# Patient Record
Sex: FEMALE | Race: WHITE | NOT HISPANIC OR LATINO | Employment: FULL TIME | URBAN - METROPOLITAN AREA
[De-identification: names, ages, dates, MRNs, and addresses within clinical notes are randomized per-mention and may not be internally consistent; named-entity substitution may affect disease eponyms.]

---

## 2017-02-21 DIAGNOSIS — J31.0 CHRONIC RHINITIS: ICD-10-CM

## 2017-02-21 RX ORDER — AZELASTINE 1 MG/ML
1 SPRAY, METERED NASAL 2 TIMES DAILY
Qty: 3 BOTTLE | Refills: 3 | Status: CANCELLED | OUTPATIENT
Start: 2017-02-21

## 2017-02-21 NOTE — TELEPHONE ENCOUNTER
Refills available - called  [place] -   1. Refills: yes    OK to cancel request    Closing encounter - no further actions needed at this time    Krissy Rivero RN

## 2017-02-21 NOTE — TELEPHONE ENCOUNTER
Azelastine 0.1% (137MCG) Nasal-200SP      Last Written Prescription Date: 12/29/16  Last Fill Quantity: 3,  # refills: 3   Last Office Visit with G, [facility] or [facility] prescribing provider: 11/10/16

## 2017-04-12 ENCOUNTER — TELEPHONE (OUTPATIENT)
Dept: FAMILY MEDICINE | Facility: CLINIC | Age: 57
End: 2017-04-12

## 2017-04-12 NOTE — TELEPHONE ENCOUNTER
Spoke with pt, she does still have refills available, explained the refill process and how sometimes refills can look different depending on protocol of clinic, she verbalized understanding  She will call her pharmacy when needs refills  Delmis Fallon RN

## 2017-04-12 NOTE — TELEPHONE ENCOUNTER
Pt is requesting fills of her nasal spray azelastine (ASTELIN) 0.1 % spray to be a fill of three each time rather than one    Pt can be reached @ [phone] ketan

## 2017-05-02 ENCOUNTER — RADIANT APPOINTMENT (OUTPATIENT)
Dept: MAMMOGRAPHY | Facility: CLINIC | Age: 57
End: 2017-05-02
Attending: FAMILY MEDICINE
Payer: COMMERCIAL

## 2017-05-02 DIAGNOSIS — Z12.31 VISIT FOR SCREENING MAMMOGRAM: ICD-10-CM

## 2017-05-02 PROCEDURE — G0202 SCR MAMMO BI INCL CAD: HCPCS

## 2017-05-10 ENCOUNTER — TELEPHONE (OUTPATIENT)
Dept: PODIATRY | Facility: CLINIC | Age: 57
End: 2017-05-10

## 2017-05-10 NOTE — TELEPHONE ENCOUNTER
Reason for Call:  Other call back    Detailed comments: Patient lost her after visit summary and would like to know what the next steps are. She has tried a few things and have seen little change. For example, she's purchased 3 new foot supports and has been exercising. Does she need better support for her foot? Please advise, thank you!    Phone Number Patient can be reached at: Home number on file [phone] (home)    Best Time: anytime    Can we leave a detailed message on this number? YES    Call taken on 5/10/2017 at 12:24 PM by Tiny Bustamante

## 2017-05-10 NOTE — TELEPHONE ENCOUNTER
Called and LVM advising she follow up in clinic since we have not seen her since July 2016. I sent her AVS from her last visit on my chart again and asked that she read it over then call to make a follow up appointment.    Kerry Whyte CMA ([facility])  Podiatry/Foot & Ankle Surgery  [facility]

## 2017-06-01 ENCOUNTER — OFFICE VISIT (OUTPATIENT)
Dept: PODIATRY | Facility: CLINIC | Age: 57
End: 2017-06-01
Payer: COMMERCIAL

## 2017-06-01 VITALS — WEIGHT: 133 LBS | HEART RATE: 68 BPM | BODY MASS INDEX: 20.16 KG/M2 | HEIGHT: 68 IN

## 2017-06-01 DIAGNOSIS — M72.2 PLANTAR FASCIAL FIBROMATOSIS: Primary | ICD-10-CM

## 2017-06-01 DIAGNOSIS — M79.671 PAIN OF BOTH HEELS: ICD-10-CM

## 2017-06-01 DIAGNOSIS — M79.672 PAIN OF BOTH HEELS: ICD-10-CM

## 2017-06-01 DIAGNOSIS — M21.6X9 PES CAVUS, UNSPECIFIED LATERALITY: ICD-10-CM

## 2017-06-01 PROCEDURE — 99213 OFFICE O/P EST LOW 20 MIN: CPT | Performed by: PODIATRIST

## 2017-06-01 NOTE — NURSING NOTE
"Chief Complaint   Patient presents with     RECHECK     BL plantar foot pain, last seen 7/2016 for same thing, says she followed the AVS instructions with no change       Initial Pulse 68  Ht 5' 8\" (1.727 m)  Wt 133 lb (60.3 kg)  LMP 04/06/2010  BMI 20.22 kg/m2 Estimated body mass index is 20.22 kg/(m^2) as calculated from the following:    Height as of this encounter: 5' 8\" (1.727 m).    Weight as of this encounter: 133 lb (60.3 kg).  Medication Reconciliation: complete  "

## 2017-06-01 NOTE — MR AVS SNAPSHOT
After Visit Summary   2017    Beronica Azevedo    MRN: [id number]           Patient Information     Date Of Birth          1960        Visit Information        Provider Department      2017 8:45 AM Gibran Keith DPM [place]        Care Instructions    DR. KEITH'S SCHEDULE:        MONDAY / FRIDAY AM - [place] WEDNES[place] - [place]   [address] [place] [address] [place] [address]   P: [phone] P: [phone]   F: [phone] F:[phone]       TUESDAY - SURGERY THURSDAY - [place]   [phone] [address] [place] [address]   TO SCHEDULE AN APPT CALL: P: [phone] F: [phone]     FYI: Our schedule at [place] on Wednesday is from 7 AM - 2 PM.    [facility] LOCATIONS  [facility]  [address]  [place] [address]  Phone: [phone]  Fax: [phone] [facility]  [address] [place] [address] [address]  [place] [address]  Phone: [phone]   Fax: [phone]   [facility]  [address]  [place] [address]  Phone: [phone]  Fax: [phone] [facility]  [address]  [place] [address]  Phone: [phone]   Fax: [phone]   [facility]   [address]  [place] [address]  Phone: [phone]   Fax: [phone] * Please call any location listed to make an appointment for a casting/fitting. Your referral was sent to their central office and they will all have the order on file.         BODY MASS INDEX (BMI)  Many things can cause foot and ankle problems. Foot structure, activity level, foot mechanics and injuries are common causes of pain.    One very important issue that often goes unmentioned, is body weight.  Extra weight can cause increased stress on muscles, ligaments, bones and tendons. Sometimes just a few extra pounds  is all it takes to put one over her/his threshold. Without reducing that stress, it can be difficult to alleviate pain.      Some people are uncomfortable addressing this issue, but we feel it is important for you to think about it. As Foot & Ankle specialists, our job is addressing the lower extremity problem and possible causes.     Regarding extra body weight, we encourage patients to discuss diet and weight management plans with their primary care doctors. It is this team approach that gives you the best opportunity for pain relief and getting you back on your feet.                Follow-ups after your visit        Your next 10 appointments already scheduled     Jun 06, 2017  1:30 PM CDT   PHYSICAL with CRAIG Abad CNP   [facility] ([facility]    [address] [place] [address] [phone]            Aug 01, 2017  9:15 AM CDT   (Arrive by 9:00 AM)   Return Visit with John Antunez MD   [facility] ([facility] [place])    [address]  3rd [address]  [facility] [address] [phone]              Who to contact     If you have questions or need follow up information about today's clinic visit or your schedule please contact [facility] directly at [phone].  Normal or non-critical lab and imaging results will be communicated to you by [employer], letter or phone within 4 business days after the clinic has received the results. If you do not hear from us within 7 days, please contact the clinic through [employer] or phone. If you have a critical or abnormal lab result, we will notify you by phone as soon as possible.  Submit refill requests through [employer] or call your pharmacy and they will forward the refill request to us. Please allow 3 business days for your refill to be completed.          Additional Information About Your Visit        [employer] Information     [employer] gives you  "secure access to your electronic health record. If you see a primary care provider, you can also send messages to your care team and make appointments. If you have questions, please call your primary care clinic.  If you do not have a primary care provider, please call [phone] and they will assist you.        Care EveryWhere ID     This is your Care EveryWhere ID. This could be used by other organizations to access your [place] medical records  [id number]        Your Vitals Were     Pulse Height Last Period BMI (Body Mass Index)          68 5' 8\" (1.727 m) 04/06/2010 20.22 kg/m2         Blood Pressure from Last 3 Encounters:   11/10/16 104/56   10/30/16 98/60   07/06/16 98/66    Weight from Last 3 Encounters:   06/01/17 133 lb (60.3 kg)   11/10/16 133 lb 6.4 oz (60.5 kg)   10/30/16 130 lb (59 kg)              Today, you had the following     No orders found for display       Primary Care Provider Office Phone # Fax #    Joaquin Sarthak Rowe [phone]       [facility] [address] [address]        Thank you!     Thank you for choosing [facility]  for your care. Our goal is always to provide you with excellent care. Hearing back from our patients is one way we can continue to improve our services. Please take a few minutes to complete the written survey that you may receive in the mail after your visit with us. Thank you!             Your Updated Medication List - Protect others around you: Learn how to safely use, store and throw away your medicines at www.disposemymeds.org.          This list is accurate as of: 6/1/17  9:07 AM.  Always use your most recent med list.                   Brand Name Dispense Instructions for use    ALPRAZolam 0.5 MG tablet    XANAX     Take 0.5 mg by mouth At Bedtime       azelastine 0.1 % spray    ASTELIN    3 Bottle    Spray 1 spray in nostril 2 times daily       BENADRYL ALLERGY PO      prn       benzonatate " 200 MG capsule    TESSALON    21 capsule    Take 1 capsule (200 mg) by mouth 3 times daily as needed for cough       biotin 2.5 mg/mL Susp      Take by mouth daily       BUTALBITAL-APAP-CAFFEINE PO      Take  by mouth as needed.       cholecalciferol 1000 UNIT tablet    vitamin D    100 tablet    Take 2 tablets (2,000 Units) by mouth daily       IRON SUPPLEMENT PO          MULTIVITAMIN & MINERAL PO      Take  by mouth.       NORTRIPTYLINE HCL PO      Take 50 mg by mouth At Bedtime       traZODone 50 MG tablet    DESYREL    15 Tab    1/2 TABLET AT BEDTIME AS NEEDED FOR SLEEP

## 2017-06-01 NOTE — LETTER
"  2017       RE: Beronica Azevedo  [address]  [facility] [address]           Dear Colleague,    Thank you for referring your patient, Beronica Azevedo, to the [facility]. Please see a copy of my visit note below.    ASSESSMENT/PLAN:    Encounter Diagnoses   Name Primary?     Plantar fascial fibromatosis, bilateral Yes     Pain of both heels      Pes cavus, unspecified laterality      Pt educated about the cause and nature of heel pain.  Treatment plan discussed includes icing, calf and plantar fascial stretching, avoidance of barefoot walking, wearing sturdy, supportive athletic-type shoes, and activity modification.  Educational handouts provided.    I think that the areas we can improve on include arch supports and stiffer soled shoes.    Pt is referred to the [facility] for prescription orthoses.      I also recommended PT as a future option.    Given that she describes the pain as \"shooting,\" the medial calcaneal nerve is considered.      Body mass index is 20.22 kg/(m^2).        Gibran Leigh DPM, FACFAS, MS    [place] Department of Podiatry/Foot & Ankle Surgery      ____________________________________________________________________    HPI:         Chief Complaint: ongoing bilateral heel and arch pain.  I treated her for this last year.  Onset of problem: 1 year  Pain/ discomfort is described as:  shooting  Ratin/10   Frequency:  daily    The pain is made worse with standing, walking  Previous treatment: ice, stretching, rolling a ball on bottom  She tried several pair of over the counter inserts. She is not convinced that they helped.      *  Past Medical History:   Diagnosis Date     Anxiety      Cervical high risk HPV (human papillomavirus) test positive 3/4/16    3/18/16 colp - ECC - negative     BEVERLY II (cervical intraepithelial neoplasia II)     LEEP     Depression      Dry eye syndrome      Frequent UTI 2009     Headache(784.0)      IBS " "(irritable bowel syndrome)      Insomnia      Lattice degeneration of retina      Lyme disease      Myopic astigmatism      Nonsenile cataract      Osteopenia 2001,2012    Fosamax     Pneumothorax on right     at 17 y/o     PVD (posterior vitreous detachment), both eyes 7/19/10     Rosacea      Vasomotor rhinitis    *  *  Past Surgical History:   Procedure Laterality Date     CHEST TUBE INSERTION       DILATION AND CURETTAGE, OPERATIVE HYSTEROSCOPY WITH MORCELLATOR, COMBINED  1/2011    endometrial polyp     LEEP TX, CERVICAL  8/2009    BEVERLY II, clear margins   *  *  Current Outpatient Prescriptions   Medication Sig Dispense Refill     azelastine (ASTELIN) 0.1 % spray Spray 1 spray in nostril 2 times daily 3 Bottle 3     benzonatate (TESSALON) 200 MG capsule Take 1 capsule (200 mg) by mouth 3 times daily as needed for cough 21 capsule 0     Ferrous Sulfate (IRON SUPPLEMENT PO)        biotin 2.5 mg/mL Take by mouth daily       cholecalciferol (VITAMIN D) 1000 UNIT tablet Take 2 tablets (2,000 Units) by mouth daily 100 tablet 3     NORTRIPTYLINE HCL PO Take 50 mg by mouth At Bedtime        BUTALBITAL-APAP-CAFFEINE PO Take  by mouth as needed.       alprazolam (XANAX) 0.5 MG tablet Take 0.5 mg by mouth At Bedtime        Multiple Vitamins-Minerals (MULTIVITAMIN & MINERAL PO) Take  by mouth.       BENADRYL ALLERGY PO prn       TRAZODONE HCL 50 MG OR TABS 1/2 TABLET AT BEDTIME AS NEEDED FOR SLEEP 15 Tab 0       EXAM:    Vitals: Pulse 68  Ht 5' 8\" (1.727 m)  Wt 133 lb (60.3 kg)  LMP 04/06/2010  BMI 20.22 kg/m2  BMI: Body mass index is 20.22 kg/(m^2).  Height: 5' 8\"    Constitutional/ general:  Pt is in no apparent distress, appears well-nourished.  Cooperative with history and physical exam.     Vascular:  Pedal pulses are palpable bilaterally for both the DP and PT arteries.  CFT < 3 sec.  No edema.  Pedal hair growth noted.     Neuro:  Alert and oriented x 3. Coordinated gait.  Light touch sensation is intact to the " L4, L5, S1 distributions. No obvious deficits.  No evidence of neurological-based weakness, spasticity, or contracture in the lower extremities.     Derm: Normal texture and turgor.  No erythema, ecchymosis, or cyanosis.  No open lesions.     Musculoskeletal:    Lower extremity muscle strength is normal.  Patient is ambulatory without an assistive device or brace .  No gross deformities.  High medial longitudinal arches.  Pain on palpation to the plantar medial aspect of the bilateral heel.  No pain with side-to-side compression of the heel.        Gibran Leigh DPM, FACFAS, MS    [place] Department of Podiatry/Foot & Ankle Surgery              Again, thank you for allowing me to participate in the care of your patient.        Sincerely,              Gibran Leigh DPM

## 2017-06-01 NOTE — PATIENT INSTRUCTIONS
DR. KEITH'S SCHEDULE:        MONDAY / FRIDAY AM - [place] WEDNESDAY - [place]   [address] [place] [address] [place] [address]   P: [phone] P: [phone]   F: [phone] F:[phone]       TUESDAY - SURGERY THURSDAY - [place]   [phone] [address] [place] [address]   TO SCHEDULE AN APPT CALL: P: [phone] F: [phone]     FYI: Our schedule at [place] on Wednesday is from 7 AM - 2 PM.    [facility] LOCATIONS  [facility]  [address]  [place] [address]  Phone: [phone]  Fax: [phone] [facility]  [address]  [place] [address]  Phone: [phone]   Fax: [phone]   [facility]  [address]  [place] [address]  Phone: [phone]  Fax: [phone] [facility]  [address]  [place] [address]  Phone: [phone]   Fax: [phone]   [facility]   [address]  [place] [address]  Phone: [phone]   Fax: [phone] * Please call any location listed to make an appointment for a casting/fitting. Your referral was sent to their central office and they will all have the order on file.         BODY MASS INDEX (BMI)  Many things can cause foot and ankle problems. Foot structure, activity level, foot mechanics and injuries are common causes of pain.    One very important issue that often goes unmentioned, is body weight.  Extra weight can cause increased stress on muscles, ligaments, bones and tendons. Sometimes just a few extra pounds is all it takes to put one over her/his threshold. Without reducing that stress, it can be difficult to alleviate pain.      Some people are uncomfortable addressing this issue, but we feel it is important for you to think about it. As Foot & Ankle specialists, our job is addressing the lower  extremity problem and possible causes.     Regarding extra body weight, we encourage patients to discuss diet and weight management plans with their primary care doctors. It is this team approach that gives you the best opportunity for pain relief and getting you back on your feet.

## 2017-06-01 NOTE — PROGRESS NOTES
"ASSESSMENT/PLAN:    Encounter Diagnoses   Name Primary?     Plantar fascial fibromatosis, bilateral Yes     Pain of both heels      Pes cavus, unspecified laterality      Pt educated about the cause and nature of heel pain.  Treatment plan discussed includes icing, calf and plantar fascial stretching, avoidance of barefoot walking, wearing sturdy, supportive athletic-type shoes, and activity modification.  Educational handouts provided.    I think that the areas we can improve on include arch supports and stiffer soled shoes.    Pt is referred to the [facility] for prescription orthoses.      I also recommended PT as a future option.    Given that she describes the pain as \"shooting,\" the medial calcaneal nerve is considered.      Body mass index is 20.22 kg/(m^2).        Gibran Leigh DPM, FACFAS, MS    [place] [facility] of Podiatry/Foot & Ankle Surgery      ____________________________________________________________________    HPI:         Chief Complaint: ongoing bilateral heel and arch pain.  I treated her for this last year.  Onset of problem: 1 year  Pain/ discomfort is described as:  shooting  Ratin/10   Frequency:  daily    The pain is made worse with standing, walking  Previous treatment: ice, stretching, rolling a ball on bottom  She tried several pair of over the counter inserts. She is not convinced that they helped.      *  Past Medical History:   Diagnosis Date     Anxiety      Cervical high risk HPV (human papillomavirus) test positive 3/4/16    3/18/16 colp - ECC - negative     BEVERLY II (cervical intraepithelial neoplasia II) 2009    LEEP     Depression      Dry eye syndrome      Frequent UTI 2009     Headache(784.0)      IBS (irritable bowel syndrome)      Insomnia      Lattice degeneration of retina      Lyme disease      Myopic astigmatism      Nonsenile cataract      Osteopenia ,    Fosamax     Pneumothorax on right     at 15 y/o     PVD (posterior " "vitreous detachment), both eyes 7/19/10     Rosacea      Vasomotor rhinitis    *  *  Past Surgical History:   Procedure Laterality Date     CHEST TUBE INSERTION       DILATION AND CURETTAGE, OPERATIVE HYSTEROSCOPY WITH MORCELLATOR, COMBINED  1/2011    endometrial polyp     LEEP TX, CERVICAL  8/2009    BEVERLY II, clear margins   *  *  Current Outpatient Prescriptions   Medication Sig Dispense Refill     azelastine (ASTELIN) 0.1 % spray Spray 1 spray in nostril 2 times daily 3 Bottle 3     benzonatate (TESSALON) 200 MG capsule Take 1 capsule (200 mg) by mouth 3 times daily as needed for cough 21 capsule 0     Ferrous Sulfate (IRON SUPPLEMENT PO)        biotin 2.5 mg/mL Take by mouth daily       cholecalciferol (VITAMIN D) 1000 UNIT tablet Take 2 tablets (2,000 Units) by mouth daily 100 tablet 3     NORTRIPTYLINE HCL PO Take 50 mg by mouth At Bedtime        BUTALBITAL-APAP-CAFFEINE PO Take  by mouth as needed.       alprazolam (XANAX) 0.5 MG tablet Take 0.5 mg by mouth At Bedtime        Multiple Vitamins-Minerals (MULTIVITAMIN & MINERAL PO) Take  by mouth.       BENADRYL ALLERGY PO prn       TRAZODONE HCL 50 MG OR TABS 1/2 TABLET AT BEDTIME AS NEEDED FOR SLEEP 15 Tab 0       EXAM:    Vitals: Pulse 68  Ht 5' 8\" (1.727 m)  Wt 133 lb (60.3 kg)  LMP 04/06/2010  BMI 20.22 kg/m2  BMI: Body mass index is 20.22 kg/(m^2).  Height: 5' 8\"    Constitutional/ general:  Pt is in no apparent distress, appears well-nourished.  Cooperative with history and physical exam.     Vascular:  Pedal pulses are palpable bilaterally for both the DP and PT arteries.  CFT < 3 sec.  No edema.  Pedal hair growth noted.     Neuro:  Alert and oriented x 3. Coordinated gait.  Light touch sensation is intact to the L4, L5, S1 distributions. No obvious deficits.  No evidence of neurological-based weakness, spasticity, or contracture in the lower extremities.     Derm: Normal texture and turgor.  No erythema, ecchymosis, or cyanosis.  No open lesions. "     Musculoskeletal:    Lower extremity muscle strength is normal.  Patient is ambulatory without an assistive device or brace .  No gross deformities.  High medial longitudinal arches.  Pain on palpation to the plantar medial aspect of the bilateral heel.  No pain with side-to-side compression of the heel.        Gibran Leigh DPM, FACFAS, MS    [place] Department of Podiatry/Foot & Ankle Surgery

## 2017-06-03 ENCOUNTER — HEALTH MAINTENANCE LETTER (OUTPATIENT)
Age: 57
End: 2017-06-03

## 2017-06-06 ENCOUNTER — OFFICE VISIT (OUTPATIENT)
Dept: FAMILY MEDICINE | Facility: CLINIC | Age: 57
End: 2017-06-06
Payer: COMMERCIAL

## 2017-06-06 VITALS
SYSTOLIC BLOOD PRESSURE: 100 MMHG | WEIGHT: 135.3 LBS | TEMPERATURE: 98.1 F | BODY MASS INDEX: 20.57 KG/M2 | DIASTOLIC BLOOD PRESSURE: 68 MMHG | OXYGEN SATURATION: 98 % | HEART RATE: 92 BPM

## 2017-06-06 DIAGNOSIS — Z11.59 NEED FOR HEPATITIS C SCREENING TEST: ICD-10-CM

## 2017-06-06 DIAGNOSIS — M25.561 ACUTE PAIN OF RIGHT KNEE: ICD-10-CM

## 2017-06-06 DIAGNOSIS — Z00.01 ENCOUNTER FOR ROUTINE ADULT MEDICAL EXAM WITH ABNORMAL FINDINGS: ICD-10-CM

## 2017-06-06 DIAGNOSIS — Z12.4 SCREENING FOR MALIGNANT NEOPLASM OF CERVIX: ICD-10-CM

## 2017-06-06 DIAGNOSIS — Z00.00 ROUTINE GENERAL MEDICAL EXAMINATION AT A HEALTH CARE FACILITY: ICD-10-CM

## 2017-06-06 DIAGNOSIS — Z13.220 SCREENING CHOLESTEROL LEVEL: ICD-10-CM

## 2017-06-06 DIAGNOSIS — F41.1 GAD (GENERALIZED ANXIETY DISORDER): Primary | ICD-10-CM

## 2017-06-06 PROCEDURE — 87624 HPV HI-RISK TYP POOLED RSLT: CPT | Performed by: NURSE PRACTITIONER

## 2017-06-06 PROCEDURE — 99396 PREV VISIT EST AGE 40-64: CPT | Performed by: NURSE PRACTITIONER

## 2017-06-06 PROCEDURE — G0145 SCR C/V CYTO,THINLAYER,RESCR: HCPCS | Performed by: NURSE PRACTITIONER

## 2017-06-06 NOTE — PROGRESS NOTES
SUBJECTIVE:     CC: Beronica Azevedo is an 57 year old woman who presents for preventive health visit.     Healthy Habits:    Do you get at least three servings of calcium containing foods daily (dairy, green leafy vegetables, etc.)? yes    Amount of exercise or daily activities, outside of work: 7 day(s) per week    Problems taking medications regularly No    Medication side effects: No    Have you had an eye exam in the past two years? yes    Do you see a dentist twice per year? yes    Do you have sleep apnea, excessive snoring or daytime drowsiness?no    Questions/Concerns: right knee pain,- ongoing several months; still able to do normal activities  spasms in upper chest; notices particularly when feeling anxious- normal exercise tolerance, no shortness of breath, no ongoing chest pain or palpitations     Today's PHQ-2 Score:   PHQ-2 ( 1999 Pf[employer]) 2/23/2018 1/30/2018   Q1: Little interest or pleasure in doing things 0 0   Q2: Feeling down, depressed or hopeless 0 1   PHQ-2 Score 0 1       Abuse: Current or Past(Physical, Sexual or Emotional)- No  Do you feel safe in your environment - Yes    Social History   Substance Use Topics     Smoking status: Never Smoker     Smokeless tobacco: Never Used     Alcohol use Yes      Comment: 1 8oz mixed drink or beer     The patient does not drink >3 drinks per day nor >7 drinks per week.    Recent Labs   Lab Test  05/18/15   0737  09/10/12   1600   CHOL  166  205*   HDL  63  78   LDL  91  111   TRIG  60  82   CHOLHDLRATIO  2.6  2.6       Reviewed orders with patient.  Reviewed health maintenance and updated orders accordingly - Yes    Mammo Decision Support:Every 2 years    Pertinent mammograms are reviewed under the imaging tab.  History of abnormal Pap smear: NO - age 30- 65 PAP every 3 years recommended    Reviewed and updated as needed this visit by clinical staff  Tobacco  Allergies  Meds  Med Hx  Surg Hx  Fam Hx  Soc Hx        Reviewed and updated as needed  this visit by Provider            ROS:  CONSTITUTIONAL: NEGATIVE for fever, chills, change in weight  INTEGUMENTARU/SKIN: NEGATIVE for worrisome rashes, moles or lesions  EYES: NEGATIVE for vision changes or irritation  ENT: NEGATIVE for ear, mouth and throat problems  RESP: NEGATIVE for significant cough or SOB  BREAST: NEGATIVE for masses, tenderness or discharge  CV: NEGATIVE for chest pain, palpitations or peripheral edema  GI: NEGATIVE for nausea, abdominal pain, heartburn, or change in bowel habits  : NEGATIVE for unusual urinary or vaginal symptoms. Periods are regular.    NEURO: NEGATIVE for weakness, dizziness or paresthesias  PSYCHIATRIC: NEGATIVE for changes in mood or affect    Labs reviewed in EPIC  OBJECTIVE:     /68 (BP Location: Right arm, Patient Position: Chair, Cuff Size: Adult Regular)  Pulse 92  Temp 98.1  F (36.7  C) (Oral)  Wt 135 lb 4.8 oz (61.4 kg)  LMP 04/06/2010  SpO2 98%  BMI 20.57 kg/m2  EXAM:  GENERAL: healthy, alert and no distress  EYES: Eyes grossly normal to inspection, PERRL and conjunctivae and sclerae normal  HENT: ear canals and TM's normal, nose and mouth without ulcers or lesions  NECK: no adenopathy, no asymmetry, masses, or scars and thyroid normal to palpation  RESP: lungs clear to auscultation - no rales, rhonchi or wheezes  BREAST: normal without masses, tenderness or nipple discharge and no palpable axillary masses or adenopathy  : normal vulva; cervix within normal lmits  CV: regular rate and rhythm, normal S1 S2, no S3 or S4, no murmur, click or rub, no peripheral edema and peripheral pulses strong  ABDOMEN: soft, nontender, no hepatosplenomegaly, no masses and bowel sounds normal  MS: no gross musculoskeletal defects noted, no edema; both knees within normal limits; no crepitus with flexion of jointxity    ASSESSMENT/PLAN:         ICD-10-CM    1. FREDIS (generalized anxiety disorder) F41.1 MENTAL HEALTH REFERRAL   2. Screening for malignant neoplasm of  "cervix Z12.4 Pap imaged thin layer screen with HPV - recommended age 30 - 65 years (select HPV order below)     HPV High Risk Types DNA Cervical   3. Need for hepatitis C screening test Z11.59    4. Routine general medical examination at a health care facility Z00.00    5. Encounter for routine adult medical exam with abnormal findings Z00.01    6. Acute pain of right knee M25.561 PHYSICAL THERAPY REFERRAL   7. Screening cholesterol level Z13.220 Lipid Profile       COUNSELING:   Reviewed preventive health counseling, as reflected in patient instructions         reports that she has never smoked. She has never used smokeless tobacco.    Estimated body mass index is 20.57 kg/(m^2) as calculated from the following:    Height as of 6/1/17: 5' 8\" (1.727 m).    Weight as of this encounter: 135 lb 4.8 oz (61.4 kg).       Counseling Resources:  ATP IV Guidelines  Pooled Cohorts Equation Calculator  Breast Cancer Risk Calculator  FRAX Risk Assessment  ICSI Preventive Guidelines  Dietary Guidelines for Americans, 2010  USDA's MyPlate  ASA Prophylaxis  Lung CA Screening    CRAIG Abad CNP  [facility]  "

## 2017-06-06 NOTE — MR AVS SNAPSHOT
After Visit Summary   6/6/2017    Beronica Azevedo    MRN: [id number]           Patient Information     Date Of Birth          1960        Visit Information        Provider Department      6/6/2017 1:30 PM Ellen Rankin APRN [facility]        Today's Diagnoses     FREDIS (generalized anxiety disorder)    -  1    Screening for malignant neoplasm of cervix        Need for hepatitis C screening test        Routine general medical examination at a health care facility        Encounter for routine adult medical exam with abnormal findings        Acute pain of right knee        Screening cholesterol level          Care Instructions      Preventive Health Recommendations  Female Ages 50 - 64    Yearly exam: See your health care provider every year in order to  o Review health changes.   o Discuss preventive care.    o Review your medicines if your doctor has prescribed any.      Get a Pap test every three years (unless you have an abnormal result and your provider advises testing more often).    If you get Pap tests with HPV test, you only need to test every 5 years, unless you have an abnormal result.     You do not need a Pap test if your uterus was removed (hysterectomy) and you have not had cancer.    You should be tested each year for STDs (sexually transmitted diseases) if you're at risk.     Have a mammogram every 1 to 2 years.    Have a colonoscopy at age 50, or have a yearly FIT test (stool test). These exams screen for colon cancer.      Have a cholesterol test every 5 years, or more often if advised.    Have a diabetes test (fasting glucose) every three years. If you are at risk for diabetes, you should have this test more often.     If you are at risk for osteoporosis (brittle bone disease), think about having a bone density scan (DEXA).    Shots: Get a flu shot each year. Get a tetanus shot every 10 years.    Nutrition:     Eat at least 5 servings of fruits and  vegetables each day.    Eat whole-grain bread, whole-wheat pasta and brown rice instead of white grains and rice.    Talk to your provider about Calcium and Vitamin D.     Lifestyle    Exercise at least 150 minutes a week (30 minutes a day, 5 days a week). This will help you control your weight and prevent disease.    Limit alcohol to one drink per day.    No smoking.     Wear sunscreen to prevent skin cancer.     See your dentist every six months for an exam and cleaning.    See your eye doctor every 1 to 2 years.            Follow-ups after your visit        Additional Services     MENTAL HEALTH REFERRAL       Your provider has referred you to:  Psychiatry scheduling; Behavioral Healthcare Providers Intake Scheduling [phone]  [email or web address]    All scheduling is subject to the client's specific insurance plan & benefits, provider/location availability, and provider clinical specialities.  Please arrive 15 minutes early for your first appointment and bring your completed paperwork.    Please be aware that coverage of these services is subject to the terms and limitations of your health insurance plan.  Call member services at your health plan with any benefit or coverage questions.            PHYSICAL THERAPY REFERRAL       *This therapy referral will be filtered to a centralized scheduling office at [facility] and the patient will receive a call to schedule an appointment at a [place] location most convenient for them. *     [facility] provides Physical Therapy evaluation and treatment and many specialty services across the [place] system.  If requesting a specialty program, please choose from the list below.    If you have not heard from the scheduling office within 2 business days, please call [phone] for all locations, with the exception of [place], please call [phone].  Treatment: Evaluation & Treatment  Special Instructions/Modalities:  "  Special Programs: None    Please be aware that coverage of these services is subject to the terms and limitations of your health insurance plan.  Call member services at your health plan with any benefit or coverage questions.      **Note to Provider:  If you are referring outside of [place] for the therapy appointment, please list the name of the location in the \"special instructions\" above, print the referral and give to the patient to schedule the appointment.                  Your next 10 appointments already scheduled     Aug 01, 2017  9:15 AM CDT   (Arrive by 9:00 AM)   Return Visit with John Antunez MD   [facility] ([facility] [place])    [address] [place] [address]  3rd Floor  [facility] [address] [phone]              Future tests that were ordered for you today     Open Future Orders        Priority Expected Expires Ordered    Lipid Profile Routine  6/6/2018 6/6/2017            Who to contact     If you have questions or need follow up information about today's clinic visit or your schedule please contact [facility] directly at [phone].  Normal or non-critical lab and imaging results will be communicated to you by MyChart, letter or phone within 4 business days after the clinic has received the results. If you do not hear from us within 7 days, please contact the clinic through [employer] or phone. If you have a critical or abnormal lab result, we will notify you by phone as soon as possible.  Submit refill requests through [employer] or call your pharmacy and they will forward the refill request to us. Please allow 3 business days for your refill to be completed.          Additional Information About Your Visit        [employer] Information     [employer] gives you secure access to your electronic health record. If you see a primary care provider, you can also send messages to your care team and make appointments. If you have questions, please call your " primary care clinic.  If you do not have a primary care provider, please call [phone] and they will assist you.        Care EveryWhere ID     This is your Care EveryWhere ID. This could be used by other organizations to access your [place] medical records  [id number]        Your Vitals Were     Pulse Temperature Last Period Pulse Oximetry BMI (Body Mass Index)       92 98.1  F (36.7  C) (Oral) 04/06/2010 98% 20.57 kg/m2        Blood Pressure from Last 3 Encounters:   06/06/17 100/68   11/10/16 104/56   10/30/16 98/60    Weight from Last 3 Encounters:   06/06/17 135 lb 4.8 oz (61.4 kg)   06/01/17 133 lb (60.3 kg)   11/10/16 133 lb 6.4 oz (60.5 kg)              We Performed the Following     DEPRESSION ACTION PLAN (DAP)     HPV High Risk Types DNA Cervical     MENTAL HEALTH REFERRAL     Pap imaged thin layer screen with HPV - recommended age 30 - 65 years (select HPV order below)     PHYSICAL THERAPY REFERRAL        Primary Care Provider Office Phone # Fax #    Joaquin Rowe [phone]       [facility] [address] [place] [address] [address]  [facility] [address]        Thank you!     Thank you for choosing [facility]  for your care. Our goal is always to provide you with excellent care. Hearing back from our patients is one way we can continue to improve our services. Please take a few minutes to complete the written survey that you may receive in the mail after your visit with us. Thank you!             Your Updated Medication List - Protect others around you: Learn how to safely use, store and throw away your medicines at www.disposemymeds.org.          This list is accurate as of: 6/6/17  2:32 PM.  Always use your most recent med list.                   Brand Name Dispense Instructions for use    ALPRAZolam 0.5 MG tablet    XANAX     Take 0.5 mg by mouth At Bedtime       azelastine 0.1 % spray    ASTELIN    3 Bottle    Spray 1 spray in nostril 2 times daily        BENADRYL ALLERGY PO      prn       benzonatate 200 MG capsule    TESSALON    21 capsule    Take 1 capsule (200 mg) by mouth 3 times daily as needed for cough       biotin 2.5 mg/mL Susp      Take by mouth daily       BUTALBITAL-APAP-CAFFEINE PO      Take  by mouth as needed.       cholecalciferol 1000 UNIT tablet    vitamin D    100 tablet    Take 2 tablets (2,000 Units) by mouth daily       IRON SUPPLEMENT PO          MULTIVITAMIN & MINERAL PO      Take  by mouth.       NORTRIPTYLINE HCL PO      Take 50 mg by mouth At Bedtime       traZODone 50 MG tablet    DESYREL    15 Tab    1/2 TABLET AT BEDTIME AS NEEDED FOR SLEEP

## 2017-06-06 NOTE — NURSING NOTE
"Chief Complaint   Patient presents with     Physical       Initial /68 (BP Location: Right arm, Patient Position: Chair, Cuff Size: Adult Regular)  Pulse 92  Temp 98.1  F (36.7  C) (Oral)  Wt 135 lb 4.8 oz (61.4 kg)  LMP 04/06/2010  SpO2 98%  BMI 20.57 kg/m2 Estimated body mass index is 20.57 kg/(m^2) as calculated from the following:    Height as of 6/1/17: 5' 8\" (1.727 m).    Weight as of this encounter: 135 lb 4.8 oz (61.4 kg).  Medication Reconciliation: complete     Nikunj Bustamante MA      "

## 2017-06-07 ASSESSMENT — PATIENT HEALTH QUESTIONNAIRE - PHQ9: SUM OF ALL RESPONSES TO PHQ QUESTIONS 1-9: 3

## 2017-06-09 LAB
COPATH REPORT: NORMAL
PAP: NORMAL

## 2017-06-12 LAB
FINAL DIAGNOSIS: NORMAL
HPV HR 12 DNA CVX QL NAA+PROBE: NEGATIVE
HPV16 DNA SPEC QL NAA+PROBE: NEGATIVE
HPV18 DNA SPEC QL NAA+PROBE: NEGATIVE
SPECIMEN DESCRIPTION: NORMAL

## 2017-06-14 ENCOUNTER — TELEPHONE (OUTPATIENT)
Dept: FAMILY MEDICINE | Facility: CLINIC | Age: 57
End: 2017-06-14

## 2017-06-14 DIAGNOSIS — F41.1 GAD (GENERALIZED ANXIETY DISORDER): Primary | ICD-10-CM

## 2017-06-14 NOTE — TELEPHONE ENCOUNTER
"----- Message from Yasmine Ortiz sent at 6/14/2017 12:58 PM CDT -----  Regarding: MENTAL HEALTH ORDER   Hello, the patient called [facility] today interested in scheduling psychiatry through [place] as her current psychiatrist is out of network for her insurance. I informed her to do this it requires a specific referral to be placed. I let her know this would need to be approved and placed by you before [facility] will schedule.     Our providers practice as a part of our Collaborative Care Psychiatry Service (CCPS).  Based on findings in the initial psychiatric evaluation, your patient will be seen for one or two follow-up medication management visits and returned to your care with recommendations for ongoing medication management.    Due to this collaboration, we do require the specific mental health referral, to ensure the provider understands, and agrees to have the patient return to their ongoing care.    You will be notified when psychiatry directs the patient back to you for ongoing care and all psychiatric medications will need to be prescribed by you, the referring provider. The psychiatric provider will no longer provide new prescriptions or process refills.    If this care model sounds appropriate for this client, please add that order into Epic. When the order is entered, please direct your patient to call our intake office at [phone] to schedule an initial appointment. Please let me know if you have any additional questions.    There have been recent update in Epic Orders.  To place this referral:  -Select Mental Health Referral  -Select the second option \"[facility]: [facility] Psychiat[facility] Services\"      Yasmine Ortiz  , [facility].   "

## 2017-06-14 NOTE — TELEPHONE ENCOUNTER
NO, we do not want collaberative care   SHE needs a new long term pyschiatrist not a couple of visits then return to us  Steve Rankin who put in the order is now out on Maternity leave so I will do a new order BUT again we do not want Collabertive care and for some reason when we send patients to [facility] they are sent there   Please stop doing this and please  call us with questions

## 2017-06-14 NOTE — TELEPHONE ENCOUNTER
Spoke with pt gave her the referral info and number and instructed her it needs to be long term pyschiatrist, she verbalized understanding    Delmis Fallon RN

## 2017-06-21 ENCOUNTER — THERAPY VISIT (OUTPATIENT)
Dept: PHYSICAL THERAPY | Facility: CLINIC | Age: 57
End: 2017-06-21
Payer: COMMERCIAL

## 2017-06-21 DIAGNOSIS — M25.561 ACUTE PAIN OF RIGHT KNEE: Primary | ICD-10-CM

## 2017-06-21 PROCEDURE — 97161 PT EVAL LOW COMPLEX 20 MIN: CPT | Mod: GP | Performed by: PHYSICAL THERAPIST

## 2017-06-21 PROCEDURE — 97110 THERAPEUTIC EXERCISES: CPT | Mod: GP | Performed by: PHYSICAL THERAPIST

## 2017-06-21 ASSESSMENT — ACTIVITIES OF DAILY LIVING (ADL)
HOW_WOULD_YOU_RATE_THE_CURRENT_FUNCTION_OF_YOUR_KNEE_DURING_YOUR_USUAL_DAILY_ACTIVITIES_ON_A_SCALE_FROM_0_TO_100_WITH_100_BEING_YOUR_LEVEL_OF_KNEE_FUNCTION_PRIOR_TO_YOUR_INJURY_AND_0_BEING_THE_INABILITY_TO_PERFORM_ANY_OF_YOUR_USUAL_DAILY_ACTIVITIES?: 60
AS_A_RESULT_OF_YOUR_KNEE_INJURY,_HOW_WOULD_YOU_RATE_YOUR_CURRENT_LEVEL_OF_DAILY_ACTIVITY?: NEARLY NORMAL
LIMPING: I HAVE THE SYMPTOM BUT IT DOES NOT AFFECT MY ACTIVITY
SIT WITH YOUR KNEE BENT: ACTIVITY IS NOT DIFFICULT
WALK: ACTIVITY IS MINIMALLY DIFFICULT
KNEE_ACTIVITY_OF_DAILY_LIVING_SUM: 49
STAND: ACTIVITY IS NOT DIFFICULT
KNEE_ACTIVITY_OF_DAILY_LIVING_SCORE: 70
KNEEL ON THE FRONT OF YOUR KNEE: ACTIVITY IS FAIRLY DIFFICULT
GO UP STAIRS: ACTIVITY IS MINIMALLY DIFFICULT
GO DOWN STAIRS: ACTIVITY IS MINIMALLY DIFFICULT
STIFFNESS: I HAVE THE SYMPTOM BUT IT DOES NOT AFFECT MY ACTIVITY
RAW_SCORE: 49
RISE FROM A CHAIR: ACTIVITY IS NOT DIFFICULT
GIVING WAY, BUCKLING OR SHIFTING OF KNEE: THE SYMPTOM AFFECTS MY ACTIVITY MODERATELY
SWELLING: I DO NOT HAVE THE SYMPTOM
HOW_WOULD_YOU_RATE_THE_OVERALL_FUNCTION_OF_YOUR_KNEE_DURING_YOUR_USUAL_DAILY_ACTIVITIES?: ABNORMAL
PAIN: THE SYMPTOM AFFECTS MY ACTIVITY MODERATELY
SQUAT: ACTIVITY IS FAIRLY DIFFICULT
WEAKNESS: THE SYMPTOM AFFECTS MY ACTIVITY SEVERELY

## 2017-06-21 NOTE — MR AVS SNAPSHOT
After Visit Summary   6/21/2017    Beronica Azevedo    MRN: [id number]           Patient Information     Date Of Birth          1960        Visit Information        Provider Department      6/21/2017 9:20 AM Elise Stewart PT [facility]        Today's Diagnoses     Acute pain of right knee    -  1       Follow-ups after your visit        Your next 10 appointments already scheduled     Aug 01, 2017  9:15 AM CDT   (Arrive by 9:00 AM)   Return Visit with John Antunez MD   [facility] ([facility] [place])    [address] [place] [address] [phone]              Who to contact     If you have questions or need follow up information about today's clinic visit or your schedule please contact [facility] directly at [phone].  Normal or non-critical lab and imaging results will be communicated to you by MyChart, letter or phone within 4 business days after the clinic has received the results. If you do not hear from us within 7 days, please contact the clinic through [employer] or phone. If you have a critical or abnormal lab result, we will notify you by phone as soon as possible.  Submit refill requests through [employer] or call your pharmacy and they will forward the refill request to us. Please allow 3 business days for your refill to be completed.          Additional Information About Your Visit        MyChart Information     [employer] gives you secure access to your electronic health record. If you see a primary care provider, you can also send messages to your care team and make appointments. If you have questions, please call your primary care clinic.  If you do not have a primary care provider, please call [phone] and they will assist you.        Care EveryWhere ID     This is your Care EveryWhere ID. This could be used by other  organizations to access your [place] medical records  [id number]        Your Vitals Were     Last Period                   04/06/2010            Blood Pressure from Last 3 Encounters:   06/06/17 100/68   11/10/16 104/56   10/30/16 98/60    Weight from Last 3 Encounters:   06/06/17 61.4 kg (135 lb 4.8 oz)   06/01/17 60.3 kg (133 lb)   11/10/16 60.5 kg (133 lb 6.4 oz)              We Performed the Following     HC PT EVAL, LOW COMPLEXITY     SHEILA INITIAL EVAL REPORT     THERAPEUTIC EXERCISES        Primary Care Provider Office Phone # Fax #    Joaquin Sarthak Rowe [phone]       [facility] [address] [facility] [address]  [facility] [address]        Equal Access to Services     GREGOR THRASHER : Hadii simon beverly hadasho Soomaali, waaxda luqadaha, qaybta kaalmada adeegyada, aaron mcgovern . So [facility] [phone].    ATENCIÓN: Si habla español, tiene a garrett disposición servicios gratuitos de asistencia lingüística. [facility] [phone].    We comply with applicable federal civil rights laws and [place] laws. We do not discriminate on the basis of race, color, national origin, age, disability sex, sexual orientation or gender identity.            Thank you!     Thank you for choosing [facility] PH[facility]ICAL [facility]ER[facility]  for your care. Our goal is always to provide you with excellent care. Hearing back from our patients is one way we can continue to improve our services. Please take a few minutes to complete the written survey that you may receive in the mail after your visit with us. Thank you!             Your Updated Medication List - Protect others around you: Learn how to safely use, store and throw away your medicines at www.disposemymeds.org.          This list is accurate as of: 6/21/17 11:59 PM.  Always use your most recent med list.                   Brand Name Dispense Instructions for use Diagnosis    ALPRAZolam 0.5 MG tablet    XANAX     Take  0.5 mg by mouth At Bedtime        azelastine 0.1 % spray    ASTELIN    3 Bottle    Spray 1 spray in nostril 2 times daily    Chronic rhinitis       BENADRYL ALLERGY PO      prn        benzonatate 200 MG capsule    TESSALON    21 capsule    Take 1 capsule (200 mg) by mouth 3 times daily as needed for cough    Cough       biotin 2.5 mg/mL Susp      Take by mouth daily        BUTALBITAL-APAP-CAFFEINE PO      Take  by mouth as needed.    Maxillary sinusitis, Headache(784.0)       cholecalciferol 1000 UNIT tablet    vitamin D    100 tablet    Take 2 tablets (2,000 Units) by mouth daily    Osteopenia       IRON SUPPLEMENT PO           MULTIVITAMIN & MINERAL PO      Take  by mouth.        NORTRIPTYLINE HCL PO      Take 50 mg by mouth At Bedtime        traZODone 50 MG tablet    DESYREL    15 Tab    1/2 TABLET AT BEDTIME AS NEEDED FOR SLEEP    Insomnia

## 2017-06-22 PROBLEM — M25.561 ACUTE PAIN OF RIGHT KNEE: Status: ACTIVE | Noted: 2017-06-22

## 2017-06-22 NOTE — PROGRESS NOTES
Subjective:    Patient is a 57 year old female presenting with rehab left ankle/foot hpi.                                      Pertinent medical history includes:  Osteoarthritis, osteoporosis, anemia, migraines and menopausal.  Medical allergies: yes (adhesive).    Current medications:  Anti-depressants and pain medication.  Current occupation is psychologist.    Primary job tasks include:  Prolonged sitting, prolonged standing and other (computer work).                                Objective:    System    Physical Exam    General     ROS    Assessment/Plan:

## 2017-06-22 NOTE — PROGRESS NOTES
Subjective:    Patient is a 57 year old female presenting with rehab right knee hpi.           Pt arrived to clinic 20 mins late for 40 minute initial evaluation appointment. Evaluation continue next as able     Pt reports insidious onset of sharp medial knee pain with WTB flexion and ext. She is walking up to 30 mins doing yoga 1x/week and exercise class once per week.   She has had previous episode of R knee pain that she notes was more anterior and not as sharp. .         and is intermittent and reported as 5/10.  Associated symptoms:  Catching and loss of motion/stiffness. Pain is worse during the day.  Symptoms are exacerbated by activity, kneeling, weight bearing, bending/squatting, walking, ascending stairs, descending stairs and transfers and relieved by rest.  Since onset symptoms are unchanged.    Previous treatment includes physical therapy (for PFP dx).  There was significant improvement following previous treatment.  General health as reported by patient is good.                      Red flags:  Pain at rest/night and chest pain.                        Objective:    Standing Alignment:        Lumbar:  Lordosis decr            Gait:    Gait Type:  Antalgic     Deviations:  Knee:  Knee flexion decr R and knee extension decr RGeneral Deviations:  Stance time decr and stride length decr    Flexibility/Screens:   Positive screens:  Hip (mod loss R hip ER ) and Lumbar (mod loss lumbar ext)                                                          Knee Evaluation:  ROM:  Strength:  Not assessed        Endfeel: springy end feel R knee ext, min loss, R hip ER firm end feel mod loss, R knee flexion min loss PROM with medial sharp pain                     General     ROS    Assessment/Plan:      Patient is a 57 year old female with right side knee complaints.    Patient has the following significant findings with corresponding treatment plan.                Diagnosis 1:  R knee pain   Pain -  hot/cold therapy,  manual therapy and self management  Decreased ROM/flexibility - manual therapy and therapeutic exercise  Decreased joint mobility - manual therapy and therapeutic exercise  Impaired gait - gait training  Impaired muscle performance - neuro re-education  Decreased function - therapeutic activities    Therapy Evaluation Codes:   1) History comprised of:   Personal factors that impact the plan of care:      None.    Comorbidity factors that impact the plan of care are:      Osteoarthritis.     Medications impacting care: None.  2) Examination of Body Systems comprised of:   Body structures and functions that impact the plan of care:      Knee.   Activity limitations that impact the plan of care are:      Squatting/kneeling, Stairs and Standing.  3) Clinical presentation characteristics are:   Stable/Uncomplicated.  4) Decision-Making    Low complexity using standardized patient assessment instrument and/or measureable assessment of functional outcome.  Cumulative Therapy Evaluation is: Low complexity.    Previous and current functional limitations:  (See Goal Flow Sheet for this information)    Short term and Long term goals: (See Goal Flow Sheet for this information)     Communication ability:  Patient appears to be able to clearly communicate and understand verbal and written communication and follow directions correctly.  Treatment Explanation - The following has been discussed with the patient:   RX ordered/plan of care  Anticipated outcomes  Possible risks and side effects  This patient would benefit from PT intervention to resume normal activities.   Rehab potential is excellent.    Frequency:  1 X week, once daily  Duration:  for 6 weeks  Discharge Plan:  Achieve all LTG.  Independent in home treatment program.  Reach maximal therapeutic benefit.    Please refer to the daily flowsheet for treatment today, total treatment time and time spent performing 1:1 timed codes.

## 2017-06-29 ENCOUNTER — OFFICE VISIT (OUTPATIENT)
Dept: FAMILY MEDICINE | Facility: CLINIC | Age: 57
End: 2017-06-29
Payer: COMMERCIAL

## 2017-06-29 VITALS
SYSTOLIC BLOOD PRESSURE: 106 MMHG | TEMPERATURE: 97.7 F | BODY MASS INDEX: 20.75 KG/M2 | DIASTOLIC BLOOD PRESSURE: 70 MMHG | HEART RATE: 100 BPM | WEIGHT: 136.5 LBS | OXYGEN SATURATION: 100 %

## 2017-06-29 DIAGNOSIS — J01.10 ACUTE NON-RECURRENT FRONTAL SINUSITIS: Primary | ICD-10-CM

## 2017-06-29 PROCEDURE — 99213 OFFICE O/P EST LOW 20 MIN: CPT | Performed by: FAMILY MEDICINE

## 2017-06-29 RX ORDER — FLUTICASONE PROPIONATE 50 MCG
1-2 SPRAY, SUSPENSION (ML) NASAL DAILY
Qty: 1 BOTTLE | Refills: 11 | Status: SHIPPED | OUTPATIENT
Start: 2017-06-29 | End: 2017-08-01

## 2017-06-29 RX ORDER — AMOXICILLIN 500 MG/1
500 CAPSULE ORAL 3 TIMES DAILY
Qty: 30 CAPSULE | Refills: 0 | Status: SHIPPED | OUTPATIENT
Start: 2017-06-29 | End: 2017-08-01

## 2017-06-29 NOTE — PROGRESS NOTES
"Chief Complaint   Patient presents with     URI       Initial /70  Pulse 100  Temp 97.7  F (36.5  C) (Oral)  Wt 136 lb 8 oz (61.9 kg)  LMP 04/06/2010  SpO2 100%  BMI 20.75 kg/m2 Estimated body mass index is 20.75 kg/(m^2) as calculated from the following:    Height as of 6/1/17: 5' 8\" (1.727 m).    Weight as of this encounter: 136 lb 8 oz (61.9 kg).  Medication Reconciliation: complete     Ana Paula Post MA      "

## 2017-06-29 NOTE — PROGRESS NOTES
SUBJECTIVE:                                                    Beronica Azevedo is a 57 year old female who presents to clinic today for the following health issues:      Acute Illness   Acute illness concerns: Possible sinus infection   Onset: worse Yesterday morning    Fever: no    Chills/Sweats: Yes Chills    Headache (location?): YES    Sinus Pressure:YES    Conjunctivitis:  no    Ear Pain: YES: both    Rhinorrhea: YES    Congestion: YES    Sore Throat: no      Cough: YES- A little productive at night     Wheeze: no     Decreased Appetite: YES    Nausea: no     Vomiting: no    Diarrhea:  no    Dysuria/Freq.: no    Fatigue/Achiness: YES- Was not able to sleep all night last night.     Sick/Strep Exposure: no      Therapies Tried and outcome: benadryl           Problem list and histories reviewed & adjusted, as indicated.  Additional history: none except some seasonal allergies    Labs reviewed in EPIC    Reviewed and updated as needed this visit by clinical staff       Reviewed and updated as needed this visit by Provider         ROS:  Constitutional, HEENT, cardiovascular, pulmonary, gi and gu systems are negative, except as otherwise noted.    OBJECTIVE:     /70  Pulse 100  Temp 97.7  F (36.5  C) (Oral)  Wt 136 lb 8 oz (61.9 kg)  LMP 04/06/2010  SpO2 100%  BMI 20.75 kg/m2  Body mass index is 20.75 kg/(m^2).  GENERAL: alert, mild distress and fatigued  HENT: normal cephalic/atraumatic, both ears: clear effusion, nose and mouth without ulcers or lesions, nasal mucosa edematous , rhinorrhea yellow and green, oropharynx clear, oral mucous membranes moist and sinuses: maxillary tenderness on bilaterally  NECK: no adenopathy, no asymmetry, masses, or scars and thyroid normal to palpation  RESP: lungs clear to auscultation - no rales, rhonchi or wheezes  CV: regular rate and rhythm, normal S1 S2, no S3 or S4, no murmur, click or rub, no peripheral edema and peripheral pulses strong        ASSESSMENT/PLAN:              1. Acute non-recurrent frontal sinusitis  Hold off on abx for now  Use netip[ot bid, try  - fluticasone (FLONASE) 50 MCG/ACT spray; Spray 1-2 sprays into both nostrils daily  Dispense: 1 Bottle; Refill: 11  - if wors eadd amoxicillin (AMOXIL) 500 MG capsule; Take 1 capsule (500 mg) by mouth 3 times daily  Dispense: 30 capsule; Refill: 0    See Patient Instructions    Joaquin Rowe MD  [facility] [place]

## 2017-06-29 NOTE — MR AVS SNAPSHOT
After Visit Summary   6/29/2017    Beronica Azevedo    MRN: [id number]           Patient Information     Date Of Birth          1960        Visit Information        Provider Department      6/29/2017 11:15 AM Joaquin Rowe MD [facility]        Today's Diagnoses     Acute non-recurrent frontal sinusitis    -  1       Follow-ups after your visit        Your next 10 appointments already scheduled     Aug 01, 2017  9:15 AM CDT   (Arrive by 9:00 AM)   Return Visit with John Antunez MD   [facility] ([facility])    [address] [place] [id number] [phone]              Who to contact     If you have questions or need follow up information about today's clinic visit or your schedule please contact [facility] directly at [phone].  Normal or non-critical lab and imaging results will be communicated to you by MyChart, letter or phone within 4 business days after the clinic has received the results. If you do not hear from us within 7 days, please contact the clinic through MyChart or phone. If you have a critical or abnormal lab result, we will notify you by phone as soon as possible.  Submit refill requests through [employer] or call your pharmacy and they will forward the refill request to us. Please allow 3 business days for your refill to be completed.          Additional Information About Your Visit        MyChart Information     [employer] gives you secure access to your electronic health record. If you see a primary care provider, you can also send messages to your care team and make appointments. If you have questions, please call your primary care clinic.  If you do not have a primary care provider, please call [phone] and they will assist you.        Care EveryWhere ID     This is your Care EveryWhere ID. This could be used by other organizations to access your [facility]  records  [id number]        Your Vitals Were     Pulse Temperature Last Period Pulse Oximetry BMI (Body Mass Index)       100 97.7  F (36.5  C) (Oral) 04/06/2010 100% 20.75 kg/m2        Blood Pressure from Last 3 Encounters:   06/29/17 106/70   06/06/17 100/68   11/10/16 104/56    Weight from Last 3 Encounters:   06/29/17 136 lb 8 oz (61.9 kg)   06/06/17 135 lb 4.8 oz (61.4 kg)   06/01/17 133 lb (60.3 kg)              Today, you had the following     No orders found for display         Today's Medication Changes          These changes are accurate as of: 6/29/17 11:39 AM.  If you have any questions, ask your nurse or doctor.               Start taking these medicines.        Dose/Directions    amoxicillin 500 MG capsule   Commonly known as:  AMOXIL   Used for:  Acute non-recurrent frontal sinusitis   Started by:  Joaquin Rowe MD        Dose:  500 mg   Take 1 capsule (500 mg) by mouth 3 times daily   Quantity:  30 capsule   Refills:  0       fluticasone 50 MCG/ACT spray   Commonly known as:  FLONASE   Used for:  Acute non-recurrent frontal sinusitis   Started by:  Joaquin Rowe MD        Dose:  1-2 spray   Spray 1-2 sprays into both nostrils daily   Quantity:  1 Bottle   Refills:  11            Where to get your medicines      These medications were sent to [facility] [address] [place] [id number]    Hours:  24-hours Phone:  [phone]     fluticasone 50 MCG/ACT spray         Some of these will need a paper prescription and others can be bought over the counter.  Ask your nurse if you have questions.     Bring a paper prescription for each of these medications     amoxicillin 500 MG capsule                Primary Care Provider Office Phone # Fax #    Joaquin Rowe [phone]       [facility] [address] [place] [id number]        Equal  Access to Services     [facility]: Hadii aad ku hadjhonnyjono Lashawnusha, wakaliada luqadaha, qaybta kaalmaguy stevenson, aaron ortiz. So [facility] [phone].    ATENCIÓN: Si edy lock, tiene a garrett disposición servicios gratuitos de asistencia lingüística. Llame al [phone].    We comply with applicable federal civil rights laws and [place] laws. We do not discriminate on the basis of race, color, national origin, age, disability sex, sexual orientation or gender identity.            Thank you!     Thank you for choosing [facility]  for your care. Our goal is always to provide you with excellent care. Hearing back from our patients is one way we can continue to improve our services. Please take a few minutes to complete the written survey that you may receive in the mail after your visit with us. Thank you!             Your Updated Medication List - Protect others around you: Learn how to safely use, store and throw away your medicines at www.disposemymeds.org.          This list is accurate as of: 6/29/17 11:39 AM.  Always use your most recent med list.                   Brand Name Dispense Instructions for use Diagnosis    ALPRAZolam 0.5 MG tablet    XANAX     Take 0.5 mg by mouth At Bedtime        amoxicillin 500 MG capsule    AMOXIL    30 capsule    Take 1 capsule (500 mg) by mouth 3 times daily    Acute non-recurrent frontal sinusitis       azelastine 0.1 % spray    ASTELIN    3 Bottle    Spray 1 spray in nostril 2 times daily    Chronic rhinitis       BENADRYL ALLERGY PO      prn        benzonatate 200 MG capsule    TESSALON    21 capsule    Take 1 capsule (200 mg) by mouth 3 times daily as needed for cough    Cough       biotin 2.5 mg/mL Susp      Take by mouth daily        BUTALBITAL-APAP-CAFFEINE PO      Take  by mouth as needed.    Maxillary sinusitis, Headache(784.0)       cholecalciferol 1000 UNIT tablet    vitamin D    100 tablet    Take 2 tablets (2,000  Units) by mouth daily    Osteopenia       fluticasone 50 MCG/ACT spray    FLONASE    1 Bottle    Spray 1-2 sprays into both nostrils daily    Acute non-recurrent frontal sinusitis       IRON SUPPLEMENT PO           MULTIVITAMIN & MINERAL PO      Take  by mouth.        NORTRIPTYLINE HCL PO      Take 50 mg by mouth At Bedtime        traZODone 50 MG tablet    DESYREL    15 Tab    1/2 TABLET AT BEDTIME AS NEEDED FOR SLEEP    Insomnia

## 2017-07-19 ENCOUNTER — TELEPHONE (OUTPATIENT)
Dept: FAMILY MEDICINE | Facility: CLINIC | Age: 57
End: 2017-07-19

## 2017-07-19 DIAGNOSIS — J32.0 MAXILLARY SINUSITIS: ICD-10-CM

## 2017-07-19 NOTE — TELEPHONE ENCOUNTER
BUTALBITAL-APAP-CAFFEINE PO       Last Written Prescription Date:  ?  Last Fill Quantity: ?,   # refills: ?  Last Office Visit with G, P or [facility] prescribing provider: 6-29-17  Future Office visit:       Routing refill request to provider for review/approval because:  Medication is reported/historical    AWARE SHE MIGHT NEED TO SEE PROVIDER FIRST BEFORE SHE CAN GET THIS FILLED.

## 2017-07-19 NOTE — TELEPHONE ENCOUNTER
Routing refill request to provider for review/approval because:  Drug not on the [facility] refill protocol      review  Last 3 refills 10/7/16 30 tabs, 12/30/16 30 tabs, 3/18/17 24 tabs, these were written by Jayne Moseley DO from [place], filled at [facility] in [place]    Left vm for pt to return call to clinic,   Would like info on the need for the medication    Delmis Fallon RN

## 2017-07-20 NOTE — TELEPHONE ENCOUNTER
Please call patient  This is a medication that is rarely used anymore because in most studies barbiturates tend not to work as well as other medications and have a lot of potential side effects/ dependency issues  I would need to meet with her before I would consider prescribing them

## 2017-07-28 ENCOUNTER — TELEPHONE (OUTPATIENT)
Dept: FAMILY MEDICINE | Facility: CLINIC | Age: 57
End: 2017-07-28

## 2017-07-28 NOTE — TELEPHONE ENCOUNTER
Notes Recorded by Isis Rudd RN on 7/14/2017 at 11:50 AM  3rd request.  Isis Rudd  Pap Tracking RN    ------    Notes Recorded by Isis Rudd RN on 7/5/2017 at 8:10 AM  2nd request.  Isis Rudd  Pap Tracking RN    ------    Notes Recorded by Isis Rudd RN on 6/30/2017 at 11:25 AM  Luis Booneyna,  I'm awaiting you to finish charting her office visit note so I can inform her of the pap results.  Isis Rudd  Pap Tracking RN    ------    Notes Recorded by Isis Rudd RN on 6/15/2017 at 10:56 AM  Awaiting the provider to finish charting the office visit note, but will anticipate a 1 year cotest due to the pt having a hx of a LEEP.  Isis Rudd  Pap Tracking RN

## 2017-07-31 NOTE — PROGRESS NOTES
SUBJECTIVE:                                                    Beronica Azevedo is a 57 year old female who presents to clinic today for the following health issues:    Medication Followup of Butalbital     Taking Medication as prescribed: yes    Side Effects:  Difficulty sleeping if taking it late in the day, lightheadedness     Medication Helping Symptoms:  yes     Other questions/concerns: Laceration on left middle finger about 1 week ago, was infected. Had it looked at by a dermatologist this morning who thought it looked ok- just wanted another opinion.     Depression and Anxiety Follow-Up    Status since last visit: No change    Other associated symptoms:None    Complicating factors:     Significant life event: No     Current substance abuse: None    PHQ-9 SCORE 6/15/2016 6/6/2017 8/1/2017   Total Score - - -   Total Score 4 3 2     FREDIS-7 SCORE 6/15/2016 8/1/2017   Total Score 9 5       PHQ-9  English  PHQ-9   Any Language  GAD7  Migraine Follow-Up    Headaches symptoms:  Stable     Frequency: 3-4 /month     Duration of headaches: hours    Able to do normal daily activities/work with migraines: No - but better wit medication     Rescue/Relief medication:fiorect              Effectiveness: moderate relief    Preventative medication: None    Neurologic complications: No new stroke-like symptoms, loss of vision or speech, numbness or weakness    In the past 4 weeks, how often have you gone to Urgent Care or the emergency room because of your headaches?  0          Problem list and histories reviewed & adjusted, as indicated.  Additional history: as documented    Labs reviewed in EPIC    Reviewed and updated as needed this visit by clinical staff       Reviewed and updated as needed this visit by Provider         ROS:  Constitutional, HEENT, cardiovascular, pulmonary, gi and gu systems are negative, except as otherwise noted.      OBJECTIVE:   /71 (BP Location: Left arm, Patient Position: Chair, Cuff Size:  Adult Regular)  Pulse 100  Temp 96.9  F (36.1  C) (Oral)  Wt 136 lb 9.6 oz (62 kg)  LMP 04/06/2010  SpO2 96%  BMI 20.77 kg/m2  Body mass index is 20.77 kg/(m^2).  GENERAL: healthy, alert and no distress  NECK: no adenopathy, no asymmetry, masses, or scars and thyroid normal to palpation  RESP: lungs clear to auscultation - no rales, rhonchi or wheezes  CV: regular rate and rhythm, normal S1 S2, no S3 or S4, no murmur, click or rub, no peripheral edema and peripheral pulses strong  ABDOMEN: soft, nontender, no hepatosplenomegaly, no masses and bowel sounds normal  SKIN: healing shallow lacertion left distal 3rf finger  NEURO: Normal strength and tone, mentation intact and speech normal  PSYCH: mentation appears normal, affect normal/bright    Diagnostic Test Results:  Results for orders placed or performed in visit on 06/06/17   Pap imaged thin layer screen with HPV - recommended age 30 - 65 years (select HPV order below)   Result Value Ref Range    PAP NIL     Copath Report         Patient Name: BELLA CRAMER  MR#: [id number]  Specimen #: [id number]  Collected: 6/6/2017  Received: 6/7/2017  Reported: 6/9/2017 12:14  Ordering Phy(s): ERICKA COSME    For improved result formatting, select 'View Enhanced Report Format'  under Linked Documents section.    SPECIMEN/STAIN PROCESS:  Pap imaged thin layer prep screening (Surepath, FocalPoint with guided  screening)       Pap-Cyto x 2, HPV ordered x 1    SOURCE: Cervical, endocervical  ----------------------------------------------------------------   Pap imaged thin layer prep screening (Surepath, FocalPoint with guided  screening)  SPECIMEN ADEQUACY:  Satisfactory for evaluation.  -Transitional zone component could not be determined due to atrophy.    CYTOLOGIC INTERPRETATION:    Negative for intraepithelial lesion or malignancy    Electronically signed out by:  TRACY Duarte (ASCP)    Processed and screened at [facility]  [facility]    CLINICAL HISTORY:  LMP: 4/6 /2010  Previous normal pap  Date of Last Pap: 3/4/2016,    Papanicolaou Test Limitations:  Cervical cytology is a screening test  with limited sensitivity; regular screening is critical for cancer  prevention; Pap tests are primarily effective for the  diagnosis/prevention of squamous cell carcinoma, not adenocarcinomas or  other cancers.    TESTING LAB LOCATION:  [address]  [phone]    COLLECTION SITE:  Client:  [facility]  Location: [facility] (B)     HPV High Risk Types DNA Cervical   Result Value Ref Range    HPV 16 DNA Negative NEG    HPV 18 DNA Negative NEG    Other HR HPV Negative NEG    Final Diagnosis       This patient's sample is negative for HPV DNA.  (Note)  METHODOLOGY:  The Roche edwin 4800 system uses automated extraction,  simultaneous amplification of HPV (L1 region) and beta-globin,  followed by  real time detection of fluorescent labeled HPV and beta  globin using specific oligonucleotide probes . The test specifically  identifies types HPV 16 DNA and HPV 18 DNA while concurrently  detecting the rest of the high risk types (31, 33, 35, 39, 45, 51,  52, 56, 58, 59, 66 or 68).    COMMENTS:  This test is not intended for use as a screening device  for women under age 30 with normal cervical cytology.  Results should  be correlated with cytologic and histologic findings. Close clinical  followup is recommended.    This test was developed and its performance characteristics  determined by the [facility], Molecular  Diagnostics Laboratory. It has not been cleared or approved by the  FDA. The laboratory is regulated under CLIA as qualified to perform  high- complexity testing. This test is used for clinical purposes. It  should not be regarded as investigational or for research.        Specimen  Description Cervical Cells   [id number]          ASSESSMENT/PLAN:             1. Migraine with aura and with status migrainosus, not intractable  OK for up to 5-6 tab/ month  - butalbital-acetaminophen-caffeine (FIORICET/ESGIC) -40 MG per tablet; Take 1 tablet by mouth every 4 hours as needed  Dispense: 28 tablet; Refill: 1    2. Mild major depression (H)  Well controlled   - nortriptyline (PAMELOR) 50 MG capsule; Take 1 capsule (50 mg) by mouth At Bedtime  Dispense: 90 capsule; Refill: 3    3. Primary insomnia  Well controlled   - traZODone (DESYREL) 50 MG tablet; 1/2 TABLET AT BEDTIME AS NEEDED FOR SLEEP  Dispense: 45 tablet; Refill: 1  - nortriptyline (PAMELOR) 50 MG capsule; Take 1 capsule (50 mg) by mouth At Bedtime  Dispense: 90 capsule; Refill: 3    4. Chronic rhinitis, unspecified type  Resume   - azelastine (ASTELIN) 0.1 % spray; Spray 1 spray in nostril 2 times daily  Dispense: 3 Bottle; Refill: 3    5. Acute non-recurrent frontal sinusitis  resume  - fluticasone (FLONASE) 50 MCG/ACT spray; Spray 1-2 sprays into both nostrils daily  Dispense: 3 Bottle; Refill: 3    6. FREDIS (generalized anxiety disorder)  Well controlled   - traZODone (DESYREL) 50 MG tablet; 1/2 TABLET AT BEDTIME AS NEEDED FOR SLEEP  Dispense: 45 tablet; Refill: 1    7. Laceration of middle finger, foreign body presence unspecified, nail damage status unspecified, unspecified laterality, subsequent encounter  Healing. discussed cares      Regular exercise  See Patient Instructions    Joaquin Rowe MD  [facility] [place]

## 2017-08-01 ENCOUNTER — MEDICAL CORRESPONDENCE (OUTPATIENT)
Dept: HEALTH INFORMATION MANAGEMENT | Facility: CLINIC | Age: 57
End: 2017-08-01

## 2017-08-01 ENCOUNTER — OFFICE VISIT (OUTPATIENT)
Dept: DERMATOLOGY | Facility: CLINIC | Age: 57
End: 2017-08-01

## 2017-08-01 ENCOUNTER — OFFICE VISIT (OUTPATIENT)
Dept: FAMILY MEDICINE | Facility: CLINIC | Age: 57
End: 2017-08-01
Payer: COMMERCIAL

## 2017-08-01 VITALS
SYSTOLIC BLOOD PRESSURE: 116 MMHG | BODY MASS INDEX: 20.77 KG/M2 | OXYGEN SATURATION: 96 % | WEIGHT: 136.6 LBS | TEMPERATURE: 96.9 F | DIASTOLIC BLOOD PRESSURE: 71 MMHG | HEART RATE: 100 BPM

## 2017-08-01 DIAGNOSIS — L21.9 DERMATITIS, SEBORRHEIC: Primary | ICD-10-CM

## 2017-08-01 DIAGNOSIS — D48.5 NEOPLASM OF UNCERTAIN BEHAVIOR OF SKIN: ICD-10-CM

## 2017-08-01 DIAGNOSIS — L72.0 EPIDERMAL CYST: ICD-10-CM

## 2017-08-01 DIAGNOSIS — F51.01 PRIMARY INSOMNIA: ICD-10-CM

## 2017-08-01 DIAGNOSIS — J01.10 ACUTE NON-RECURRENT FRONTAL SINUSITIS: ICD-10-CM

## 2017-08-01 DIAGNOSIS — S61.218D: ICD-10-CM

## 2017-08-01 DIAGNOSIS — L71.9 ROSACEA: ICD-10-CM

## 2017-08-01 DIAGNOSIS — F32.0 MILD MAJOR DEPRESSION (H): ICD-10-CM

## 2017-08-01 DIAGNOSIS — J31.0 CHRONIC RHINITIS, UNSPECIFIED TYPE: ICD-10-CM

## 2017-08-01 DIAGNOSIS — F41.1 GAD (GENERALIZED ANXIETY DISORDER): ICD-10-CM

## 2017-08-01 DIAGNOSIS — G43.101 MIGRAINE WITH AURA AND WITH STATUS MIGRAINOSUS, NOT INTRACTABLE: Primary | ICD-10-CM

## 2017-08-01 PROCEDURE — 99214 OFFICE O/P EST MOD 30 MIN: CPT | Performed by: FAMILY MEDICINE

## 2017-08-01 RX ORDER — TRAZODONE HYDROCHLORIDE 50 MG/1
TABLET, FILM COATED ORAL
Qty: 45 TABLET | Refills: 1 | Status: SHIPPED | OUTPATIENT
Start: 2017-08-01 | End: 2019-07-22

## 2017-08-01 RX ORDER — AZELASTINE 1 MG/ML
1 SPRAY, METERED NASAL 2 TIMES DAILY
Qty: 3 BOTTLE | Refills: 3 | Status: SHIPPED | OUTPATIENT
Start: 2017-08-01 | End: 2018-04-19

## 2017-08-01 RX ORDER — NORTRIPTYLINE HYDROCHLORIDE 50 MG/1
50 CAPSULE ORAL AT BEDTIME
Qty: 90 CAPSULE | Refills: 3 | Status: SHIPPED | OUTPATIENT
Start: 2017-08-01 | End: 2018-09-25

## 2017-08-01 RX ORDER — LIDOCAINE HYDROCHLORIDE AND EPINEPHRINE 10; 10 MG/ML; UG/ML
3 INJECTION, SOLUTION INFILTRATION; PERINEURAL ONCE
Qty: 3 ML | Refills: 0 | OUTPATIENT
Start: 2017-08-01 | End: 2017-08-01

## 2017-08-01 RX ORDER — FLUTICASONE PROPIONATE 50 MCG
1-2 SPRAY, SUSPENSION (ML) NASAL DAILY
Qty: 3 BOTTLE | Refills: 3 | Status: SHIPPED | OUTPATIENT
Start: 2017-08-01 | End: 2019-01-18

## 2017-08-01 RX ORDER — BUTALBITAL, ACETAMINOPHEN AND CAFFEINE 50; 325; 40 MG/1; MG/1; MG/1
1 TABLET ORAL EVERY 4 HOURS PRN
Qty: 28 TABLET | Refills: 1 | Status: SHIPPED | OUTPATIENT
Start: 2017-08-01 | End: 2018-05-01

## 2017-08-01 RX ORDER — KETOCONAZOLE 20 MG/ML
SHAMPOO TOPICAL DAILY PRN
Qty: 120 ML | Refills: 1 | Status: SHIPPED | OUTPATIENT
Start: 2017-08-01 | End: 2019-02-12

## 2017-08-01 ASSESSMENT — PATIENT HEALTH QUESTIONNAIRE - PHQ9: 5. POOR APPETITE OR OVEREATING: SEVERAL DAYS

## 2017-08-01 ASSESSMENT — PAIN SCALES - GENERAL: PAINLEVEL: NO PAIN (0)

## 2017-08-01 ASSESSMENT — ANXIETY QUESTIONNAIRES
1. FEELING NERVOUS, ANXIOUS, OR ON EDGE: SEVERAL DAYS
6. BECOMING EASILY ANNOYED OR IRRITABLE: SEVERAL DAYS
3. WORRYING TOO MUCH ABOUT DIFFERENT THINGS: SEVERAL DAYS
7. FEELING AFRAID AS IF SOMETHING AWFUL MIGHT HAPPEN: NOT AT ALL
2. NOT BEING ABLE TO STOP OR CONTROL WORRYING: SEVERAL DAYS
IF YOU CHECKED OFF ANY PROBLEMS ON THIS QUESTIONNAIRE, HOW DIFFICULT HAVE THESE PROBLEMS MADE IT FOR YOU TO DO YOUR WORK, TAKE CARE OF THINGS AT HOME, OR GET ALONG WITH OTHER PEOPLE: SOMEWHAT DIFFICULT
GAD7 TOTAL SCORE: 5
5. BEING SO RESTLESS THAT IT IS HARD TO SIT STILL: NOT AT ALL

## 2017-08-01 NOTE — LETTER
8/1/2017       RE: Beronica Azevedo  [address]  [facility] [address]     Dear Colleague,    Thank you for referring your patient, Beronica Azevedo, to the [facility] DERMATOLOGY at [facility]. Please see a copy of my visit note below.    [facility] Dermatology Note      Dermatology Problem List:  -Seborrheic Keratoses on back  - digital mucoid cyst left first toe PIP  -Scattered benign nevi  -eczematous dermatitis  -erythematotelangiectatic rosacea  -onychodystrophy  -milia on left neck  - longitudinal ridging on nails    Encounter Date: Aug 1, 2017    CC:   Chief Complaint   Patient presents with     Derm Problem     Beronica is here today for a skin check, and to discuss her concerns of having a dry scalp. Has a concerning mole on the center of her back.             History of Present Illness:  Ms. Beronica Azevedo is a 57 year old female who presents as a follow-up for skin check. The patient was last seen 12/15/2015 when she had a skin check with no concerning lesions, but an inflamed SK and myxoid cyst treated with cryotherapy.  The patient mentions she has a dry, occasionally itchy scalp, as well as flaky. Occasionally has dry, red patches.  She has used Free and Clear shampoo, Aveda, and now Intelligent nutrients shampoo. She washes her hair every 2 weeks.  No associated hair loss.    She has an irregular shaped mole on the center of her back and a dilated pore on her back.  The patient has rosacea but not uses any rosacea directed therapies currently. Has used Metrogel in the past which made it worse.    She thinks her finer might be infected from a cut from a piece of plastic that she thinks might be infected. She is using triple antibiotic gel.    Past Medical History:   Patient Active Problem List   Diagnosis     Headache     Insomnia     Moderate dysplasia of cervix (BEVERLY II)     CARDIOVASCULAR SCREENING; LDL GOAL LESS THAN 160     Endometrial polyp      Iron deficiency anemia due to chronic blood loss     Rosacea     Onychodystrophy     Dermatitis     Osteopenia     Post-menopausal     Mild major depression (H)     IBS (irritable bowel syndrome)     Myopia     Regular astigmatism     Presbyopia     Posterior subcapsular polar senile cataract     Senile nuclear sclerosis     Macular puckering of retina     Cervicalgia     Pain in thoracic spine     Digital mucous cyst     Seborrheic keratosis, inflamed     Chronic pain of right knee     Acute pain of right knee     Past Medical History:   Diagnosis Date     Anxiety      Cervical high risk HPV (human papillomavirus) test positive 3/4/16    3/18/16 colp - ECC - negative     BEVERLY II (cervical intraepithelial neoplasia II) 2009    LEEP     Depression      Dry eye syndrome      Frequent UTI 2009     Headache(784.0)      IBS (irritable bowel syndrome)      Insomnia      Lattice degeneration of retina      Lyme disease      Myopic astigmatism      Nonsenile cataract      Osteopenia 2001,2012    Fosamax     Pneumothorax on right     at 17 y/o     PVD (posterior vitreous detachment), both eyes 7/19/10     Rosacea      Vasomotor rhinitis      Past Surgical History:   Procedure Laterality Date     CHEST TUBE INSERTION       DILATION AND CURETTAGE, OPERATIVE HYSTEROSCOPY WITH MORCELLATOR, COMBINED  1/2011    endometrial polyp     LEEP TX, CERVICAL  8/2009    BEVERLY II, clear margins     Social History:  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 The patient admits to use of tanning beds.     Family History:  There is a family history of skin cancer in the patient's father, as per HPI.    Medications:  Current Outpatient Prescriptions   Medication Sig Dispense Refill     fluticasone (FLONASE) 50 MCG/ACT spray Spray 1-2 sprays into both nostrils daily 1 Bottle 11     azelastine (ASTELIN) 0.1 % spray Spray 1 spray in nostril 2 times daily 3 Bottle 3     biotin 2.5 mg/mL Take by mouth daily       cholecalciferol  (VITAMIN D) 1000 UNIT tablet Take 2 tablets (2,000 Units) by mouth daily 100 tablet 3     NORTRIPTYLINE HCL PO Take 50 mg by mouth At Bedtime        BUTALBITAL-APAP-CAFFEINE PO Take  by mouth as needed.       alprazolam (XANAX) 0.5 MG tablet Take 0.5 mg by mouth nightly as needed        Multiple Vitamins-Minerals (MULTIVITAMIN & MINERAL PO) Take  by mouth.       BENADRYL ALLERGY PO prn       TRAZODONE HCL 50 MG OR TABS 1/2 TABLET AT BEDTIME AS NEEDED FOR SLEEP 15 Tab 0        Allergies   Allergen Reactions     Tape [Adhesive Tape] Rash     Compazine Other (See Comments)     Hyperactivity       Paxil [Paroxetine] Other (See Comments)     Ropinirole Hcl Unknown     TABS     Depakote [Valproic Acid] Rash     Rash       Food Itching and Rash     mushrooms     Mushroom Itching and Rash         Review of Systems:  -As per HPI  -Constitutional: The patient denies fatigue, fevers, chills, unintended weight loss, and night sweats.  -HEENT: Patient denies nonhealing oral sores.  -Skin: As above in HPI. No additional skin concerns.    Physical exam:  Vitals: LMP 04/06/2010  GEN: This is a well developed, well-nourished female in no acute distress, in a pleasant mood.    SKIN: Total skin excluding the undergarment areas was performed. The exam included the head/face, neck, both arms, chest, back, abdomen, both legs, digits and/or nails.   -There is a bright red dome shaped papule on the abdomen.   There is very mild erythema on cheeks and nose with mild telangiectasias.   There are waxy stuck on tan to brown papules on the back.  The is a cafe-au-lait colored patch on the lower back, on the posterior left upper extremity adjacent to left scapula there is another cafe-au-lait colored patch with fine speckled pinpoint hyperpigmented macules  -Fingernails with linear ridges  -Left 3rd digit with ruptured vesicle but no fluid or pus, rim of faint colored erythema surrounding  -Mild follicular accentuation over scalp with several  flakes, but no clarence-follicular erythema or follicular plugging  -No other lesions of concern on areas examined.     Impression/Plan:  1. Seborrheic dermatitis- Over scalp. Very minimal Recommend to use ketoconazole shampoo.    Prescribed ketoconazole 2% shampoo, recommend to use at least once a week (the patient hesitant to wash hair more frequently)      2. Seborrheic keratosis, non irritated    ABCDs of melanoma were discussed and self skin checks were advised.     Benign nature of skin lesions were discussed      3. Irritated epidermal cyst- Has a dark pore that is irritating. Elected for 3 mm punch biopsy.    Punch biopsy:  After discussion of benefits and risks including but not limited to bleeding/bruising, pain/swelling, infection, scar, incomplete removal, nerve damage/numbness, recurrence, and non-diagnostic biopsy, written consent, verbal consent and photographs were obtained. Time-out was performed. The area was cleaned with isopropyl alcohol. 1.5 mL of 1% lidocaine was injected to obtain adequate anesthesia of the lesion on the upper back. A 3 mm punch biopsy was performed.  4-0 prolene sutures were utilized to approximate the epidermal edges.  White petroleum jelly/VaselineTM and a bandage was applied to the wound.  Explicit verbal and written wound care instructions were provided.  The patient left the Dermatology Clinic in good condition. The patient was counseled to follow up for suture removal in approximately 14 days.    F/u biopsy result    4.   Mild erythematelangiectatic rosacea- Referred to laser clinic as the patient states she does not respond well to topical directed therapies for Rosacea.    4. Finernail changes after menopause- Ridges over fingernails but not dystrophic.    Continue biotin supplement    Recommend      CC Dr. Antunez on close of this encounter.  Follow-up in 1 year, earlier for new or changing lesions.       Dr. Antunez staffed the patient.    Staff  Involved:  Resident(Isaias Gonsalez)/Staff(as above)    I have seen and examined this patient and agree with the assessment and plan as documented in the resident's note, and was present for all procedures.    John Antunez MD  Dermatology Attending          Pictures were placed in Pt's chart today for future reference.      Again, thank you for allowing me to participate in the care of your patient.      Sincerely,    John Antunez MD

## 2017-08-01 NOTE — MR AVS SNAPSHOT
After Visit Summary   8/1/2017    Beronica Azevedo    MRN: [id number]           Patient Information     Date Of Birth          1960        Visit Information        Provider Department      8/1/2017 1:45 PM Joa[place]e [place] [facility] [place]'s Diagnoses     Migraine with aura and with status migrainosus, not intractable    -  1    Mild major depression (H)        Primary insomnia        Chronic rhinitis, unspecified type        Acute non-recurrent frontal sinusitis        FREDIS (generalized anxiety disorder)        Laceration of middle finger, foreign body presence unspecified, nail damage status unspecified, unspecified laterality, subsequent encounter           Follow-ups after your visit        Who to contact     If you have questions or need follow up information about today's clinic visit or your schedule please contact [facility] [place] directly at [phone].  Normal or non-critical lab and imaging results will be communicated to you by MyChart, letter or phone within 4 business days after the clinic has received the results. If you do not hear from us within 7 days, please contact the clinic through MyChart or phone. If you have a critical or abnormal lab result, we will notify you by phone as soon as possible.  Submit refill requests through [employer] or call your pharmacy and they will forward the refill request to us. Please allow 3 business days for your refill to be completed.          Additional Information About Your Visit        MyChart Information     [employer] gives you secure access to your electronic health record. If you see a primary care provider, you can also send messages to your care team and make appointments. If you have questions, please call your primary care clinic.  If you do not have a primary care provider, please call [phone] and they will assist you.        Care EveryWhere ID     This is your Care EveryWhere ID. This could  be used by other organizations to access your [facility] medical records  [id number]        Your Vitals Were     Pulse Temperature Last Period Pulse Oximetry BMI (Body Mass Index)       100 96.9  F (36.1  C) (Oral) 04/06/2010 96% 20.77 kg/m2        Blood Pressure from Last 3 Encounters:   08/01/17 116/71   06/29/17 106/70   06/06/17 100/68    Weight from Last 3 Encounters:   08/01/17 136 lb 9.6 oz (62 kg)   06/29/17 136 lb 8 oz (61.9 kg)   06/06/17 135 lb 4.8 oz (61.4 kg)              Today, you had the following     No orders found for display         Today's Medication Changes          These changes are accurate as of: 8/1/17  2:22 PM.  If you have any questions, ask your nurse or doctor.               Start taking these medicines.        Dose/Directions    ketoconazole 2 % shampoo   Commonly known as:  NIZORAL   Used for:  Dermatitis, seborrheic   Started by:  John Antunez MD        Apply topically daily as needed for itching or irritation   Quantity:  120 mL   Refills:  1       lidocaine 1% with EPINEPHrine 1:100,000 1 %-1:818232 injection   Used for:  Epidermal cyst   Started by:  John Antunez MD        Dose:  3 mL   Inject 3 mLs into the skin once for 1 dose   Quantity:  3 mL   Refills:  0         These medicines have changed or have updated prescriptions.        Dose/Directions    * butalbital-acetaminophen-caffeine -40 MG per tablet   Commonly known as:  FIORICET/ESGIC   This may have changed:  You were already taking a medication with the same name, and this prescription was added. Make sure you understand how and when to take each.   Used for:  Migraine with aura and with status migrainosus, not intractable   Changed by:  Joaquin Rowe MD        Dose:  1 tablet   Take 1 tablet by mouth every 4 hours as needed   Quantity:  28 tablet   Refills:  1       * BUTALBITAL-APAP-CAFFEINE PO   This may have changed:  Another medication with the same name was added. Make sure you understand  how and when to take each.   Used for:  Maxillary sinusitis, Headache(784.0)   Changed by:  Joaquin Rowe MD        Take  by mouth as needed.   Refills:  0       * NORTRIPTYLINE HCL PO   This may have changed:  Another medication with the same name was added. Make sure you understand how and when to take each.   Changed by:  Mila Johnston OD        Dose:  50 mg   Take 50 mg by mouth At Bedtime   Refills:  0       * nortriptyline 50 MG capsule   Commonly known as:  PAMELOR   This may have changed:  You were already taking a medication with the same name, and this prescription was added. Make sure you understand how and when to take each.   Used for:  Mild major depression (H), Primary insomnia   Changed by:  Joaquin Rowe MD        Dose:  50 mg   Take 1 capsule (50 mg) by mouth At Bedtime   Quantity:  90 capsule   Refills:  3       traZODone 50 MG tablet   Commonly known as:  DESYREL   This may have changed:  See the new instructions.   Used for:  Primary insomnia, FREDIS (generalized anxiety disorder)   Changed by:  Joaquin Rowe MD        1/2 TABLET AT BEDTIME AS NEEDED FOR SLEEP   Quantity:  45 tablet   Refills:  1       * Notice:  This list has 4 medication(s) that are the same as other medications prescribed for you. Read the directions carefully, and ask your doctor or other care provider to review them with you.      Stop taking these medicines if you haven't already. Please contact your care team if you have questions.     amoxicillin 500 MG capsule   Commonly known as:  AMOXIL   Stopped by:  John Antunez MD           benzonatate 200 MG capsule   Commonly known as:  TESSALON   Stopped by:  John Antunez MD           IRON SUPPLEMENT PO   Stopped by:  John Antunez MD                Where to get your medicines      [facility] [address] [place] [address] AVE AT [place] [address]  [address], [facility] [address]    Hours:  24-hours Phone:  [phone]     azelastine 0.1 % spray    fluticasone 50 MCG/ACT spray    ketoconazole 2 % shampoo    nortriptyline 50 MG capsule    traZODone 50 MG tablet         Some of these will need a paper prescription and others can be bought over the counter.  Ask your nurse if you have questions.     Bring a paper prescription for each of these medications     butalbital-acetaminophen-caffeine -40 MG per tablet       You don't need a prescription for these medications     lidocaine 1% with EPINEPHrine 1:100,000 1 %-1:592701 injection                Primary Care Provider Office Phone # Fax #    Joaquin Sarthak Rowe [phone]       [facility] [address]  [facility] [address]        Equal Access to Services     GREGOR THRASHER : Hadii aad ku hadasho Sousha, waaxda luqadaha, qaybta kaalmada adeegyada, aaron dominique haytony mcgovern . So [facility] [phone].    ATENCIÓN: Si habla español, tiene a garrett disposición servicios gratuitos de asistencia lingüística. Llame al [phone].    We comply with applicable federal civil rights laws and [place] laws. We do not discriminate on the basis of race, color, national origin, age, disability sex, sexual orientation or gender identity.            Thank you!     Thank you for choosing [facility]  for your care. Our goal is always to provide you with excellent care. Hearing back from our patients is one way we can continue to improve our services. Please take a few minutes to complete the written survey that you may receive in the mail after your visit with us. Thank you!             Your Updated Medication List - Protect others around you: Learn how to safely use, store and throw away your medicines at www.disposemymeds.org.          This list is accurate as of: 8/1/17  2:22 PM.  Always use your most recent med list.                   Brand  Name Dispense Instructions for use Diagnosis    ALPRAZolam 0.5 MG tablet    XANAX     Take 0.5 mg by mouth nightly as needed        azelastine 0.1 % spray    ASTELIN    3 Bottle    Spray 1 spray in nostril 2 times daily    Chronic rhinitis, unspecified type       BENADRYL ALLERGY PO      prn        biotin 2.5 mg/mL Susp      Take by mouth daily        * butalbital-acetaminophen-caffeine -40 MG per tablet    FIORICET/ESGIC    28 tablet    Take 1 tablet by mouth every 4 hours as needed    Migraine with aura and with status migrainosus, not intractable       * BUTALBITAL-APAP-CAFFEINE PO      Take  by mouth as needed.    Maxillary sinusitis, Headache(784.0)       cholecalciferol 1000 UNIT tablet    vitamin D    100 tablet    Take 2 tablets (2,000 Units) by mouth daily    Osteopenia       fluticasone 50 MCG/ACT spray    FLONASE    3 Bottle    Spray 1-2 sprays into both nostrils daily    Acute non-recurrent frontal sinusitis       ketoconazole 2 % shampoo    NIZORAL    120 mL    Apply topically daily as needed for itching or irritation    Dermatitis, seborrheic       lidocaine 1% with EPINEPHrine 1:100,000 1 %-1:829691 injection     3 mL    Inject 3 mLs into the skin once for 1 dose    Epidermal cyst       MULTIVITAMIN & MINERAL PO      Take  by mouth.        * NORTRIPTYLINE HCL PO      Take 50 mg by mouth At Bedtime        * nortriptyline 50 MG capsule    PAMELOR    90 capsule    Take 1 capsule (50 mg) by mouth At Bedtime    Mild major depression (H), Primary insomnia       traZODone 50 MG tablet    DESYREL    45 tablet    1/2 TABLET AT BEDTIME AS NEEDED FOR SLEEP    Primary insomnia, FREDIS (generalized anxiety disorder)       * Notice:  This list has 4 medication(s) that are the same as other medications prescribed for you. Read the directions carefully, and ask your doctor or other care provider to review them with you.

## 2017-08-01 NOTE — NURSING NOTE
"Chief Complaint   Patient presents with     Recheck Medication       Initial /71 (BP Location: Left arm, Patient Position: Chair, Cuff Size: Adult Regular)  Pulse 100  Temp 96.9  F (36.1  C) (Oral)  Wt 136 lb 9.6 oz (62 kg)  LMP 04/06/2010  SpO2 96%  BMI 20.77 kg/m2 Estimated body mass index is 20.77 kg/(m^2) as calculated from the following:    Height as of 6/1/17: 5' 8\" (1.727 m).    Weight as of this encounter: 136 lb 9.6 oz (62 kg).  Medication Reconciliation: complete     Christina Zavala CMA    "

## 2017-08-01 NOTE — PROGRESS NOTES
[facility] Dermatology Note      Dermatology Problem List:  -Seborrheic Keratoses on back  - digital mucoid cyst left first toe PIP  -Scattered benign nevi  -eczematous dermatitis  -erythematotelangiectatic rosacea  -onychodystrophy  -milia on left neck  - longitudinal ridging on nails    Encounter Date: Aug 1, 2017    CC:   Chief Complaint   Patient presents with     Derm Problem     Beronica is here today for a skin check, and to discuss her concerns of having a dry scalp. Has a concerning mole on the center of her back.             History of Present Illness:  Ms. Beronica Azevedo is a 57 year old female who presents as a follow-up for skin check. The patient was last seen 12/15/2015 when she had a skin check with no concerning lesions, but an inflamed SK and myxoid cyst treated with cryotherapy.  The patient mentions she has a dry, occasionally itchy scalp, as well as flaky. Occasionally has dry, red patches.  She has used Free and Clear shampoo, Aveda, and now Intelligent nutrients shampoo. She washes her hair every 2 weeks.  No associated hair loss.    She has an irregular shaped mole on the center of her back and a dilated pore on her back.  The patient has rosacea but not uses any rosacea directed therapies currently. Has used Metrogel in the past which made it worse.    She thinks her finer might be infected from a cut from a piece of plastic that she thinks might be infected. She is using triple antibiotic gel.    Past Medical History:   Patient Active Problem List   Diagnosis     Headache     Insomnia     Moderate dysplasia of cervix (BEVERLY II)     CARDIOVASCULAR SCREENING; LDL GOAL LESS THAN 160     Endometrial polyp     Iron deficiency anemia due to chronic blood loss     Rosacea     Onychodystrophy     Dermatitis     Osteopenia     Post-menopausal     Mild major depression (H)     IBS (irritable bowel syndrome)     Myopia     Regular astigmatism     Presbyopia     Posterior subcapsular  polar senile cataract     Senile nuclear sclerosis     Macular puckering of retina     Cervicalgia     Pain in thoracic spine     Digital mucous cyst     Seborrheic keratosis, inflamed     Chronic pain of right knee     Acute pain of right knee     Past Medical History:   Diagnosis Date     Anxiety      Cervical high risk HPV (human papillomavirus) test positive 3/4/16    3/18/16 colp - [facility] - negative     BEVERLY II (cervical intraepithelial neoplasia II) 2009    LEEP     Depression      Dry eye syndrome      Frequent UTI 2009     Headache(784.0)      IBS (irritable bowel syndrome)      Insomnia      Lattice degeneration of retina      Lyme disease      Myopic astigmatism      Nonsenile cataract      Osteopenia 2001,2012    Fosamax     Pneumothorax on right     at 17 y/o     PVD (posterior vitreous detachment), both eyes 7/19/10     Rosacea      Vasomotor rhinitis      Past Surgical History:   Procedure Laterality Date     CHEST TUBE INSERTION       DILATION AND CURETTAGE, OPERATIVE HYSTEROSCOPY WITH MORCELLATOR, COMBINED  1/2011    endometrial polyp     LEEP TX, CERVICAL  8/2009    BEVERLY II, clear margins     Social History:  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 The patient admits to use of tanning beds.     Family History:  There is a family history of skin cancer in the patient's father, as per HPI.    Medications:  Current Outpatient Prescriptions   Medication Sig Dispense Refill     fluticasone (FLONASE) 50 MCG/ACT spray Spray 1-2 sprays into both nostrils daily 1 Bottle 11     azelastine (ASTELIN) 0.1 % spray Spray 1 spray in nostril 2 times daily 3 Bottle 3     biotin 2.5 mg/mL Take by mouth daily       cholecalciferol (VITAMIN D) 1000 UNIT tablet Take 2 tablets (2,000 Units) by mouth daily 100 tablet 3     NORTRIPTYLINE HCL PO Take 50 mg by mouth At Bedtime        BUTALBITAL-APAP-CAFFEINE PO Take  by mouth as needed.       alprazolam (XANAX) 0.5 MG tablet Take 0.5 mg by mouth nightly as  needed        Multiple Vitamins-Minerals (MULTIVITAMIN & MINERAL PO) Take  by mouth.       BENADRYL ALLERGY PO prn       TRAZODONE HCL 50 MG OR TABS 1/2 TABLET AT BEDTIME AS NEEDED FOR SLEEP 15 Tab 0        Allergies   Allergen Reactions     Tape [Adhesive Tape] Rash     Compazine Other (See Comments)     Hyperactivity       Paxil [Paroxetine] Other (See Comments)     Ropinirole Hcl Unknown     TABS     Depakote [Valproic Acid] Rash     Rash       Food Itching and Rash     mushrooms     Mushroom Itching and Rash         Review of Systems:  -As per HPI  -Constitutional: The patient denies fatigue, fevers, chills, unintended weight loss, and night sweats.  -HEENT: Patient denies nonhealing oral sores.  -Skin: As above in HPI. No additional skin concerns.    Physical exam:  Vitals: LMP 04/06/2010  GEN: This is a well developed, well-nourished female in no acute distress, in a pleasant mood.    SKIN: Total skin excluding the undergarment areas was performed. The exam included the head/face, neck, both arms, chest, back, abdomen, both legs, digits and/or nails.   -There is a bright red dome shaped papule on the abdomen.   There is very mild erythema on cheeks and nose with mild telangiectasias.   There are waxy stuck on tan to brown papules on the back.  The is a cafe-au-lait colored patch on the lower back, on the posterior left upper extremity adjacent to left scapula there is another cafe-au-lait colored patch with fine speckled pinpoint hyperpigmented macules  -Fingernails with linear ridges  -Left 3rd digit with ruptured vesicle but no fluid or pus, rim of faint colored erythema surrounding  -Mild follicular accentuation over scalp with several flakes, but no clarence-follicular erythema or follicular plugging  -No other lesions of concern on areas examined.     Impression/Plan:  1. Seborrheic dermatitis- Over scalp. Very minimal Recommend to use ketoconazole shampoo.    Prescribed ketoconazole 2% shampoo, recommend to  use at least once a week (the patient hesitant to wash hair more frequently)      2. Seborrheic keratosis, non irritated    ABCDs of melanoma were discussed and self skin checks were advised.     Benign nature of skin lesions were discussed      3. Irritated epidermal cyst- Has a dark pore that is irritating. Elected for 3 mm punch biopsy.    Punch biopsy:  After discussion of benefits and risks including but not limited to bleeding/bruising, pain/swelling, infection, scar, incomplete removal, nerve damage/numbness, recurrence, and non-diagnostic biopsy, written consent, verbal consent and photographs were obtained. Time-out was performed. The area was cleaned with isopropyl alcohol. 1.5 mL of 1% lidocaine was injected to obtain adequate anesthesia of the lesion on the upper back. A 3 mm punch biopsy was performed.  4-0 prolene sutures were utilized to approximate the epidermal edges.  White petroleum jelly/VaselineTM and a bandage was applied to the wound.  Explicit verbal and written wound care instructions were provided.  The patient left the Dermatology Clinic in good condition. The patient was counseled to follow up for suture removal in approximately 14 days.    F/u biopsy result    4.   Mild erythematelangiectatic rosacea- Referred to laser clinic as the patient states she does not respond well to topical directed therapies for Rosacea.    4. Finernail changes after menopause- Ridges over fingernails but not dystrophic.    Continue biotin supplement    Recommend      CC Dr. Antunez on close of this encounter.  Follow-up in 1 year, earlier for new or changing lesions.       Dr. Antunez staffed the patient.    Staff Involved:  Resident(Isaias Gonsalez)/Staff(as above)    I have seen and examined this patient and agree with the assessment and plan as documented in the resident's note, and was present for all procedures.    John Antunez MD  Dermatology Attending

## 2017-08-01 NOTE — PATIENT INSTRUCTIONS
Wound Care After a Biopsy    What is a skin biopsy?  A skin biopsy allows the doctor to examine a very small piece of tissue under the microscope to determine the diagnosis and the best treatment for the skin condition. A local anesthetic (numbing medicine)  is injected with a very small needle into the skin area to be tested. A small piece of skin is taken from the area. Sometimes a suture (stitch) is used.     What are the risks of a skin biopsy?  I will experience scar, bleeding, swelling, pain, crusting and redness. I may experience incomplete removal or recurrence. Risks of this procedure are excessive bleeding, bruising, infection, nerve damage, numbness, thick (hypertrophic or keloidal) scar and non-diagnostic biopsy.    How should I care for my wound for the first 24 hours?    Keep the wound dry and covered for 24 hours    If it bleeds, hold direct pressure on the area for 15 minutes. If bleeding does not stop then go to the emergency room    Avoid strenuous exercise the first 1-2 days or as your doctor instructs you    How should I care for the wound after 24 hours?    After 24 hours, remove the bandage    You may bathe or shower as normal    If you had a scalp biopsy, you can shampoo as usual and can use shower water to clean the biopsy site daily    Clean the wound twice a day with gentle soap and water    Do not scrub, be gentle    Apply white petroleum/Vaseline after cleaning the wound with a cotton swab or a clean finger, and keep the site covered with a Bandaid /bandage. Bandages are not necessary with a scalp biopsy    If you are unable to cover the site with a Bandaid /bandage, re-apply ointment 2-3 times a day to keep the site moist. Moisture will help with healing    Avoid strenuous activity for first 1-2 days    Avoid lakes, rivers, pools, and oceans until the stitches are removed or the site is healed    How do I clean my wound?    Wash hands thoroughly with soap or use hand  before all  wound care    Clean the wound with gentle soap and water    Apply white petroleum/Vaseline  to wound after it is clean    Replace the Bandaid /bandage to keep the wound covered for the first few days or as instructed by your doctor    If you had a scalp biopsy, warm shower water to the area on a daily basis should suffice    What should I use to clean my wound?     Cotton-tipped applicators (Qtips )    White petroleum jelly (Vaseline ). Use a clean new container and use Q-tips to apply.    Bandaids   as needed    Gentle soap     How should I care for my wound long term?    Do not get your wound dirty    Keep up with wound care for one week or until the area is healed.    A small scab will form and fall off by itself when the area is completely healed. The area will be red and will become pink in color as it heals. Sun protection is very important for how your scar will turn out. Sunscreen with an SPF 30 or greater is recommended once the area is healed.    If you have stitches, stitches need to be removed in 14 days. You may return to our clinic for this or you may have it done locally at your doctor s office.    You should have some soreness but it should be mild and slowly go away over several days. Talk to your doctor about using tylenol for pain,    When should I call my doctor?  If you have increased:     Pain or swelling    Pus or drainage (clear or slightly yellow drainage is ok)    Temperature over 100F    Spreading redness or warmth around wound    When will I hear about my results?  The biopsy results can take 2-3 weeks to come back. The clinic will call you with the results, send you a [employer] message, or have you schedule a follow-up clinic or phone time to discuss the results. Contact our clinics if you do not hear from us in 3 weeks.     Who should I call with questions?    [facility]: [phone]     [facility]: [phone]    For  urgent needs outside of business hours call the [facility] at [phone] and ask for the dermatology resident on call      The ABCDEs of Melanoma    Skin cancer can develop anywhere on the skin. Ask someone for help when checking your skin, especially in hard to see places. If you notice a mole different from others, or that changes, enlarges, itches, or bleeds (even if it is small), you should see a dermatologist.

## 2017-08-01 NOTE — NURSING NOTE
Dermatology Rooming Note    Beronica Azevedo's goals for this visit include:   Chief Complaint   Patient presents with     Derm Problem     Beronica is here today for a skin check, and to discuss her concerns of having a dry scalp. Has a concerning mole on the center of her back.           Penelope Machado, RANI

## 2017-08-01 NOTE — MR AVS SNAPSHOT
After Visit Summary   8/1/2017    Beronica Azevedo    MRN: [id number]           Patient Information     Date Of Birth          1960        Visit Information        Provider Department      8/1/2017 9:15 AM John Antunez MD [facility] Dermatology        Today's Diagnoses     Dermatitis, seborrheic    -  1    Rosacea        Epidermal cyst          Care Instructions    Wound Care After a Biopsy    What is a skin biopsy?  A skin biopsy allows the doctor to examine a very small piece of tissue under the microscope to determine the diagnosis and the best treatment for the skin condition. A local anesthetic (numbing medicine)  is injected with a very small needle into the skin area to be tested. A small piece of skin is taken from the area. Sometimes a suture (stitch) is used.     What are the risks of a skin biopsy?  I will experience scar, bleeding, swelling, pain, crusting and redness. I may experience incomplete removal or recurrence. Risks of this procedure are excessive bleeding, bruising, infection, nerve damage, numbness, thick (hypertrophic or keloidal) scar and non-diagnostic biopsy.    How should I care for my wound for the first 24 hours?    Keep the wound dry and covered for 24 hours    If it bleeds, hold direct pressure on the area for 15 minutes. If bleeding does not stop then go to the emergency room    Avoid strenuous exercise the first 1-2 days or as your doctor instructs you    How should I care for the wound after 24 hours?    After 24 hours, remove the bandage    You may bathe or shower as normal    If you had a scalp biopsy, you can shampoo as usual and can use shower water to clean the biopsy site daily    Clean the wound twice a day with gentle soap and water    Do not scrub, be gentle    Apply white petroleum/Vaseline after cleaning the wound with a cotton swab or a clean finger, and keep the site covered with a Bandaid /bandage. Bandages are not necessary with a scalp  biopsy    If you are unable to cover the site with a Bandaid /bandage, re-apply ointment 2-3 times a day to keep the site moist. Moisture will help with healing    Avoid strenuous activity for first 1-2 days    Avoid lakes, rivers, pools, and oceans until the stitches are removed or the site is healed    How do I clean my wound?    Wash hands thoroughly with soap or use hand  before all wound care    Clean the wound with gentle soap and water    Apply white petroleum/Vaseline  to wound after it is clean    Replace the Bandaid /bandage to keep the wound covered for the first few days or as instructed by your doctor    If you had a scalp biopsy, warm shower water to the area on a daily basis should suffice    What should I use to clean my wound?     Cotton-tipped applicators (Qtips )    White petroleum jelly (Vaseline ). Use a clean new container and use Q-tips to apply.    Bandaids   as needed    Gentle soap     How should I care for my wound long term?    Do not get your wound dirty    Keep up with wound care for one week or until the area is healed.    A small scab will form and fall off by itself when the area is completely healed. The area will be red and will become pink in color as it heals. Sun protection is very important for how your scar will turn out. Sunscreen with an SPF 30 or greater is recommended once the area is healed.    If you have stitches, stitches need to be removed in 14 days. You may return to our clinic for this or you may have it done locally at your doctor s office.    You should have some soreness but it should be mild and slowly go away over several days. Talk to your doctor about using tylenol for pain,    When should I call my doctor?  If you have increased:     Pain or swelling    Pus or drainage (clear or slightly yellow drainage is ok)    Temperature over 100F    Spreading redness or warmth around wound    When will I hear about my results?  The biopsy results can take 2-3  weeks to come back. The clinic will call you with the results, send you a mychart message, or have you schedule a follow-up clinic or phone time to discuss the results. Contact our clinics if you do not hear from us in 3 weeks.     Who should I call with questions?    [facility]: [phone]     [facility]: [phone]    For urgent needs outside of business hours call the [facility] at [phone] and ask for the dermatology resident on call      The ABCDEs of Melanoma    Skin cancer can develop anywhere on the skin. Ask someone for help when checking your skin, especially in hard to see places. If you notice a mole different from others, or that changes, enlarges, itches, or bleeds (even if it is small), you should see a dermatologist.                  Follow-ups after your visit        Additional Services     Dermatology Referral for [place]       Your provider has referred you to: [facility]: [facility]  [phone]   Dr. Tariq desai for vascular laser for rosacea  Please be aware that coverage of these services is subject to the terms and limitations of your health insurance plan.  Call member services at your health plan with any benefit or coverage questions.      Please bring the following to your appointment:  Any x-rays, CTs or MRIs which have been performed.  Contact the facility where they were done to arrange for  prior to your scheduled appointment.  Any new CT, MRI or other procedures ordered by your specialist must be performed at a [place] facility or coordinated by your clinic's referral office.    List of current medications   This referral request   Any documents/labs given to you for this referral                  Follow-up notes from your care team     Return in 1 year (on 8/1/2018), or for suture removal.      Your next 10 appointments already scheduled     Aug 01, 2017  1:45 PM CDT   Office Visit  with Joaquin Rowe MD   [facility]    [address] [place] [id number] [phone]           Bring a current list of meds and any records pertaining to this visit. For Physicals, please bring immunization records and any forms needing to be filled out. Please arrive 10 minutes early to complete paperwork.              Who to contact     Please call your clinic at [phone] to:    Ask questions about your health    Make or cancel appointments    Discuss your medicines    Learn about your test results    Speak to your doctor   If you have compliments or concerns about an experience at your clinic, or if you wish to file a complaint, please contact [facility] Patient Relations at [phone] or email us at [email or web address]         Additional Information About Your Visit        MyChart Information     [employer] gives you secure access to your electronic health record. If you see a primary care provider, you can also send messages to your care team and make appointments. If you have questions, please call your primary care clinic.  If you do not have a primary care provider, please call [phone] and they will assist you.      [employer] is an electronic gateway that provides easy, online access to your medical records. With [employer], you can request a clinic appointment, read your test results, renew a prescription or communicate with your care team.     To access your existing account, please contact your [facility] or call [phone] for assistance.        Care EveryWhere ID     This is your Care EveryWhere ID. This could be used by other organizations to access your [facility] medical records  [id number]        Your Vitals Were     Last Period                   04/06/2010            Blood Pressure from Last 3 Encounters:   06/29/17 106/70   06/06/17 100/68    11/10/16 104/56    Weight from Last 3 Encounters:   06/29/17 61.9 kg (136 lb 8 oz)   06/06/17 61.4 kg (135 lb 4.8 oz)   06/01/17 60.3 kg (133 lb)              We Performed the Following     BIOPSY SKIN/SUBQ/MUC MEM, SINGLE LESION     Dermatology Referral for [place]     Surgical pathology exam          Today's Medication Changes          These changes are accurate as of: 8/1/17 11:02 AM.  If you have any questions, ask your nurse or doctor.               Start taking these medicines.        Dose/Directions    ketoconazole 2 % shampoo   Commonly known as:  NIZORAL   Used for:  Dermatitis, seborrheic   Started by:  John Antunez MD        Apply topically daily as needed for itching or irritation   Quantity:  120 mL   Refills:  1       lidocaine 1% with EPINEPHrine 1:100,000 1 %-1:405062 injection   Used for:  Epidermal cyst   Started by:  John Antunez MD        Dose:  3 mL   Inject 3 mLs into the skin once for 1 dose   Quantity:  3 mL   Refills:  0         Stop taking these medicines if you haven't already. Please contact your care team if you have questions.     amoxicillin 500 MG capsule   Commonly known as:  AMOXIL   Stopped by:  John Antunez MD           benzonatate 200 MG capsule   Commonly known as:  TESSALON   Stopped by:  John Antunez MD           IRON SUPPLEMENT PO   Stopped by:  John Antunez MD                Where to get your medicines      These medications were sent to [facility] [address] [place] [address] AT [address] [address]    Hours:  24-hours Phone:  [phone]     ketoconazole 2 % shampoo         Some of these will need a paper prescription and others can be bought over the counter.  Ask your nurse if you have questions.     You don't need a prescription for these medications     lidocaine 1% with EPINEPHrine 1:100,000 1 %-1:686209 injection                Primary Care Provider Office  Phone # Fax #    Joaquin Sarthak Rowe [phone]       [facility] [address]  [facility] [address]        Equal Access to Services     GREGOR THRASHER : Hadii aad ku hadjhonnyo Soamenaali, waaxda luqadaha, qaybta kaalmada adeegyada, aaron huntrevangie gomezdejah freed shaniqua ortiz. So [facility] [phone].    ATENCIÓN: Si habla español, tiene a garrett disposición servicios gratuitos de asistencia lingüística. Alyx al [phone].    We comply with applicable federal civil rights laws and [place] laws. We do not discriminate on the basis of race, color, national origin, age, disability sex, sexual orientation or gender identity.            Thank you!     Thank you for choosing [facility] DERMATOLOGY  for your care. Our goal is always to provide you with excellent care. Hearing back from our patients is one way we can continue to improve our services. Please take a few minutes to complete the written survey that you may receive in the mail after your visit with us. Thank you!             Your Updated Medication List - Protect others around you: Learn how to safely use, store and throw away your medicines at www.disposemymeds.org.          This list is accurate as of: 8/1/17 11:02 AM.  Always use your most recent med list.                   Brand Name Dispense Instructions for use Diagnosis    ALPRAZolam 0.5 MG tablet    XANAX     Take 0.5 mg by mouth nightly as needed        azelastine 0.1 % spray    ASTELIN    3 Bottle    Spray 1 spray in nostril 2 times daily    Chronic rhinitis       BENADRYL ALLERGY PO      prn        biotin 2.5 mg/mL Susp      Take by mouth daily        BUTALBITAL-APAP-CAFFEINE PO      Take  by mouth as needed.    Maxillary sinusitis, Headache(784.0)       cholecalciferol 1000 UNIT tablet    vitamin D    100 tablet    Take 2 tablets (2,000 Units) by mouth daily    Osteopenia       fluticasone 50 MCG/ACT spray    FLONASE    1 Bottle    Spray 1-2 sprays into both nostrils  daily    Acute non-recurrent frontal sinusitis       ketoconazole 2 % shampoo    NIZORAL    120 mL    Apply topically daily as needed for itching or irritation    Dermatitis, seborrheic       lidocaine 1% with EPINEPHrine 1:100,000 1 %-1:831067 injection     3 mL    Inject 3 mLs into the skin once for 1 dose    Epidermal cyst       MULTIVITAMIN & MINERAL PO      Take  by mouth.        NORTRIPTYLINE HCL PO      Take 50 mg by mouth At Bedtime        traZODone 50 MG tablet    DESYREL    15 Tab    1/2 TABLET AT BEDTIME AS NEEDED FOR SLEEP    Insomnia

## 2017-08-02 LAB — COPATH REPORT: NORMAL

## 2017-08-02 ASSESSMENT — ANXIETY QUESTIONNAIRES: GAD7 TOTAL SCORE: 5

## 2017-08-02 ASSESSMENT — PATIENT HEALTH QUESTIONNAIRE - PHQ9: SUM OF ALL RESPONSES TO PHQ QUESTIONS 1-9: 2

## 2017-08-10 PROBLEM — F41.1 GAD (GENERALIZED ANXIETY DISORDER): Status: ACTIVE | Noted: 2017-08-10

## 2017-08-16 PROBLEM — L21.9 DERMATITIS, SEBORRHEIC: Status: ACTIVE | Noted: 2017-08-16

## 2017-08-16 PROBLEM — D48.5 NEOPLASM OF UNCERTAIN BEHAVIOR OF SKIN: Status: ACTIVE | Noted: 2017-08-16

## 2018-01-26 ENCOUNTER — OFFICE VISIT (OUTPATIENT)
Dept: URGENT CARE | Facility: URGENT CARE | Age: 58
End: 2018-01-26
Payer: COMMERCIAL

## 2018-01-26 VITALS
BODY MASS INDEX: 21.59 KG/M2 | HEART RATE: 84 BPM | TEMPERATURE: 98.1 F | SYSTOLIC BLOOD PRESSURE: 104 MMHG | DIASTOLIC BLOOD PRESSURE: 66 MMHG | RESPIRATION RATE: 20 BRPM | WEIGHT: 142 LBS

## 2018-01-26 DIAGNOSIS — J34.89 SINUS PRESSURE: Primary | ICD-10-CM

## 2018-01-26 DIAGNOSIS — R51.9 NONINTRACTABLE HEADACHE, UNSPECIFIED CHRONICITY PATTERN, UNSPECIFIED HEADACHE TYPE: ICD-10-CM

## 2018-01-26 PROCEDURE — 99214 OFFICE O/P EST MOD 30 MIN: CPT | Performed by: FAMILY MEDICINE

## 2018-01-26 RX ORDER — CETIRIZINE HYDROCHLORIDE 10 MG/1
10 TABLET ORAL EVERY EVENING
Qty: 30 TABLET | Refills: 0 | COMMUNITY
Start: 2018-01-26 | End: 2018-02-25

## 2018-01-26 RX ORDER — HYDROCODONE BITARTRATE AND ACETAMINOPHEN 5; 325 MG/1; MG/1
1 TABLET ORAL EVERY 6 HOURS PRN
Qty: 8 TABLET | Refills: 0 | Status: SHIPPED | OUTPATIENT
Start: 2018-01-26 | End: 2018-01-28

## 2018-01-26 NOTE — MR AVS SNAPSHOT
After Visit Summary   1/26/2018    Beronica Azevedo    MRN: [id number]           Patient Information     Date Of Birth          1960        Visit Information        Provider Department      1/26/2018 2:50 PM Elie Sparks,  [facility]        Today's Diagnoses     Sinus pressure    -  1    Nonintractable headache, unspecified chronicity pattern, unspecified headache type           Follow-ups after your visit        Your next 10 appointments already scheduled     Jan 30, 2018  3:30 PM CST   Office Visit with Joaquin Rowe MD   [facility]    [address] [place] [id number] [phone]           Bring a current list of meds and any records pertaining to this visit. For Physicals, please bring immunization records and any forms needing to be filled out. Please arrive 10 minutes early to complete paperwork.              Who to contact     If you have questions or need follow up information about today's clinic visit or your schedule please contact [employer] directly at [phone].  Normal or non-critical lab and imaging results will be communicated to you by [employer], letter or phone within 4 business days after the clinic has received the results. If you do not hear from us within 7 days, please contact the clinic through [employer] or phone. If you have a critical or abnormal lab result, we will notify you by phone as soon as possible.  Submit refill requests through [employer] or call your pharmacy and they will forward the refill request to us. Please allow 3 business days for your refill to be completed.          Additional Information About Your Visit        MyChart Information     [employer] gives you secure access to your electronic health record. If you see a primary care provider, you can also send messages to your care team and make appointments. If you  have questions, please call your primary care clinic.  If you do not have a primary care provider, please call [phone] and they will assist you.        Care EveryWhere ID     This is your Care EveryWhere ID. This could be used by other organizations to access your [facility] medical records  [id number]        Your Vitals Were     Pulse Temperature Respirations Last Period BMI (Body Mass Index)       84 98.1  F (36.7  C) (Oral) 20 04/06/2010 21.59 kg/m2        Blood Pressure from Last 3 Encounters:   01/26/18 104/66   08/01/17 116/71   06/29/17 106/70    Weight from Last 3 Encounters:   01/26/18 142 lb (64.4 kg)   08/01/17 136 lb 9.6 oz (62 kg)   06/29/17 136 lb 8 oz (61.9 kg)              Today, you had the following     No orders found for display         Today's Medication Changes          These changes are accurate as of 1/26/18  3:57 PM.  If you have any questions, ask your nurse or doctor.               Start taking these medicines.        Dose/Directions    cetirizine 10 MG tablet   Commonly known as:  zyrTEC   Used for:  Sinus pressure   Started by:  Elie Sparks DO        Dose:  10 mg   Take 1 tablet (10 mg) by mouth every evening   Quantity:  30 tablet   Refills:  0       HYDROcodone-acetaminophen 5-325 MG per tablet   Commonly known as:  NORCO   Used for:  Nonintractable headache, unspecified chronicity pattern, unspecified headache type   Started by:  Elie Sparks DO        Dose:  1 tablet   Take 1 tablet by mouth every 6 hours as needed   Quantity:  8 tablet   Refills:  0            Where to get your medicines      Some of these will need a paper prescription and others can be bought over the counter.  Ask your nurse if you have questions.     Bring a paper prescription for each of these medications     HYDROcodone-acetaminophen 5-325 MG per tablet       You don't need a prescription for these medications     cetirizine 10 MG tablet                Primary Care Provider Office Phone # Fax #     Joaquin Rowe [phone]       [address]  [facility] [address]        Equal Access to Services     GREGOR THRASHER : Hadii simon beverly krystyna Soamenaali, wakaliada luqadaha, qaybta kaalmada aderahat, aaron keyes jasondejah freed laQuincytony ortiz. So [facility] [phone].    ATENCIÓN: Si habla español, tiene a garrett disposición servicios gratuitos de asistencia lingüística. Llame al [phone].    We comply with applicable federal civil rights laws and [place] laws. We do not discriminate on the basis of race, color, national origin, age, disability, sex, sexual orientation, or gender identity.            Thank you!     Thank you for choosing [facility]  for your care. Our goal is always to provide you with excellent care. Hearing back from our patients is one way we can continue to improve our services. Please take a few minutes to complete the written survey that you may receive in the mail after your visit with us. Thank you!             Your Updated Medication List - Protect others around you: Learn how to safely use, store and throw away your medicines at www.disposemymeds.org.          This list is accurate as of 1/26/18  3:57 PM.  Always use your most recent med list.                   Brand Name Dispense Instructions for use Diagnosis    ALPRAZolam 0.5 MG tablet    XANAX     Take 0.5 mg by mouth nightly as needed        azelastine 0.1 % spray    ASTELIN    3 Bottle    Spray 1 spray in nostril 2 times daily    Chronic rhinitis, unspecified type       BENADRYL ALLERGY PO      prn        biotin 2.5 mg/mL Susp      Take by mouth daily        * butalbital-acetaminophen-caffeine -40 MG per tablet    FIORICET/ESGIC    28 tablet    Take 1 tablet by mouth every 4 hours as needed    Migraine with aura and with status migrainosus, not intractable       * BUTALBITAL-APAP-CAFFEINE PO      Take  by mouth as needed.    Maxillary sinusitis, Headache(784.0)        cetirizine 10 MG tablet    zyrTEC    30 tablet    Take 1 tablet (10 mg) by mouth every evening    Sinus pressure       cholecalciferol 1000 UNIT tablet    vitamin D3    100 tablet    Take 2 tablets (2,000 Units) by mouth daily    Osteopenia       fluticasone 50 MCG/ACT spray    FLONASE    3 Bottle    Spray 1-2 sprays into both nostrils daily    Acute non-recurrent frontal sinusitis       HYDROcodone-acetaminophen 5-325 MG per tablet    NORCO    8 tablet    Take 1 tablet by mouth every 6 hours as needed    Nonintractable headache, unspecified chronicity pattern, unspecified headache type       ketoconazole 2 % shampoo    NIZORAL    120 mL    Apply topically daily as needed for itching or irritation    Dermatitis, seborrheic       MULTIVITAMIN & MINERAL PO      Take  by mouth.        * NORTRIPTYLINE HCL PO      Take 50 mg by mouth At Bedtime        * nortriptyline 50 MG capsule    PAMELOR    90 capsule    Take 1 capsule (50 mg) by mouth At Bedtime    Mild major depression (H), Primary insomnia       traZODone 50 MG tablet    DESYREL    45 tablet    1/2 TABLET AT BEDTIME AS NEEDED FOR SLEEP    Primary insomnia, FREDIS (generalized anxiety disorder)       * Notice:  This list has 4 medication(s) that are the same as other medications prescribed for you. Read the directions carefully, and ask your doctor or other care provider to review them with you.

## 2018-01-26 NOTE — NURSING NOTE
"Chief Complaint   Patient presents with     Headache     headache for 4 days       Initial /66 (Cuff Size: Adult Regular)  Pulse 84  Temp 98.1  F (36.7  C) (Oral)  Resp 20  Wt 142 lb (64.4 kg)  LMP 04/06/2010  BMI 21.59 kg/m2 Estimated body mass index is 21.59 kg/(m^2) as calculated from the following:    Height as of 6/1/17: 5' 8\" (1.727 m).    Weight as of this encounter: 142 lb (64.4 kg).  Medication Reconciliation: complete Sara SARAVIA    "

## 2018-01-30 ENCOUNTER — OFFICE VISIT (OUTPATIENT)
Dept: FAMILY MEDICINE | Facility: CLINIC | Age: 58
End: 2018-01-30
Payer: COMMERCIAL

## 2018-01-30 VITALS
OXYGEN SATURATION: 100 % | DIASTOLIC BLOOD PRESSURE: 70 MMHG | HEART RATE: 97 BPM | BODY MASS INDEX: 21.44 KG/M2 | WEIGHT: 141 LBS | SYSTOLIC BLOOD PRESSURE: 100 MMHG | TEMPERATURE: 97.3 F

## 2018-01-30 DIAGNOSIS — R51.9 CHRONIC NONINTRACTABLE HEADACHE, UNSPECIFIED HEADACHE TYPE: ICD-10-CM

## 2018-01-30 DIAGNOSIS — G89.29 CHRONIC NONINTRACTABLE HEADACHE, UNSPECIFIED HEADACHE TYPE: ICD-10-CM

## 2018-01-30 DIAGNOSIS — F32.0 MILD MAJOR DEPRESSION (H): ICD-10-CM

## 2018-01-30 DIAGNOSIS — G44.209 TENSION-TYPE HEADACHE, NOT INTRACTABLE, UNSPECIFIED CHRONICITY PATTERN: Primary | ICD-10-CM

## 2018-01-30 DIAGNOSIS — J01.90 ACUTE SINUSITIS WITH SYMPTOMS GREATER THAN 10 DAYS: ICD-10-CM

## 2018-01-30 DIAGNOSIS — M25.532 PAIN IN BOTH WRISTS: ICD-10-CM

## 2018-01-30 DIAGNOSIS — M25.531 PAIN IN BOTH WRISTS: ICD-10-CM

## 2018-01-30 PROCEDURE — 99214 OFFICE O/P EST MOD 30 MIN: CPT | Performed by: FAMILY MEDICINE

## 2018-01-30 RX ORDER — AMOXICILLIN 500 MG/1
500 CAPSULE ORAL 3 TIMES DAILY
Qty: 30 CAPSULE | Refills: 0 | Status: SHIPPED | OUTPATIENT
Start: 2018-01-30 | End: 2018-03-20

## 2018-01-30 RX ORDER — CYCLOBENZAPRINE HCL 10 MG
10 TABLET ORAL 3 TIMES DAILY PRN
Qty: 20 TABLET | Refills: 0 | Status: SHIPPED | OUTPATIENT
Start: 2018-01-30 | End: 2018-07-25

## 2018-01-30 ASSESSMENT — ANXIETY QUESTIONNAIRES
GAD7 TOTAL SCORE: 4
6. BECOMING EASILY ANNOYED OR IRRITABLE: NOT AT ALL
1. FEELING NERVOUS, ANXIOUS, OR ON EDGE: SEVERAL DAYS
3. WORRYING TOO MUCH ABOUT DIFFERENT THINGS: SEVERAL DAYS
5. BEING SO RESTLESS THAT IT IS HARD TO SIT STILL: NOT AT ALL
2. NOT BEING ABLE TO STOP OR CONTROL WORRYING: SEVERAL DAYS
7. FEELING AFRAID AS IF SOMETHING AWFUL MIGHT HAPPEN: NOT AT ALL

## 2018-01-30 ASSESSMENT — PATIENT HEALTH QUESTIONNAIRE - PHQ9: 5. POOR APPETITE OR OVEREATING: SEVERAL DAYS

## 2018-01-30 NOTE — NURSING NOTE
"Chief Complaint   Patient presents with     Headache       Initial /70 (BP Location: Right arm, Patient Position: Chair, Cuff Size: Adult Regular)  Pulse 97  Temp 97.3  F (36.3  C) (Oral)  Wt 141 lb (64 kg)  LMP 04/06/2010  SpO2 100%  BMI 21.44 kg/m2 Estimated body mass index is 21.44 kg/(m^2) as calculated from the following:    Height as of 6/1/17: 5' 8\" (1.727 m).    Weight as of this encounter: 141 lb (64 kg).  Medication Reconciliation: complete     Christina Zavala CMA    "

## 2018-01-30 NOTE — MR AVS SNAPSHOT
After Visit Summary   1/30/2018    Beronica Azevedo    MRN: [id number]           Patient Information     Date Of Birth          1960        Visit Information        Provider Department      1/30/2018 3:30 PM Joaquin Rowe [place] [facility]        Today's Diagnoses     Tension-type headache, not intractable, unspecified chronicity pattern    -  1    Acute sinusitis with symptoms greater than 10 days        Pain in both wrists        Chronic nonintractable headache, unspecified headache type           Follow-ups after your visit        Additional Services     NEUROLOGY ADULT REFERRAL       Your provider has referred you to: [facility]: [facility]  [place] [phone]   [email or web address]    Please be aware that coverage of these services is subject to the terms and limitations of your health insurance plan.  Call member services at your health plan with any benefit or coverage questions.      Please bring the following to your appointment:    >>   Any x-rays, CTs or MRIs which have been performed.  Contact the facility where they were done to arrange for  prior to your scheduled appointment.  Any new CT, MRI or other procedures ordered by your specialist must be performed at a [facility] facility or coordinated by your clinic's referral office.    >>   List of current medications   >>   This referral request   >>   Any documents/labs given to you for this referral                  Future tests that were ordered for you today     Open Future Orders        Priority Expected Expires Ordered    CRP, inflammation Routine  1/30/2019 1/30/2018            Who to contact     If you have questions or need follow up information about today's clinic visit or your schedule please contact [facility] directly at [phone].  Normal or non-critical lab and imaging results will be communicated to you by MyChart, letter or phone  within 4 business days after the clinic has received the results. If you do not hear from us within 7 days, please contact the clinic through [employer] or phone. If you have a critical or abnormal lab result, we will notify you by phone as soon as possible.  Submit refill requests through [employer] or call your pharmacy and they will forward the refill request to us. Please allow 3 business days for your refill to be completed.          Additional Information About Your Visit        [employer] Information     [employer] gives you secure access to your electronic health record. If you see a primary care provider, you can also send messages to your care team and make appointments. If you have questions, please call your primary care clinic.  If you do not have a primary care provider, please call [phone] and they will assist you.        Care EveryWhere ID     This is your Care EveryWhere ID. This could be used by other organizations to access your [place] medical records  [id number]        Your Vitals Were     Pulse Temperature Last Period Pulse Oximetry BMI (Body Mass Index)       97 97.3  F (36.3  C) (Oral) 04/06/2010 100% 21.44 kg/m2        Blood Pressure from Last 3 Encounters:   01/30/18 100/70   01/26/18 104/66   08/01/17 116/71    Weight from Last 3 Encounters:   01/30/18 141 lb (64 kg)   01/26/18 142 lb (64.4 kg)   08/01/17 136 lb 9.6 oz (62 kg)              We Performed the Following     NEUROLOGY ADULT REFERRAL          Today's Medication Changes          These changes are accurate as of 1/30/18  4:09 PM.  If you have any questions, ask your nurse or doctor.               Start taking these medicines.        Dose/Directions    acetaminophen-codeine 300-30 MG per tablet   Commonly known as:  TYLENOL #3   Used for:  Tension-type headache, not intractable, unspecified chronicity pattern   Started by:  Joaquin Rowe MD        Dose:  1 tablet   Take 1 tablet by mouth every 4 hours as needed for pain  maximum 4 tablet(s) per day   Quantity:  20 tablet   Refills:  0       amoxicillin 500 MG capsule   Commonly known as:  AMOXIL   Used for:  Acute sinusitis with symptoms greater than 10 days   Started by:  Joaquin Rowe MD        Dose:  500 mg   Take 1 capsule (500 mg) by mouth 3 times daily   Quantity:  30 capsule   Refills:  0       cyclobenzaprine 10 MG tablet   Commonly known as:  FLEXERIL   Used for:  Tension-type headache, not intractable, unspecified chronicity pattern   Started by:  Joaquin Rowe MD        Dose:  10 mg   Take 1 tablet (10 mg) by mouth 3 times daily as needed for muscle spasms   Quantity:  20 tablet   Refills:  0            Where to get your medicines      These medications were sent to [facility] [address] [place] [address] AT [address] [address]     Phone:  [phone]     cyclobenzaprine 10 MG tablet         Some of these will need a paper prescription and others can be bought over the counter.  Ask your nurse if you have questions.     Bring a paper prescription for each of these medications     acetaminophen-codeine 300-30 MG per tablet    amoxicillin 500 MG capsule                Primary Care Provider Office Phone # Fax #    Joaquin Rowe [phone]       [address] [facility] [address]  [facility] [address]        Equal Access to Services     GREGOR THRASHER AH: Hadii simon beverly hadasho Soomaali, waaxda luqadaha, qaybta kaalmada adeegyada, aaron ortiz. So [facility] [phone].    ATENCIÓN: Si habla español, tiene a garrett disposición servicios gratuitos de asistencia lingüística. Alyx al [phone].    We comply with applicable federal civil rights laws and [place] laws. We do not discriminate on the basis of race, color, national origin, age, disability, sex, sexual orientation, or gender identity.            Thank you!     Thank you for  choosing [facility]  for your care. Our goal is always to provide you with excellent care. Hearing back from our patients is one way we can continue to improve our services. Please take a few minutes to complete the written survey that you may receive in the mail after your visit with us. Thank you!             Your Updated Medication List - Protect others around you: Learn how to safely use, store and throw away your medicines at www.disposemymeds.org.          This list is accurate as of 1/30/18  4:09 PM.  Always use your most recent med list.                   Brand Name Dispense Instructions for use Diagnosis    acetaminophen-codeine 300-30 MG per tablet    TYLENOL #3    20 tablet    Take 1 tablet by mouth every 4 hours as needed for pain maximum 4 tablet(s) per day    Tension-type headache, not intractable, unspecified chronicity pattern       ALPRAZolam 0.5 MG tablet    XANAX     Take 0.5 mg by mouth nightly as needed        amoxicillin 500 MG capsule    AMOXIL    30 capsule    Take 1 capsule (500 mg) by mouth 3 times daily    Acute sinusitis with symptoms greater than 10 days       azelastine 0.1 % spray    ASTELIN    3 Bottle    Spray 1 spray in nostril 2 times daily    Chronic rhinitis, unspecified type       BENADRYL ALLERGY PO      prn        biotin 2.5 mg/mL Susp      Take by mouth daily        * butalbital-acetaminophen-caffeine -40 MG per tablet    FIORICET/ESGIC    28 tablet    Take 1 tablet by mouth every 4 hours as needed    Migraine with aura and with status migrainosus, not intractable       * BUTALBITAL-APAP-CAFFEINE PO      Take  by mouth as needed.    Maxillary sinusitis, Headache(784.0)       cetirizine 10 MG tablet    zyrTEC    30 tablet    Take 1 tablet (10 mg) by mouth every evening    Sinus pressure       cholecalciferol 1000 UNIT tablet    vitamin D3    100 tablet    Take 2 tablets (2,000 Units) by mouth daily    Osteopenia       cyclobenzaprine 10 MG tablet     FLEXERIL    20 tablet    Take 1 tablet (10 mg) by mouth 3 times daily as needed for muscle spasms    Tension-type headache, not intractable, unspecified chronicity pattern       fluticasone 50 MCG/ACT spray    FLONASE    3 Bottle    Spray 1-2 sprays into both nostrils daily    Acute non-recurrent frontal sinusitis       ketoconazole 2 % shampoo    NIZORAL    120 mL    Apply topically daily as needed for itching or irritation    Dermatitis, seborrheic       MULTIVITAMIN & MINERAL PO      Take  by mouth.        * NORTRIPTYLINE HCL PO      Take 50 mg by mouth At Bedtime        * nortriptyline 50 MG capsule    PAMELOR    90 capsule    Take 1 capsule (50 mg) by mouth At Bedtime    Mild major depression (H), Primary insomnia       traZODone 50 MG tablet    DESYREL    45 tablet    1/2 TABLET AT BEDTIME AS NEEDED FOR SLEEP    Primary insomnia, FREDIS (generalized anxiety disorder)       * Notice:  This list has 4 medication(s) that are the same as other medications prescribed for you. Read the directions carefully, and ask your doctor or other care provider to review them with you.

## 2018-01-30 NOTE — PROGRESS NOTES
SUBJECTIVE:   Beronica Azevedo is a 57 year old female who presents to clinic today for the following health issues:    Headache  Onset: Current headache x7 days     Description:   Location: Forehead/face, back of neck   Character: dull pain, sharp pain  Frequency:  Constant x7 days  Duration:  Constant x7 days     Intensity: moderate    Progression of Symptoms:  improving    Accompanying Signs & Symptoms:  Stiff neck: YES  Neck or upper back pain: YES  Fever: no  Sinus pressure: YES  Nausea or vomiting: YES- nausea last week    Dizziness: no  Numbness: no  Weakness: no  Visual changes: no    History:   Head trauma: no  Family history of migraines: YES  Previous tests for headaches: YES- MRI or CT  Neurologist evaluations: YES  Able to do daily activities: no  Wake with a headaches: YES- sometimes   Do headaches wake you up: YES  Daily pain medication use: no  Work/school stressors/changes: no- changed work location because of [employer]     Precipitating factors:   Does light make it worse: YES- sensitivity to light  Does sound make it worse: YES    Alleviating factors:  Does sleep help: YES    Therapies Tried and outcome: warm compress, neti pot, steam, zyrtec, tylenol #3       Encounter Diagnoses   Name Primary?     Tension-type headache, not intractable, unspecified chronicity pattern Yes     Acute sinusitis with symptoms greater than 10 days      Pain in both wrists for months, slightl swelling, no redness      Chronic nonintractable headache, unspecified headache type  Wants to see neurology for evaluation       Mild major depression (H) Well controlled          Problem list and histories reviewed & adjusted, as indicated.  Additional history: as documented    Labs reviewed in EPIC    Reviewed and updated as needed this visit by clinical staff       Reviewed and updated as needed this visit by Provider         ROS:  Constitutional, HEENT, cardiovascular, pulmonary, gi and gu systems are negative, except as  otherwise noted.    OBJECTIVE:     /70 (BP Location: Right arm, Patient Position: Chair, Cuff Size: Adult Regular)  Pulse 97  Temp 97.3  F (36.3  C) (Oral)  Wt 141 lb (64 kg)  LMP 04/06/2010  SpO2 100%  BMI 21.44 kg/m2  Body mass index is 21.44 kg/(m^2).  GENERAL: alert, mild distress and fatigued  EYES: Eyes grossly normal to inspection, PERRL and conjunctivae and sclerae normal  HENT: normal cephalic/atraumatic, ear canals and TM's normal, nose and mouth without ulcers or lesions, nasal mucosa edematous , rhinorrhea yellow, oropharynx clear, oral mucous membranes moist and sinuses: frontal tenderness on bilaterally  NECK: no adenopathy, no asymmetry, masses, or scars and thyroid normal to palpation  RESP: lungs clear to auscultation - no rales, rhonchi or wheezes  CV: regular rate and rhythm, normal S1 S2, no S3 or S4, no murmur, click or rub, no peripheral edema and peripheral pulses strong  ABDOMEN: soft, nontender, no hepatosplenomegaly, no masses and bowel sounds normal  MS: normal muscle tone and tenderness to palpation both wrist with no effusion  SKIN: no suspicious lesions or rashes  NEURO: Normal strength and tone, mentation intact and speech normal  PSYCH: mentation appears normal, affect normal/bright     wiol do CRP Inflam test once she has improved    ASSESSMENT/PLAN:             1. Tension-type headache, not intractable, unspecified chronicity pattern  Try ice packs, use sparingly  - cyclobenzaprine (FLEXERIL) 10 MG tablet; Take 1 tablet (10 mg) by mouth 3 times daily as needed for muscle spasms  Dispense: 20 tablet; Refill: 0  - acetaminophen-codeine (TYLENOL #3) 300-30 MG per tablet; Take 1 tablet by mouth every 4 hours as needed for pain maximum 4 tablet(s) per day  Dispense: 20 tablet; Refill: 0    2. Acute sinusitis with symptoms greater than 10 days  If not better in 2-3 days  - amoxicillin (AMOXIL) 500 MG capsule; Take 1 capsule (500 mg) by mouth 3 times daily  Dispense: 30  capsule; Refill: 0    3. Pain in both wrists  Likely OA check  - CRP, inflammation; Future    4. Chronic nonintractable headache, unspecified headache type  Follow up with consultant as planned.   - NEUROLOGY ADULT REFERRAL    5. Mild major depression (H)  Well controlled       Regular exercise  See Patient Instructions    Joaquin Rowe MD  [facility] [place]

## 2018-01-31 ASSESSMENT — ANXIETY QUESTIONNAIRES: GAD7 TOTAL SCORE: 4

## 2018-01-31 ASSESSMENT — PATIENT HEALTH QUESTIONNAIRE - PHQ9: SUM OF ALL RESPONSES TO PHQ QUESTIONS 1-9: 4

## 2018-02-06 ENCOUNTER — TELEPHONE (OUTPATIENT)
Dept: FAMILY MEDICINE | Facility: CLINIC | Age: 58
End: 2018-02-06

## 2018-02-06 DIAGNOSIS — G44.209 TENSION HEADACHE: Primary | ICD-10-CM

## 2018-02-06 NOTE — TELEPHONE ENCOUNTER
Spoke with pt, she will call today to set her appointment with neurology    Delmis Fallon RN   [facility]

## 2018-02-06 NOTE — TELEPHONE ENCOUNTER
Its time to see neurology as I am running out of ideas  Orders Placed This Encounter     NEUROLOGY ADULT REFERRAL     Referral Type:   Consultation

## 2018-02-06 NOTE — TELEPHONE ENCOUNTER
Reason for Call:  Other call back    Detailed comments: Was prescribed amoxicillin which she took one dose on Friday and then the regular (3) doses on Saturday and she is not feeling much better and wanting some advice    Phone Number Patient can be reached at: Home number on file [phone] (home)    Best Time: anytime    Can we leave a detailed message on this number? Not Applicable    Call taken on 2/6/2018 at 3:04 PM by Denisa Valdovinos

## 2018-02-06 NOTE — TELEPHONE ENCOUNTER
Dr. Rowe    Continues to have h/as daily,   during the day it goes away when using her T3, but today it returned.   She has been using the T3 daily with good effect  She started the antibiotic Friday evening, instructed to continue the antibiotic     Pain is 7/10 at worse  Flexeril didn't help    Assure adequate fluids, try warmth/cold to head, try aromatherapy, keep caffeine consumption the same daily, add humidity, try adding a plain tylenol as needed, assure not exceeding 24 hour dose limit    Advise    Delmis Fallno RN   [facility]

## 2018-02-10 ENCOUNTER — NURSE TRIAGE (OUTPATIENT)
Dept: NURSING | Facility: CLINIC | Age: 58
End: 2018-02-10

## 2018-02-10 NOTE — TELEPHONE ENCOUNTER
"\"I have bursitis in my knee and it's gotten worse. Should I go to yoga today?\"  C/O pain in the inner aspect of the right knee.   Pain has been more constant over the past few days, \"but really it's more intermittent depending on what I'm doing.\"   C/O knee pain when bending the knee, \"like when I'm going up or down stairs.\"   Rates pain during stair walking, 6-7/10 \"although maybe it's lower than that.\"   Inner aspect of knee is \"slightly blotchy\" and slightly warm.   Denies fever.   Denies any pain when standing or if leg is straight.   Caller states that PCP has told her to use ice when she's had this discomfort before. Advised to do what PCP recommended.   Advised to not to go to yoga today.      Protocol and care advice reviewed.   Caller states understanding of the recommended disposition. Caller wants to wait and see how the knee does this weekend. \"I'm off of work on Tuesday and Wednesday, so if it's still like this then I'll make an appointment.\"   Advised to call back if further questions or concerns.     Reason for Disposition    [1] MODERATE pain (e.g., interferes with normal activities, limping) AND [2] present > 3 days    Additional Information    Negative: Sounds like a life-threatening emergency to the triager    Negative: [1] Swollen joint AND [2] fever    Negative: [1] Red area or streak AND [2] fever    Negative: Patient sounds very sick or weak to the triager    Negative: [1] SEVERE pain (e.g., excruciating, unable to walk) AND [2] not improved after 2 hours of pain medicine    Negative: [1] Can't move swollen joint at all AND [2] no fever    Negative: [1] Thigh or calf pain AND [2] only 1 side AND [3] present > 1 hour    Negative: [1] Thigh, calf, or ankle swelling AND [2] only 1 side    Negative: [1] Looks infected (spreading redness, pus) AND [2] large red area (> 2 in. or 5 cm)    Negative: [1] Very swollen joint AND [2] no fever    Negative: Blistering rash in area of pain  (i.e., dermatomal " "distribution or \"band\" or \"stripe\")    Negative: Looks like a boil, infected sore, or deep ulcer    Negative: [1] Redness of the skin AND [2] no fever    Protocols used: KNEE PAIN-ADULT-AH    "

## 2018-02-15 NOTE — PROGRESS NOTES
SUBJECTIVE: Beronica Azevedo is a 57 year old female here with concerns about sinus infection.  She states onset  of symptoms were greater than 10 days with sinus pressure and drainage.  Course of illness is worsening.    Severity: moderate  Current and Associated symptoms: cold symptoms  Predisposing factors include none.   Recent treatment has included: OTC's  Allergic symptoms include: none    Past Medical History:   Diagnosis Date     Anxiety      Cervical high risk HPV (human papillomavirus) test positive 3/4/16    3/18/16 colp - ECC - negative     BEVERLY II (cervical intraepithelial neoplasia II) 2009    LEEP     Depression      Dry eye syndrome      Frequent UTI 2009     Headache(784.0)      IBS (irritable bowel syndrome)      Insomnia      Lattice degeneration of retina      Lyme disease      Myopic astigmatism      Nonsenile cataract      Osteopenia 2001,2012    Fosamax     Pneumothorax on right     at 15 y/o     PVD (posterior vitreous detachment), both eyes 7/19/10     Rosacea      Vasomotor rhinitis      Allergies   Allergen Reactions     Tape [Adhesive Tape] Rash     Compazine Other (See Comments)     Hyperactivity       Paxil [Paroxetine] Other (See Comments)     Ropinirole Hcl Unknown     TABS     Depakote [Valproic Acid] Rash     Rash       Food Itching and Rash     mushrooms     Mushroom Itching and Rash     Social History   Substance Use Topics     Smoking status: Never Smoker     Smokeless tobacco: Never Used     Alcohol use Yes      Comment: a drink a week       ROS:   no rash  no vomiting    OBJECTIVE:  /66 (Cuff Size: Adult Regular)  Pulse 84  Temp 98.1  F (36.7  C) (Oral)  Resp 20  Wt 142 lb (64.4 kg)  LMP 04/06/2010  BMI 21.59 kg/m2  NAD  EYES: clear, no mattering  EARS: TM's clear, no redness/buldging, canals clear, no swelling/redness  NOSE: discolored discharge sen. Nares  THROAT: clear, no erythema, no exudate  SINUS: maxillary tenderness with palpation  LUNGS: CTAB, no  rhonchi/wheeze/rales      ICD-10-CM    1. Sinus pressure J34.89 cetirizine (ZYRTEC) 10 MG tablet   2. Nonintractable headache, unspecified chronicity pattern, unspecified headache type R51 HYDROcodone-acetaminophen (NORCO) 5-325 MG per tablet       Follow up with primary clinic if not improving

## 2018-02-17 ENCOUNTER — NURSE TRIAGE (OUTPATIENT)
Dept: NURSING | Facility: CLINIC | Age: 58
End: 2018-02-17

## 2018-02-17 NOTE — TELEPHONE ENCOUNTER
"Clinic Action Needed: Please call back Pt to advise Monday 2/19/18 . Pt would like a different antibiotic Rx for her sinus pain  From Dr Rowe.   [facility] Triage Call  Presenting Problem: Around the nose and under   right eyebrow having  Sinus pain/ pressure  since 1/23/18. Seen on 1/26/18 and 1/30/18 for this Sinus pain and pressure  and then Saw Neurologist   2/7/18   and  had MRI of head , and given a  Rx of tramadol  but has not started it yet  and  Next neuro  appt is  2/21/18  . Tried Tylenol #3 and it helps for 3 hours , but Amoxil  started 2/3/18  didn t help .  Currently : no fever , sinus pain is 5/10 after taking  Fioricet at 930am .   Guideline Used: facial pain - adult and then sinus pain or congestion - adult guidelines .  Patient Recommendations/Teaching: follow up with provider in 72 hours .   Routed to:Sent to PCP's nurse pool.   Mila Leung RN, [facility]         Reason for Disposition    [1] Sinus congestion (pressure, fullness) AND [2] present > 10 days    Additional Information    Negative: Shock suspected (e.g., cold/pale/clammy skin, too weak to stand, low BP, rapid pulse)    Negative: [1] Similar pain previously AND [2] it was from \"heart attack\"    Negative: [1] Similar pain previously AND [2] it was from \"angina\" AND [3] not relieved by nitroglycerin    Negative: Sounds like a life-threatening emergency to the triager    Negative: Chest pain    Negative: Headache or pain in upper forehead    Negative: Toothache is main symptom    Negative: Followed a face injury    Sinus pain and congestion    Negative: Severe difficulty breathing (e.g., struggling for each breath, speaks in single words)    Negative: Sounds like a life-threatening emergency to the triager    Negative: [1] Sinus infection AND [2] taking an antibiotic AND [3] symptoms continue    Negative: [1] Difficulty breathing AND [2] not from stuffy nose (e.g., not relieved by cleaning out the nose)    Negative: [1] SEVERE " headache AND [2] fever    Negative: [1] Redness or swelling on the cheek, forehead or around the eye AND [2] fever    Negative: Fever > 104 F (40 C)    Negative: Patient sounds very sick or weak to the triager    Negative: [1] SEVERE pain AND [2] not improved 2 hours after pain medicine    Negative: [1] Redness or swelling on the cheek, forehead or around the eye AND [2] no fever    Negative: [1] Fever > 101 F (38.3 C) AND [2] age > 60    Negative: [1] Fever > 101 F (38.3 C) AND [2] bedridden (e.g., nursing home patient, CVA, chronic illness, recovering from surgery)    Negative: [1] Fever > 100.5 F (38.1 C) AND [2] diabetes mellitus or weak immune system (e.g., HIV positive, cancer chemo, splenectomy, organ transplant, chronic steroids)    Negative: Fever present > 3 days (72 hours)    Negative: [1] Fever returns after gone for over 24 hours AND [2] symptoms worse or not improved    Negative: [1] Sinus pain (not just congestion) AND [2] fever    Negative: Earache    Protocols used: SINUS PAIN OR CONGESTION-ADULT-AH, FACE PAIN-ADULT-AH

## 2018-02-22 ENCOUNTER — NURSE TRIAGE (OUTPATIENT)
Dept: NURSING | Facility: CLINIC | Age: 58
End: 2018-02-22

## 2018-02-22 NOTE — TELEPHONE ENCOUNTER
"  Reason for Disposition    [1] MODERATE pain (e.g., interferes with normal activities, limping) AND [2] present > 3 days    Additional Information    Negative: [1] Swollen joint AND [2] fever    Negative: [1] Red area or streak AND [2] fever    Negative: Patient sounds very sick or weak to the triager    Negative: [1] SEVERE pain (e.g., excruciating, unable to walk) AND [2] not improved after 2 hours of pain medicine    Negative: [1] Can't move swollen joint at all AND [2] no fever    Negative: [1] Thigh or calf pain AND [2] only 1 side AND [3] present > 1 hour    Negative: [1] Thigh, calf, or ankle swelling AND [2] only 1 side    Negative: [1] Looks infected (spreading redness, pus) AND [2] large red area (> 2 in. or 5 cm)    Negative: [1] Very swollen joint AND [2] no fever    Negative: Blistering rash in area of pain  (i.e., dermatomal distribution or \"band\" or \"stripe\")    Negative: Looks like a boil, infected sore, or deep ulcer    Negative: [1] Redness of the skin AND [2] no fever    Protocols used: KNEE PAIN-ADULT-  Connected to schedulers.  Zofia Terrazas RN  [facility]    "

## 2018-02-23 ENCOUNTER — OFFICE VISIT (OUTPATIENT)
Dept: FAMILY MEDICINE | Facility: CLINIC | Age: 58
End: 2018-02-23
Payer: COMMERCIAL

## 2018-02-23 VITALS
DIASTOLIC BLOOD PRESSURE: 49 MMHG | RESPIRATION RATE: 17 BRPM | HEART RATE: 98 BPM | TEMPERATURE: 98.5 F | OXYGEN SATURATION: 100 % | SYSTOLIC BLOOD PRESSURE: 80 MMHG

## 2018-02-23 DIAGNOSIS — M25.561 ACUTE PAIN OF RIGHT KNEE: Primary | ICD-10-CM

## 2018-02-23 DIAGNOSIS — M72.2 PLANTAR FASCIITIS: ICD-10-CM

## 2018-02-23 DIAGNOSIS — Z23 NEED FOR PROPHYLACTIC VACCINATION AND INOCULATION AGAINST INFLUENZA: ICD-10-CM

## 2018-02-23 PROCEDURE — 90686 IIV4 VACC NO PRSV 0.5 ML IM: CPT | Performed by: FAMILY MEDICINE

## 2018-02-23 PROCEDURE — 99213 OFFICE O/P EST LOW 20 MIN: CPT | Mod: 25 | Performed by: FAMILY MEDICINE

## 2018-02-23 PROCEDURE — 90471 IMMUNIZATION ADMIN: CPT | Performed by: FAMILY MEDICINE

## 2018-02-23 NOTE — PATIENT INSTRUCTIONS
ASSESSMENT AND PLAN  1. Acute pain of right knee  Overall exam consistent with plical irritation (joint lining inflammation) and small baker's cyst likely related to this.  Treat with compression icing 3-4 times daily for 15 minutes and ibuprofen 600mg three times daily for 4-5 days .    Next steps would be xray/injection and/or physical therapy if not improving.         2. Plantar fasciitis  Chronic, worse now.  Has been stretching, icing, using inserts.     Discussed use of night splint to try.     Could consider steroid injection as well.      3. Need for prophylactic vaccination and inoculation against influenza          MYCHART SIGNUP FOR E-VISITS AND EASIER COMMUNICATION:  [email or web address]     Zipnosis:  [place].  Sign up for e-visits for common illnesses!     RADIOLOGY:   [facility]:  [phone] to schedule any radiology tests at [facility]: [phone]    Mammograms/colonoscopies:  [phone]    CONSUMER PRICE LINE for estimates of test costs:  [phone]

## 2018-02-23 NOTE — MR AVS SNAPSHOT
After Visit Summary   2/23/2018    Beronica Azevedo    MRN: [id number]           Patient Information     Date Of Birth          1960        Visit Information        Provider Department      2/23/2018 9:40 AM Wegener, Joel Daniel Irwin, MD [facility]        Today's Diagnoses     Acute pain of right knee    -  1    Plantar fasciitis        Need for prophylactic vaccination and inoculation against influenza          Care Instructions    ASSESSMENT AND PLAN  1. Acute pain of right knee  Overall exam consistent with plical irritation (joint lining inflammation) and small baker's cyst likely related to this.  Treat with compression icing 3-4 times daily for 15 minutes and ibuprofen 600mg three times daily for 4-5 days .    Next steps would be xray/injection and/or physical therapy if not improving.         2. Plantar fasciitis  Chronic, worse now.  Has been stretching, icing, using inserts.     Discussed use of night splint to try.     Could consider steroid injection as well.      3. Need for prophylactic vaccination and inoculation against influenza          MYCHART SIGNUP FOR E-VISITS AND EASIER COMMUNICATION:  [email or web address]     Zipnosis:  [email or web address].  Sign up for e-visits for common illnesses!     RADIOLOGY:   [facility]:  [phone] to schedule any radiology tests at [facility]: [phone]    Mammograms/colonoscopies:  [phone]    CONSUMER PRICE LINE for estimates of test costs:  [phone]               Follow-ups after your visit        Who to contact     If you have questions or need follow up information about today's clinic visit or your schedule please contact [facility] directly at [phone].  Normal or non-critical lab and imaging results will be communicated to you by MyChart, letter or phone within 4 business days after the clinic has received the results. If you do not hear from us within 7 days,  please contact the clinic through [employer] or phone. If you have a critical or abnormal lab result, we will notify you by phone as soon as possible.  Submit refill requests through [employer] or call your pharmacy and they will forward the refill request to us. Please allow 3 business days for your refill to be completed.          Additional Information About Your Visit        [id number] Information     [employer] gives you secure access to your electronic health record. If you see a primary care provider, you can also send messages to your care team and make appointments. If you have questions, please call your primary care clinic.  If you do not have a primary care provider, please call [phone] and they will assist you.        Care EveryWhere ID     This is your Care EveryWhere ID. This could be used by other organizations to access your [facility] medical records  [id number]        Your Vitals Were     Pulse Temperature Respirations Last Period Pulse Oximetry       98 98.5  F (36.9  C) (Oral) 17 04/06/2010 100%        Blood Pressure from Last 3 Encounters:   02/23/18 (!) 80/49   01/30/18 100/70   01/26/18 104/66    Weight from Last 3 Encounters:   01/30/18 141 lb (64 kg)   01/26/18 142 lb (64.4 kg)   08/01/17 136 lb 9.6 oz (62 kg)              Today, you had the following     No orders found for display       Primary Care Provider Office Phone # Fax #    Joaquin Sarthak Rowe [phone]       [address]  [facility] [address]        Equal Access to Services     GREGOR THRASHER : Hadii aad ku hadasho Soomaali, waaxda luqadaha, qaybta kaalmada adeegyada, aaron ortiz. So [facility] [phone].    ATENCIÓN: Si habla español, tiene a garrett disposición servicios gratuitos de asistencia lingüística. Llame al [phone].    We comply with applicable federal civil rights laws and [place] laws. We do not discriminate on the basis of race, color, national origin, age,  disability, sex, sexual orientation, or gender identity.            Thank you!     Thank you for choosing [facility]  for your care. Our goal is always to provide you with excellent care. Hearing back from our patients is one way we can continue to improve our services. Please take a few minutes to complete the written survey that you may receive in the mail after your visit with us. Thank you!             Your Updated Medication List - Protect others around you: Learn how to safely use, store and throw away your medicines at www.disposemymeds.org.          This list is accurate as of 2/23/18 10:29 AM.  Always use your most recent med list.                   Brand Name Dispense Instructions for use Diagnosis    acetaminophen-codeine 300-30 MG per tablet    TYLENOL #3    20 tablet    Take 1 tablet by mouth every 4 hours as needed for pain maximum 4 tablet(s) per day    Tension-type headache, not intractable, unspecified chronicity pattern       ALPRAZolam 0.5 MG tablet    XANAX     Take 0.5 mg by mouth nightly as needed        amoxicillin 500 MG capsule    AMOXIL    30 capsule    Take 1 capsule (500 mg) by mouth 3 times daily    Acute sinusitis with symptoms greater than 10 days       azelastine 0.1 % spray    ASTELIN    3 Bottle    Spray 1 spray in nostril 2 times daily    Chronic rhinitis, unspecified type       BENADRYL ALLERGY PO      prn        biotin 2.5 mg/mL Susp      Take by mouth daily        * butalbital-acetaminophen-caffeine -40 MG per tablet    FIORICET/ESGIC    28 tablet    Take 1 tablet by mouth every 4 hours as needed    Migraine with aura and with status migrainosus, not intractable       * BUTALBITAL-APAP-CAFFEINE PO      Take  by mouth as needed.    Maxillary sinusitis, Headache(784.0)       cetirizine 10 MG tablet    zyrTEC    30 tablet    Take 1 tablet (10 mg) by mouth every evening    Sinus pressure       cholecalciferol 1000 UNIT tablet    vitamin D3    100 tablet    Take  2 tablets (2,000 Units) by mouth daily    Osteopenia       CO Q 10 PO           cyclobenzaprine 10 MG tablet    FLEXERIL    20 tablet    Take 1 tablet (10 mg) by mouth 3 times daily as needed for muscle spasms    Tension-type headache, not intractable, unspecified chronicity pattern       fluticasone 50 MCG/ACT spray    FLONASE    3 Bottle    Spray 1-2 sprays into both nostrils daily    Acute non-recurrent frontal sinusitis       ketoconazole 2 % shampoo    NIZORAL    120 mL    Apply topically daily as needed for itching or irritation    Dermatitis, seborrheic       MULTIVITAMIN & MINERAL PO      Take  by mouth.        * NORTRIPTYLINE HCL PO      Take 50 mg by mouth At Bedtime        * nortriptyline 50 MG capsule    PAMELOR    90 capsule    Take 1 capsule (50 mg) by mouth At Bedtime    Mild major depression (H), Primary insomnia       traZODone 50 MG tablet    DESYREL    45 tablet    1/2 TABLET AT BEDTIME AS NEEDED FOR SLEEP    Primary insomnia, FREDIS (generalized anxiety disorder)       VITAMIN B-2 PO      Take 400 mg by mouth        * Notice:  This list has 4 medication(s) that are the same as other medications prescribed for you. Read the directions carefully, and ask your doctor or other care provider to review them with you.

## 2018-02-23 NOTE — PROGRESS NOTES
SUBJECTIVE:   Beronica Azevedo is a 57 year old female who presents to clinic today for the following health issues:      Musculoskeletal problem/pain      Duration: On going for 2 years and getting worst    Description  Location: right knee    Intensity:  moderate    Accompanying signs and symptoms: radiation of pain to upper right thigh, right arm pain, hands and feet are falling asleep more often    History  Previous similar problem: no   Previous evaluation:  none    Precipitating or alleviating factors:  Trauma or overuse: no   Aggravating factors include: walking, overuse and bending    Therapies tried and outcome: ice and Ibuprofen-helps     Other:  Sinus headaches     Additional history/life update:    Patient has had right knee pain for a few years, which waxes and wanes. Over the past couple of days the pain has been gotten worse and she is having pain when flexing her knee and when bending her knee while walking. The endorses pain along the posterior knee as well as long the medial anterior knee. No prior surgery. Has been icing anterior knee with some benefit. Does have moderate worsening plantar fasciitis that she have previously seen podiatry for and is looking for recommendations to treat conservatively. The patient also endorses some right shoulder pain that she attributes to moving a heavy box yesterday.       Acute pain of right knee  Plantar fasciitis  Need for prophylactic vaccination and inoculation against influenza        Problem list, Medication list, Allergies, and Medical/Social/Surgical histories reviewed in [facility] and updated as appropriate.  Labs reviewed in EPIC  BP Readings from Last 3 Encounters:   02/23/18 (!) 80/49   01/30/18 100/70   01/26/18 104/66    Wt Readings from Last 3 Encounters:   01/30/18 141 lb (64 kg)   01/26/18 142 lb (64.4 kg)   08/01/17 136 lb 9.6 oz (62 kg)                  Patient Active Problem List   Diagnosis     Headache     Insomnia     Moderate dysplasia of  cervix (BEVERLY II)     CARDIOVASCULAR SCREENING; LDL GOAL LESS THAN 160     Endometrial polyp     Iron deficiency anemia due to chronic blood loss     Rosacea     Onychodystrophy     Dermatitis     Osteopenia     Post-menopausal     Mild major depression (H)     IBS (irritable bowel syndrome)     Myopia     Regular astigmatism     Presbyopia     Posterior subcapsular polar senile cataract     Senile nuclear sclerosis     Macular puckering of retina     Cervicalgia     Pain in thoracic spine     Digital mucous cyst     Seborrheic keratosis, inflamed     Chronic pain of right knee     Acute pain of right knee     FREDIS (generalized anxiety disorder)     Neoplasm of uncertain behavior of skin     Dermatitis, seborrheic     Past Surgical History:   Procedure Laterality Date     BIOPSY  Moles skin tags     CHEST TUBE INSERTION       COLONOSCOPY  Normal     DILATION AND CURETTAGE, OPERATIVE HYSTEROSCOPY WITH MORCELLATOR, COMBINED  1/2011    endometrial polyp     LEEP TX, CERVICAL  8/2009    BEVERLY II, clear margins     THORACIC SURGERY  1976    Collapsed lung       Social History   Substance Use Topics     Smoking status: Never Smoker     Smokeless tobacco: Never Used     Alcohol use Yes      Comment: 1 8oz mixed drink or beer     Family History   Problem Relation Age of Onset     Depression Sister      Uterine Cancer Sister      Neurologic Disorder Sister      Neurologic Disorder Brother      Neurologic Disorder Father      Lipids Father      high cholesterol     GASTROINTESTINAL DISEASE Father      stomach ulcers     CANCER Father      melanoma     Skin Cancer Father      Hypertension Father      Hyperlipidemia Father      Neurologic Disorder Mother      Allergies Mother      Arthritis Mother      Depression Mother      Eye Disorder Mother      Lipids Mother      high cholesterol     OSTEOPOROSIS Mother      Hypertension Mother      Hyperlipidemia Mother      Thyroid Disease Mother      Neurologic Disorder Sister      epilepsy      Alcohol/Drug Sister      alcohol and drug dependency     Alcohol/Drug Brother      recovery alcohol and drug addit     Asthma Son      excercise and allergy induced     Asthma Daughter      excercise and allergy induced     Lipids Sister      high cholesterol     GASTROINTESTINAL DISEASE Sister      hepatitis ??     OSTEOPOROSIS Sister      Breast Cancer Maternal Aunt      Asthma Daughter      Asthma Son      Melanoma No family hx of          Current Outpatient Prescriptions   Medication Sig Dispense Refill     Riboflavin (VITAMIN B-2 PO) Take 400 mg by mouth       Coenzyme Q10 (CO Q 10 PO)        cyclobenzaprine (FLEXERIL) 10 MG tablet Take 1 tablet (10 mg) by mouth 3 times daily as needed for muscle spasms 20 tablet 0     acetaminophen-codeine (TYLENOL #3) 300-30 MG per tablet Take 1 tablet by mouth every 4 hours as needed for pain maximum 4 tablet(s) per day 20 tablet 0     ketoconazole (NIZORAL) 2 % shampoo Apply topically daily as needed for itching or irritation 120 mL 1     butalbital-acetaminophen-caffeine (FIORICET/ESGIC) -40 MG per tablet Take 1 tablet by mouth every 4 hours as needed 28 tablet 1     traZODone (DESYREL) 50 MG tablet 1/2 TABLET AT BEDTIME AS NEEDED FOR SLEEP 45 tablet 1     nortriptyline (PAMELOR) 50 MG capsule Take 1 capsule (50 mg) by mouth At Bedtime 90 capsule 3     azelastine (ASTELIN) 0.1 % spray Spray 1 spray in nostril 2 times daily 3 Bottle 3     fluticasone (FLONASE) 50 MCG/ACT spray Spray 1-2 sprays into both nostrils daily 3 Bottle 3     biotin 2.5 mg/mL Take by mouth daily       cholecalciferol (VITAMIN D) 1000 UNIT tablet Take 2 tablets (2,000 Units) by mouth daily 100 tablet 3     NORTRIPTYLINE HCL PO Take 50 mg by mouth At Bedtime        alprazolam (XANAX) 0.5 MG tablet Take 0.5 mg by mouth nightly as needed        Multiple Vitamins-Minerals (MULTIVITAMIN & MINERAL PO) Take  by mouth.       BENADRYL ALLERGY PO prn       amoxicillin (AMOXIL) 500 MG capsule Take 1  capsule (500 mg) by mouth 3 times daily (Patient not taking: Reported on 2/23/2018) 30 capsule 0     cetirizine (ZYRTEC) 10 MG tablet Take 1 tablet (10 mg) by mouth every evening (Patient not taking: Reported on 2/23/2018) 30 tablet 0     BUTALBITAL-APAP-CAFFEINE PO Take  by mouth as needed.       Allergies   Allergen Reactions     Tape [Adhesive Tape] Rash     Compazine Other (See Comments)     Hyperactivity       Paxil [Paroxetine] Other (See Comments)     Ropinirole Hcl Unknown     TABS     Depakote [Valproic Acid] Rash     Rash       Food Itching and Rash     mushrooms     Mushroom Itching and Rash     Recent Labs   Lab Test  06/20/16   1703  05/18/15   0737  05/17/15   1827  10/23/14   1054   09/10/12   1600   LDL   --   91   --    --    --   111   HDL   --   63   --    --    --   78   TRIG   --   60   --    --    --   82   ALT  19   --   15  22   < >   --    CR  0.71  0.68  0.67  0.82   < >   --    GFRESTIMATED  85  90  >90  Non  GFR Calc    72   < >   --    GFRESTBLACK  >90   GFR Calc    >90   GFR Calc    >90   GFR Calc    87   < >   --    POTASSIUM  3.8  4.0  3.5  4.5   < >   --    TSH  3.16   --    --   1.70   < >   --     < > = values in this interval not displayed.        ROS:  Constitutional, HEENT, cardiovascular, pulmonary, GI, , musculoskeletal, neuro, skin, endocrine and psych systems are negative, except as otherwise noted.        OBJECTIVE:  BP (!) 80/49 (BP Location: Left arm, Patient Position: Sitting, Cuff Size: Adult Regular)  Pulse 98  Temp 98.5  F (36.9  C) (Oral)  Resp 17  LMP 04/06/2010  SpO2 100%    EXAM:  GENERAL APPEARANCE: healthy, alert and no distress  RESP: breathing comfortably  MSK:  -Right knee: mild swelling over medial tibial plateau compared to the left, mild tenderness to deep palpation over swollen area. Full passive and active ROM. Strength 5/5 flexion and extension. Without tenderness over patella,  medial/lateral tibial prominences. There is a moderately swollen area over the popliteal fossa compared to the left. Negative so's impingement test.   -Left knee: normal appearing knee, no special testing done      ASSESSMENT AND PLAN  Patient Instructions   ASSESSMENT AND PLAN  1. Acute pain of right knee  Overall exam consistent with plical irritation (joint lining inflammation) and small baker's cyst likely related to this.  Treat with compression icing 3-4 times daily for 15 minutes and ibuprofen 600mg three times daily for 4-5 days .    Next steps would be xray/injection and/or physical therapy if not improving.         2. Plantar fasciitis  Chronic, worse now.  Has been stretching, icing, using inserts.     Discussed use of night splint to try.     Could consider steroid injection as well.      3. Need for prophylactic vaccination and inoculation against influenza          MYCHART SIGNUP FOR E-VISITS AND EASIER COMMUNICATION:  [email or web address]     Zipnosis:  [email or web address].  Sign up for e-visits for common illnesses!     RADIOLOGY:   [facility]:  [phone] to schedule any radiology tests at [facility]: [phone]    Mammograms/colonoscopies:  [phone]    CONSUMER PRICE LINE for estimates of test costs:  [phone]              Scribed by Jackson Delgado, Medical Student for Joel Wegener, MD based on the provider s statements to me.        I have reviewed and edited the medical student s documentation acting as scribe and verify that the history, exam and medical decision making reflect the history, exam and decision making performed by myself today.      Joel Wegener,MD        Injectable Influenza Immunization Documentation    1.  Is the person to be vaccinated sick today?   No    2. Does the person to be vaccinated have an allergy to a component   of the vaccine?   No  Egg Allergy Algorithm Link    3. Has the person to be vaccinated ever had a serious  reaction   to influenza vaccine in the past?   No    4. Has the person to be vaccinated ever had Guillain-Barré syndrome?   No    Form completed by patient.    Matilda Ferrera MA

## 2018-02-23 NOTE — NURSING NOTE
Prior to injection verified patient identity using patient's name and date of birth.    Due to injection administration, patient instructed to remain in clinic for 15 minutes  afterwards, and to report any adverse reaction to me immediately.    Matilda Ferrera MA

## 2018-03-20 ENCOUNTER — HOSPITAL ENCOUNTER (OUTPATIENT)
Dept: GENERAL RADIOLOGY | Facility: CLINIC | Age: 58
Discharge: HOME | End: 2018-03-20
Attending: FAMILY MEDICINE | Admitting: FAMILY MEDICINE
Payer: COMMERCIAL

## 2018-03-20 ENCOUNTER — OFFICE VISIT (OUTPATIENT)
Dept: FAMILY MEDICINE | Facility: CLINIC | Age: 58
End: 2018-03-20
Payer: COMMERCIAL

## 2018-03-20 VITALS
DIASTOLIC BLOOD PRESSURE: 65 MMHG | OXYGEN SATURATION: 98 % | BODY MASS INDEX: 21.33 KG/M2 | SYSTOLIC BLOOD PRESSURE: 105 MMHG | WEIGHT: 140.3 LBS | TEMPERATURE: 97.5 F | HEART RATE: 106 BPM

## 2018-03-20 DIAGNOSIS — M25.561 ACUTE BILATERAL KNEE PAIN: Primary | ICD-10-CM

## 2018-03-20 DIAGNOSIS — M72.2 PLANTAR FASCIITIS: ICD-10-CM

## 2018-03-20 DIAGNOSIS — M25.562 ACUTE BILATERAL KNEE PAIN: ICD-10-CM

## 2018-03-20 DIAGNOSIS — M25.562 ACUTE BILATERAL KNEE PAIN: Primary | ICD-10-CM

## 2018-03-20 DIAGNOSIS — M25.561 ACUTE BILATERAL KNEE PAIN: ICD-10-CM

## 2018-03-20 DIAGNOSIS — H61.23 BILATERAL IMPACTED CERUMEN: ICD-10-CM

## 2018-03-20 PROCEDURE — 69209 REMOVE IMPACTED EAR WAX UNI: CPT | Performed by: FAMILY MEDICINE

## 2018-03-20 PROCEDURE — 73560 X-RAY EXAM OF KNEE 1 OR 2: CPT | Mod: 50

## 2018-03-20 PROCEDURE — 99214 OFFICE O/P EST MOD 30 MIN: CPT | Mod: 25 | Performed by: FAMILY MEDICINE

## 2018-03-20 NOTE — NURSING NOTE
"Chief Complaint   Patient presents with     Musculoskeletal Problem     Ear Problem       Initial /65 (BP Location: Left arm, Patient Position: Sitting, Cuff Size: Adult Regular)  Pulse 106  Temp 97.5  F (36.4  C) (Oral)  Wt 140 lb 4.8 oz (63.6 kg)  LMP 04/06/2010  SpO2 98%  BMI 21.33 kg/m2 Estimated body mass index is 21.33 kg/(m^2) as calculated from the following:    Height as of 6/1/17: 5' 8\" (1.727 m).    Weight as of this encounter: 140 lb 4.8 oz (63.6 kg).  Medication Reconciliation: complete     Christina Zavala CMA    "

## 2018-03-20 NOTE — MR AVS SNAPSHOT
After Visit Summary   3/20/2018    Beronica Azevedo    MRN: [id number]           Patient Information     Date Of Birth          1960        Visit Information        Provider Department      3/20/2018 10:30 AM Joaquin Rowe MD [facility]        Today's Diagnoses     Acute bilateral knee pain    -  1    Bilateral impacted cerumen        Plantar fasciitis           Follow-ups after your visit        Additional Services     S[facility] PT, HAND, AND CHIROPRACTIC REFERRAL       **This order will print in the [facility] Scheduling Office**    Physical Therapy, Hand Therapy and Chiropractic Care are available through:    *[facility]  *[facility]  *[facility]    Call one number to schedule at any of the above locations: [phone].    Your provider has referred you to: Physical Therapy at [facility] or [facility]    Indication/Reason for Referral: Foot Pain and Knee Pain  Onset of Illness: 2018  Therapy Orders: Evaluate and Treat  Special Programs: None  Special Request: None    Stephanie Brown      Additional Comments for the Therapist or Chiropractor:     Please be aware that coverage of these services is subject to the terms and limitations of your health insurance plan.  Call member services at your health plan with any benefit or coverage questions.      Please bring the following to your appointment:    *Your personal calendar for scheduling future appointments  *Comfortable clothing                  Future tests that were ordered for you today     Open Future Orders        Priority Expected Expires Ordered    XR Knee Standing Left 2 Views Routine 3/20/2018 3/20/2019 3/20/2018            Who to contact     If you have questions or need follow up information about today's clinic visit or your schedule please contact [facility] directly at [phone].  Normal or non-critical lab and imaging results will be communicated to you by  MyChart, letter or phone within 4 business days after the clinic has received the results. If you do not hear from us within 7 days, please contact the clinic through [employer] or phone. If you have a critical or abnormal lab result, we will notify you by phone as soon as possible.  Submit refill requests through [employer] or call your pharmacy and they will forward the refill request to us. Please allow 3 business days for your refill to be completed.          Additional Information About Your Visit        [employer] Information     [employer] gives you secure access to your electronic health record. If you see a primary care provider, you can also send messages to your care team and make appointments. If you have questions, please call your primary care clinic.  If you do not have a primary care provider, please call [phone] and they will assist you.        Care EveryWhere ID     This is your Care EveryWhere ID. This could be used by other organizations to access your [facility] medical records  [id number]        Your Vitals Were     Pulse Temperature Last Period Pulse Oximetry BMI (Body Mass Index)       106 97.5  F (36.4  C) (Oral) 04/06/2010 98% 21.33 kg/m2        Blood Pressure from Last 3 Encounters:   03/20/18 105/65   02/23/18 (!) 80/49   01/30/18 100/70    Weight from Last 3 Encounters:   03/20/18 140 lb 4.8 oz (63.6 kg)   01/30/18 141 lb (64 kg)   01/26/18 142 lb (64.4 kg)              We Performed the Following     SHE[facility]A PT, HAND, AND CHIROPRACTIC REFERRAL     REMOVE IMPACTED CERUMEN          Today's Medication Changes          These changes are accurate as of 3/20/18 11:02 AM.  If you have any questions, ask your nurse or doctor.               These medicines have changed or have updated prescriptions.        Dose/Directions    butalbital-acetaminophen-caffeine -40 MG per tablet   Commonly known as:  FIORICET/ESGIC   This may have changed:  Another medication with the same name was removed. Continue  taking this medication, and follow the directions you see here.   Used for:  Migraine with aura and with status migrainosus, not intractable   Changed by:  Joaquin Rowe MD        Dose:  1 tablet   Take 1 tablet by mouth every 4 hours as needed   Quantity:  28 tablet   Refills:  1       nortriptyline 50 MG capsule   Commonly known as:  PAMELOR   This may have changed:  Another medication with the same name was removed. Continue taking this medication, and follow the directions you see here.   Used for:  Mild major depression (H), Primary insomnia   Changed by:  Joaquin Rowe MD        Dose:  50 mg   Take 1 capsule (50 mg) by mouth At Bedtime   Quantity:  90 capsule   Refills:  3         Stop taking these medicines if you haven't already. Please contact your care team if you have questions.     amoxicillin 500 MG capsule   Commonly known as:  AMOXIL   Stopped by:  Joaquin Rowe MD           MULTIVITAMIN & MINERAL PO   Stopped by:  Joaquin Rowe MD                    Primary Care Provider Office Phone # Fax #    Joaquin Rowe [phone]       [address] [facility] [address]  [facility] [address]        Equal Access to Services     [facility]: Hadii aad ku hadasho Soomaali, waaxda luqadaha, qaybta kaalmada adeegyada, waxay trip haytony mcgovern . So [facility] [phone].    ATENCIÓN: Si habla español, tiene a garrett disposición servicios gratuitos de asistencia lingüística. [facility] [phone].    We comply with applicable federal civil rights laws and [place] laws. We do not discriminate on the basis of race, color, national origin, age, disability, sex, sexual orientation, or gender identity.            Thank you!     Thank you for choosing [facility]  for your care. Our goal is always to provide you with excellent care. Hearing back from our patients is one way we can continue to improve our services. Please take a few  minutes to complete the written survey that you may receive in the mail after your visit with us. Thank you!             Your Updated Medication List - Protect others around you: Learn how to safely use, store and throw away your medicines at www.disposemymeds.org.          This list is accurate as of 3/20/18 11:02 AM.  Always use your most recent med list.                   Brand Name Dispense Instructions for use Diagnosis    acetaminophen-codeine 300-30 MG per tablet    TYLENOL #3    20 tablet    Take 1 tablet by mouth every 4 hours as needed for pain maximum 4 tablet(s) per day    Tension-type headache, not intractable, unspecified chronicity pattern       ALPRAZolam 0.5 MG tablet    XANAX     Take 0.5 mg by mouth nightly as needed        azelastine 0.1 % spray    ASTELIN    3 Bottle    Spray 1 spray in nostril 2 times daily    Chronic rhinitis, unspecified type       BENADRYL ALLERGY PO      prn        biotin 2.5 mg/mL Susp      Take by mouth daily        butalbital-acetaminophen-caffeine -40 MG per tablet    FIORICET/ESGIC    28 tablet    Take 1 tablet by mouth every 4 hours as needed    Migraine with aura and with status migrainosus, not intractable       cholecalciferol 1000 UNIT tablet    vitamin D3    100 tablet    Take 2 tablets (2,000 Units) by mouth daily    Osteopenia       CO Q 10 PO           cyclobenzaprine 10 MG tablet    FLEXERIL    20 tablet    Take 1 tablet (10 mg) by mouth 3 times daily as needed for muscle spasms    Tension-type headache, not intractable, unspecified chronicity pattern       fluticasone 50 MCG/ACT spray    FLONASE    3 Bottle    Spray 1-2 sprays into both nostrils daily    Acute non-recurrent frontal sinusitis       ketoconazole 2 % shampoo    NIZORAL    120 mL    Apply topically daily as needed for itching or irritation    Dermatitis, seborrheic       magnesium 100 MG Caps      Take 400 mg by mouth        nortriptyline 50 MG capsule    PAMELOR    90 capsule    Take 1  capsule (50 mg) by mouth At Bedtime    Mild major depression (H), Primary insomnia       traZODone 50 MG tablet    DESYREL    45 tablet    1/2 TABLET AT BEDTIME AS NEEDED FOR SLEEP    Primary insomnia, FREDIS (generalized anxiety disorder)       VITAMIN B-2 PO      Take 400 mg by mouth

## 2018-03-20 NOTE — NURSING NOTE
Beronica Azevedo is a 57 year old female who presents in clinic with complaint of impacted ear wax (cerumen).  Per the order of Dr. Rowe, ear wax was removed from both sides by flushing with warm water and peroxide solution. Patient had some dizziness after procedure. Ear wax has been successfully removed.  Christina Zavala

## 2018-03-20 NOTE — PROGRESS NOTES
SUBJECTIVE:   Beronica Azevedo is a 57 year old female who presents to clinic today for the following health issues:    Joint Pain    Onset: on and off x2-3 years     Description:   Location: right knee  Character: Sharp or Dull     Intensity: mild now,  Worst would be 6-7/10    Progression of Symptoms: worse    Accompanying Signs & Symptoms:  Other symptoms: warmth    History:   Previous similar pain: YES      Precipitating factors:   Trauma or overuse: YES- overuse    Therapies Tried and outcome: Ice, good footwear, ibuprofen, stretching- all helpful but never goes away     Other questions/concerns: fluid/wax in right ear- getting better, plantar fascitis right foot         Encounter Diagnoses   Name Primary?     Acute bilateral knee pain Yes     Bilateral impacted cerumen      Plantar fasciitis         Problem list and histories reviewed & adjusted, as indicated.  Additional history: as documented    Labs reviewed in EPIC    Reviewed and updated as needed this visit by clinical staff       Reviewed and updated as needed this visit by Provider         ROS:  Constitutional, HEENT, cardiovascular, pulmonary, gi and gu systems are negative, except as otherwise noted.    OBJECTIVE:     /65 (BP Location: Left arm, Patient Position: Sitting, Cuff Size: Adult Regular)  Pulse 106  Temp 97.5  F (36.4  C) (Oral)  Wt 140 lb 4.8 oz (63.6 kg)  LMP 04/06/2010  SpO2 98%  BMI 21.33 kg/m2  Body mass index is 21.33 kg/(m^2).  GENERAL: healthy, alert and no distress  HENT: normal cephalic/atraumatic, both ears: occluded with wax and Cerumenosis is noted.  Wax is removed by syringing   Instructions for home care to prevent wax buildup are given., nose and mouth without ulcers or lesions, oropharynx clear and oral mucous membranes moist  NECK: no adenopathy, no asymmetry, masses, or scars and thyroid normal to palpation  MS: normal muscle tone and    Knee exam: antalgic gait, soft tissue tenderness over right pop[liteal  region and medailly joint line bilaterally, negative pivot-shift, collateral ligaments intact, ligamentous instability medially, normal ipsilateral foot and ankle exam.  X-ray: no fracture or dislocation noted.      SKIN: no suspicious lesions or rashes  NEURO: Normal strength and tone, mentation intact and speech normal        ASSESSMENT/PLAN:             1. Acute bilateral knee pain  worseRegular exercise  See Patient Instructions- XR Knee Standing Left 2 Views; Future  - [facility] PT, HAND, AND CHIROPRACTIC REFERRAL  ASSESSMENT:  Knee strain and underlying chronic DJD likely     NSAID, ice suggested  referral to physical therapy  see primary care physician in follow up  [facility]  Regular exercise  2. Bilateral impacted cerumen  Reslved, stop q tips  - REMOVE IMPACTED CERUMEN    3. Plantar fasciitis  As above  - XR Knee Standing Left 2 Views; Future  - [facility]A PT, HAND, AND CHIROPRACTIC REFERRAL        Joaquin Rowe MD  [facility]

## 2018-03-21 ENCOUNTER — OFFICE VISIT (OUTPATIENT)
Dept: OBGYN | Facility: CLINIC | Age: 58
End: 2018-03-21
Payer: COMMERCIAL

## 2018-03-21 VITALS — DIASTOLIC BLOOD PRESSURE: 72 MMHG | TEMPERATURE: 97.4 F | HEART RATE: 96 BPM | SYSTOLIC BLOOD PRESSURE: 116 MMHG

## 2018-03-21 DIAGNOSIS — N90.89 VULVAR LESION: Primary | ICD-10-CM

## 2018-03-21 PROCEDURE — 99214 OFFICE O/P EST MOD 30 MIN: CPT | Performed by: OBSTETRICS & GYNECOLOGY

## 2018-03-21 NOTE — NURSING NOTE
"Chief Complaint   Patient presents with     Vaginal Problem     sores outside vaginal area for a week       Initial /72  Pulse 96  Temp 97.4  F (36.3  C) (Oral)  LMP 2010 Estimated body mass index is 21.33 kg/(m^2) as calculated from the following:    Height as of 17: 5' 8\" (1.727 m).    Weight as of 3/20/18: 140 lb 4.8 oz (63.6 kg).  BP completed using cuff size: regular        The following HM Due: NONE      The following patient reported/Care Every where data was sent to:  P ABSTRACT QUALITY INITIATIVES [67196]        patient has appointment for today  Ana Paula Rodriguez                "

## 2018-03-21 NOTE — MR AVS SNAPSHOT
After Visit Summary   3/21/2018    Beronica Azevedo    MRN: [id number]           Patient Information     Date Of Birth          1960        Visit Information        Provider Department      3/21/2018 4:45 PM Shwetha Ibrahim MD [facility]        Today's Diagnoses     Vulvar lesion    -  1       Follow-ups after your visit        Your next 10 appointments already scheduled     Mar 28, 2018 11:40 AM CDT   (Arrive by 11:25 AM)   SHEILA Extremity with Jeronimo Bernardo PT   [facility] ([facility]  )    [address]  [place] [address]   [phone]            Apr 04, 2018 11:40 AM CDT   SHEILA Extremity with Jeronimo Bernardo PT   [facility] ([facility]  )    [address]  [place] [facility] [id number] [phone]              Future tests that were ordered for you today     Open Future Orders        Priority Expected Expires Ordered    XR Knee AP/Lat Standing Bilateral Routine 3/20/2018 3/20/2019 3/20/2018            Who to contact     If you have questions or need follow up information about today's clinic visit or your schedule please contact [facility] directly at [phone].  Normal or non-critical lab and imaging results will be communicated to you by MyChart, letter or phone within 4 business days after the clinic has received the results. If you do not hear from us within 7 days, please contact the clinic through MyChart or phone. If you have a critical or abnormal lab result, we will notify you by phone as soon as possible.  Submit refill requests through [employer] or call your pharmacy and they will forward the refill request to us. Please allow 3 business days for your refill to be completed.          Additional Information About Your Visit        [employer] Information     [employer] gives you secure access to your electronic health record. If you see a  primary care provider, you can also send messages to your care team and make appointments. If you have questions, please call your primary care clinic.  If you do not have a primary care provider, please call [phone] and they will assist you.        Care EveryWhere ID     This is your Care EveryWhere ID. This could be used by other organizations to access your [facility] [facility] medical records  [id number]        Your Vitals Were     Pulse Temperature Last Period             96 97.4  F (36.3  C) (Oral) 04/06/2010          Blood Pressure from Last 3 Encounters:   03/21/18 116/72   03/20/18 105/65   02/23/18 (!) 80/49    Weight from Last 3 Encounters:   03/20/18 140 lb 4.8 oz (63.6 kg)   01/30/18 141 lb (64 kg)   01/26/18 142 lb (64.4 kg)              Today, you had the following     No orders found for display       Primary Care Provider Office Phone # Fax #    Joaquin Sarthak Rowe [phone]       [address] [facility] [address]  [facility] [address]        Equal Access to Services     [facility]: Hadii aad ku hadasho Sousha, waaxda luqadaha, qaybta kaalmada graham, aaron mcgovern . So [facility] [phone].    ATENCIÓN: Si habla español, tiene a garrett disposición servicios gratuitos de asistencia lingüística. [facility] [phone].    We comply with applicable federal civil rights laws and [place] laws. We do not discriminate on the basis of race, color, national origin, age, disability, sex, sexual orientation, or gender identity.            Thank you!     Thank you for choosing [facility]  for your care. Our goal is always to provide you with excellent care. Hearing back from our patients is one way we can continue to improve our services. Please take a few minutes to complete the written survey that you may receive in the mail after your visit with us. Thank you!             Your Updated Medication List - Protect others around you: Learn how to safely use,  store and throw away your medicines at www.disposemymeds.org.          This list is accurate as of 3/21/18  5:11 PM.  Always use your most recent med list.                   Brand Name Dispense Instructions for use Diagnosis    acetaminophen-codeine 300-30 MG per tablet    TYLENOL #3    20 tablet    Take 1 tablet by mouth every 4 hours as needed for pain maximum 4 tablet(s) per day    Tension-type headache, not intractable, unspecified chronicity pattern       ALPRAZolam 0.5 MG tablet    XANAX     Take 0.5 mg by mouth nightly as needed        azelastine 0.1 % spray    ASTELIN    3 Bottle    Spray 1 spray in nostril 2 times daily    Chronic rhinitis, unspecified type       BENADRYL ALLERGY PO      prn        biotin 2.5 mg/mL Susp      Take by mouth daily        butalbital-acetaminophen-caffeine -40 MG per tablet    FIORICET/ESGIC    28 tablet    Take 1 tablet by mouth every 4 hours as needed    Migraine with aura and with status migrainosus, not intractable       cholecalciferol 1000 UNIT tablet    vitamin D3    100 tablet    Take 2 tablets (2,000 Units) by mouth daily    Osteopenia       CO Q 10 PO           cyclobenzaprine 10 MG tablet    FLEXERIL    20 tablet    Take 1 tablet (10 mg) by mouth 3 times daily as needed for muscle spasms    Tension-type headache, not intractable, unspecified chronicity pattern       fluticasone 50 MCG/ACT spray    FLONASE    3 Bottle    Spray 1-2 sprays into both nostrils daily    Acute non-recurrent frontal sinusitis       ketoconazole 2 % shampoo    NIZORAL    120 mL    Apply topically daily as needed for itching or irritation    Dermatitis, seborrheic       magnesium 100 MG Caps      Take 400 mg by mouth        nortriptyline 50 MG capsule    PAMELOR    90 capsule    Take 1 capsule (50 mg) by mouth At Bedtime    Mild major depression (H), Primary insomnia       traZODone 50 MG tablet    DESYREL    45 tablet    1/2 TABLET AT BEDTIME AS NEEDED FOR SLEEP    Primary insomnia, FREDIS  (generalized anxiety disorder)       VITAMIN B-2 PO      Take 400 mg by mouth

## 2018-03-21 NOTE — PROGRESS NOTES
CC: vaginal sores    HPI: Beronica Azevedo is a 57 year old  who reports noticing some sores on the outside of her vagina bout a week ago.  She reports that initially it was a little itchy and when she looked, she noticed 2 small, flat red areas.  One seemed to have a scab on it and when she scratched, the scab came off and the lesion was gone.  The other has slowly improved but is still there.  They were never blisters or pimple type things, only flat.  Never ulcerated.  She denies pain or drainage or bleeding.  She has never had this before, denies any history of HSV.  She has not been sexually active for about 10 years.    She does take baths, and recently started using more bath salts, wondering if that could be the cause?    ROS:  C: NEGATIVE for fever, chills, change in weight  I: NEGATIVE for worrisome rashes, moles or lesions  E: NEGATIVE for vision changes or irritation  E/M: NEGATIVE for ear, mouth and throat problems  R: NEGATIVE for significant cough or SOB  CV: NEGATIVE for chest pain, palpitations or peripheral edema  GI: NEGATIVE for nausea, abdominal pain, heartburn, or change in bowel habits  : NEGATIVE for frequency, dysuria, hematuria, vaginal discharge  M: NEGATIVE for significant arthralgias or myalgia  N: NEGATIVE for weakness, dizziness or paresthesias  E: NEGATIVE for temperature intolerance, skin/hair changes  P: NEGATIVE for changes in mood or affect    Past Medical History:   Diagnosis Date     Anxiety      Arthritis      Cervical high risk HPV (human papillomavirus) test positive 3/4/16    3/18/16 colp - [facility] - negative     BEVERLY II (cervical intraepithelial neoplasia II) 2009    [facility]     Depression      Depressive disorder Adolescence    Managed with medication and therapy     Dry eye syndrome      Frequent UTI 2009     Headache(784.0)      IBS (irritable bowel syndrome)      Insomnia      Lattice degeneration of retina      Lyme disease      Myopic astigmatism      Nonsenile  cataract      Osteopenia ,    Fosamax     Pneumothorax on right     at 17 y/o     PVD (posterior vitreous detachment), both eyes 7/19/10     Rosacea      Vasomotor rhinitis      Past Surgical History:   Procedure Laterality Date     BIOPSY  Moles skin tags     CHEST TUBE INSERTION       COLONOSCOPY  Normal     DILATION AND CURETTAGE, OPERATIVE HYSTEROSCOPY WITH MORCELLATOR, COMBINED  2011    endometrial polyp     LEEP TX, CERVICAL  2009    BEVERLY II, clear margins     THORACIC SURGERY  1976    Collapsed lung     Obstetric History       T0      L2     SAB0   TAB0   Ectopic0   Multiple0   Live Births2       # Outcome Date GA Lbr Deuce/2nd Weight Sex Delivery Anes PTL Lv   2 Para 89        JAVY   1 Para 85        JAVY           Allergies   Allergen Reactions     Tape [Adhesive Tape] Rash     Compazine Other (See Comments)     Hyperactivity       Paxil [Paroxetine] Other (See Comments)     Ropinirole Hcl Unknown     TABS     Depakote [Valproic Acid] Rash     Rash       Food Itching and Rash     mushrooms     Mushroom Itching and Rash       Current Outpatient Prescriptions:      magnesium 100 MG CAPS, Take 400 mg by mouth, Disp: , Rfl:      Riboflavin (VITAMIN B-2 PO), Take 400 mg by mouth, Disp: , Rfl:      Coenzyme Q10 (CO Q 10 PO), , Disp: , Rfl:      cyclobenzaprine (FLEXERIL) 10 MG tablet, Take 1 tablet (10 mg) by mouth 3 times daily as needed for muscle spasms, Disp: 20 tablet, Rfl: 0     acetaminophen-codeine (TYLENOL #3) 300-30 MG per tablet, Take 1 tablet by mouth every 4 hours as needed for pain maximum 4 tablet(s) per day, Disp: 20 tablet, Rfl: 0     ketoconazole (NIZORAL) 2 % shampoo, Apply topically daily as needed for itching or irritation, Disp: 120 mL, Rfl: 1     butalbital-acetaminophen-caffeine (FIORICET/ESGIC) -40 MG per tablet, Take 1 tablet by mouth every 4 hours as needed, Disp: 28 tablet, Rfl: 1     traZODone (DESYREL) 50 MG tablet, 1/2 TABLET AT BEDTIME  AS NEEDED FOR SLEEP, Disp: 45 tablet, Rfl: 1     nortriptyline (PAMELOR) 50 MG capsule, Take 1 capsule (50 mg) by mouth At Bedtime, Disp: 90 capsule, Rfl: 3     azelastine (ASTELIN) 0.1 % spray, Spray 1 spray in nostril 2 times daily, Disp: 3 Bottle, Rfl: 3     fluticasone (FLONASE) 50 MCG/ACT spray, Spray 1-2 sprays into both nostrils daily, Disp: 3 Bottle, Rfl: 3     biotin 2.5 mg/mL, Take by mouth daily, Disp: , Rfl:      cholecalciferol (VITAMIN D) 1000 UNIT tablet, Take 2 tablets (2,000 Units) by mouth daily, Disp: 100 tablet, Rfl: 3     alprazolam (XANAX) 0.5 MG tablet, Take 0.5 mg by mouth nightly as needed , Disp: , Rfl:      BENADRYL ALLERGY PO, prn, Disp: , Rfl:     Social History     Social History     Marital status:      Spouse name: N/A     Number of children: N/A     Years of education: N/A     Occupational History     Psychologist      Social History Main Topics     Smoking status: Never Smoker     Smokeless tobacco: Never Used     Alcohol use Yes      Comment: 1 8oz mixed drink or beer     Drug use: No     Sexual activity: No     Other Topics Concern     Parent/Sibling W/ Cabg, Mi Or Angioplasty Before 65f 55m? No     Social History Narrative     Family History   Problem Relation Age of Onset     Depression Sister      Uterine Cancer Sister      Neurologic Disorder Sister      Neurologic Disorder Brother      Neurologic Disorder Father      Lipids Father      high cholesterol     GASTROINTESTINAL DISEASE Father      stomach ulcers     CANCER Father      melanoma     Skin Cancer Father      Hypertension Father      Hyperlipidemia Father      Neurologic Disorder Mother      Allergies Mother      Arthritis Mother      Depression Mother      Eye Disorder Mother      Lipids Mother      high cholesterol     OSTEOPOROSIS Mother      Hypertension Mother      Hyperlipidemia Mother      Thyroid Disease Mother      Neurologic Disorder Sister      epilepsy     Alcohol/Drug Sister      alcohol and drug  dependency     Alcohol/Drug Brother      recovery alcohol and drug addit     Asthma Son      excercise and allergy induced     Asthma Daughter      excercise and allergy induced     Lipids Sister      high cholesterol     GASTROINTESTINAL DISEASE Sister      hepatitis ??     OSTEOPOROSIS Sister      Breast Cancer Maternal Aunt      Asthma Daughter      Asthma Son      Melanoma No family hx of      Past medical, surgical, social and family history were reviewed and updated in EPIC.    PE: /72  Pulse 96  Temp 97.4  F (36.3  C) (Oral)  LMP 04/06/2010    Psych: normal affect, appropriate eye contact  Resp: no increased work of breathing  CV: no peripheral edema  Abd; SNT, no palpable masses  Lymph: no enlarged inquinal nodes  Pelvic: normal appearing vulva.  Normal hair distribution.  Labia without lesions or erythema.  Single light brown irregular flat lesion, 2 x 3mm on mons. Appears like healing skin vs mole?  Skin: no visible rashes or lesions.    A/P; Lesion on mons   Based on history of improvement and current clinical appearance, this does not look worrisome.  I don;t suspect HSV or any other STD based on current appearance and sexual history.  Possibly was folliculitis or ingrown hair, hard to know.  Neoplasia is possible, although seems less likely since it is improving per her report.  I recommended close monitoring.  If lesion is changing, growing or persisting, she should RTC for biopsy.  If resolves, ok to just monitor for future.  Questions answered.    MELIA AQUINO MD

## 2018-03-28 ENCOUNTER — THERAPY VISIT (OUTPATIENT)
Dept: PHYSICAL THERAPY | Facility: CLINIC | Age: 58
End: 2018-03-28
Payer: COMMERCIAL

## 2018-03-28 DIAGNOSIS — M72.2 PLANTAR FASCIITIS: ICD-10-CM

## 2018-03-28 DIAGNOSIS — M25.561 ACUTE PAIN OF RIGHT KNEE: Primary | ICD-10-CM

## 2018-03-28 PROCEDURE — 97161 PT EVAL LOW COMPLEX 20 MIN: CPT | Mod: GP | Performed by: PHYSICAL THERAPIST

## 2018-03-28 PROCEDURE — 97110 THERAPEUTIC EXERCISES: CPT | Mod: GP | Performed by: PHYSICAL THERAPIST

## 2018-03-28 ASSESSMENT — ACTIVITIES OF DAILY LIVING (ADL)
SQUAT: ACTIVITY IS FAIRLY DIFFICULT
HOW_WOULD_YOU_RATE_THE_CURRENT_FUNCTION_OF_YOUR_KNEE_DURING_YOUR_USUAL_DAILY_ACTIVITIES_ON_A_SCALE_FROM_0_TO_100_WITH_100_BEING_YOUR_LEVEL_OF_KNEE_FUNCTION_PRIOR_TO_YOUR_INJURY_AND_0_BEING_THE_INABILITY_TO_PERFORM_ANY_OF_YOUR_USUAL_DAILY_ACTIVITIES?: 60
STIFFNESS: I DO NOT HAVE THE SYMPTOM
GIVING WAY, BUCKLING OR SHIFTING OF KNEE: I DO NOT HAVE THE SYMPTOM
PAIN: THE SYMPTOM AFFECTS MY ACTIVITY MODERATELY
KNEEL ON THE FRONT OF YOUR KNEE: I AM UNABLE TO DO THE ACTIVITY
LIMPING: I DO NOT HAVE THE SYMPTOM
GO DOWN STAIRS: ACTIVITY IS MINIMALLY DIFFICULT
KNEE_ACTIVITY_OF_DAILY_LIVING_SUM: 48
STAND: ACTIVITY IS MINIMALLY DIFFICULT
RISE FROM A CHAIR: ACTIVITY IS MINIMALLY DIFFICULT
GO UP STAIRS: ACTIVITY IS FAIRLY DIFFICULT
KNEE_ACTIVITY_OF_DAILY_LIVING_SCORE: 68.57
WEAKNESS: THE SYMPTOM AFFECTS MY ACTIVITY SEVERELY
SWELLING: I DO NOT HAVE THE SYMPTOM
SIT WITH YOUR KNEE BENT: ACTIVITY IS MINIMALLY DIFFICULT
HOW_WOULD_YOU_RATE_THE_OVERALL_FUNCTION_OF_YOUR_KNEE_DURING_YOUR_USUAL_DAILY_ACTIVITIES?: ABNORMAL
AS_A_RESULT_OF_YOUR_KNEE_INJURY,_HOW_WOULD_YOU_RATE_YOUR_CURRENT_LEVEL_OF_DAILY_ACTIVITY?: ABNORMAL
RAW_SCORE: 48
WALK: ACTIVITY IS NOT DIFFICULT

## 2018-03-28 NOTE — PROGRESS NOTES
[facility] Initial Evaluation  Subjective:  Beronica Azevedo is a 57 year old female.    Patient s chief complaints:  R knee and B feet.    Condition occurred due to no specific injury.  Date of Onset: about 2 years of pain, recently saw MD and was referred to PT on 3/20/18  Location of symptoms is R knee is posterior/popliteal and medial knee; feet are medial calcaneal tubercle and in mid arch as well.  .  Symptoms other than pain include: knee: weakness  Quality of pain is aching and frequency is intermittent.    Pain dependence on time of day is: worse pm.   Pain rating is: 2-7/10.    Symptoms are exacerbated by: kneeling/squatting, arising from squatting or kneeling, arising stairs, standing prolonged (feet), dress shoes .    Symptoms are relieved by:  Using new balances with custom orthotics, doorway hamstring stretch, ices knee.    Progression of symptoms is that symptoms are:  unchanged  Imaging/Special tests include: Knee x-rays negative.   Previous treatments include: PT a year ago, mild change.   Patient reports that general health is: fair.   Pertinent medical history includes:  Anemia, HA, sleep disorder, menopausal, tension in chest.    Medical allergies includes: adhesive, airborne chemical.   Surgical history includes: collapsed lung.  Current medications include: sleep, pain, anti depressent  Current occupation is: psychologist  Work status is: work  Primary job tasks include: sitting, repetitive computer work  Barriers include: none  Red flags include: none    Patient's expectations for therapy include: strengthen and reduce/eliminate pain.    HPI                    Objective:  KNEE:    PROM:   L  R   Hyperextension 5 5   Extension 0 0   Flexion 150 150     Strength:   L R   HIP     Flex 5 5   Ext 4 4   Abd 5 5   KNEE     Flex 5 5   Ext 5 5       Special tests:   L R   Anterior Drawer  negative   Posterior Drawer  negative   Lachman's  negative   Valgus 0 degrees  negative    Valgus 30 degrees  negative   Varus 0 degrees  negative   Varus 30 degrees  negative   Abby's  negative   Appley's  negative   Lateral Compression     Patellar Compression         Palpation: tender popliteal area of R knee.    Patellar tracking: R knee lateral tilt (but not really having anterior side symptoms)    ANKLE:     PROM L PROM R AROM L AROM R MMT L MMT R   Dorsiflexion 23 20   5 5   Plantarflexion 70 70   5 5   Inversion     5 5   Eversion     5 5   G Toe Ext         G Toe Flex           Edema:    General: none    Palpation: minimal tenderness medial calcaneal tubercle B feet.    System    Physical Exam    General     ROS    Assessment/Plan:    Patient is a 57 year old female with right side knee and B foot complaints.    Patient has the following significant findings with corresponding treatment plan.                Diagnosis 1:  R knee pain, popliteal area (suspect hamstring/popliteus pathology vs possible cyst--though not too prominent with palpation of present, but in common area)    Pain -  hot/cold therapy, US, manual therapy and directional preference exercise  Decreased ROM/flexibility - manual therapy and therapeutic exercise  Decreased strength - therapeutic exercise and therapeutic activities  Decreased proprioception - neuro re-education and therapeutic activities  Decreased function - therapeutic activities  Impaired posture - neuro re-education  Diagnosis 2:  B plantar fascitis     Pain -  hot/cold therapy, US and manual therapy  Decreased ROM/flexibility - manual therapy and therapeutic exercise  Decreased strength - therapeutic exercise and therapeutic activities  Decreased proprioception - neuro re-education and therapeutic activities  Impaired gait - gait training  Decreased function - therapeutic activities    Therapy Evaluation Codes:   1) History comprised of:   Personal factors that impact the plan of care:      None.    Comorbidity factors that impact the plan of care are:       None.     Medications impacting care: None.  2) Examination of Body Systems comprised of:   Body structures and functions that impact the plan of care:      Knee and Foot.   Activity limitations that impact the plan of care are:      Squatting/kneeling, Stairs and Walking.  3) Clinical presentation characteristics are:   Stable/Uncomplicated.  4) Decision-Making    Low complexity using standardized patient assessment instrument and/or measureable assessment of functional outcome.  Cumulative Therapy Evaluation is: Low complexity.    Previous and current functional limitations:  (See Goal Flow Sheet for this information)    Short term and Long term goals: (See Goal Flow Sheet for this information)     Communication ability:  Patient appears to be able to clearly communicate and understand verbal and written communication and follow directions correctly.  Treatment Explanation - The following has been discussed with the patient:   RX ordered/plan of care  Anticipated outcomes  Possible risks and side effects  This patient would benefit from PT intervention to resume normal activities.   Rehab potential is good.    Frequency:  1 X week, once daily  Duration:  for 6 weeks  Discharge Plan:  Achieve all LTG.  Independent in home treatment program.  Reach maximal therapeutic benefit.    Please refer to the daily flowsheet for treatment today, total treatment time and time spent performing 1:1 timed codes.

## 2018-04-04 ENCOUNTER — THERAPY VISIT (OUTPATIENT)
Dept: PHYSICAL THERAPY | Facility: CLINIC | Age: 58
End: 2018-04-04
Payer: COMMERCIAL

## 2018-04-04 DIAGNOSIS — M25.561 ACUTE PAIN OF RIGHT KNEE: ICD-10-CM

## 2018-04-04 DIAGNOSIS — M72.2 PLANTAR FASCIITIS: ICD-10-CM

## 2018-04-04 PROCEDURE — 97110 THERAPEUTIC EXERCISES: CPT | Mod: GP | Performed by: PHYSICAL THERAPIST

## 2018-04-04 PROCEDURE — 97035 APP MDLTY 1+ULTRASOUND EA 15: CPT | Mod: GP | Performed by: PHYSICAL THERAPIST

## 2018-04-04 PROCEDURE — 97112 NEUROMUSCULAR REEDUCATION: CPT | Mod: GP | Performed by: PHYSICAL THERAPIST

## 2018-04-11 ENCOUNTER — THERAPY VISIT (OUTPATIENT)
Dept: PHYSICAL THERAPY | Facility: CLINIC | Age: 58
End: 2018-04-11
Payer: COMMERCIAL

## 2018-04-11 DIAGNOSIS — M25.561 ACUTE PAIN OF RIGHT KNEE: ICD-10-CM

## 2018-04-11 DIAGNOSIS — M72.2 PLANTAR FASCIITIS: ICD-10-CM

## 2018-04-11 PROCEDURE — 97035 APP MDLTY 1+ULTRASOUND EA 15: CPT | Mod: GP | Performed by: PHYSICAL THERAPIST

## 2018-04-11 PROCEDURE — 97110 THERAPEUTIC EXERCISES: CPT | Mod: GP | Performed by: PHYSICAL THERAPIST

## 2018-04-11 PROCEDURE — 97112 NEUROMUSCULAR REEDUCATION: CPT | Mod: GP | Performed by: PHYSICAL THERAPIST

## 2018-04-18 ENCOUNTER — TELEPHONE (OUTPATIENT)
Dept: FAMILY MEDICINE | Facility: CLINIC | Age: 58
End: 2018-04-18

## 2018-04-18 RX ORDER — ALPRAZOLAM 0.5 MG
0.5 TABLET ORAL DAILY PRN
Qty: 90 TABLET | Refills: 0 | Status: CANCELLED | OUTPATIENT
Start: 2018-04-18

## 2018-04-18 NOTE — TELEPHONE ENCOUNTER
Please call patient  I do not recommend that patients take controlled medications on a daily basis unless they are written for and followed by a specialist such as a psychiatrist  So I am not comfortable in witting for this medication on a daily basis   she needs to find a new psychiatrist  We can do a referral if she would like that

## 2018-04-18 NOTE — TELEPHONE ENCOUNTER
Spoke with patient, she is in agreement with this plan. She recalls maybe receiving a referral in the past that didn't work out with her insurance, so she is going to call her insurance company to find a provider that is covered under her plan. She will call us back with the information.    Marisela Melo RN  [facility]

## 2018-04-18 NOTE — TELEPHONE ENCOUNTER
Reason for call:  Other   Patient called regarding (reason for call): call back  Additional comments: The patient has questions on dosing of some of her prescriptions. She would like a call back to discuss this.    Phone number to reach patient:  Home number on file [phone] (home)    Best Time:  Any    Can we leave a detailed message on this number?  YES

## 2018-04-18 NOTE — TELEPHONE ENCOUNTER
Dr. Rowe,     Patient is requesting a prescription written for alprazolam.     Patient states that previous prescription was prescribed by Dr. Tariq Gonzales, and her insurance no longer covers visit to him.     Previous prescription was to take 0.5 mg twice daily as needed.    Patient states that she only takes it once a day currently and would like the dose reduced to 0.5 mg by mouth once daily as needed. Patient also requesting 90 day supply, because then she does not have to pay co-pay.    Medication/pharmacy cued if okay.    Patient is willing to schedule appt if needed.    Marisela Melo RN  [facility]

## 2018-04-19 ENCOUNTER — TELEPHONE (OUTPATIENT)
Dept: FAMILY MEDICINE | Facility: CLINIC | Age: 58
End: 2018-04-19

## 2018-04-19 DIAGNOSIS — F41.1 GAD (GENERALIZED ANXIETY DISORDER): Primary | ICD-10-CM

## 2018-04-19 DIAGNOSIS — J31.0 CHRONIC RHINITIS, UNSPECIFIED TYPE: ICD-10-CM

## 2018-04-19 NOTE — TELEPHONE ENCOUNTER
Reason for call:  Order   Order or referral being requested: long term psych care referral  Reason for request: med management alprazolam, trazodone, norateriptaline  Date needed: as soon as possible  Has the patient been seen by the PCP for this problem? YES    Additional comments: n/a    Phone number to reach patient:  Home number on file [phone] (home)    Best Time:  n/a    Can we leave a detailed message on this number?  YES

## 2018-04-19 NOTE — TELEPHONE ENCOUNTER
"Requested Prescriptions   Pending Prescriptions Disp Refills     azelastine (ASTELIN) 0.1 % spray [Pharmacy Med Name: AZELASTINE 0.1%(137MCG) NASAL-200SP] 90 mL 0    Last Written Prescription Date:  8/1/17  Last Fill Quantity: 3btl,  # refills: 3   Last office visit: 3/20/2018 with prescribing provider:  3/20/18   Future Office Visit:     Sig: INSTILL 1 SPRAY INTO THE NOSTIL(S) TWICE DAILY    Antihistamines Protocol Passed    4/19/2018 11:35 AM       Passed - Patient is 3-64 years of age    Apply weight-based dosing for peds patients age 3 - 12 years of age.    Forward request to provider for patients under the age of 3 or over the age of 64.         Passed - Recent (12 mo) or future (30 days) visit within the authorizing provider's specialty    Patient had office visit in the last 12 months or has a visit in the next 30 days with authorizing provider or within the authorizing provider's specialty.  See \"Patient Info\" tab in inbasket, or \"Choose Columns\" in Meds & Orders section of the refill encounter.              "

## 2018-04-19 NOTE — TELEPHONE ENCOUNTER
Note I do not want her to go to [facility] or [facility] psych clinic'   Sh e needs ongoing psychiatric care so should go to  Orders Placed This Encounter     MENTAL HEALTH REFERRAL  - Adult; Psychiatry and Medication Management; Psychiatry; Other: [facility] [phone]; We will contact you to schedule the appointment or please call with any questions     Referral Type:   Mental Health Outpatient

## 2018-04-19 NOTE — TELEPHONE ENCOUNTER
Patient notified of referral placed and given scheduling number.    Marisela Melo RN  [facility]

## 2018-04-19 NOTE — TELEPHONE ENCOUNTER
Dr. Rowe,     Patient is requesting a referral for psychiatry for medication management and to prescribe her alprazolam medication.    Referral cued if okay.    Please review/sign or advise.    Marisela Melo RN  [facility]

## 2018-04-20 RX ORDER — AZELASTINE 1 MG/ML
SPRAY, METERED NASAL
Qty: 90 ML | Refills: 0 | Status: SHIPPED | OUTPATIENT
Start: 2018-04-20 | End: 2018-10-29

## 2018-04-20 NOTE — TELEPHONE ENCOUNTER
LOV: 3/20/2018    Prescription approved per [facility] according to the [facility] Refill Protocol.  Thanks! Kristyn Peterson RN

## 2018-05-01 DIAGNOSIS — G43.101 MIGRAINE WITH AURA AND WITH STATUS MIGRAINOSUS, NOT INTRACTABLE: ICD-10-CM

## 2018-05-01 RX ORDER — BUTALBITAL, ACETAMINOPHEN AND CAFFEINE 50; 325; 40 MG/1; MG/1; MG/1
TABLET ORAL
Qty: 28 TABLET | Refills: 0 | Status: SHIPPED | OUTPATIENT
Start: 2018-05-01 | End: 2018-10-04

## 2018-05-01 NOTE — TELEPHONE ENCOUNTER
Routing refill request to provider for review/approval because:  Drug not on the [facility] refill protocol      check last fill 12/5/17 for 28    Delmis Fallon RN   [facility]

## 2018-05-01 NOTE — TELEPHONE ENCOUNTER
Controlled Substance Refill Request for BUTALBITAL/ACETAMINOPHEN/CAFF TABS    Problem List Complete:  No     PROVIDER TO CONSIDER COMPLETION OF PROBLEM LIST AND OVERVIEW/CONTROLLED SUBSTANCE AGREEMENT    Last Written Prescription Date:  8/1/17  Last Fill Quantity: 28,   # refills: 1    Last Office Visit with [facility] primary care provider: 3/20/18    Future Office visit:     Controlled substance agreement on file: No.     Processing:  n/a   checked in past 6 months?  No, route to RN

## 2018-05-22 ENCOUNTER — OFFICE VISIT (OUTPATIENT)
Dept: FAMILY MEDICINE | Facility: CLINIC | Age: 58
End: 2018-05-22
Payer: COMMERCIAL

## 2018-05-22 DIAGNOSIS — Z00.00 ROUTINE GENERAL MEDICAL EXAMINATION AT A HEALTH CARE FACILITY: Primary | ICD-10-CM

## 2018-05-22 DIAGNOSIS — Z86.2 HISTORY OF ANEMIA: ICD-10-CM

## 2018-05-22 DIAGNOSIS — M25.531 PAIN IN BOTH WRISTS: ICD-10-CM

## 2018-05-22 DIAGNOSIS — M25.561 CHRONIC PAIN OF RIGHT KNEE: ICD-10-CM

## 2018-05-22 DIAGNOSIS — M25.532 PAIN IN BOTH WRISTS: ICD-10-CM

## 2018-05-22 DIAGNOSIS — F32.0 MILD MAJOR DEPRESSION (H): ICD-10-CM

## 2018-05-22 DIAGNOSIS — G89.29 CHRONIC PAIN OF RIGHT KNEE: ICD-10-CM

## 2018-05-22 DIAGNOSIS — Z13.220 SCREENING CHOLESTEROL LEVEL: ICD-10-CM

## 2018-05-22 DIAGNOSIS — Z11.59 NEED FOR HEPATITIS C SCREENING TEST: ICD-10-CM

## 2018-05-22 LAB
ALBUMIN SERPL-MCNC: 3.7 G/DL (ref 3.4–5)
ALBUMIN UR-MCNC: NEGATIVE MG/DL
ALP SERPL-CCNC: 75 U/L (ref 40–150)
ALT SERPL W P-5'-P-CCNC: 20 U/L (ref 0–50)
ANION GAP SERPL CALCULATED.3IONS-SCNC: 7 MMOL/L (ref 3–14)
APPEARANCE UR: CLEAR
AST SERPL W P-5'-P-CCNC: 15 U/L (ref 0–45)
BILIRUB SERPL-MCNC: 0.3 MG/DL (ref 0.2–1.3)
BILIRUB UR QL STRIP: NEGATIVE
BUN SERPL-MCNC: 8 MG/DL (ref 7–30)
CALCIUM SERPL-MCNC: 8.9 MG/DL (ref 8.5–10.1)
CHLORIDE SERPL-SCNC: 106 MMOL/L (ref 94–109)
CHOLEST SERPL-MCNC: 185 MG/DL
CO2 SERPL-SCNC: 26 MMOL/L (ref 20–32)
COLOR UR AUTO: YELLOW
CREAT SERPL-MCNC: 0.69 MG/DL (ref 0.52–1.04)
CRP SERPL-MCNC: <2.9 MG/L (ref 0–8)
ERYTHROCYTE [DISTWIDTH] IN BLOOD BY AUTOMATED COUNT: 11.9 % (ref 10–15)
GFR SERPL CREATININE-BSD FRML MDRD: 87 ML/MIN/1.7M2
GLUCOSE SERPL-MCNC: 64 MG/DL (ref 70–99)
GLUCOSE UR STRIP-MCNC: NEGATIVE MG/DL
HCT VFR BLD AUTO: 41.2 % (ref 35–47)
HCV AB SERPL QL IA: NONREACTIVE
HDLC SERPL-MCNC: 64 MG/DL
HGB BLD-MCNC: 13.6 G/DL (ref 11.7–15.7)
HGB UR QL STRIP: NEGATIVE
KETONES UR STRIP-MCNC: NEGATIVE MG/DL
LDLC SERPL CALC-MCNC: 106 MG/DL
LEUKOCYTE ESTERASE UR QL STRIP: NEGATIVE
MCH RBC QN AUTO: 30.3 PG (ref 26.5–33)
MCHC RBC AUTO-ENTMCNC: 33 G/DL (ref 31.5–36.5)
MCV RBC AUTO: 92 FL (ref 78–100)
NITRATE UR QL: NEGATIVE
NONHDLC SERPL-MCNC: 121 MG/DL
PH UR STRIP: 7 PH (ref 5–7)
PLATELET # BLD AUTO: 252 10E9/L (ref 150–450)
POTASSIUM SERPL-SCNC: 3.8 MMOL/L (ref 3.4–5.3)
PROT SERPL-MCNC: 7.2 G/DL (ref 6.8–8.8)
RBC # BLD AUTO: 4.49 10E12/L (ref 3.8–5.2)
SODIUM SERPL-SCNC: 139 MMOL/L (ref 133–144)
SOURCE: NORMAL
SP GR UR STRIP: 1.01 (ref 1–1.03)
TRIGL SERPL-MCNC: 75 MG/DL
TSH SERPL DL<=0.005 MIU/L-ACNC: 0.92 MU/L (ref 0.4–4)
UROBILINOGEN UR STRIP-ACNC: 0.2 EU/DL (ref 0.2–1)
WBC # BLD AUTO: 5.5 10E9/L (ref 4–11)

## 2018-05-22 PROCEDURE — 84443 ASSAY THYROID STIM HORMONE: CPT | Performed by: FAMILY MEDICINE

## 2018-05-22 PROCEDURE — 36415 COLL VENOUS BLD VENIPUNCTURE: CPT | Performed by: FAMILY MEDICINE

## 2018-05-22 PROCEDURE — 99396 PREV VISIT EST AGE 40-64: CPT | Performed by: FAMILY MEDICINE

## 2018-05-22 PROCEDURE — 86140 C-REACTIVE PROTEIN: CPT | Performed by: FAMILY MEDICINE

## 2018-05-22 PROCEDURE — 86803 HEPATITIS C AB TEST: CPT | Performed by: FAMILY MEDICINE

## 2018-05-22 PROCEDURE — 85027 COMPLETE CBC AUTOMATED: CPT | Performed by: FAMILY MEDICINE

## 2018-05-22 PROCEDURE — 80053 COMPREHEN METABOLIC PANEL: CPT | Performed by: FAMILY MEDICINE

## 2018-05-22 PROCEDURE — 81003 URINALYSIS AUTO W/O SCOPE: CPT | Performed by: FAMILY MEDICINE

## 2018-05-22 PROCEDURE — 80061 LIPID PANEL: CPT | Performed by: FAMILY MEDICINE
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
## 2018-05-22 NOTE — MR AVS SNAPSHOT
After Visit Summary   5/22/2018    Beronica Azevedo    MRN: [id number]           Patient Information     Date Of Birth          1960        Visit Information        Provider Department      5/22/2018 10:00 AM Joaquin Rowe MD [facility]        Today's Diagnoses     Routine general medical examination at a health care facility    -  1    Mild major depression (H)        Chronic pain of right knee        Pain in both wrists        Screening cholesterol level        History of anemia        Need for hepatitis C screening test          Care Instructions      Preventive Health Recommendations  Female Ages 50 - 64    Yearly exam: See your health care provider every year in order to  o Review health changes.   o Discuss preventive care.    o Review your medicines if your doctor has prescribed any.      Get a Pap test every three years (unless you have an abnormal result and your provider advises testing more often).    If you get Pap tests with HPV test, you only need to test every 5 years, unless you have an abnormal result.     You do not need a Pap test if your uterus was removed (hysterectomy) and you have not had cancer.    You should be tested each year for STDs (sexually transmitted diseases) if you're at risk.     Have a mammogram every 1 to 2 years.    Have a colonoscopy at age 50, or have a yearly FIT test (stool test). These exams screen for colon cancer.      Have a cholesterol test every 5 years, or more often if advised.    Have a diabetes test (fasting glucose) every three years. If you are at risk for diabetes, you should have this test more often.     If you are at risk for osteoporosis (brittle bone disease), think about having a bone density scan (DEXA).    Shots: Get a flu shot each year. Get a tetanus shot every 10 years.    Nutrition:     Eat at least 5 servings of fruits and vegetables each day.    Eat whole-grain bread, whole-wheat pasta and brown rice  instead of white grains and rice.    Talk to your provider about Calcium and Vitamin D.     Lifestyle    Exercise at least 150 minutes a week (30 minutes a day, 5 days a week). This will help you control your weight and prevent disease.    Limit alcohol to one drink per day.    No smoking.     Wear sunscreen to prevent skin cancer.     See your dentist every six months for an exam and cleaning.    See your eye doctor every 1 to 2 years.            Follow-ups after your visit        Who to contact     If you have questions or need follow up information about today's clinic visit or your schedule please contact [facility] directly at [phone].  Normal or non-critical lab and imaging results will be communicated to you by [employer], letter or phone within 4 business days after the clinic has received the results. If you do not hear from us within 7 days, please contact the clinic through [employer] or phone. If you have a critical or abnormal lab result, we will notify you by phone as soon as possible.  Submit refill requests through [employer] or call your pharmacy and they will forward the refill request to us. Please allow 3 business days for your refill to be completed.          Additional Information About Your Visit        [employer] Information     [employer] gives you secure access to your electronic health record. If you see a primary care provider, you can also send messages to your care team and make appointments. If you have questions, please call your primary care clinic.  If you do not have a primary care provider, please call [phone] and they will assist you.        Care EveryWhere ID     This is your Care EveryWhere ID. This could be used by other organizations to access your [facility] medical records  [id number]        Your Vitals Were     Pulse Temperature Last Period Pulse Oximetry BMI (Body Mass Index)       96 98.1  F (36.7  C) (Oral) 04/06/2010 99% 21.04 kg/m2        Blood Pressure  from Last 3 Encounters:   05/22/18 101/69   03/21/18 116/72   03/20/18 105/65    Weight from Last 3 Encounters:   05/22/18 138 lb 6.4 oz (62.8 kg)   03/20/18 140 lb 4.8 oz (63.6 kg)   01/30/18 141 lb (64 kg)              We Performed the Following     *UA reflex to Microscopic     CBC with platelets     Comprehensive metabolic panel     CRP, inflammation     DEPRESSION ACTION PLAN (DAP)     Hepatitis C antibody     Lipid Profile     TSH with free T4 reflex        Primary Care Provider Office Phone # Fax #    Joaquin Sarthak Rowe [phone]       [address] [facility] [address]  [facility] [address]        Equal Access to Services     GREGOR THRASHER : Hadii simon Gutierres, waaxda tyroneadaha, qaybta kaalmada adedejahyaguy, aaron ortiz. So [facility] [phone].    ATENCIÓN: Si habla español, tiene a garrett disposición servicios gratuitos de asistencia lingüística. Llame al [phone].    We comply with applicable federal civil rights laws and [place] laws. We do not discriminate on the basis of race, color, national origin, age, disability, sex, sexual orientation, or gender identity.            Thank you!     Thank you for choosing [facility]  for your care. Our goal is always to provide you with excellent care. Hearing back from our patients is one way we can continue to improve our services. Please take a few minutes to complete the written survey that you may receive in the mail after your visit with us. Thank you!             Your Updated Medication List - Protect others around you: Learn how to safely use, store and throw away your medicines at www.disposemymeds.org.          This list is accurate as of 5/22/18 12:44 PM.  Always use your most recent med list.                   Brand Name Dispense Instructions for use Diagnosis    acetaminophen-codeine 300-30 MG per tablet    TYLENOL #3    20 tablet    Take 1 tablet by mouth every 4 hours as needed for pain  maximum 4 tablet(s) per day    Tension-type headache, not intractable, unspecified chronicity pattern       ALPRAZolam 0.5 MG tablet    XANAX     Take 0.25 mg by mouth nightly as needed        azelastine 0.1 % spray    ASTELIN    90 mL    INSTILL 1 SPRAY INTO THE NOSTIL(S) TWICE DAILY    Chronic rhinitis, unspecified type       BENADRYL ALLERGY PO      prn        biotin 2.5 mg/mL Susp      Take by mouth daily        butalbital-acetaminophen-caffeine -40 MG per tablet    FIORICET/ESGIC    28 tablet    TAKE 1 TABLET BY MOUTH EVERY 4 HOURS AS NEEDED    Migraine with aura and with status migrainosus, not intractable       cholecalciferol 1000 UNIT tablet    vitamin D3    100 tablet    Take 2 tablets (2,000 Units) by mouth daily    Osteopenia       CO Q 10 PO           cyclobenzaprine 10 MG tablet    FLEXERIL    20 tablet    Take 1 tablet (10 mg) by mouth 3 times daily as needed for muscle spasms    Tension-type headache, not intractable, unspecified chronicity pattern       fluticasone 50 MCG/ACT spray    FLONASE    3 Bottle    Spray 1-2 sprays into both nostrils daily    Acute non-recurrent frontal sinusitis       ketoconazole 2 % shampoo    NIZORAL    120 mL    Apply topically daily as needed for itching or irritation    Dermatitis, seborrheic       magnesium 100 MG Caps      Take 400 mg by mouth        nortriptyline 50 MG capsule    PAMELOR    90 capsule    Take 1 capsule (50 mg) by mouth At Bedtime    Mild major depression (H), Primary insomnia       traZODone 50 MG tablet    DESYREL    45 tablet    1/2 TABLET AT BEDTIME AS NEEDED FOR SLEEP    Primary insomnia, FREDIS (generalized anxiety disorder)       VITAMIN B-2 PO      Take 400 mg by mouth

## 2018-05-23 ASSESSMENT — PATIENT HEALTH QUESTIONNAIRE - PHQ9: SUM OF ALL RESPONSES TO PHQ QUESTIONS 1-9: 3

## 2018-05-23 NOTE — PROGRESS NOTES
Beronica,    Hep C was negative.  If you have any questions, please feel free to contact the clinic.    CHANDU Deleon

## 2018-05-24 VITALS
WEIGHT: 138.4 LBS | BODY MASS INDEX: 20.98 KG/M2 | SYSTOLIC BLOOD PRESSURE: 101 MMHG | DIASTOLIC BLOOD PRESSURE: 69 MMHG | OXYGEN SATURATION: 99 % | TEMPERATURE: 98.1 F | HEIGHT: 68 IN | HEART RATE: 96 BPM

## 2018-06-06 ENCOUNTER — RADIANT APPOINTMENT (OUTPATIENT)
Dept: MAMMOGRAPHY | Facility: CLINIC | Age: 58
End: 2018-06-06
Attending: NURSE PRACTITIONER
Payer: COMMERCIAL

## 2018-06-06 DIAGNOSIS — Z12.31 VISIT FOR SCREENING MAMMOGRAM: ICD-10-CM

## 2018-06-06 PROCEDURE — 77067 SCR MAMMO BI INCL CAD: CPT

## 2018-06-13 ENCOUNTER — OFFICE VISIT (OUTPATIENT)
Dept: FAMILY MEDICINE | Facility: CLINIC | Age: 58
End: 2018-06-13
Payer: COMMERCIAL

## 2018-06-13 VITALS
DIASTOLIC BLOOD PRESSURE: 63 MMHG | SYSTOLIC BLOOD PRESSURE: 101 MMHG | TEMPERATURE: 97.7 F | BODY MASS INDEX: 20.88 KG/M2 | OXYGEN SATURATION: 97 % | HEART RATE: 94 BPM | WEIGHT: 137.3 LBS

## 2018-06-13 DIAGNOSIS — Z78.0 MENOPAUSE: ICD-10-CM

## 2018-06-13 DIAGNOSIS — Z12.4 CERVICAL CANCER SCREENING: Primary | ICD-10-CM

## 2018-06-13 PROCEDURE — 99213 OFFICE O/P EST LOW 20 MIN: CPT | Performed by: NURSE PRACTITIONER

## 2018-06-13 PROCEDURE — 87624 HPV HI-RISK TYP POOLED RSLT: CPT | Performed by: NURSE PRACTITIONER

## 2018-06-13 PROCEDURE — G0145 SCR C/V CYTO,THINLAYER,RESCR: HCPCS | Performed by: NURSE PRACTITIONER

## 2018-06-13 PROCEDURE — G0124 SCREEN C/V THIN LAYER BY MD: HCPCS | Performed by: NURSE PRACTITIONER

## 2018-06-13 NOTE — PROGRESS NOTES
SUBJECTIVE:   Beronica Azevedo is a 58 year old female who presents to clinic today for the following health issues:    Cervical Cancer Screening. Experiencing some vaginal dryness and discomfort. Has tried KY Jelly, which has not been helpful. Is not sexually active currently. No discharge or bleeding currently. No lower abdominal pain or swelling.   -------------------------------------    Problem list and histories reviewed & adjusted, as indicated.  Additional history: as documented    Patient Active Problem List   Diagnosis     Headache     Insomnia     Moderate dysplasia of cervix (BEVERLY II)     CARDIOVASCULAR SCREENING; LDL GOAL LESS THAN 160     Endometrial polyp     Iron deficiency anemia due to chronic blood loss     Rosacea     Onychodystrophy     Dermatitis     Osteopenia     Post-menopausal     Mild major depression (H)     IBS (irritable bowel syndrome)     Myopia     Regular astigmatism     Presbyopia     Posterior subcapsular polar senile cataract     Senile nuclear sclerosis     Macular puckering of retina     Cervicalgia     Pain in thoracic spine     Digital mucous cyst     Seborrheic keratosis, inflamed     Chronic pain of right knee     Acute pain of right knee     FREDIS (generalized anxiety disorder)     Neoplasm of uncertain behavior of skin     Dermatitis, seborrheic     Plantar fasciitis     Past Surgical History:   Procedure Laterality Date     BIOPSY  Moles skin tags     CHEST TUBE INSERTION       COLONOSCOPY  Normal     DILATION AND CURETTAGE, OPERATIVE HYSTEROSCOPY WITH MORCELLATOR, COMBINED  1/2011    endometrial polyp     LEEP TX, CERVICAL  8/2009    BEVERLY II, clear margins     THORACIC SURGERY  1976    Collapsed lung       Social History   Substance Use Topics     Smoking status: Never Smoker     Smokeless tobacco: Never Used     Alcohol use Yes      Comment: 1 8oz mixed drink or beer     Family History   Problem Relation Age of Onset     Depression Sister      Uterine Cancer Sister       Neurologic Disorder Sister      Neurologic Disorder Brother      Neurologic Disorder Father      Lipids Father      high cholesterol     GASTROINTESTINAL DISEASE Father      stomach ulcers     CANCER Father      melanoma     Skin Cancer Father      Hypertension Father      Hyperlipidemia Father      Neurologic Disorder Mother      Allergies Mother      Arthritis Mother      Depression Mother      Eye Disorder Mother      Lipids Mother      high cholesterol     OSTEOPOROSIS Mother      Hypertension Mother      Hyperlipidemia Mother      Thyroid Disease Mother      Neurologic Disorder Sister      epilepsy     Alcohol/Drug Sister      alcohol and drug dependency     Alcohol/Drug Brother      recovery alcohol and drug addit     Asthma Son      excercise and allergy induced     Asthma Daughter      excercise and allergy induced     Lipids Sister      high cholesterol     GASTROINTESTINAL DISEASE Sister      hepatitis ??     OSTEOPOROSIS Sister      Breast Cancer Maternal Aunt      Asthma Daughter      Asthma Son      Melanoma No family hx of            Reviewed and updated as needed this visit by clinical staff  Tobacco  Allergies  Meds  Med Hx  Surg Hx  Fam Hx  Soc Hx      Reviewed and updated as needed this visit by Provider         ROS:  Constitutional, HEENT, cardiovascular, pulmonary, gi and gu systems are negative, except as otherwise noted.    OBJECTIVE:     /63 (BP Location: Left arm, Patient Position: Sitting, Cuff Size: Adult Regular)  Pulse 94  Temp 97.7  F (36.5  C) (Oral)  Wt 137 lb 4.8 oz (62.3 kg)  LMP 04/06/2010  SpO2 97%  BMI 20.88 kg/m2  Body mass index is 20.88 kg/(m^2).   GENERAL: healthy, alert and no distress  RESP: lungs clear to auscultation - no rales, rhonchi or wheezes  CV: regular rate and rhythm, normal S1 S2, no S3 or S4, no murmur, click or rub, no peripheral edema and peripheral pulses strong  ABDOMEN: soft, nontender, no hepatosplenomegaly, no masses and bowel sounds  normal   (female): normal female external genitalia, normal urethral meatus, vaginal mucosa, normal cervix/adnexa/uterus without masses or discharge; slightly pale vaginal mucosa    Diagnostic Test Results:  No results found for this or any previous visit (from the past 24 hour(s)).    ASSESSMENT/PLAN:     Problem List Items Addressed This Visit     None      Visit Diagnoses     Cervical cancer screening    -  Primary    Relevant Orders    Pap imaged thin layer screen with HPV - recommended age 30 - 65 years (select HPV order below)    HPV High Risk Types DNA Cervical    Menopause             Beronica plans to check with her insurance to see if an Estrogen cream, such as Estrace, is covered, and she will get back to us.   CRAIG Abad [facility]

## 2018-06-18 LAB
COPATH REPORT: ABNORMAL
PAP: ABNORMAL

## 2018-06-19 LAB
FINAL DIAGNOSIS: ABNORMAL
HPV HR 12 DNA CVX QL NAA+PROBE: POSITIVE
HPV16 DNA SPEC QL NAA+PROBE: NEGATIVE
HPV18 DNA SPEC QL NAA+PROBE: NEGATIVE
SPECIMEN DESCRIPTION: ABNORMAL
SPECIMEN SOURCE CVX/VAG CYTO: ABNORMAL

## 2018-06-20 PROBLEM — R87.619 ENDOMETRIAL CELLS ON CERVICAL PAP SMEAR INCONSISTENT WITH LAST MENSTRUAL PERIOD: Status: ACTIVE | Noted: 2018-06-13

## 2018-07-25 ENCOUNTER — OFFICE VISIT (OUTPATIENT)
Dept: OBGYN | Facility: CLINIC | Age: 58
End: 2018-07-25
Payer: COMMERCIAL

## 2018-07-25 VITALS
SYSTOLIC BLOOD PRESSURE: 105 MMHG | WEIGHT: 137.4 LBS | TEMPERATURE: 97.6 F | HEART RATE: 90 BPM | DIASTOLIC BLOOD PRESSURE: 64 MMHG | BODY MASS INDEX: 20.89 KG/M2

## 2018-07-25 DIAGNOSIS — R87.610 ASCUS WITH POSITIVE HIGH RISK HPV CERVICAL: Primary | ICD-10-CM

## 2018-07-25 DIAGNOSIS — R87.619 ENDOMETRIAL CELLS ON CERVICAL PAP SMEAR INCONSISTENT WITH LAST MENSTRUAL PERIOD: ICD-10-CM

## 2018-07-25 DIAGNOSIS — R87.810 ASCUS WITH POSITIVE HIGH RISK HPV CERVICAL: Primary | ICD-10-CM

## 2018-07-25 PROCEDURE — 57456 ENDOCERV CURETTAGE W/SCOPE: CPT | Performed by: OBSTETRICS & GYNECOLOGY

## 2018-07-25 PROCEDURE — 58110 BX DONE W/COLPOSCOPY ADD-ON: CPT | Performed by: OBSTETRICS & GYNECOLOGY

## 2018-07-25 PROCEDURE — 88342 IMHCHEM/IMCYTCHM 1ST ANTB: CPT | Performed by: OBSTETRICS & GYNECOLOGY

## 2018-07-25 PROCEDURE — 88305 TISSUE EXAM BY PATHOLOGIST: CPT | Performed by: OBSTETRICS & GYNECOLOGY

## 2018-07-25 NOTE — LETTER
[facility]  [address] [place] [address] [address]  [facility] [address]  [phone]          January 11, 2019    Beronica Azevedo                                                                                                                     [address]  [facility] [address]          Luis Loco,     The biopsy we did 12/12/18 was normal.  We will plan your follow-up pap in July as scheduled.     Take care,   MELIA AQUINO MD

## 2018-07-25 NOTE — PROGRESS NOTES
Beronica Azevedo is a 58 year old female  who presents for repeat colposcopy, referred by Ellen Rankin. Pap smear on 18 showed: ASCUS and with high risk HPV present: non 16/18 and endometrial cells present. The prior pap showed normal.       Patient's last menstrual period was 2010.  UPT today is not postmenopausal  Patient does not smoke  Type of contraception: none  Age at first sexual intercourse: 16  Number of sexual partners (lifetime): 6  Past GYN history: Gonorrhea and HPV  Prior cervical/vaginal disease: BEVERLY 2.  Prior cervical treatment: LEEP.      PROCEDURE:      Before the procedure, it was ensured that the patient was educated regarding the nature of her findings to date, the implications, and what was to be done. She has been made aware of the role of HPV, the natural history of infection, ways to minimize her future risk, the effect of HPV on the cervix, and treatment options available should they be indicated. The details of the colposcopic procedure were reviewed. All questions were answered before proceeding, and informed consent was therefore obtained.      Speculum placed in vagina and excellent visualization of cervix acheived, cervix swabbed x 3 with acetic acid solution. A colposcopy was performed.  The cervix was then swabbed with a betadine prep.  Tenaculum was applied to the anterior cervical lip. Endometrial biopsy pipelle was passed through the cervical os and scant tissue obtained.  A second pass was made, again yielding scant tissue. Uterus sounded to 6 cm.  Tenaculum was removed and sites were hemostatic. There were no complications. The patient tolerated the procedure well with a minimal amount of cramping noted.  Specimen was sent to pathology.        FINDINGS:  Cervix: no visible lesions  Please refer to images section for details.  SCJ seen?: no   ECC done?: Yes   Satisfactory examination?: yes      ASSESSMENT: HPV related changes.  PLAN: specimens labelled and sent to  Pathology, will base further treatment on Pathology findings, treatment options discussed with patient, post biopsy instructions given to patient and call to discuss Pathology results.      Shwetha Ibrahim MD

## 2018-07-25 NOTE — MR AVS SNAPSHOT
After Visit Summary   7/25/2018    Beronica Azevedo    MRN: [id number]           Patient Information     Date Of Birth          1960        Visit Information        Provider Department      7/25/2018 2:00 PM Shwetha Ibrahim MD; RD PROC [facility]        Care Instructions    Endometrial Bioy Post-Procedure Patient Instructions    Please call your healthcare provider if you have any of the following symptoms:    Fever--Temperature greater than 100 degrees    Cramping after 48 hours    Bleeding heavier than a normal period in the first 24-48 hours    If you are bleeding and soaking more than one pad an hour    Or any other worrisome problems.    We recommend that:    You use pads, not tampons for the bleeding.    You may resume sexual activity in 2-3 days, or after bleeding stops.    You may use Tylenol or ibuprofen (Motrin or Advil) for any discomfort.      [facility] - OB/GYN : [phone]                    Follow-ups after your visit        Who to contact     If you have questions or need follow up information about today's clinic visit or your schedule please contact [facility] directly at [phone].  Normal or non-critical lab and imaging results will be communicated to you by MyChart, letter or phone within 4 business days after the clinic has received the results. If you do not hear from us within 7 days, please contact the clinic through [employer] or phone. If you have a critical or abnormal lab result, we will notify you by phone as soon as possible.  Submit refill requests through [employer] or call your pharmacy and they will forward the refill request to us. Please allow 3 business days for your refill to be completed.          Additional Information About Your Visit        MyChart Information     [employer] gives you secure access to your electronic health record. If you see a primary care provider, you can also send messages to your care  team and make appointments. If you have questions, please call your primary care clinic.  If you do not have a primary care provider, please call [phone] and they will assist you.        Care EveryWhere ID     This is your Care EveryWhere ID. This could be used by other organizations to access your [place] medical records  [id number]        Your Vitals Were     Pulse Temperature Last Period BMI (Body Mass Index)          90 97.6  F (36.4  C) (Oral) 04/06/2010 20.89 kg/m2         Blood Pressure from Last 3 Encounters:   07/25/18 105/64   06/13/18 101/63   05/22/18 101/69    Weight from Last 3 Encounters:   07/25/18 137 lb 6.4 oz (62.3 kg)   06/13/18 137 lb 4.8 oz (62.3 kg)   05/22/18 138 lb 6.4 oz (62.8 kg)              Today, you had the following     No orders found for display       Primary Care Provider Office Phone # Fax #    Joaquin Sarthak Rowe [phone]       [address] [facility] [address]  [facility] [address]        Equal Access to Services     [facility]: Hadii aad ku hadasho Soomaali, waaxda luqadaha, qaybta kaalmada adeegyada, aaron mcgovern . So [facility] [phone].    ATENCIÓN: Si habla español, tiene a garrett disposición servicios gratuitos de asistencia lingüística. Llame al [phone].    We comply with applicable federal civil rights laws and [place] laws. We do not discriminate on the basis of race, color, national origin, age, disability, sex, sexual orientation, or gender identity.            Thank you!     Thank you for choosing [facility]  for your care. Our goal is always to provide you with excellent care. Hearing back from our patients is one way we can continue to improve our services. Please take a few minutes to complete the written survey that you may receive in the mail after your visit with us. Thank you!             Your Updated Medication List - Protect others around you: Learn how to safely use, store and throw away  your medicines at www.disposemymeds.org.          This list is accurate as of 7/25/18  2:13 PM.  Always use your most recent med list.                   Brand Name Dispense Instructions for use Diagnosis    azelastine 0.1 % spray    ASTELIN    90 mL    INSTILL 1 SPRAY INTO THE NOSTIL(S) TWICE DAILY    Chronic rhinitis, unspecified type       BENADRYL ALLERGY PO      prn        biotin 2.5 mg/mL Susp      Take by mouth daily        butalbital-acetaminophen-caffeine -40 MG per tablet    FIORICET/ESGIC    28 tablet    TAKE 1 TABLET BY MOUTH EVERY 4 HOURS AS NEEDED    Migraine with aura and with status migrainosus, not intractable       cholecalciferol 1000 UNIT tablet    vitamin D3    100 tablet    Take 2 tablets (2,000 Units) by mouth daily    Osteopenia       CO Q 10 PO           fluticasone 50 MCG/ACT spray    FLONASE    3 Bottle    Spray 1-2 sprays into both nostrils daily    Acute non-recurrent frontal sinusitis       ketoconazole 2 % shampoo    NIZORAL    120 mL    Apply topically daily as needed for itching or irritation    Dermatitis, seborrheic       magnesium 100 MG Caps      Take 400 mg by mouth        nortriptyline 50 MG capsule    PAMELOR    90 capsule    Take 1 capsule (50 mg) by mouth At Bedtime    Mild major depression (H), Primary insomnia       traZODone 50 MG tablet    DESYREL    45 tablet    1/2 TABLET AT BEDTIME AS NEEDED FOR SLEEP    Primary insomnia, FREDIS (generalized anxiety disorder)       VITAMIN B-2 PO      Take 400 mg by mouth

## 2018-07-25 NOTE — NURSING NOTE
"Chief Complaint   Patient presents with     Colposcopy     colp and EMB       Initial /64  Pulse 90  Temp 97.6  F (36.4  C) (Oral)  Wt 137 lb 6.4 oz (62.3 kg)  LMP 2010  BMI 20.89 kg/m2 Estimated body mass index is 20.89 kg/(m^2) as calculated from the following:    Height as of 18: 5' 8\" (1.727 m).    Weight as of this encounter: 137 lb 6.4 oz (62.3 kg).  BP completed using cuff size: regular        The following HM Due: NONE      The following patient reported/Care Every where data was sent to:  P ABSTRACT QUALITY INITIATIVES [02474]        patient has appointment for today  Ana Paula Rodriguez                "

## 2018-08-01 LAB — COPATH REPORT: NORMAL

## 2018-09-25 DIAGNOSIS — F51.01 PRIMARY INSOMNIA: ICD-10-CM

## 2018-09-25 DIAGNOSIS — F32.0 MILD MAJOR DEPRESSION (H): ICD-10-CM

## 2018-09-25 NOTE — TELEPHONE ENCOUNTER
"Requested Prescriptions   Pending Prescriptions Disp Refills     nortriptyline (PAMELOR) 50 MG capsule [Pharmacy Med Name: NORTRIPTYLINE 50MG CAPSULES] 90 capsule 0    Last Written Prescription Date:  08/01/2017  Last Fill Quantity: 90,  # refills: 3   Last office visit: 6/13/2018 with prescribing provider:  05/22/2018   Future Office Visit:   Sig: TAKE 1 CAPSULE(50 MG) BY MOUTH AT BEDTIME    Tricyclic Agents ( Annual appt and no PHQ9) Passed    9/25/2018 10:24 AM       Passed - Blood Pressure under 140/90 in past 12 mos    BP Readings from Last 3 Encounters:   07/25/18 105/64   06/13/18 101/63   05/22/18 101/69                Passed - Recent (12 mo) or future (30 days) visit within authorizing provider's specialty    Patient had office visit in the last 12 months or has a visit in the next 30 days with authorizing provider or within the authorizing provider's specialty.  See \"Patient Info\" tab in inbasket, or \"Choose Columns\" in Meds & Orders section of the refill encounter.           Passed - Patient is age 18 or older       Passed - Patient is not pregnant       Passed - No positive pregnancy test on record in past 12 mos        "

## 2018-09-26 RX ORDER — NORTRIPTYLINE HYDROCHLORIDE 50 MG/1
CAPSULE ORAL
Qty: 90 CAPSULE | Refills: 1 | Status: SHIPPED | OUTPATIENT
Start: 2018-09-26

## 2018-09-26 NOTE — TELEPHONE ENCOUNTER
Prescription approved per [facility] Refill Protocol.    Jose Agudelo RN, BSN

## 2018-10-04 DIAGNOSIS — G43.101 MIGRAINE WITH AURA AND WITH STATUS MIGRAINOSUS, NOT INTRACTABLE: ICD-10-CM

## 2018-10-04 NOTE — TELEPHONE ENCOUNTER
butalbital-acetaminophen-caffeine (FIORICET/ESGIC) -40 MG per tablet      Last Written Prescription Date:  05/01/2018  Last Fill Quantity: 28,   # refills: 0  Last Office Visit: 06/13/2018  Future Office visit:       Routing refill request to provider for review/approval because:  Drug not on the [facility], [facility] or [facility] refill protocol or controlled substance

## 2018-10-05 ENCOUNTER — TELEPHONE (OUTPATIENT)
Dept: FAMILY MEDICINE | Facility: CLINIC | Age: 58
End: 2018-10-05

## 2018-10-05 RX ORDER — BUTALBITAL, ACETAMINOPHEN AND CAFFEINE 50; 325; 40 MG/1; MG/1; MG/1
TABLET ORAL
Qty: 28 TABLET | Refills: 0 | Status: SHIPPED | OUTPATIENT
Start: 2018-10-05 | End: 2022-04-15

## 2018-10-05 NOTE — LETTER
[address] [address]  [phone]          2018    Regarding: Beronica Azevedo                                                                                                                   10/12/2018    INSURER: No coverage found.    ATTN: Vazquez  Re: Prior Authorization Request  Patient: Beronica Azevedo  Policy ID#:   [id number]  : 1960      To Whom it May Concern:    I am writing to formally request a prior authorization of coverage for my patient,  Beronica Azevedo, for treatment using, Fioricet/Esgic -40 mg tablet.   Prescribed for migraine.     The therapy involves  Fioricet/Esgic -40 mg tablet. Prescribed for migraine.       The benefits of the therapy include pt uses Fioricet/Esgic -40 mg tablet. Prescribed for migraine. And she gets good relief with the medication      I have treated Beronica Azevedo for many years  and I have determined that it is medically appropriate for  this patient to receive be treated with Fioricet/Esgic -40 mg tablet. Prescribed for migraine.    ]  for the reason(s) stated below:      Migraine, Migraine with aura and with status migrainosus, not intractable [G43.101]              Naratriptan, she states she tried this med on  for 4 doses, it didn't work and             the form does state she has tried the others sumitriptan, rizatripan and                             zolmitriptan on the letter from Vazquez to her neurologist dated 18           She also states she has used naproxen and ibuprofen and they do not give her relief    I firmly believe that this therapy is clinically appropriate and that Beronica Azevedo would benefit from improved if allowed the opportunity to receive this treatment.  Please contact me at Dept: [phone] if you require additional information to ensure the prompt approval for coverage.    Please send your written decision to me at this  address:  [address] [id number] [phone]      Sincerely,          Joaquin Rowe MD

## 2018-10-05 NOTE — TELEPHONE ENCOUNTER
Prior Authorization Retail Medication Request    Medication/Dose: butalbital-acetaminophen-caffeine (FIORICET/ESGIC) -40 MG per tablet  ICD code (if different than what is on RX):    Previously Tried and Failed:    Rationale:  Plan does not cover this medication.    Insurance Name:  [phone]  Insurance ID:  [id number]      Pharmacy Information (if different than what is on RX)  Name:  [facility]  Phone:  [phone]  Fax: [phone]

## 2018-10-05 NOTE — TELEPHONE ENCOUNTER
Routing refill request to provider for review/approval because:  Drug not on the [facility] refill protocol      check last fill 5/1/18 for 28, Dr. Soledad Fallon, RN   [facility]             36 y/o female w/ left ear TM perf- cipro otic drops, ent f/u.

## 2018-10-08 NOTE — TELEPHONE ENCOUNTER
[facility] Prior Authorization Team   Phone: [phone]      PA Initiation    Medication: butalbital-acetaminophen-caffeine (FIORICET/ESGIC) -40 MG per tablet-Initiated  Insurance Company: Other (see comments)Comment:  Vazquez [phone]  Pharmacy Filling the Rx: [facility] [id number] [place] [address] [address] AT [address]  Filling Pharmacy Phone: [phone]  Filling Pharmacy Fax:    Start Date: 10/8/2018

## 2018-10-11 NOTE — TELEPHONE ENCOUNTER
Please call patient  If she has tried theses medications and she can tell us that they have not worked we can document that and try a medical necessity note, if she has not tried them she needs to either try them or pay for medication out of pocket

## 2018-10-11 NOTE — TELEPHONE ENCOUNTER
Dr. Rowe,    PA denied for Fioricet/Esgic -40 mg tablet. Prescribed for migraine.     Denial reason:  Excluded medication.  Covered medications are sumatriptan, naratriptan, rizatriptan, zolmitriptan, naproxen, and ibuprofen. Per chart review, pt has not had previous rx's for these medications.    Please advise- would you like to try one of the covered medications or send letter of medical necessity for butalbital-acetaminophen-caffeine -40 MG tablet?    Marisela Melo RN  [facility]

## 2018-10-11 NOTE — TELEPHONE ENCOUNTER
PRIOR AUTHORIZATION DENIED    Medication: butalbital-acetaminophen-caffeine (FIORICET/ESGIC) -40 MG per tablet-DENIED    Denial Date: 10/10/2018    Denial Rational: Excluded medication.  Covered medications are sumatriptan, naratriptan, rizatriptan, zolmitriptan, naproxen, and ibuprofen.        Appeal Information:

## 2018-10-12 NOTE — TELEPHONE ENCOUNTER
Since this was denied already we would need to appeal the medication. Please have provider write a letter of medical necessity and route this back to [facility] so when know when to start the appeal process.

## 2018-10-12 NOTE — TELEPHONE ENCOUNTER
Spoke with pt, she stated she has tried some of the meds, she had gotten scripts from her neurologist    She will fax the info to us    She is currently paying for the medication    Delmis Fallon RN   [facility]

## 2018-10-12 NOTE — TELEPHONE ENCOUNTER
PA group    Can we redo the PA for pt    Pt stated that she has used naproxen and ibuprofen and they did not give her relief    Pt sent documentation of tries/fails on alternative meds    Naratriptan, she states she tried this med on 8/30/ for 4 doses, it didn't work and the form does state she has tried the others sumitriptan, rizatripan and zolmitriptan on the letter from Vazquez  These were through her neurologist letter dated 8/22/18    Form sent to abstracting    Delmis Fallon RN   [facility]

## 2018-10-12 NOTE — TELEPHONE ENCOUNTER
Letter of med. Brionna was faxed to [facility] 1 [phone]    Delmis Fallon RN   [facility]

## 2018-10-12 NOTE — TELEPHONE ENCOUNTER
Dr. Rowe    See letter of medical necessity     Please review/ revise and sign if you agree    Thank you  Delmis RADHA Fallon   [facility]

## 2018-10-29 DIAGNOSIS — J31.0 CHRONIC RHINITIS: ICD-10-CM

## 2018-10-30 RX ORDER — AZELASTINE 1 MG/ML
SPRAY, METERED NASAL
Qty: 30 ML | Refills: 1 | Status: SHIPPED | OUTPATIENT
Start: 2018-10-30 | End: 2019-03-26

## 2018-10-30 NOTE — TELEPHONE ENCOUNTER
"Requested Prescriptions   Pending Prescriptions Disp Refills     azelastine (ASTELIN) 0.1 % nasal spray [Pharmacy Med Name: AZELASTINE 0.1%(137MCG) NASAL-200SP]    Last Written Prescription Date:  4-20-18  Last Fill Quantity: 90,  # refills: 0   Last office visit: 6/13/2018 with prescribing provider:  Ellen Rankin   Future Office Visit:     90 mL 0     Sig: INSTILL 1 SPRAY INTO THE NOSTIL(S) TWICE DAILY    Antihistamines Protocol Passed    10/29/2018  6:53 PM       Passed - Patient is 3-64 years of age    Apply weight-based dosing for peds patients age 3 - 12 years of age.    Forward request to provider for patients under the age of 3 or over the age of 64.         Passed - Recent (12 mo) or future (30 days) visit within the authorizing provider's specialty    Patient had office visit in the last 12 months or has a visit in the next 30 days with authorizing provider or within the authorizing provider's specialty.  See \"Patient Info\" tab in inbasket, or \"Choose Columns\" in Meds & Orders section of the refill encounter.                "

## 2018-10-30 NOTE — TELEPHONE ENCOUNTER
Prescription approved per [facility] Refill Protocol.    Marisela Melo RN  [facility]

## 2018-10-31 ENCOUNTER — TELEPHONE (OUTPATIENT)
Dept: DERMATOLOGY | Facility: CLINIC | Age: 58
End: 2018-10-31

## 2018-10-31 NOTE — TELEPHONE ENCOUNTER
[facility] Call Center    Phone Message    May a detailed message be left on voicemail: yes    Reason for Call: Medication Refill Request    Has the patient contacted the pharmacy for the refill? Yes   Name of medication being requested: ketoconazole (NIZORAL) 2 % shampoo  Provider who prescribed the medication:Isaias Gonsalez MD  Pharmacy: [facility] [address] [facility]   Date medication is needed: ASAP   Pt requesting a 3 month supply each time she calls in for refills, due to copay and she wont have to call in as much. Please call pt ASAP when rx is filled or sent to Pharmacy .   Action Taken: Message routed to:  [facility]

## 2018-11-01 ENCOUNTER — TELEPHONE (OUTPATIENT)
Dept: DERMATOLOGY | Facility: CLINIC | Age: 58
End: 2018-11-01

## 2018-11-01 ENCOUNTER — TELEPHONE (OUTPATIENT)
Dept: FAMILY MEDICINE | Facility: CLINIC | Age: 58
End: 2018-11-01

## 2018-11-01 NOTE — TELEPHONE ENCOUNTER
Pt last seen 8/1/17, no future appt, note sent to Derm team to contact pt to schedule clinic appt.  Scheduled appt needed prior to refill per protocol

## 2018-11-01 NOTE — TELEPHONE ENCOUNTER
----- Message from Raquel Suresh CMA sent at 11/1/2018 11:30 AM CDT -----  Regarding: FW: appt  Can you please call the patient to schedule an appointment?   Thank you,   Raquel  ----- Message -----     From: Joann Rivera RN     Sent: 11/1/2018  10:52 AM       To: [facility]  Subject: appt                                             Please call to schedule.    Thanks    Due for RTC: pt requesting refill, last seen 8/1/17     I do not need any follow up on the scheduling of this appointment unless the patient will no longer be receiving care in our clinic.

## 2018-11-01 NOTE — TELEPHONE ENCOUNTER
Called patient.     Symptoms:   Urgency to use the bathroom, then can't go.  Has a stomach ache.  Little bit of back pain.  Cramping/pelvic like pain    No fever, burning sensation, no foul smelling urine. Cloudy urine? No. Blood in urine: no.    Scheduled pt for OV tomorrow at 8:00 am with Zoey.    Marisela Melo RN  [facility]

## 2018-11-01 NOTE — TELEPHONE ENCOUNTER
Reason for call:  Symptom   Symptom or request: urinary urgency    Duration (how long have symptoms been present): 1 day  Have you been treated for this before? No    Additional comments: Pt states this is what previous UTIs have felt like    Phone number to reach patient:  Home number on file [phone] (home)    Best Time:  n/a    Can we leave a detailed message on this number?  YES

## 2018-11-01 NOTE — TELEPHONE ENCOUNTER
LVM- please schedule w/ Dr. Watson next available appt. Provided call center number as call back.

## 2018-11-02 ENCOUNTER — OFFICE VISIT (OUTPATIENT)
Dept: FAMILY MEDICINE | Facility: CLINIC | Age: 58
End: 2018-11-02
Payer: COMMERCIAL

## 2018-11-02 VITALS
BODY MASS INDEX: 21.29 KG/M2 | HEART RATE: 110 BPM | OXYGEN SATURATION: 98 % | SYSTOLIC BLOOD PRESSURE: 96 MMHG | DIASTOLIC BLOOD PRESSURE: 65 MMHG | RESPIRATION RATE: 16 BRPM | WEIGHT: 140 LBS

## 2018-11-02 DIAGNOSIS — R81 GLUCOSURIA: ICD-10-CM

## 2018-11-02 DIAGNOSIS — R30.0 DYSURIA: ICD-10-CM

## 2018-11-02 DIAGNOSIS — B37.31 YEAST INFECTION OF THE VAGINA: ICD-10-CM

## 2018-11-02 DIAGNOSIS — R82.90 NONSPECIFIC FINDING ON EXAMINATION OF URINE: ICD-10-CM

## 2018-11-02 DIAGNOSIS — N30.00 ACUTE CYSTITIS WITHOUT HEMATURIA: Primary | ICD-10-CM

## 2018-11-02 LAB
ALBUMIN UR-MCNC: 100 MG/DL
ANION GAP SERPL CALCULATED.3IONS-SCNC: 10 MMOL/L (ref 3–14)
APPEARANCE UR: CLEAR
BACTERIA #/AREA URNS HPF: ABNORMAL /HPF
BILIRUB UR QL STRIP: NEGATIVE
BUN SERPL-MCNC: 12 MG/DL (ref 7–30)
CALCIUM SERPL-MCNC: 9.1 MG/DL (ref 8.5–10.1)
CHLORIDE SERPL-SCNC: 103 MMOL/L (ref 94–109)
CO2 SERPL-SCNC: 24 MMOL/L (ref 20–32)
COLOR UR AUTO: YELLOW
CREAT SERPL-MCNC: 0.84 MG/DL (ref 0.52–1.04)
GFR SERPL CREATININE-BSD FRML MDRD: 70 ML/MIN/1.7M2
GLUCOSE SERPL-MCNC: 55 MG/DL (ref 70–99)
GLUCOSE UR STRIP-MCNC: 100 MG/DL
HGB UR QL STRIP: ABNORMAL
KETONES UR STRIP-MCNC: ABNORMAL MG/DL
LEUKOCYTE ESTERASE UR QL STRIP: ABNORMAL
NITRATE UR QL: NEGATIVE
NON-SQ EPI CELLS #/AREA URNS LPF: ABNORMAL /LPF
PH UR STRIP: 5.5 PH (ref 5–7)
POTASSIUM SERPL-SCNC: 4.5 MMOL/L (ref 3.4–5.3)
RBC #/AREA URNS AUTO: >100 /HPF
SODIUM SERPL-SCNC: 137 MMOL/L (ref 133–144)
SOURCE: ABNORMAL
SP GR UR STRIP: >1.03 (ref 1–1.03)
UROBILINOGEN UR STRIP-ACNC: 0.2 EU/DL (ref 0.2–1)
WBC #/AREA URNS AUTO: ABNORMAL /HPF

## 2018-11-02 PROCEDURE — 99214 OFFICE O/P EST MOD 30 MIN: CPT | Performed by: PHYSICIAN ASSISTANT

## 2018-11-02 PROCEDURE — 80048 BASIC METABOLIC PNL TOTAL CA: CPT | Performed by: PHYSICIAN ASSISTANT

## 2018-11-02 PROCEDURE — 81001 URINALYSIS AUTO W/SCOPE: CPT | Performed by: PHYSICIAN ASSISTANT

## 2018-11-02 PROCEDURE — 87086 URINE CULTURE/COLONY COUNT: CPT | Performed by: PHYSICIAN ASSISTANT

## 2018-11-02 PROCEDURE — 36415 COLL VENOUS BLD VENIPUNCTURE: CPT | Performed by: PHYSICIAN ASSISTANT

## 2018-11-02 RX ORDER — SULFAMETHOXAZOLE/TRIMETHOPRIM 800-160 MG
1 TABLET ORAL 2 TIMES DAILY
Qty: 6 TABLET | Refills: 0 | Status: SHIPPED | OUTPATIENT
Start: 2018-11-02 | End: 2018-11-05

## 2018-11-02 RX ORDER — FLUCONAZOLE 150 MG/1
150 TABLET ORAL ONCE
Qty: 1 TABLET | Refills: 0 | Status: SHIPPED | OUTPATIENT
Start: 2018-11-02 | End: 2018-11-02

## 2018-11-02 ASSESSMENT — ANXIETY QUESTIONNAIRES
6. BECOMING EASILY ANNOYED OR IRRITABLE: SEVERAL DAYS
2. NOT BEING ABLE TO STOP OR CONTROL WORRYING: SEVERAL DAYS
1. FEELING NERVOUS, ANXIOUS, OR ON EDGE: SEVERAL DAYS
7. FEELING AFRAID AS IF SOMETHING AWFUL MIGHT HAPPEN: SEVERAL DAYS
GAD7 TOTAL SCORE: 6
3. WORRYING TOO MUCH ABOUT DIFFERENT THINGS: SEVERAL DAYS
5. BEING SO RESTLESS THAT IT IS HARD TO SIT STILL: NOT AT ALL

## 2018-11-02 ASSESSMENT — PATIENT HEALTH QUESTIONNAIRE - PHQ9
SUM OF ALL RESPONSES TO PHQ QUESTIONS 1-9: 3
5. POOR APPETITE OR OVEREATING: SEVERAL DAYS

## 2018-11-02 NOTE — MR AVS SNAPSHOT
After Visit Summary   11/2/2018    Beronica Azevedo    MRN: [id number]           Patient Information     Date Of Birth          1960        Visit Information        Provider Department      11/2/2018 8:00 AM Nadia Greer PA-C [facility]        Today's Diagnoses     Acute cystitis without hematuria    -  1    Dysuria        Glucosuria        Yeast infection of the vagina        Nonspecific finding on examination of urine           Follow-ups after your visit        Future tests that were ordered for you today     Open Future Orders        Priority Expected Expires Ordered    *UA reflex to Microscopic Routine  11/2/2019 11/2/2018            Who to contact     If you have questions or need follow up information about today's clinic visit or your schedule please contact [facility] directly at [phone].  Normal or non-critical lab and imaging results will be communicated to you by MyChart, letter or phone within 4 business days after the clinic has received the results. If you do not hear from us within 7 days, please contact the clinic through [employer] or phone. If you have a critical or abnormal lab result, we will notify you by phone as soon as possible.  Submit refill requests through [employer] or call your pharmacy and they will forward the refill request to us. Please allow 3 business days for your refill to be completed.          Additional Information About Your Visit        MyChart Information     [employer] gives you secure access to your electronic health record. If you see a primary care provider, you can also send messages to your care team and make appointments. If you have questions, please call your primary care clinic.  If you do not have a primary care provider, please call [phone] and they will assist you.        Care EveryWhere ID     This is your Care EveryWhere ID. This could be used by other organizations to access your [place]  medical records  [id number]        Your Vitals Were     Pulse Respirations Last Period Pulse Oximetry BMI (Body Mass Index)       110 16 04/06/2010 98% 21.29 kg/m2        Blood Pressure from Last 3 Encounters:   11/02/18 96/65   07/25/18 105/64   06/13/18 101/63    Weight from Last 3 Encounters:   11/02/18 140 lb (63.5 kg)   07/25/18 137 lb 6.4 oz (62.3 kg)   06/13/18 137 lb 4.8 oz (62.3 kg)              We Performed the Following     *UA reflex to Microscopic and Culture ([place] and [facility] (except [place] and [place])     Basic metabolic panel  (Ca, Cl, CO2, Creat, Gluc, K, Na, BUN)     Urine Culture Aerobic Bacterial     Urine Microscopic          Today's Medication Changes          These changes are accurate as of 11/2/18  9:16 AM.  If you have any questions, ask your nurse or doctor.               Start taking these medicines.        Dose/Directions    fluconazole 150 MG tablet   Commonly known as:  DIFLUCAN   Used for:  Yeast infection of the vagina   Started by:  Nadia Greer PA-C        Dose:  150 mg   Take 1 tablet (150 mg) by mouth once for 1 dose   Quantity:  1 tablet   Refills:  0       sulfamethoxazole-trimethoprim 800-160 MG per tablet   Commonly known as:  BACTRIM DS/SEPTRA DS   Used for:  Acute cystitis without hematuria   Started by:  Nadia Greer PA-C        Dose:  1 tablet   Take 1 tablet by mouth 2 times daily for 3 days   Quantity:  6 tablet   Refills:  0            Where to get your medicines      These medications were sent to [facility] [address]  [address], [facility] [address]     Phone:  [phone]     fluconazole 150 MG tablet    sulfamethoxazole-trimethoprim 800-160 MG per tablet                Primary Care Provider Office Phone # Fax #    Joaquin Rowe [phone]       [address]  [facility] [address]        Equal Access to Services     GREGOR  GAAR : Hadii aad ku hadnatalya Marleyali, waaxda luqadaha, qaybta kaalmada aderahat, aaron trip dalevangie johnson abdiaziz shaniqua . So [facility] [phone].    ATENCIÓN: Si habla ashvin, tiene a garrett disposición servicios gratuitos de asistencia lingüística. Llame al [phone].    We comply with applicable federal civil rights laws and [place] laws. We do not discriminate on the basis of race, color, national origin, age, disability, sex, sexual orientation, or gender identity.            Thank you!     Thank you for choosing [facility]  for your care. Our goal is always to provide you with excellent care. Hearing back from our patients is one way we can continue to improve our services. Please take a few minutes to complete the written survey that you may receive in the mail after your visit with us. Thank you!             Your Updated Medication List - Protect others around you: Learn how to safely use, store and throw away your medicines at www.disposemymeds.org.          This list is accurate as of 11/2/18  9:16 AM.  Always use your most recent med list.                   Brand Name Dispense Instructions for use Diagnosis    azelastine 0.1 % nasal spray    ASTELIN    30 mL    INSTILL 1 SPRAY INTO THE NOSTIL(S) TWICE DAILY    Chronic rhinitis       BENADRYL ALLERGY PO      prn        biotin 2.5 mg/mL Susp      Take by mouth daily        butalbital-acetaminophen-caffeine -40 MG per tablet    FIORICET/ESGIC    28 tablet    TAKE 1 TABLET BY MOUTH EVERY 4 HOURS AS NEEDED    Migraine with aura and with status migrainosus, not intractable       cholecalciferol 1000 UNIT tablet    vitamin D3    100 tablet    Take 2 tablets (2,000 Units) by mouth daily    Osteopenia       CO Q 10 PO           fluconazole 150 MG tablet    DIFLUCAN    1 tablet    Take 1 tablet (150 mg) by mouth once for 1 dose    Yeast infection of the vagina       fluticasone 50 MCG/ACT spray    FLONASE    3 Bottle    Spray 1-2 sprays  into both nostrils daily    Acute non-recurrent frontal sinusitis       ketoconazole 2 % shampoo    NIZORAL    120 mL    Apply topically daily as needed for itching or irritation    Dermatitis, seborrheic       magnesium 100 MG Caps      Take 400 mg by mouth        nortriptyline 50 MG capsule    PAMELOR    90 capsule    TAKE 1 CAPSULE(50 MG) BY MOUTH AT BEDTIME    Mild major depression (H), Primary insomnia       sulfamethoxazole-trimethoprim 800-160 MG per tablet    BACTRIM DS/SEPTRA DS    6 tablet    Take 1 tablet by mouth 2 times daily for 3 days    Acute cystitis without hematuria       traZODone 50 MG tablet    DESYREL    45 tablet    1/2 TABLET AT BEDTIME AS NEEDED FOR SLEEP    Primary insomnia, FREDIS (generalized anxiety disorder)       VITAMIN B-2 PO      Take 400 mg by mouth

## 2018-11-02 NOTE — PROGRESS NOTES
"  SUBJECTIVE:   Beronica Azevedo is a 58 year old female who presents to clinic today for the following health issues:    URINARY TRACT SYMPTOMS  Onset: x 2 days    Description:   Painful urination (Dysuria): no   Blood in urine (Hematuria): no   Delay in urine (Hesitency): YES    Intensity: mild, moderate    Progression of Symptoms:  worsening    Accompanying Signs & Symptoms: cramping with urination  Fever/chills: no   Flank pain: lower back pain  Nausea and vomiting: no   Any vaginal symptoms: none  Abdominal/Pelvic Pain: YES    History:   History of frequent UTI's: YES  History of kidney stones: no   Sexually Active: no   Possibility of pregnancy: No    Precipitating factors:   \"If I'm focusing on it, it makes it worse\"    Therapies Tried and outcome: drink more water    No burning with urination, but describes it more as cramping. Also with some urgency    Reports she ate a lot of candy last night which she normally doesn't        Problem list and histories reviewed & adjusted, as indicated.  Additional history: as documented    ROS:  CONSTITUTIONAL: NEGATIVE for fever, chills, change in weight  INTEGUMENTARY/SKIN: NEGATIVE for worrisome rashes, moles or lesions  EYES: NEGATIVE for vision changes or irritation  ENT/MOUTH: NEGATIVE for ear, mouth and throat problems  RESP: NEGATIVE for significant cough or SOB  CV: NEGATIVE for chest pain, palpitations or peripheral edema  GI: NEGATIVE for nausea, abdominal pain, heartburn, or change in bowel habits  MUSCULOSKELETAL: NEGATIVE for significant arthralgias or myalgia  NEURO: NEGATIVE for weakness, dizziness or paresthesias  ENDOCRINE: NEGATIVE for temperature intolerance, skin/hair changes  HEME: NEGATIVE for bleeding problems  PSYCHIATRIC: NEGATIVE for changes in mood or affect        Patient Active Problem List   Diagnosis     Headache     Insomnia     Moderate dysplasia of cervix (BEVERLY II)     CARDIOVASCULAR SCREENING; LDL GOAL LESS THAN 160     Endometrial polyp "     Iron deficiency anemia due to chronic blood loss     Rosacea     Onychodystrophy     Dermatitis     Osteopenia     Post-menopausal     Mild major depression (H)     IBS (irritable bowel syndrome)     Myopia     Regular astigmatism     Presbyopia     Posterior subcapsular polar senile cataract     Senile nuclear sclerosis     Macular puckering of retina     Cervicalgia     Pain in thoracic spine     Digital mucous cyst     Seborrheic keratosis, inflamed     Chronic pain of right knee     Acute pain of right knee     FREDIS (generalized anxiety disorder)     Neoplasm of uncertain behavior of skin     Dermatitis, seborrheic     Plantar fasciitis     Endometrial cells on cervical Pap smear inconsistent with last menstrual period     Past Surgical History:   Procedure Laterality Date     BIOPSY  Moles skin tags     CHEST TUBE INSERTION       COLONOSCOPY  Normal     DILATION AND CURETTAGE, OPERATIVE HYSTEROSCOPY WITH MORCELLATOR, COMBINED  1/2011    endometrial polyp     LEEP TX, CERVICAL  8/2009    BEVERLY II, clear margins     THORACIC SURGERY  1976    Collapsed lung       Social History   Substance Use Topics     Smoking status: Never Smoker     Smokeless tobacco: Never Used     Alcohol use Yes      Comment: 1 8oz mixed drink or beer, minimal 1-2 per week     Family History   Problem Relation Age of Onset     Depression Sister      Uterine Cancer Sister      Neurologic Disorder Sister      Neurologic Disorder Brother      Neurologic Disorder Father      Lipids Father      high cholesterol     GASTROINTESTINAL DISEASE Father      stomach ulcers     Cancer Father      melanoma     Skin Cancer Father      Hypertension Father      Hyperlipidemia Father      Neurologic Disorder Mother      Allergies Mother      Arthritis Mother      Depression Mother      Eye Disorder Mother      Lipids Mother      high cholesterol     Osteoporosis Mother      Hypertension Mother      Hyperlipidemia Mother      Thyroid Disease Mother       Neurologic Disorder Sister      epilepsy     Alcohol/Drug Sister      alcohol and drug dependency     Alcohol/Drug Brother      recovery alcohol and drug addit     Asthma Son      excercise and allergy induced     Asthma Daughter      excercise and allergy induced     Lipids Sister      high cholesterol     GASTROINTESTINAL DISEASE Sister      hepatitis ??     Osteoporosis Sister      Breast Cancer Maternal Aunt      Asthma Daughter      Asthma Son      Melanoma No family hx of            Problem list, Medication list, Allergies, and Medical/Social/Surgical histories reviewed in [facility] and updated as appropriate.    OBJECTIVE:                                                    BP 96/65  Pulse 110  Resp 16  Wt 140 lb (63.5 kg)  LMP 04/06/2010  SpO2 98%  BMI 21.29 kg/m2 Body mass index is 21.29 kg/(m^2).   General appearance: alert,no distress  Hydration: well hydrated  CVS exam: S1, S2 normal, no murmur, click, rub or gallop, regular rate and rhythm, chest is clear without rales or wheezing, no pedal edema, no JVD, no hepatosplenomegaly.  LUNGS:  CTA B/L, no wheezing or crackles.  CVA tenderness to percussion: absent  Abdomen: flat, normal bowel sounds.   Tenderness: present: mild suprapubic rebound,guarding absent  Masses: none  Organomegaly: none      Results for orders placed or performed in visit on 11/02/18 (from the past 24 hour(s))   *UA reflex to Microscopic and Culture ([place] and [facility] (except [place] and [place])   Result Value Ref Range    Color Urine Yellow     Appearance Urine Clear     Glucose Urine 100 (A) NEG^Negative mg/dL    Bilirubin Urine Negative NEG^Negative    Ketones Urine Trace (A) NEG^Negative mg/dL    Specific Gravity Urine >1.030 1.003 - 1.035    Blood Urine Large (A) NEG^Negative    pH Urine 5.5 5.0 - 7.0 pH    Protein Albumin Urine 100 (A) NEG^Negative mg/dL    Urobilinogen Urine 0.2 0.2 - 1.0 EU/dL    Nitrite Urine Negative NEG^Negative    Leukocyte Esterase Urine  "Moderate (A) NEG^Negative    Source Midstream Urine    Urine Microscopic   Result Value Ref Range    WBC Urine  (A) OTO5^0 - 5 /HPF    RBC Urine >100 (A) OTO2^O - 2 /HPF    Squamous Epithelial /LPF Urine Few FEW^Few /LPF    Bacteria Urine Moderate (A) NEG^Negative /HPF          ASSESSMENT/PLAN:                                                        ICD-10-CM    1. Acute cystitis without hematuria N30.00 Basic metabolic panel  (Ca, Cl, CO2, Creat, Gluc, K, Na, BUN)     sulfamethoxazole-trimethoprim (BACTRIM DS/SEPTRA DS) 800-160 MG per tablet     *UA reflex to Microscopic   2. Dysuria R30.0 *UA reflex to Microscopic and Culture ([place] and [facility] (except [place] and [place])     Urine Microscopic   3. Glucosuria R81 Basic metabolic panel  (Ca, Cl, CO2, Creat, Gluc, K, Na, BUN)   4. Yeast infection of the vagina B37.3 fluconazole (DIFLUCAN) 150 MG tablet   5. Nonspecific finding on examination of urine R82.90 Urine Culture Aerobic Bacterial         Labs to further assess glucosuria. More likely lab error. Advised to take antibiotics as prescribed and plan repeat UA after completing course of antibiotics. Return to clinic for any new or worsening symptoms or go to ER Urgent care in off hours        Estimated body mass index is 21.29 kg/(m^2) as calculated from the following:    Height as of 5/22/18: 5' 8\" (1.727 m).    Weight as of this encounter: 140 lb (63.5 kg).   Weight management plan: See PCP    Nadia Greer  [facility]      "

## 2018-11-03 LAB
BACTERIA SPEC CULT: NORMAL
SPECIMEN SOURCE: NORMAL

## 2018-11-03 ASSESSMENT — ANXIETY QUESTIONNAIRES: GAD7 TOTAL SCORE: 6

## 2018-11-20 ENCOUNTER — TELEPHONE (OUTPATIENT)
Dept: FAMILY MEDICINE | Facility: CLINIC | Age: 58
End: 2018-11-20

## 2018-11-20 DIAGNOSIS — R82.90 ABNORMAL URINE FINDINGS: Primary | ICD-10-CM

## 2018-11-20 DIAGNOSIS — N30.00 ACUTE CYSTITIS WITHOUT HEMATURIA: ICD-10-CM

## 2018-11-20 LAB
ALBUMIN UR-MCNC: 100 MG/DL
APPEARANCE UR: ABNORMAL
BACTERIA #/AREA URNS HPF: ABNORMAL /HPF
BILIRUB UR QL STRIP: ABNORMAL
COLOR UR AUTO: YELLOW
GLUCOSE UR STRIP-MCNC: NEGATIVE MG/DL
HGB UR QL STRIP: ABNORMAL
KETONES UR STRIP-MCNC: ABNORMAL MG/DL
LEUKOCYTE ESTERASE UR QL STRIP: ABNORMAL
NITRATE UR QL: POSITIVE
NON-SQ EPI CELLS #/AREA URNS LPF: ABNORMAL /LPF
PH UR STRIP: 6.5 PH (ref 5–7)
RBC #/AREA URNS AUTO: >100 /HPF
SOURCE: ABNORMAL
SP GR UR STRIP: >1.03 (ref 1–1.03)
UROBILINOGEN UR STRIP-ACNC: 1 EU/DL (ref 0.2–1)
WBC #/AREA URNS AUTO: ABNORMAL /HPF

## 2018-11-20 PROCEDURE — 87088 URINE BACTERIA CULTURE: CPT | Performed by: PHYSICIAN ASSISTANT

## 2018-11-20 PROCEDURE — 87086 URINE CULTURE/COLONY COUNT: CPT | Performed by: PHYSICIAN ASSISTANT

## 2018-11-20 PROCEDURE — 87186 SC STD MICRODIL/AGAR DIL: CPT | Performed by: PHYSICIAN ASSISTANT

## 2018-11-20 PROCEDURE — 81001 URINALYSIS AUTO W/SCOPE: CPT | Performed by: PHYSICIAN ASSISTANT

## 2018-11-20 RX ORDER — CIPROFLOXACIN 500 MG/1
500 TABLET, FILM COATED ORAL 2 TIMES DAILY
Qty: 6 TABLET | Refills: 0 | Status: SHIPPED | OUTPATIENT
Start: 2018-11-20 | End: 2019-01-18

## 2018-11-20 RX ORDER — CIPROFLOXACIN 500 MG/1
500 TABLET, FILM COATED ORAL 2 TIMES DAILY
Qty: 6 TABLET | Refills: 0 | Status: SHIPPED | OUTPATIENT
Start: 2018-11-20 | End: 2018-11-20

## 2018-11-20 NOTE — TELEPHONE ENCOUNTER
Patient called clinic requesting RX for Cipro be sent to [facility]'s instead of [facility]. Stated that it has to do with her insurance contract, she would have to pay out of pocket at [facility].    Rx sent to [facility]'s per pt request.    Marisela Melo RN  [facility]

## 2018-11-20 NOTE — TELEPHONE ENCOUNTER
Pt states she saw Venita Greer earlier this month for a bladder infection but believes she still has it and would like to see if she can get a prescription for a new medication as the medication she was given for it before did not help. She would like someone to call her to follow up with her    Pt can be reached @ [phone] ketan

## 2018-11-20 NOTE — TELEPHONE ENCOUNTER
Venita,    Called patient. Please see phone message (below).     There is current UA in chart. Patient is coming in (has lab appt for 9:30 am today) to leave a urine sample.    Pt stated that her current symptoms include: urgency, cramping when urinating, pelvic pressure, burning, and nausea. She stated that they symptoms went away for a couple days after taking antibiotic and diflucan prescribed 11/02/18, but then returned.    Pt stated that she did take a pill from her daughter-- pill to take away urgency and made her urine red. Pt stated that she is unsure of the name of this medication, but can check with her daughter if needed.    Marisela Melo RN  [facility]

## 2018-11-24 LAB
BACTERIA SPEC CULT: ABNORMAL
BACTERIA SPEC CULT: ABNORMAL
SPECIMEN SOURCE: ABNORMAL

## 2018-12-12 ENCOUNTER — OFFICE VISIT (OUTPATIENT)
Dept: OBGYN | Facility: CLINIC | Age: 58
End: 2018-12-12
Payer: COMMERCIAL

## 2018-12-12 VITALS
HEART RATE: 85 BPM | SYSTOLIC BLOOD PRESSURE: 104 MMHG | DIASTOLIC BLOOD PRESSURE: 69 MMHG | WEIGHT: 141.7 LBS | BODY MASS INDEX: 21.55 KG/M2

## 2018-12-12 DIAGNOSIS — R87.810 ASCUS WITH POSITIVE HIGH RISK HPV CERVICAL: ICD-10-CM

## 2018-12-12 DIAGNOSIS — R87.610 ASCUS WITH POSITIVE HIGH RISK HPV CERVICAL: ICD-10-CM

## 2018-12-12 DIAGNOSIS — Z23 NEED FOR PROPHYLACTIC VACCINATION AND INOCULATION AGAINST INFLUENZA: Primary | ICD-10-CM

## 2018-12-12 PROCEDURE — 90471 IMMUNIZATION ADMIN: CPT | Performed by: OBSTETRICS & GYNECOLOGY

## 2018-12-12 PROCEDURE — 57505 ENDOCERVICAL CURETTAGE: CPT | Performed by: OBSTETRICS & GYNECOLOGY

## 2018-12-12 PROCEDURE — 90686 IIV4 VACC NO PRSV 0.5 ML IM: CPT | Performed by: OBSTETRICS & GYNECOLOGY

## 2018-12-12 PROCEDURE — 88305 TISSUE EXAM BY PATHOLOGIST: CPT | Performed by: OBSTETRICS & GYNECOLOGY

## 2018-12-12 NOTE — PROGRESS NOTES
"S; Beronica Azevedo is a 58 year old  who is here for ECC.  She has a long history of cervical dysplasia, including a LEEP in  for BEVERLY II.  Her most recent pap in  was ascus with + HPV and endometrial cells.  She had a colp with ecc and EMB . The ECC was \"normal\" but possible dysplastic changes.  Decision was made to repeat ecc in 6 months.  She is otherwise without complaints.    O: /69   Pulse 85   Wt 64.3 kg (141 lb 11.2 oz)   LMP 2010   BMI 21.55 kg/m      Psych: normal affect, appropriate eye contact  Resp: no increased work of breathing  CV: no peripheral edema  Abd; SNT, no palpable masses  Lymph: no enlarged inquinal nodes  Pelvic: atrophic vulva and vagina, small cervix, no lesions visible.  ECC performed  Skin: no visible rashes or lesions.    A/P; possible cervical dysplasia   ECC done.  Will await path report to determine best mgmt.  If normal, plan cotesting in .      MELIA AQUINO MD          Injectable Influenza Immunization Documentation    1.  Is the person to be vaccinated sick today?   No    2. Does the person to be vaccinated have an allergy to a component   of the vaccine?   No  Egg Allergy Algorithm Link    3. Has the person to be vaccinated ever had a serious reaction   to influenza vaccine in the past?   No    4. Has the person to be vaccinated ever had Guillain-Barré syndrome?   No    Form completed by Ana Paula Rodriguez           "

## 2018-12-12 NOTE — NURSING NOTE
"Chief Complaint   Patient presents with     Gyn Exam     ECC and flu shot       Initial /69   Pulse 85   Wt 64.3 kg (141 lb 11.2 oz)   LMP 2010   BMI 21.55 kg/m   Estimated body mass index is 21.55 kg/m  as calculated from the following:    Height as of 18: 1.727 m (5' 8\").    Weight as of this encounter: 64.3 kg (141 lb 11.2 oz).  BP completed using cuff size: regular    Questioned patient about current smoking habits.  Pt. has never smoked.          The following HM Due: NONE      The following patient reported/Care Every where data was sent to:  P ABSTRACT QUALITY INITIATIVES [[id number]]        patient has appointment for today  Ana Paula Rodriguez                "

## 2018-12-14 LAB — COPATH REPORT: NORMAL

## 2019-01-17 ENCOUNTER — TELEPHONE (OUTPATIENT)
Dept: FAMILY MEDICINE | Facility: CLINIC | Age: 59
End: 2019-01-17

## 2019-01-17 ENCOUNTER — NURSE TRIAGE (OUTPATIENT)
Dept: NURSING | Facility: CLINIC | Age: 59
End: 2019-01-17

## 2019-01-17 DIAGNOSIS — R30.0 DYSURIA: Primary | ICD-10-CM

## 2019-01-18 ENCOUNTER — OFFICE VISIT (OUTPATIENT)
Dept: FAMILY MEDICINE | Facility: CLINIC | Age: 59
End: 2019-01-18
Payer: COMMERCIAL

## 2019-01-18 VITALS
TEMPERATURE: 97.8 F | HEART RATE: 100 BPM | DIASTOLIC BLOOD PRESSURE: 66 MMHG | BODY MASS INDEX: 21.55 KG/M2 | OXYGEN SATURATION: 98 % | WEIGHT: 141.7 LBS | SYSTOLIC BLOOD PRESSURE: 102 MMHG

## 2019-01-18 DIAGNOSIS — R30.0 DYSURIA: Primary | ICD-10-CM

## 2019-01-18 DIAGNOSIS — Z23 NEED FOR VACCINATION FOR ZOSTER: ICD-10-CM

## 2019-01-18 LAB
BACTERIA #/AREA URNS HPF: ABNORMAL /HPF
NON-SQ EPI CELLS #/AREA URNS LPF: ABNORMAL /LPF
RBC #/AREA URNS AUTO: ABNORMAL /HPF
URNS CMNT MICRO: ABNORMAL
WBC #/AREA URNS AUTO: ABNORMAL /HPF

## 2019-01-18 PROCEDURE — 99213 OFFICE O/P EST LOW 20 MIN: CPT | Mod: 25 | Performed by: FAMILY MEDICINE

## 2019-01-18 PROCEDURE — 81015 MICROSCOPIC EXAM OF URINE: CPT | Performed by: FAMILY MEDICINE

## 2019-01-18 PROCEDURE — 90750 HZV VACC RECOMBINANT IM: CPT | Performed by: FAMILY MEDICINE

## 2019-01-18 PROCEDURE — 87088 URINE BACTERIA CULTURE: CPT | Performed by: FAMILY MEDICINE

## 2019-01-18 PROCEDURE — 90471 IMMUNIZATION ADMIN: CPT | Performed by: FAMILY MEDICINE

## 2019-01-18 PROCEDURE — 87186 SC STD MICRODIL/AGAR DIL: CPT | Performed by: FAMILY MEDICINE

## 2019-01-18 PROCEDURE — 87086 URINE CULTURE/COLONY COUNT: CPT | Performed by: FAMILY MEDICINE

## 2019-01-18 RX ORDER — TOPIRAMATE 25 MG/1
25 TABLET, FILM COATED ORAL 2 TIMES DAILY
COMMUNITY

## 2019-01-18 RX ORDER — NITROFURANTOIN 25; 75 MG/1; MG/1
100 CAPSULE ORAL 2 TIMES DAILY
Qty: 14 CAPSULE | Refills: 0 | Status: SHIPPED | OUTPATIENT
Start: 2019-01-18 | End: 2019-05-21

## 2019-01-18 NOTE — PROGRESS NOTES
SUBJECTIVE:   Beronica Azevedo is a 58 year old female who presents to clinic today for the following health issues:    URINARY TRACT SYMPTOMS  Onset: Tuesday     Description:   Painful urination (Dysuria): YES  Blood in urine (Hematuria): no   Delay in urine (Hesitency): no     Intensity: mild currently     Progression of Symptoms: worse     Accompanying Signs & Symptoms:  Fever/chills: no   Flank pain no   Nausea and vomiting: no   Any vaginal symptoms: none  Abdominal/Pelvic Pain: YES- cramping     History:   History of frequent UTI's: YES  History of kidney stones: no   Sexually Active: no   Possibility of pregnancy: No    Precipitating factors:   None     Therapies Tried and outcome: Azo- helping     No flank pain or fever  No STI risk      Wants Zoster vaccine  Problem list and histories reviewed & adjusted, as indicated.  Additional history: as documented    Labs reviewed in EPIC    Reviewed and updated as needed this visit by clinical staff       Reviewed and updated as needed this visit by Provider         ROS:  Constitutional, HEENT, cardiovascular, pulmonary, gi and gu systems are negative, except as otherwise noted.    OBJECTIVE:     /66   Pulse 100   Temp 97.8  F (36.6  C) (Oral)   Wt 64.3 kg (141 lb 11.2 oz)   LMP 04/06/2010   SpO2 98%   BMI 21.55 kg/m    Body mass index is 21.55 kg/m .  GENERAL: healthy, alert and no distress  ABDOMEN: soft, nontender, no hepatosplenomegaly, no masses and bowel sounds normal  SKIN: no suspicious lesions or rashes  BACK: no CVA tenderness, no paralumbar tenderness    Diagnostic Test Results:  Results for orders placed or performed in visit on 01/18/19   Urine Microscopic   Result Value Ref Range    WBC Urine 5-10 (A) OTO5^0 - 5 /HPF    RBC Urine O - 2 OTO2^O - 2 /HPF    Squamous Epithelial /LPF Urine Few FEW^Few /LPF    Bacteria Urine Many (A) NEG^Negative /HPF    Comment Urine       UNABLE TO RUN UMAC DUE TO INTERFERING SUBSTANCE ON THE MACROSCOPIC.        ASSESSMENT/PLAN:             1. Dysuria  Likely UTI  - nitroFURantoin macrocrystal-monohydrate (MACROBID) 100 MG capsule; Take 1 capsule (100 mg) by mouth 2 times daily for 7 days  Dispense: 14 capsule; Refill: 0  - Urine Culture Aerobic Bacterial  - Urine Microscopic    2. Need for vaccination for zoster  Done  Follow up in 2 months.   - ADMIN 1st VACCINE    See Patient Instructions    Joaquin Rowe MD  [facility]

## 2019-01-18 NOTE — TELEPHONE ENCOUNTER
Patient reports UTI symptoms since Tuesday and says symptoms worse today when she went got home.  Symptoms are urgency and pain with urination.  Reviewed guideline and care advice with caller.  Caller requests her doctor to send in a prescription without office visit.  Patient says this has been done in the past.  Patient say she could give urine sample if doctor is ok with lab only visit.  FNA advised will send message to provider to decide if patient needs office visit or not. TE routed to .        Reason for Disposition    Urinating more frequently than usual (i.e., frequency)    Additional Information    Negative: Shock suspected (e.g., cold/pale/clammy skin, too weak to stand, low BP, rapid pulse)    Negative: Sounds like a life-threatening emergency to the triager    Negative: Followed a genital area injury    Negative: Followed a genital area injury (penis, scrotum)    Negative: Vaginal discharge    Negative: Pus (white, yellow) or bloody discharge from end of penis    Negative: [1] Taking antibiotic for urinary tract infection (UTI) AND [2] female    Negative: [1] Taking antibiotic for urinary tract infection (UTI) AND [2] male    Negative: [1] Discomfort (pain, burning or stinging) when passing urine AND [2] pregnant    Negative: [1] Discomfort (pain, burning or stinging) when passing urine AND [2] postpartum < 1 month    Negative: [1] Discomfort (pain, burning or stinging) when passing urine AND [2] female    Negative: [1] Discomfort (pain, burning or stinging) when passing urine AND [2] male    Negative: Pain or itching in the vulvar area    Negative: Pain in scrotum is main symptom    Negative: Blood in the urine is main symptom    Negative: Symptoms arising from use of a urinary catheter ([place] or [place])    Negative: [1] Unable to urinate (or only a few drops) > 4 hours AND     [2] bladder feels very full (e.g., palpable bladder or strong urge to urinate)    Negative: [1] Decreased urination and [2]  drinking very little AND [2] dehydration suspected (e.g., dark urine, no urine > 12 hours, very dry mouth, very lightheaded)    Negative: Patient sounds very sick or weak to the triager    Negative: Fever > 100.5 F (38.1 C)    Negative: Side (flank) or lower back pain present    Negative: [1] Can't control passage of urine (i.e., urinary incontinence) AND [2] new onset (< 2 weeks) or worsening    Protocols used: URINARY SYMPTOMS-ADULT-AH

## 2019-01-18 NOTE — TELEPHONE ENCOUNTER
Dr. Rowe    See phone message below. Patient reporting UTI symptoms. Requesting antibiotic be sent with no appointment    Would you like office visit or e-Visit?    Please advise    Thank You!  Maile Ortiz, RADHA  Triage Nurse

## 2019-01-18 NOTE — TELEPHONE ENCOUNTER
Clinic Action Needed: yes, call back  FNA Triage Call  Presenting Problem:  Patient reports UTI symptoms since Tuesday and says symptoms worse today when she went got home.  Symptoms are urgency and pain with urination.      Caller requests her doctor to send in a prescription without office visit.  Patient say she could give urine sample if doctor is ok with lab only visit. Patient says antibiotics have been called for her in the past.     FNA advised will send message to provider to decide if patient needs office visit or not.     Guideline Used: urinary symptoms    Patient Recommendations/Teaching: see provider within 24 hours    Routed to: RN Pool -- patient wants request to be forward to Nadia Greer if Dr. Rowe is not in clinic    Tayler Bustamante RN/[facility]

## 2019-01-18 NOTE — TELEPHONE ENCOUNTER
If at all posible we should do an OV or at least have her drop off a UA/UC before starting medications

## 2019-01-20 LAB
BACTERIA SPEC CULT: ABNORMAL
SPECIMEN SOURCE: ABNORMAL

## 2019-01-22 ENCOUNTER — TELEPHONE (OUTPATIENT)
Dept: DERMATOLOGY | Facility: CLINIC | Age: 59
End: 2019-01-22

## 2019-01-22 NOTE — TELEPHONE ENCOUNTER
I called and left Beronica a VM informing her that she would need a follow up appointment before we could refill her rx.

## 2019-01-22 NOTE — TELEPHONE ENCOUNTER
[facility]    Phone Message    May a detailed message be left on voicemail: yes    Reason for Call: Other: PT states her pharmacy won't refill her RX for ketoconazole (NIZORAL) 2 % shampoo.  Please follow up with the PT at number above.      Action Taken: Message routed to:  [facility] ([facility]): derm

## 2019-01-30 ENCOUNTER — TELEPHONE (OUTPATIENT)
Dept: FAMILY MEDICINE | Facility: CLINIC | Age: 59
End: 2019-01-30

## 2019-01-30 ENCOUNTER — NURSE TRIAGE (OUTPATIENT)
Dept: NURSING | Facility: CLINIC | Age: 59
End: 2019-01-30

## 2019-01-30 DIAGNOSIS — N30.00 ACUTE CYSTITIS WITHOUT HEMATURIA: Primary | ICD-10-CM

## 2019-01-31 RX ORDER — CIPROFLOXACIN 250 MG/1
250 TABLET, FILM COATED ORAL 2 TIMES DAILY
Qty: 14 TABLET | Refills: 0 | Status: SHIPPED | OUTPATIENT
Start: 2019-01-31 | End: 2019-05-21

## 2019-01-31 NOTE — TELEPHONE ENCOUNTER
Spoke with patient, relayed provider message below. Patient verbalized understanding, lab only appointment scheduled 02/01/2019    Maile Ortiz, RN  Triage Nurse

## 2019-01-31 NOTE — TELEPHONE ENCOUNTER
Dr. Rowe,  Please see phone message below. Patient requesting repeat round of Nitrofurantoin as UTI symptoms have returned    Would you like office visit/lab only appointment for repeat UA/UC?    Please advise    Thank You!  Maile Ortiz, RADHA  Triage Nurse

## 2019-01-31 NOTE — TELEPHONE ENCOUNTER
Please call patient  As always it is best to get a UA/UC first but I understand that with the cold weather that may be hard to do    Orders Placed This Encounter     **UA reflex to Microscopic FUTURE anytime     Standing Status:   Future     Standing Expiration Date:   1/31/2020     ciprofloxacin (CIPRO) 250 MG tablet     Sig: Take 1 tablet (250 mg) by mouth 2 times daily for 7 days     Dispense:  14 tablet     Refill:  0

## 2019-01-31 NOTE — TELEPHONE ENCOUNTER
Pt calling as she was treated with Nitrofurantoin Macrocrystal-Monohydrate (Macrobid) 100 mg PO capsule, 1 capsule (100 mg) by mouth 2 times daily for 7 days, for a UTI, 1/18/19-1/25/19. Symptoms did disappear, but now they are back again. Has dysuria, and frequency. She is doing home care as discussed and feels she can wait till tomorrow to speak with her clinic.     Cecily Zhao RN, [facility]      Reason for Disposition    [1] Painful urination AND [2] EITHER frequency or urgency AND [3] has on-call doctor    Additional Information    Negative: Shock suspected (e.g., cold/pale/clammy skin, too weak to stand, low BP, rapid pulse)    Negative: Sounds like a life-threatening emergency to the triager    Negative: Followed a genital area injury    Negative: Taking antibiotic for urinary tract infection (UTI)    Negative: Pregnant    Negative: Postpartum < 1 month    Negative: [1] Unable to urinate (or only a few drops) > 4 hours AND     [2] bladder feels very full (e.g., palpable bladder or strong urge to urinate)    Negative: Patient sounds very sick or weak to the triager    Negative: [1] SEVERE pain with urination  (e.g., excruciating) AND [2] not improved after 2 hours of pain medicine and Sitz bath    Negative: Fever > 100.5 F (38.1 C)    Negative: Side (flank) or lower back pain present    Negative: Diabetes mellitus or weak immune system (e.g., HIV positive, cancer chemotherapy, transplant patient)    Negative: Bedridden (e.g., nursing home patient, CVA, chronic illness, recovering from surgery)    Negative: Artificial heart valve or artificial joint    Negative: Unusual vaginal discharge (e.g., bad smelling, yellow, green, or foamy-white)    Negative: Patient is worried about sexually transmitted disease (STD)    Negative: Possibility of pregnancy    Negative: Blood in urine (red, pink, or tea-colored)    Negative: Age > 50 years    Negative: > 2 UTI's in last year    Negative: All other patients  with painful urination(Exception: [1] EITHER frequency or urgency AND [2] has on-call doctor)    Protocols used: URINATION PAIN - FEMALE-ADULT-AH

## 2019-01-31 NOTE — TELEPHONE ENCOUNTER
Clinic Action Needed:Yes-Please call patient  Reason for Call: Pt calling as she was treated with Nitrofurantoin 100 mg PO, 1 capsule (100 mg) by mouth 2 times daily for 7 days, for a UTI, from 1/18/19-1/25/19. Symptoms did disappear, but now they are back again. Has dysuria, and frequency. She is doing home care as discussed and feels she can wait till tomorrow to speak with her clinic. Would like more antibiotics. I did tell her she may need to be seen in clinic for evaluation.  Discussed common causes of UTI's and prevention.   Routed to: Dr Rowe care team    Cecily Zhao RN, [facility]

## 2019-02-01 DIAGNOSIS — N30.00 ACUTE CYSTITIS WITHOUT HEMATURIA: ICD-10-CM

## 2019-02-01 LAB
ALBUMIN UR-MCNC: NEGATIVE MG/DL
APPEARANCE UR: CLEAR
BILIRUB UR QL STRIP: NEGATIVE
COLOR UR AUTO: YELLOW
GLUCOSE UR STRIP-MCNC: NEGATIVE MG/DL
HGB UR QL STRIP: NEGATIVE
KETONES UR STRIP-MCNC: NEGATIVE MG/DL
LEUKOCYTE ESTERASE UR QL STRIP: NEGATIVE
NITRATE UR QL: NEGATIVE
PH UR STRIP: 6.5 PH (ref 5–7)
SOURCE: NORMAL
SP GR UR STRIP: <=1.005 (ref 1–1.03)
UROBILINOGEN UR STRIP-ACNC: 0.2 EU/DL (ref 0.2–1)

## 2019-02-01 PROCEDURE — 87086 URINE CULTURE/COLONY COUNT: CPT | Performed by: FAMILY MEDICINE

## 2019-02-01 PROCEDURE — 81003 URINALYSIS AUTO W/O SCOPE: CPT | Performed by: FAMILY MEDICINE

## 2019-02-02 LAB
BACTERIA SPEC CULT: NORMAL
SPECIMEN SOURCE: NORMAL

## 2019-02-12 ENCOUNTER — OFFICE VISIT (OUTPATIENT)
Dept: DERMATOLOGY | Facility: CLINIC | Age: 59
End: 2019-02-12
Payer: COMMERCIAL

## 2019-02-12 DIAGNOSIS — L21.9 DERMATITIS, SEBORRHEIC: Primary | ICD-10-CM

## 2019-02-12 DIAGNOSIS — L72.0 EIC (EPIDERMAL INCLUSION CYST): ICD-10-CM

## 2019-02-12 DIAGNOSIS — D23.9 INTRADERMAL NEVUS: ICD-10-CM

## 2019-02-12 DIAGNOSIS — L82.1 SK (SEBORRHEIC KERATOSIS): ICD-10-CM

## 2019-02-12 DIAGNOSIS — L71.9 ROSACEA: ICD-10-CM

## 2019-02-12 DIAGNOSIS — D22.39 FIBROUS PAPULE OF NOSE: ICD-10-CM

## 2019-02-12 RX ORDER — NARATRIPTAN 2.5 MG/1
2.5 TABLET ORAL PRN
Refills: 6 | COMMUNITY
Start: 2018-08-30 | End: 2019-05-21

## 2019-02-12 RX ORDER — KETOCONAZOLE 20 MG/ML
SHAMPOO TOPICAL DAILY PRN
Qty: 120 ML | Refills: 11 | Status: SHIPPED | OUTPATIENT
Start: 2019-02-12 | End: 2020-03-12

## 2019-02-12 RX ORDER — ELETRIPTAN HYDROBROMIDE 40 MG/1
40 TABLET, FILM COATED ORAL PRN
Refills: 10 | COMMUNITY
Start: 2019-01-22

## 2019-02-12 ASSESSMENT — PAIN SCALES - GENERAL: PAINLEVEL: NO PAIN (0)

## 2019-02-12 NOTE — NURSING NOTE
"Dermatology Rooming Note    Beronica Azevedo's goals for this visit include:   Chief Complaint   Patient presents with     Derm Problem     Beronica is here today for a seb derm follow up- Beronica states \"I need my shampoo prescription refilled\".      Skin Check     Nacy would also like a skin check today- Beronica notes some areas of concern.      Ayanna Orellana, RMKARON     "

## 2019-02-12 NOTE — PATIENT INSTRUCTIONS
The bump on your nose is most likely a fibrous papule. This is a benign finding, but please let us known if it grows or changes significantly.    The dark spot in the middle of your back is a seborrheic keratosis. The itchy mole on your left upper back is a mole (intradermal nevus). If this continues to bother you, we could do a shave biopsy to remove the mole. The other bump on your back is most likely an epidermoid cyst. None of these findings are worrisome.    Follow up in 1 year.

## 2019-02-12 NOTE — LETTER
"2/12/2019       RE: Beronica Azevedo  [address]  [facility] [address]     Dear Colleague,    Thank you for referring your patient, Beronica Azevedo, to the [facility] DERMATOLOGY at [facility]. Please see a copy of my visit note below.    [facility] Dermatology Note    Dermatology Problem List:  1. Seborrheic dermatitis, scalp  - Current tx: ketoconazole shampoo  2. Eczematous dermatitis  3. Erythematotelangiectatic rosacea  - Referred to laser clinic but not pursued  4. Epidermal inclusion cyst, upper back  - s/p excisional bx of dilated pore of Nichole 8/1/2017  5. Scattered benign nevi  6. Seborrheic keratoses, non-irritated  7. Onychodystrophy, longitudinal ridging on all nails  - Current tx: biotin supplement    Encounter Date: Feb 12, 2019    CC:  Chief Complaint   Patient presents with     Derm Problem     Beronica is here today for a seb derm follow up- Beronica states \"I need my shampoo prescription refilled\".      Skin Check     Nacmartha would also like a skin check today- Beronica notes some areas of concern.          History of Present Illness:  Ms. Beronica Azevedo is a 58 year old female who presents as a follow-up for seborrheic dermatitis of the scalp. The patient was last seen 08/01/2017 by Dr. Antunez when she was started on ketoconazole 2% shampoo, referred to laser clinic for mld erythematelangiectatic rosacea, reassured about her longitudinal nail ridging, and 3mm punch biopsy of an irritated epidermal cyst on the upper back was performed. Dermatopathology revealed a dilated pore of Nichole.    She presents for a new ketoconazole 2% shampoo prescription. She washes her hair once weekly using ketoconazole shampoo, then Aveda Dry Remedy oil on her scalp and conditioner on hair. She continues to have scalp dryness but denies itching, flaking, redness, or hair loss. Her nails still have vertical ridges, sometimes split vertically, and her toenails feel " thicker. She takes biotin daily and believes this has improved her nail appearance and resulted in thicker hair.    She is also concerned about a bump on her nose that bleeds easily and for a prolonged period when lightly scratched. She has rosacea but is not using any rosacea-directed therapies. She used topical metronidazole in the past but believes this made the redness worse.  She reports a large, dark, irregular spot in the middle of her back that may be growing. She reports an itchy mole on her left shoulder. She also notes a bump on her back where a dilated pore was removed at her last dermatology appointment.    Beronica denies other skin, hair, or nail concerns. Recent health changes include seeing a neurologist for headache management, medications reviewed and updated in chart. She has been healthy recently and denies any other health changes since her last appointment.        Past Medical History:   Patient Active Problem List   Diagnosis     Headache     Insomnia     Moderate dysplasia of cervix (BEVERLY II)     CARDIOVASCULAR SCREENING; LDL GOAL LESS THAN 160     Endometrial polyp     Iron deficiency anemia due to chronic blood loss     Rosacea     Onychodystrophy     Dermatitis     Osteopenia     Post-menopausal     Mild major depression (H)     IBS (irritable bowel syndrome)     Myopia     Regular astigmatism     Presbyopia     Posterior subcapsular polar senile cataract     Senile nuclear sclerosis     Macular puckering of retina     Cervicalgia     Pain in thoracic spine     Digital mucous cyst     Seborrheic keratosis, inflamed     Chronic pain of right knee     Acute pain of right knee     FREDIS (generalized anxiety disorder)     Neoplasm of uncertain behavior of skin     Dermatitis, seborrheic     Plantar fasciitis     Endometrial cells on cervical Pap smear inconsistent with last menstrual period     Past Medical History:   Diagnosis Date     Anxiety      Arthritis 2010     Cervical high risk HPV (human  papillomavirus) test positive 03/04/2016    3/18/16 colp - ECC - negative     BEVERLY II (cervical intraepithelial neoplasia II) 2009    LEEP     Depression      Depressive disorder Adolescence    Managed with medication and therapy     Dry eye syndrome      Frequent UTI 2009     Headache(784.0)      IBS (irritable bowel syndrome)      Insomnia      Lattice degeneration of retina      Lyme disease      Myopic astigmatism      Nonsenile cataract      Osteopenia 2001,2012    Fosamax     Pneumothorax on right     at 17 y/o     PVD (posterior vitreous detachment), both eyes 7/19/10     Rosacea      Vasomotor rhinitis      Past Surgical History:   Procedure Laterality Date     BIOPSY  Moles skin tags     CHEST TUBE INSERTION       COLONOSCOPY  Normal     DILATION AND CURETTAGE, OPERATIVE HYSTEROSCOPY WITH MORCELLATOR, COMBINED  1/2011    endometrial polyp     LEEP TX, CERVICAL  8/2009    BEVERLY II, clear margins     THORACIC SURGERY  1976    Collapsed lung       Social History:  Patient reports that  has never smoked. she has never used smokeless tobacco. She reports that she drinks alcohol. She reports that she does not use drugs.    Family History:  Family History   Problem Relation Age of Onset     Depression Sister      Uterine Cancer Sister      Neurologic Disorder Sister      Neurologic Disorder Brother      Neurologic Disorder Father      Lipids Father         high cholesterol     Gastrointestinal Disease Father         stomach ulcers     Cancer Father         melanoma     Skin Cancer Father      Hypertension Father      Hyperlipidemia Father      Neurologic Disorder Mother      Allergies Mother      Arthritis Mother      Depression Mother      Eye Disorder Mother      Lipids Mother         high cholesterol     Osteoporosis Mother      Hypertension Mother      Hyperlipidemia Mother      Thyroid Disease Mother      Neurologic Disorder Sister         epilepsy     Alcohol/Drug Sister         alcohol and drug dependency      Alcohol/Drug Brother         recovery alcohol and drug addit     Asthma Son         excercise and allergy induced     Asthma Daughter         excercise and allergy induced     Lipids Sister         high cholesterol     Gastrointestinal Disease Sister         hepatitis ??     Osteoporosis Sister      Breast Cancer Maternal Aunt      Asthma Daughter      Asthma Son      Melanoma No family hx of        Medications:  Current Outpatient Medications   Medication Sig Dispense Refill     azelastine (ASTELIN) 0.1 % nasal spray INSTILL 1 SPRAY INTO THE NOSTIL(S) TWICE DAILY 30 mL 1     BENADRYL ALLERGY PO prn       biotin 2.5 mg/mL Take by mouth daily       butalbital-acetaminophen-caffeine (FIORICET/ESGIC) -40 MG per tablet TAKE 1 TABLET BY MOUTH EVERY 4 HOURS AS NEEDED 28 tablet 0     cholecalciferol (VITAMIN D) 1000 UNIT tablet Take 2 tablets (2,000 Units) by mouth daily 100 tablet 3     ketoconazole (NIZORAL) 2 % shampoo Apply topically daily as needed for itching or irritation 120 mL 1     magnesium 100 MG CAPS Take 400 mg by mouth       nortriptyline (PAMELOR) 50 MG capsule TAKE 1 CAPSULE(50 MG) BY MOUTH AT BEDTIME 90 capsule 1     Riboflavin (VITAMIN B-2 PO) Take 400 mg by mouth       topiramate (TOPAMAX) 25 MG tablet Take 25 mg by mouth 2 times daily       traZODone (DESYREL) 50 MG tablet 1/2 TABLET AT BEDTIME AS NEEDED FOR SLEEP 45 tablet 1     UNABLE TO FIND MEDICATION NAME: Relpax. Used for headaches. 40 mg. As needed       Allergies   Allergen Reactions     Perfume Other (See Comments)     Scents/smells     Tape [Adhesive Tape] Rash     Compazine Other (See Comments)     Hyperactivity       Paxil [Paroxetine] Other (See Comments)     Ropinirole Hcl Unknown     TABS     Depakote [Valproic Acid] Rash     Rash       Food Itching and Rash     mushrooms     Mushroom Itching and Rash         Review of Systems:  -Constitutional: Otherwise feeling well today, in usual state of health.  -HEENT: Patient denies  nonhealing oral sores.  -Skin: As above in HPI. The patient denies any new rash, pruritus, or lesions that are symptomatic, changing or bleeding, except as per HPI. No additional skin concerns.     Physical exam:  Vitals: LMP 04/06/2010   GEN: This is a well developed, well-nourished female in no acute distress, in a pleasant mood.    SKIN: Focused examination of the scalp, face, central chest, back, upper extremities, hands, and feet was performed.  -Mild follicular accentuation over scalp with scattered white flakes in hair near scalp, no erythema or perifollicular scale  -Very mildly erythematous, poorly defined patch on cheeks and nose with mild telangiectasias  -Poorly defined 1.5mm flesh-toned to light pink papule on nasal tip  -Waxy, tan to brown, stuck-on papule on the back  -Flesh-colored, symmetric, smooth domed papule on right posterior shoulder  -5mm round, hypopigmented, firm, mobile papule on back  -Cafe-au-lait patch on lower back and left upper shoulder  -Finger and toenails with linear ridges. No discoloration  -No other lesions of concern on areas examined.     Impression/Plan:  1. Seborrheic dermatitis - affecting scalp, minimal.    Continue ketoconazole 2% shampoo at least once weekly    2. Fibrous papule on the nose, not inflamed, not concerning for malignancy    Benign nature was discussed.No further intervention required at this time.     3. Seborrheic keratosis, non irritated, mid-back    Seborrheic keratosis: Discussed benign nature of lesions    4. Mild erythematelangiectatic rosacea    Patient declined Metrogel and has been referred to the laser clinic in the past. She is not currently concerned about her rosacea and is aware that she may discuss treatment options again in the future.     5. Epidermal inclusion cyst, upper back    S/p punch biopsy 8/1/2017 which showed a dilated pore. Likely a mix of scar and residual epidermal inclusion cyst.    Benign nature was discussed. No further  intervention required at this time.    6. Intradermal nevus, right posterior shoulder    Benign nature was discussed. Offered shave biopsy to remove nevus if it continues to be pruritic or irritated.    Follow-up in 1 year, earlier for new or changing lesions.     Staff Involved:  Scribed by Pamella Davalos, MS4, for Dr. See Corrigan.    Staff Physician:  I was present with the medical student who participated in the service and in the documentation of the note. I have verified the history and personally performed the physical exam and medical decision making. I agree with the assessment and plan of care as documented in the note.     Again, thank you for allowing me to participate in the care of your patient.      Sincerely,    See Corrigan MD

## 2019-02-12 NOTE — PROGRESS NOTES
"[facility] Note    Dermatology Problem List:  1. Seborrheic dermatitis, scalp  - Current tx: ketoconazole shampoo  2. Eczematous dermatitis  3. Erythematotelangiectatic rosacea  - Referred to laser clinic but not pursued  4. Epidermal inclusion cyst, upper back  - s/p excisional bx of dilated pore of Nichole 8/1/2017  5. Scattered benign nevi  6. Seborrheic keratoses, non-irritated  7. Onychodystrophy, longitudinal ridging on all nails  - Current tx: biotin supplement    Encounter Date: Feb 12, 2019    CC:  Chief Complaint   Patient presents with     Derm Problem     Beronica is here today for a seb derm follow up- Beronica states \"I need my shampoo prescription refilled\".      Skin Check     Jesse would also like a skin check today- Beronica notes some areas of concern.          History of Present Illness:  Ms. Beronica Azevedo is a 58 year old female who presents as a follow-up for seborrheic dermatitis of the scalp. The patient was last seen 08/01/2017 by Dr. Antunez when she was started on ketoconazole 2% shampoo, referred to laser clinic for mld erythematelangiectatic rosacea, reassured about her longitudinal nail ridging, and 3mm punch biopsy of an irritated epidermal cyst on the upper back was performed. Dermatopathology revealed a dilated pore of Nichole.    She presents for a new ketoconazole 2% shampoo prescription. She washes her hair once weekly using ketoconazole shampoo, then Aveda Dry Remedy oil on her scalp and conditioner on hair. She continues to have scalp dryness but denies itching, flaking, redness, or hair loss. Her nails still have vertical ridges, sometimes split vertically, and her toenails feel thicker. She takes biotin daily and believes this has improved her nail appearance and resulted in thicker hair.    She is also concerned about a bump on her nose that bleeds easily and for a prolonged period when lightly scratched. She has rosacea but is not using any rosacea-directed " therapies. She used topical metronidazole in the past but believes this made the redness worse.  She reports a large, dark, irregular spot in the middle of her back that may be growing. She reports an itchy mole on her left shoulder. She also notes a bump on her back where a dilated pore was removed at her last dermatology appointment.    Beronica denies other skin, hair, or nail concerns. Recent health changes include seeing a neurologist for headache management, medications reviewed and updated in chart. She has been healthy recently and denies any other health changes since her last appointment.        Past Medical History:   Patient Active Problem List   Diagnosis     Headache     Insomnia     Moderate dysplasia of cervix (BEVERLY II)     CARDIOVASCULAR SCREENING; LDL GOAL LESS THAN 160     Endometrial polyp     Iron deficiency anemia due to chronic blood loss     Rosacea     Onychodystrophy     Dermatitis     Osteopenia     Post-menopausal     Mild major depression (H)     IBS (irritable bowel syndrome)     Myopia     Regular astigmatism     Presbyopia     Posterior subcapsular polar senile cataract     Senile nuclear sclerosis     Macular puckering of retina     Cervicalgia     Pain in thoracic spine     Digital mucous cyst     Seborrheic keratosis, inflamed     Chronic pain of right knee     Acute pain of right knee     FREDIS (generalized anxiety disorder)     Neoplasm of uncertain behavior of skin     Dermatitis, seborrheic     Plantar fasciitis     Endometrial cells on cervical Pap smear inconsistent with last menstrual period     Past Medical History:   Diagnosis Date     Anxiety      Arthritis 2010     Cervical high risk HPV (human papillomavirus) test positive 03/04/2016    3/18/16 colp - ECC - negative     BEVERLY II (cervical intraepithelial neoplasia II) 2009    LEEP     Depression      Depressive disorder Adolescence    Managed with medication and therapy     Dry eye syndrome      Frequent UTI 2009      Headache(784.0)      IBS (irritable bowel syndrome)      Insomnia      Lattice degeneration of retina      Lyme disease      Myopic astigmatism      Nonsenile cataract      Osteopenia 2001,2012    Fosamax     Pneumothorax on right     at 17 y/o     PVD (posterior vitreous detachment), both eyes 7/19/10     Rosacea      Vasomotor rhinitis      Past Surgical History:   Procedure Laterality Date     BIOPSY  Moles skin tags     CHEST TUBE INSERTION       COLONOSCOPY  Normal     DILATION AND CURETTAGE, OPERATIVE HYSTEROSCOPY WITH MORCELLATOR, COMBINED  1/2011    endometrial polyp     LEEP TX, CERVICAL  8/2009    BEVERLY II, clear margins     THORACIC SURGERY  1976    Collapsed lung       Social History:  Patient reports that  has never smoked. she has never used smokeless tobacco. She reports that she drinks alcohol. She reports that she does not use drugs.    Family History:  Family History   Problem Relation Age of Onset     Depression Sister      Uterine Cancer Sister      Neurologic Disorder Sister      Neurologic Disorder Brother      Neurologic Disorder Father      Lipids Father         high cholesterol     Gastrointestinal Disease Father         stomach ulcers     Cancer Father         melanoma     Skin Cancer Father      Hypertension Father      Hyperlipidemia Father      Neurologic Disorder Mother      Allergies Mother      Arthritis Mother      Depression Mother      Eye Disorder Mother      Lipids Mother         high cholesterol     Osteoporosis Mother      Hypertension Mother      Hyperlipidemia Mother      Thyroid Disease Mother      Neurologic Disorder Sister         epilepsy     Alcohol/Drug Sister         alcohol and drug dependency     Alcohol/Drug Brother         recovery alcohol and drug addit     Asthma Son         excercise and allergy induced     Asthma Daughter         excercise and allergy induced     Lipids Sister         high cholesterol     Gastrointestinal Disease Sister         hepatitis ??      Osteoporosis Sister      Breast Cancer Maternal Aunt      Asthma Daughter      Asthma Son      Melanoma No family hx of        Medications:  Current Outpatient Medications   Medication Sig Dispense Refill     azelastine (ASTELIN) 0.1 % nasal spray INSTILL 1 SPRAY INTO THE NOSTIL(S) TWICE DAILY 30 mL 1     BENADRYL ALLERGY PO prn       biotin 2.5 mg/mL Take by mouth daily       butalbital-acetaminophen-caffeine (FIORICET/ESGIC) -40 MG per tablet TAKE 1 TABLET BY MOUTH EVERY 4 HOURS AS NEEDED 28 tablet 0     cholecalciferol (VITAMIN D) 1000 UNIT tablet Take 2 tablets (2,000 Units) by mouth daily 100 tablet 3     ketoconazole (NIZORAL) 2 % shampoo Apply topically daily as needed for itching or irritation 120 mL 1     magnesium 100 MG CAPS Take 400 mg by mouth       nortriptyline (PAMELOR) 50 MG capsule TAKE 1 CAPSULE(50 MG) BY MOUTH AT BEDTIME 90 capsule 1     Riboflavin (VITAMIN B-2 PO) Take 400 mg by mouth       topiramate (TOPAMAX) 25 MG tablet Take 25 mg by mouth 2 times daily       traZODone (DESYREL) 50 MG tablet 1/2 TABLET AT BEDTIME AS NEEDED FOR SLEEP 45 tablet 1     UNABLE TO FIND MEDICATION NAME: Relpax. Used for headaches. 40 mg. As needed       Allergies   Allergen Reactions     Perfume Other (See Comments)     Scents/smells     Tape [Adhesive Tape] Rash     Compazine Other (See Comments)     Hyperactivity       Paxil [Paroxetine] Other (See Comments)     Ropinirole Hcl Unknown     TABS     Depakote [Valproic Acid] Rash     Rash       Food Itching and Rash     mushrooms     Mushroom Itching and Rash         Review of Systems:  -Constitutional: Otherwise feeling well today, in usual state of health.  -HEENT: Patient denies nonhealing oral sores.  -Skin: As above in HPI. The patient denies any new rash, pruritus, or lesions that are symptomatic, changing or bleeding, except as per HPI. No additional skin concerns.     Physical exam:  Vitals: LMP 04/06/2010   GEN: This is a well developed,  well-nourished female in no acute distress, in a pleasant mood.    SKIN: Focused examination of the scalp, face, central chest, back, upper extremities, hands, and feet was performed.  -Mild follicular accentuation over scalp with scattered white flakes in hair near scalp, no erythema or perifollicular scale  -Very mildly erythematous, poorly defined patch on cheeks and nose with mild telangiectasias  -Poorly defined 1.5mm flesh-toned to light pink papule on nasal tip  -Waxy, tan to brown, stuck-on papule on the back  -Flesh-colored, symmetric, smooth domed papule on right posterior shoulder  -5mm round, hypopigmented, firm, mobile papule on back  -Cafe-au-lait patch on lower back and left upper shoulder  -Finger and toenails with linear ridges. No discoloration  -No other lesions of concern on areas examined.     Impression/Plan:  1. Seborrheic dermatitis - affecting scalp, minimal.    Continue ketoconazole 2% shampoo at least once weekly    2. Fibrous papule on the nose, not inflamed, not concerning for malignancy    Benign nature was discussed.No further intervention required at this time.     3. Seborrheic keratosis, non irritated, mid-back    Seborrheic keratosis: Discussed benign nature of lesions    4. Mild erythematelangiectatic rosacea    Patient declined Metrogel and has been referred to the laser clinic in the past. She is not currently concerned about her rosacea and is aware that she may discuss treatment options again in the future.     5. Epidermal inclusion cyst, upper back    S/p punch biopsy 8/1/2017 which showed a dilated pore. Likely a mix of scar and residual epidermal inclusion cyst.    Benign nature was discussed. No further intervention required at this time.    6. Intradermal nevus, right posterior shoulder    Benign nature was discussed. Offered shave biopsy to remove nevus if it continues to be pruritic or irritated.    Follow-up in 1 year, earlier for new or changing lesions.     Staff  Involved:  Scribed by Pamella Davalos, MS4, for Dr. See Corrigan.    Staff Physician:  I was present with the medical student who participated in the service and in the documentation of the note. I have verified the history and personally performed the physical exam and medical decision making. I agree with the assessment and plan of care as documented in the note.     See Corrigan MD  Staff Dermatologist and Dermatopathologist  , Department of Dermatology

## 2019-02-28 ENCOUNTER — TELEPHONE (OUTPATIENT)
Dept: FAMILY MEDICINE | Facility: CLINIC | Age: 59
End: 2019-02-28

## 2019-02-28 NOTE — TELEPHONE ENCOUNTER
Called patient and gave message from Dr. Rowe. Pt verbalized understanding.    Marisela Melo RN  [facility]

## 2019-02-28 NOTE — TELEPHONE ENCOUNTER
Dr. Rowe,    Spoke with patient today. She is in [place] right now. Won't be back until Sunday. She is having symptoms of abdominal cramping and urgency. Thinks that she might have a UTI again. She is taking AZO right now. Pt had cipro 250 mg tablet that was prescribed on 01/31/19, she brought this prescription with her. She states that she didn't start this prescription, because she was told that she that she did not have a UTI (UA/UC done 02/01/19).     Pt wondering if she should start cipro. Recommended UC to pt. She states that she has no idea if she is covered in [place], and wondering if she can start antibiotic.    Please advise.    Marisela Melo RN  [facility]

## 2019-02-28 NOTE — TELEPHONE ENCOUNTER
Reason for call:  Other   Patient called regarding (reason for call): call back  Additional comments: patient suspects she has another UTI and is wondering if its ok that she take the Cipro she has left over from her last UTI she had in January?    Phone number to reach patient:  Home number on file [phone] (home)    Best Time:  any    Can we leave a detailed message on this number?  YES

## 2019-03-26 DIAGNOSIS — J31.0 CHRONIC RHINITIS: ICD-10-CM

## 2019-03-26 RX ORDER — AZELASTINE 1 MG/ML
SPRAY, METERED NASAL
Qty: 30 ML | Refills: 0 | Status: SHIPPED | OUTPATIENT
Start: 2019-03-26 | End: 2020-01-29

## 2019-03-26 NOTE — TELEPHONE ENCOUNTER
Prescription approved per [facility].  LOV: 01/18/2019    Maile Ortiz, RN  Triage Nurse

## 2019-05-21 ENCOUNTER — HOSPITAL ENCOUNTER (OUTPATIENT)
Dept: GENERAL RADIOLOGY | Facility: CLINIC | Age: 59
Discharge: HOME | End: 2019-05-21
Attending: FAMILY MEDICINE | Admitting: FAMILY MEDICINE
Payer: COMMERCIAL

## 2019-05-21 ENCOUNTER — OFFICE VISIT (OUTPATIENT)
Dept: FAMILY MEDICINE | Facility: CLINIC | Age: 59
End: 2019-05-21
Payer: COMMERCIAL

## 2019-05-21 VITALS
SYSTOLIC BLOOD PRESSURE: 101 MMHG | BODY MASS INDEX: 21.07 KG/M2 | HEART RATE: 108 BPM | HEIGHT: 68 IN | TEMPERATURE: 97.5 F | WEIGHT: 139 LBS | DIASTOLIC BLOOD PRESSURE: 69 MMHG | OXYGEN SATURATION: 98 %

## 2019-05-21 DIAGNOSIS — G89.29 CHRONIC KNEE PAIN, UNSPECIFIED LATERALITY: Primary | ICD-10-CM

## 2019-05-21 DIAGNOSIS — G89.29 CHRONIC KNEE PAIN, UNSPECIFIED LATERALITY: ICD-10-CM

## 2019-05-21 DIAGNOSIS — M25.569 CHRONIC KNEE PAIN, UNSPECIFIED LATERALITY: ICD-10-CM

## 2019-05-21 DIAGNOSIS — M25.569 CHRONIC KNEE PAIN, UNSPECIFIED LATERALITY: Primary | ICD-10-CM

## 2019-05-21 PROCEDURE — 73560 X-RAY EXAM OF KNEE 1 OR 2: CPT | Mod: 50

## 2019-05-21 PROCEDURE — 99213 OFFICE O/P EST LOW 20 MIN: CPT | Performed by: FAMILY MEDICINE

## 2019-05-21 ASSESSMENT — MIFFLIN-ST. JEOR: SCORE: 1249

## 2019-05-21 NOTE — PROGRESS NOTES
Subjective     Beronica Azevedo is a 59 year old female who presents to clinic today for the following health issues:    HPI   Joint Pain    Onset: years     Description:   Location: left knee and right knee  Character: Sharp    Intensity: moderate    Progression of Symptoms: worse    Accompanying Signs & Symptoms:  Other symptoms: radiation of pain to down and up the legs and weakness of both knees    History:   Previous similar pain: YES      Precipitating factors:   Trauma or overuse: no     Alleviating factors:  Improved by: rest/inactivity and physical therapy    Therapies Tried and outcome:nothing       {  Current Outpatient Medications   Medication Sig Dispense Refill     azelastine (ASTELIN) 0.1 % nasal spray INSTILL 1 SPRAY INTO THE NOSTIL(S) TWICE DAILY 30 mL 0     BENADRYL ALLERGY PO prn       biotin 2.5 mg/mL Take by mouth daily       butalbital-acetaminophen-caffeine (FIORICET/ESGIC) -40 MG per tablet TAKE 1 TABLET BY MOUTH EVERY 4 HOURS AS NEEDED 28 tablet 0     eletriptan (RELPAX) 40 MG tablet Take 40 mg by mouth as needed for headaches  10     ketoconazole (NIZORAL) 2 % external shampoo Apply topically daily as needed for itching or irritation 120 mL 11     magnesium 100 MG CAPS Take 400 mg by mouth       nortriptyline (PAMELOR) 50 MG capsule TAKE 1 CAPSULE(50 MG) BY MOUTH AT BEDTIME 90 capsule 1     Riboflavin (VITAMIN B-2 PO) Take 400 mg by mouth       topiramate (TOPAMAX) 25 MG tablet Take 25 mg by mouth 2 times daily       traZODone (DESYREL) 50 MG tablet 1/2 TABLET AT BEDTIME AS NEEDED FOR SLEEP 45 tablet 1       Reviewed and updated as needed this visit by Provider         Review of Systems   ROS COMP: Constitutional, HEENT, cardiovascular, pulmonary, gi and gu systems are negative, except as otherwise noted.      Objective    LMP 04/06/2010   There is no height or weight on file to calculate BMI.  Physical Exam   GENERAL: healthy, alert and no distress  MS: normal muscle tone and   Knee  exam: antalgic gait, soft tissue tenderness over hamstrings, negative drawer sign, negative pivot-shift, collateral ligaments intact, negative Abby sign, patellar tenderness, tenderness over tibial tubercle.  X-ray: ordered, but results not yet available.  SKIN: no suspicious lesions or rashes  ASSESSMENT:  Knee strain and rule out DJD    PLAN:  NSAID, ice suggested  activity modification  referral to physical therapy  see primary care physician in follow up  See orders in [facility]

## 2019-06-05 ENCOUNTER — THERAPY VISIT (OUTPATIENT)
Dept: PHYSICAL THERAPY | Facility: CLINIC | Age: 59
End: 2019-06-05
Payer: COMMERCIAL

## 2019-06-05 DIAGNOSIS — M25.561 CHRONIC PAIN OF RIGHT KNEE: ICD-10-CM

## 2019-06-05 DIAGNOSIS — G89.29 CHRONIC PAIN OF RIGHT KNEE: ICD-10-CM

## 2019-06-05 DIAGNOSIS — M25.569 CHRONIC KNEE PAIN, UNSPECIFIED LATERALITY: ICD-10-CM

## 2019-06-05 DIAGNOSIS — G89.29 CHRONIC KNEE PAIN, UNSPECIFIED LATERALITY: ICD-10-CM

## 2019-06-05 PROCEDURE — 97110 THERAPEUTIC EXERCISES: CPT | Mod: GP | Performed by: PHYSICAL THERAPIST

## 2019-06-05 PROCEDURE — 97161 PT EVAL LOW COMPLEX 20 MIN: CPT | Mod: GP | Performed by: PHYSICAL THERAPIST

## 2019-06-05 ASSESSMENT — ACTIVITIES OF DAILY LIVING (ADL)
SIT WITH YOUR KNEE BENT: ACTIVITY IS VERY DIFFICULT
HOW_WOULD_YOU_RATE_THE_CURRENT_FUNCTION_OF_YOUR_KNEE_DURING_YOUR_USUAL_DAILY_ACTIVITIES_ON_A_SCALE_FROM_0_TO_100_WITH_100_BEING_YOUR_LEVEL_OF_KNEE_FUNCTION_PRIOR_TO_YOUR_INJURY_AND_0_BEING_THE_INABILITY_TO_PERFORM_ANY_OF_YOUR_USUAL_DAILY_ACTIVITIES?: 85
HOW_WOULD_YOU_RATE_THE_OVERALL_FUNCTION_OF_YOUR_KNEE_DURING_YOUR_USUAL_DAILY_ACTIVITIES?: NEARLY NORMAL
SWELLING: I DO NOT HAVE THE SYMPTOM
WALK: ACTIVITY IS FAIRLY DIFFICULT
GO UP STAIRS: ACTIVITY IS MINIMALLY DIFFICULT
GO DOWN STAIRS: ACTIVITY IS NOT DIFFICULT
SQUAT: ACTIVITY IS FAIRLY DIFFICULT
LIMPING: THE SYMPTOM AFFECTS MY ACTIVITY SLIGHTLY
PAIN: THE SYMPTOM AFFECTS MY ACTIVITY SLIGHTLY
KNEE_ACTIVITY_OF_DAILY_LIVING_SCORE: 57.14
WEAKNESS: THE SYMPTOM AFFECTS MY ACTIVITY MODERATELY
GIVING WAY, BUCKLING OR SHIFTING OF KNEE: I DO NOT HAVE THE SYMPTOM
RISE FROM A CHAIR: ACTIVITY IS FAIRLY DIFFICULT
STAND: ACTIVITY IS VERY DIFFICULT
RAW_SCORE: 40
AS_A_RESULT_OF_YOUR_KNEE_INJURY,_HOW_WOULD_YOU_RATE_YOUR_CURRENT_LEVEL_OF_DAILY_ACTIVITY?: NEARLY NORMAL
STIFFNESS: I HAVE THE SYMPTOM BUT IT DOES NOT AFFECT MY ACTIVITY
KNEEL ON THE FRONT OF YOUR KNEE: ACTIVITY IS VERY DIFFICULT
KNEE_ACTIVITY_OF_DAILY_LIVING_SUM: 40

## 2019-06-05 NOTE — PROGRESS NOTES
[facility] Initial Evaluation  Subjective:  The history is provided by the patient.   Beronica Azevedo is a 59 year old female with a bilateral knees condition.      This is a chronic condition  Pt reports chronic knee pain since 5 years, worse since the past few months, initially pain was over R knee only now reports B knee pain, has had PT in the past which helps, saw MD on 5/21/19 who recommended PT; x rays were done   H/o B plantar fascitis   Pt able to do yoga 6/month with mild knee pain when she is standing on one leg   .    Patient reports pain:  Medial and posterior (R; not sure where it hurt on the L knee - pain is less intense on the L ).  Radiates to:  Lower leg (B ).  Pain is described as sharp and is intermittent and reported as 3/10 and 4/10.  Associated symptoms:  Loss of strength and tingling. Pain is the same all the time.  Symptoms are exacerbated by ascending stairs, sitting, bending/squatting and walking (driving every now and then, walking raúl 20 min)   Since onset symptoms are gradually worsening.  Special tests:  X-ray.  Previous treatment includes physical therapy.  There was significant improvement following previous treatment.  General health as reported by patient is good.  Pertinent medical history includes:  Anemia, depression, menopausal, migraines/headaches, osteoarthritis and osteoporosis.  Medical allergies: adhesive     Current medications:  Anti-depressants.  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                                Objective:  Standing Alignment:                Ankle/foot deviations: mild collapse of medial arch with single leg stance B                                                      Hip Evaluation    Hip Strength:    Flexion:   Left: 4+/5   Pain:  Right: 4/5   Pain:                      Abduction:  Left: 4+/5     Pain:      External Rotation:  Left: 4+/5   Pain:                        Knee Evaluation:  ROM:    AROM    Hyperextension:  Left:  0    Right: 0  Extension:  Left: 0    Right:  0  Flexion: Left: 140    Right: 140  PROM    Hyperextension: Left: 7    Right:  7  Extension: Left: 0    Right:  0  Flexion: Left: 142    Right:  142  Pain: no pain     Strength:     Extension:  Left: 5/5   Pain:      Right: 5/5   Pain:    Quad Set Left: WNL    Pain:   Quad Set Right: WNL    Pain:      Palpation:    Left knee tenderness present at:  Medial Joint Line; Lateral Joint Line and Patellar Inferior (medial inf fat pad )  Left knee tenderness not present at:  Patellar Tendon; Popliteal; Biceps Femoral; Semitendinosus; Semembranosus; Patellar Medial; Patellar Lateral and Patellar Superior  Right knee tenderness present at:  Medial Joint Line; Lateral Joint Line; Popliteal; Biceps Femoral and Semitendinosus  Right knee tenderness not present at:  Patellar Tendon; Patellar Medial; Patellar Lateral and Patellar Superior      Functional Testing:          Quad:    Single Leg Squat:  Left:         Mild ant knee pain, improved post 5 reps  Moderate loss of control and femoral IR  Right:      Ankle pain   Moderate loss of control and femoral IR  Bilateral Leg Squat:  No pain till 90 degrees ROM   Mild loss of control and femoral IR              General     ROS    Assessment/Plan:    Patient is a 59 year old female with both sides knee complaints.    Patient has the following significant findings with corresponding treatment plan.                Diagnosis 1:  B knee pain  Pain -  hot/cold therapy, self management, education, directional preference exercise and home program  Decreased strength - therapeutic exercise, therapeutic activities and home program  Impaired balance - neuro re-education and therapeutic activities  Decreased function - therapeutic activities    Therapy Evaluation Codes:   1) History comprised of:   Personal factors that impact the plan of care:      Time since onset of symptoms.     Comorbidity factors that impact the plan of care are:      Depression, Dizziness, Menopausal, Migraines/headaches and Osteoarthritis.     Medications impacting care: Anti-depressant and Sleep.  2) Examination of Body Systems comprised of:   Body structures and functions that impact the plan of care:      Knee.   Activity limitations that impact the plan of care are:      Squatting/kneeling, Stairs and Walking.  3) Clinical presentation characteristics are:   Stable/Uncomplicated.  4) Decision-Making    Moderate complexity using standardized patient assessment instrument and/or measureable assessment of functional outcome.  Cumulative Therapy Evaluation is: Low complexity.    Previous and current functional limitations:  (See Goal Flow Sheet for this information)    Short term and Long term goals: (See Goal Flow Sheet for this information)     Communication ability:  Patient appears to be able to clearly communicate and understand verbal and written communication and follow directions correctly.  Treatment Explanation - The following has been discussed with the patient:   RX ordered/plan of care  Anticipated outcomes  Possible risks and side effects  This patient would benefit from PT intervention to resume normal activities.   Rehab potential is excellent.    Frequency:  1 X week, once daily  Duration:  for 8 weeks  Discharge Plan:  Achieve all LTG.  Independent in home treatment program.  Return to previous functional level by discharge.  Reach maximal therapeutic benefit.    Please refer to the daily flowsheet for treatment today, total treatment time and time spent performing 1:1 timed codes.

## 2019-06-07 PROBLEM — M25.561 KNEE PAIN, RIGHT: Status: ACTIVE | Noted: 2019-06-07

## 2019-06-11 NOTE — PROGRESS NOTES
[facility]  Subjective:                                       Pertinent medical history includes:  Anemia, concussions/dizziness, depression, menopausal, migraines/headaches, osteoarthritis and osteoporosis.  Medical allergies: yes (Adhesive, chemical).  Other surgeries include:  Other (Collapsed lung).  Current medications:  Anti-depressants and sleep medication.    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                                Objective:  System    Physical Exam    General     ROS    Assessment/Plan:

## 2019-06-12 ENCOUNTER — THERAPY VISIT (OUTPATIENT)
Dept: PHYSICAL THERAPY | Facility: CLINIC | Age: 59
End: 2019-06-12
Payer: COMMERCIAL

## 2019-06-12 DIAGNOSIS — M25.561 CHRONIC PAIN OF RIGHT KNEE: ICD-10-CM

## 2019-06-12 DIAGNOSIS — G89.29 CHRONIC PAIN OF RIGHT KNEE: ICD-10-CM

## 2019-06-12 PROCEDURE — 97110 THERAPEUTIC EXERCISES: CPT | Mod: GP | Performed by: PHYSICAL THERAPIST

## 2019-06-12 PROCEDURE — 97140 MANUAL THERAPY 1/> REGIONS: CPT | Mod: GP | Performed by: PHYSICAL THERAPIST

## 2019-06-20 ENCOUNTER — THERAPY VISIT (OUTPATIENT)
Dept: PHYSICAL THERAPY | Facility: CLINIC | Age: 59
End: 2019-06-20
Payer: COMMERCIAL

## 2019-06-20 DIAGNOSIS — G89.29 CHRONIC PAIN OF RIGHT KNEE: ICD-10-CM

## 2019-06-20 DIAGNOSIS — M25.561 CHRONIC PAIN OF RIGHT KNEE: ICD-10-CM

## 2019-06-20 PROCEDURE — 97110 THERAPEUTIC EXERCISES: CPT | Mod: GP | Performed by: PHYSICAL THERAPIST

## 2019-06-20 PROCEDURE — 97140 MANUAL THERAPY 1/> REGIONS: CPT | Mod: GP | Performed by: PHYSICAL THERAPIST

## 2019-06-20 PROCEDURE — 97112 NEUROMUSCULAR REEDUCATION: CPT | Mod: GP | Performed by: PHYSICAL THERAPIST

## 2019-07-05 ENCOUNTER — THERAPY VISIT (OUTPATIENT)
Dept: PHYSICAL THERAPY | Facility: CLINIC | Age: 59
End: 2019-07-05
Payer: COMMERCIAL

## 2019-07-05 DIAGNOSIS — G89.29 CHRONIC PAIN OF RIGHT KNEE: ICD-10-CM

## 2019-07-05 DIAGNOSIS — M25.561 CHRONIC PAIN OF RIGHT KNEE: ICD-10-CM

## 2019-07-05 PROCEDURE — 97112 NEUROMUSCULAR REEDUCATION: CPT | Mod: GP | Performed by: PHYSICAL THERAPIST

## 2019-07-05 PROCEDURE — 97110 THERAPEUTIC EXERCISES: CPT | Mod: GP | Performed by: PHYSICAL THERAPIST
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
## 2019-07-12 ENCOUNTER — OFFICE VISIT (OUTPATIENT)
Dept: FAMILY MEDICINE | Facility: CLINIC | Age: 59
End: 2019-07-12
Payer: COMMERCIAL

## 2019-07-12 VITALS
HEART RATE: 108 BPM | RESPIRATION RATE: 12 BRPM | DIASTOLIC BLOOD PRESSURE: 66 MMHG | TEMPERATURE: 97.4 F | HEIGHT: 68 IN | OXYGEN SATURATION: 99 % | BODY MASS INDEX: 20.75 KG/M2 | SYSTOLIC BLOOD PRESSURE: 84 MMHG | WEIGHT: 136.9 LBS

## 2019-07-12 DIAGNOSIS — F41.1 GAD (GENERALIZED ANXIETY DISORDER): ICD-10-CM

## 2019-07-12 DIAGNOSIS — H60.393 INFECTIVE OTITIS EXTERNA, BILATERAL: ICD-10-CM

## 2019-07-12 DIAGNOSIS — F32.0 MILD MAJOR DEPRESSION (H): ICD-10-CM

## 2019-07-12 DIAGNOSIS — Z00.00 ROUTINE GENERAL MEDICAL EXAMINATION AT A HEALTH CARE FACILITY: Primary | ICD-10-CM

## 2019-07-12 DIAGNOSIS — Z23 NEED FOR VACCINATION: ICD-10-CM

## 2019-07-12 LAB
ALBUMIN SERPL-MCNC: 3.8 G/DL (ref 3.4–5)
ALP SERPL-CCNC: 90 U/L (ref 40–150)
ALT SERPL W P-5'-P-CCNC: 19 U/L (ref 0–50)
ANION GAP SERPL CALCULATED.3IONS-SCNC: 6 MMOL/L (ref 3–14)
AST SERPL W P-5'-P-CCNC: 14 U/L (ref 0–45)
BILIRUB SERPL-MCNC: 0.3 MG/DL (ref 0.2–1.3)
BUN SERPL-MCNC: 11 MG/DL (ref 7–30)
CALCIUM SERPL-MCNC: 9.2 MG/DL (ref 8.5–10.1)
CHLORIDE SERPL-SCNC: 111 MMOL/L (ref 94–109)
CHOLEST SERPL-MCNC: 206 MG/DL
CO2 SERPL-SCNC: 25 MMOL/L (ref 20–32)
CREAT SERPL-MCNC: 0.89 MG/DL (ref 0.52–1.04)
GFR SERPL CREATININE-BSD FRML MDRD: 71 ML/MIN/{1.73_M2}
GLUCOSE SERPL-MCNC: 68 MG/DL (ref 70–99)
HBA1C MFR BLD: 5.3 % (ref 0–5.6)
HDLC SERPL-MCNC: 67 MG/DL
LDLC SERPL CALC-MCNC: 115 MG/DL
NONHDLC SERPL-MCNC: 139 MG/DL
POTASSIUM SERPL-SCNC: 3.6 MMOL/L (ref 3.4–5.3)
PROT SERPL-MCNC: 7 G/DL (ref 6.8–8.8)
SODIUM SERPL-SCNC: 142 MMOL/L (ref 133–144)
TRIGL SERPL-MCNC: 119 MG/DL

## 2019-07-12 PROCEDURE — 83036 HEMOGLOBIN GLYCOSYLATED A1C: CPT | Performed by: FAMILY MEDICINE

## 2019-07-12 PROCEDURE — 36415 COLL VENOUS BLD VENIPUNCTURE: CPT | Performed by: FAMILY MEDICINE

## 2019-07-12 PROCEDURE — 90471 IMMUNIZATION ADMIN: CPT | Performed by: FAMILY MEDICINE

## 2019-07-12 PROCEDURE — 80053 COMPREHEN METABOLIC PANEL: CPT | Performed by: FAMILY MEDICINE

## 2019-07-12 PROCEDURE — 80061 LIPID PANEL: CPT | Performed by: FAMILY MEDICINE

## 2019-07-12 PROCEDURE — 90750 HZV VACC RECOMBINANT IM: CPT | Performed by: FAMILY MEDICINE

## 2019-07-12 PROCEDURE — 99396 PREV VISIT EST AGE 40-64: CPT | Mod: 25 | Performed by: FAMILY MEDICINE

## 2019-07-12 PROCEDURE — 99213 OFFICE O/P EST LOW 20 MIN: CPT | Mod: 25 | Performed by: FAMILY MEDICINE

## 2019-07-12 RX ORDER — NEOMYCIN SULFATE, POLYMYXIN B SULFATE, HYDROCORTISONE 3.5; 10000; 1 MG/ML; [USP'U]/ML; MG/ML
3 SOLUTION/ DROPS AURICULAR (OTIC) 4 TIMES DAILY
Qty: 3 ML | Refills: 0 | Status: SHIPPED | OUTPATIENT
Start: 2019-07-12 | End: 2019-07-17

## 2019-07-12 ASSESSMENT — ANXIETY QUESTIONNAIRES
7. FEELING AFRAID AS IF SOMETHING AWFUL MIGHT HAPPEN: NOT AT ALL
GAD7 TOTAL SCORE: 3
3. WORRYING TOO MUCH ABOUT DIFFERENT THINGS: SEVERAL DAYS
1. FEELING NERVOUS, ANXIOUS, OR ON EDGE: SEVERAL DAYS
IF YOU CHECKED OFF ANY PROBLEMS ON THIS QUESTIONNAIRE, HOW DIFFICULT HAVE THESE PROBLEMS MADE IT FOR YOU TO DO YOUR WORK, TAKE CARE OF THINGS AT HOME, OR GET ALONG WITH OTHER PEOPLE: SOMEWHAT DIFFICULT
6. BECOMING EASILY ANNOYED OR IRRITABLE: NOT AT ALL
2. NOT BEING ABLE TO STOP OR CONTROL WORRYING: NOT AT ALL
5. BEING SO RESTLESS THAT IT IS HARD TO SIT STILL: NOT AT ALL

## 2019-07-12 ASSESSMENT — PATIENT HEALTH QUESTIONNAIRE - PHQ9
SUM OF ALL RESPONSES TO PHQ QUESTIONS 1-9: 3
5. POOR APPETITE OR OVEREATING: SEVERAL DAYS

## 2019-07-12 ASSESSMENT — MIFFLIN-ST. JEOR: SCORE: 1243.72

## 2019-07-13 ASSESSMENT — ANXIETY QUESTIONNAIRES: GAD7 TOTAL SCORE: 3

## 2019-07-18 ENCOUNTER — THERAPY VISIT (OUTPATIENT)
Dept: PHYSICAL THERAPY | Facility: CLINIC | Age: 59
End: 2019-07-18
Payer: COMMERCIAL

## 2019-07-18 DIAGNOSIS — G89.29 CHRONIC PAIN OF RIGHT KNEE: ICD-10-CM

## 2019-07-18 DIAGNOSIS — M25.561 CHRONIC PAIN OF RIGHT KNEE: ICD-10-CM

## 2019-07-18 PROCEDURE — 97530 THERAPEUTIC ACTIVITIES: CPT | Mod: GP | Performed by: PHYSICAL THERAPIST

## 2019-07-18 PROCEDURE — 97110 THERAPEUTIC EXERCISES: CPT | Mod: GP | Performed by: PHYSICAL THERAPIST

## 2019-07-22 DIAGNOSIS — F41.1 GAD (GENERALIZED ANXIETY DISORDER): ICD-10-CM

## 2019-07-22 DIAGNOSIS — F51.01 PRIMARY INSOMNIA: ICD-10-CM

## 2019-07-22 RX ORDER — TRAZODONE HYDROCHLORIDE 50 MG/1
TABLET, FILM COATED ORAL
Qty: 45 TABLET | Refills: 1 | Status: SHIPPED | OUTPATIENT
Start: 2019-07-22

## 2019-09-06 ENCOUNTER — THERAPY VISIT (OUTPATIENT)
Dept: PHYSICAL THERAPY | Facility: CLINIC | Age: 59
End: 2019-09-06
Payer: COMMERCIAL

## 2019-09-06 DIAGNOSIS — G89.29 CHRONIC PAIN OF RIGHT KNEE: ICD-10-CM

## 2019-09-06 DIAGNOSIS — M25.561 CHRONIC PAIN OF RIGHT KNEE: ICD-10-CM

## 2019-09-06 PROCEDURE — 97530 THERAPEUTIC ACTIVITIES: CPT | Mod: GP | Performed by: PHYSICAL THERAPIST

## 2019-09-06 PROCEDURE — 97110 THERAPEUTIC EXERCISES: CPT | Mod: GP | Performed by: PHYSICAL THERAPIST

## 2019-09-07 NOTE — PROGRESS NOTES
Subjective:  HPI                    Objective:  System    Physical Exam    General     ROS    Assessment/Plan:    PROGRESS  REPORT    Progress reporting period is from 6-7-19 to 9-6-19.       SUBJECTIVE  Subjective changes noted by patient:   Pt. returns to PT today for the first time in almost 2 months. She notes less knee pain overall but still has some intermittent anterior R knee pain with prolonged walking and stairs. Pt. has been diligent with her home exercises but wants to review them today to make sure she is doing them correctly and if there are any progressions she should be making.       Current pain level is  1/10.     Previous pain level was  3/10.   Changes in function:  Yes (See Goal flowsheet attached for changes in current functional level)  Adverse reaction to treatment or activity: None    OBJECTIVE  Changes noted in objective findings:  Strength R quad 4+/5; hams 4+/5; hip flex 4+/5; abd 4/5; ext 4-/5     ASSESSMENT/PLAN  Updated problem list and treatment plan: Diagnosis 1:  R patellofemoral knee pain  Pain -  self management and education  Decreased strength - therapeutic exercise and therapeutic activities  Impaired muscle performance - neuro re-education  Decreased function - therapeutic activities  STG/LTGs have been met or progress has been made towards goals:  Yes (See Goal flow sheet completed today.)  Assessment of Progress: The patient's condition is improving.  Self Management Plans:  Patient has been instructed in a home treatment program.  Patient  has been instructed in self management of symptoms.  I have re-evaluated this patient and find that the nature, scope, duration and intensity of the therapy is appropriate for the medical condition of the patient.  Beronica continues to require the following intervention to meet STG and LTG's:  PT    Recommendations:  This patient would benefit from continued therapy.     Frequency:  1 X a month, once daily  Duration:  for 1  month        Please refer to the daily flowsheet for treatment today, total treatment time and time spent performing 1:1 timed codes.

## 2019-09-12 ENCOUNTER — ANCILLARY PROCEDURE (OUTPATIENT)
Dept: MAMMOGRAPHY | Facility: CLINIC | Age: 59
End: 2019-09-12
Attending: OBSTETRICS & GYNECOLOGY
Payer: COMMERCIAL

## 2019-09-12 ENCOUNTER — OFFICE VISIT (OUTPATIENT)
Dept: OBGYN | Facility: CLINIC | Age: 59
End: 2019-09-12
Payer: COMMERCIAL

## 2019-09-12 VITALS
BODY MASS INDEX: 20.67 KG/M2 | WEIGHT: 137 LBS | DIASTOLIC BLOOD PRESSURE: 65 MMHG | HEART RATE: 88 BPM | SYSTOLIC BLOOD PRESSURE: 95 MMHG | TEMPERATURE: 97.5 F

## 2019-09-12 DIAGNOSIS — N95.2 VAGINAL ATROPHY: ICD-10-CM

## 2019-09-12 DIAGNOSIS — N87.1 MODERATE DYSPLASIA OF CERVIX (CIN II): Primary | ICD-10-CM

## 2019-09-12 DIAGNOSIS — Z12.31 VISIT FOR SCREENING MAMMOGRAM: ICD-10-CM

## 2019-09-12 PROCEDURE — 77067 SCR MAMMO BI INCL CAD: CPT

## 2019-09-12 PROCEDURE — 99213 OFFICE O/P EST LOW 20 MIN: CPT | Performed by: OBSTETRICS & GYNECOLOGY

## 2019-09-12 PROCEDURE — 88175 CYTOPATH C/V AUTO FLUID REDO: CPT | Performed by: OBSTETRICS & GYNECOLOGY

## 2019-09-12 PROCEDURE — 87624 HPV HI-RISK TYP POOLED RSLT: CPT | Performed by: OBSTETRICS & GYNECOLOGY

## 2019-09-12 RX ORDER — ESTRADIOL 10 UG/1
10 INSERT VAGINAL DAILY
Qty: 32 TABLET | Refills: 3 | Status: SHIPPED | OUTPATIENT
Start: 2019-09-12 | End: 2020-07-24

## 2019-09-12 NOTE — NURSING NOTE
"Chief Complaint   Patient presents with     Gyn Exam       Initial BP 95/65 (BP Location: Left arm, Patient Position: Sitting, Cuff Size: Adult Regular)   Pulse 88   Temp 97.5  F (36.4  C) (Oral)   Wt 62.1 kg (137 lb)   LMP 2010   BMI 20.67 kg/m   Estimated body mass index is 20.67 kg/m  as calculated from the following:    Height as of 19: 1.734 m (5' 8.27\").    Weight as of this encounter: 62.1 kg (137 lb).  BP completed using cuff size: regular    Questioned patient about current smoking habits.  Pt. has never smoked.          The following HM Due: pap smear      The following patient reported/Care Every where data was sent to:  P ABSTRACT QUALITY INITIATIVES [[id number]]  JONNY Etienne MA           "

## 2019-09-13 NOTE — PROGRESS NOTES
CC:  Pap smear  HPI:  Beronica Azevedo is a 59 year old female  Patient's last menstrual period was 04/06/2010.  Here for diagnostic pap smear. Wants to review history and HPV so that was done today.     6/09: ASC H,   8/09: Cost - BEVERLY II  8/18/09: LEEP - BEVERLY II, clear margins  NIL paps: 2/10, 7/10, 9/12.  Plan pap in 1 yr.  3/4/16: Pap - NIL, + HR HPV.   3/18/16: Cost - ECC - negative. Plan cotest in 1 year.  06/06/17: NIL pap, Neg HR HPV result. Plan cotest in 1 year.  Pt has had a LEEP, needs 2 consecutive NIL/Neg cotest's then a 3 year cotest.   6/13/18 ASCUS pap/+ HR HPV (not 16 or 18) and endometrial cells.  Plan: colposcopy and endometrial cell follow up by 9/13/18.  07/25/18:Cost ECC Endocervical cells showed possible changes. Endometrial cells normal per provider Plan ECC in 3-6 months. Cotest in 1 year.   12/12/18: ECC Neg for dysplasia. Plan cotest due on 07/25/19.     Also c/o vaginal dryness. Is  and not sexually active right now, but may want to be in the future. Has friends on vaginal estrogen.      Past Medical History:   Diagnosis Date     Anxiety      Arthritis 2010     Cervical high risk HPV (human papillomavirus) test positive 03/04/2016    3/18/16 colp - ECC - negative     BEVERLY II (cervical intraepithelial neoplasia II) 2009    LEEP     Depression      Depressive disorder Adolescence    Managed with medication and therapy     Dry eye syndrome      Frequent UTI 2009     Headache(784.0)      IBS (irritable bowel syndrome)      Insomnia      Lattice degeneration of retina      Lyme disease      Myopic astigmatism      Nonsenile cataract      Osteopenia 2001,2012    Fosamax     Pneumothorax on right     at 15 y/o     PVD (posterior vitreous detachment), both eyes 7/19/10     Rosacea      Vasomotor rhinitis        Past Surgical History:   Procedure Laterality Date     BIOPSY  Moles skin tags     CHEST TUBE INSERTION       COLONOSCOPY  Normal     DILATION AND CURETTAGE, OPERATIVE HYSTEROSCOPY  WITH MORCELLATOR, COMBINED  1/2011    endometrial polyp     LEEP TX, CERVICAL  8/2009    BEVERLY II, clear margins     THORACIC SURGERY  1976    Collapsed lung       Family History   Problem Relation Age of Onset     Depression Sister      Uterine Cancer Sister      Neurologic Disorder Sister      Neurologic Disorder Brother      Neurologic Disorder Father      Lipids Father         high cholesterol     Gastrointestinal Disease Father         stomach ulcers     Cancer Father         melanoma     Skin Cancer Father      Hypertension Father      Hyperlipidemia Father      Neurologic Disorder Mother      Allergies Mother      Arthritis Mother      Depression Mother      Eye Disorder Mother      Lipids Mother         high cholesterol     Osteoporosis Mother      Hypertension Mother      Hyperlipidemia Mother      Thyroid Disease Mother      Neurologic Disorder Sister         epilepsy     Alcohol/Drug Sister         alcohol and drug dependency     Alcohol/Drug Brother         recovery alcohol and drug addit     Asthma Son         excercise and allergy induced     Asthma Daughter         excercise and allergy induced     Lipids Sister         high cholesterol     Gastrointestinal Disease Sister         hepatitis ??     Osteoporosis Sister      Breast Cancer Maternal Aunt      Asthma Daughter      Asthma Son      Melanoma No family hx of        Current Outpatient Medications   Medication Sig Dispense Refill     azelastine (ASTELIN) 0.1 % nasal spray INSTILL 1 SPRAY INTO THE NOSTIL(S) TWICE DAILY 30 mL 0     BENADRYL ALLERGY PO prn       biotin 2.5 mg/mL Take by mouth daily       butalbital-acetaminophen-caffeine (FIORICET/ESGIC) -40 MG per tablet TAKE 1 TABLET BY MOUTH EVERY 4 HOURS AS NEEDED 28 tablet 0     eletriptan (RELPAX) 40 MG tablet Take 40 mg by mouth as needed for headaches  10     estradiol (VAGIFEM) 10 MCG TABS vaginal tablet Place 1 tablet (10 mcg) vaginally daily For 2 weeks, then twice a week 32 tablet 3      ketoconazole (NIZORAL) 2 % external shampoo Apply topically daily as needed for itching or irritation 120 mL 11     magnesium 100 MG CAPS Take 400 mg by mouth       nortriptyline (PAMELOR) 50 MG capsule TAKE 1 CAPSULE(50 MG) BY MOUTH AT BEDTIME 90 capsule 1     Riboflavin (VITAMIN B-2 PO) Take 400 mg by mouth       topiramate (TOPAMAX) 25 MG tablet Take 25 mg by mouth 2 times daily       traZODone (DESYREL) 50 MG tablet 1/2 TABLET AT BEDTIME AS NEEDED FOR SLEEP 45 tablet 1       Allergies: Perfume; Tape [adhesive tape]; Compazine; Paxil [paroxetine]; Ropinirole hcl; Depakote [valproic acid]; Food; and Mushroom    ROS:  C: NEGATIVE for fever, chills, change in weight  R: NEGATIVE for significant cough or SOB  CV: NEGATIVE for chest pain, palpitations or peripheral edema  GI: NEGATIVE for nausea, abdominal pain, heartburn, or change in bowel habits  : NEGATIVE for frequency, dysuria, hematuria, vaginal discharge  P: NEGATIVE for changes in mood or affect    EXAM:  Blood pressure 95/65, pulse 88, temperature 97.5  F (36.4  C), temperature source Oral, weight 62.1 kg (137 lb), last menstrual period 04/06/2010, not currently breastfeeding.   BMI= Body mass index is 20.67 kg/m .  General - pleasant female in no acute distress.  Abdomen - soft, nontender, nondistended, no hepatosplenomegaly.  Pelvic - EG: adult female, BUS: within normal limits, Vagina: very atrophic with thin skin at introitus, no discharge, Cervix: no lesions or CMT **virginal spec used  Rectovaginal - deferred.  Musculoskeletal - no gross deformities.  Neurological - normal strength, sensation, and mental status.    ASSESSMENT/PLAN:  (N87.1) Moderate dysplasia of cervix (BEVERLY II)  (primary encounter diagnosis)  Comment: s/p LEEP 2009  Plan: HPV High Risk Types DNA Cervical, Pap imaged         thin layer diagnostic with HPV (select HPV         order below)        Discussed last pap was ASCUS with other hr hpv and if this pap is abnormal or has HPV  will need a repeat colposcopy. If normal and neg, would plan repeat pap smear one year.    (N95.2) Vaginal atrophy  Comment: very atrophic  Plan: estradiol (VAGIFEM) 10 MCG TABS vaginal tablet        Discussed topical estrogen cream vs vaginal tablets vs estrogen ring to try and help with vaginal atrophy.  At this time she would like to try tablets.  She will call if not having some relief after one month of treatment.      RAMSEY QUINTANA MD

## 2019-09-16 LAB
COPATH REPORT: NORMAL
PAP: NORMAL

## 2019-09-20 ENCOUNTER — ANCILLARY PROCEDURE (OUTPATIENT)
Dept: MAMMOGRAPHY | Facility: CLINIC | Age: 59
End: 2019-09-20
Attending: OBSTETRICS & GYNECOLOGY
Payer: COMMERCIAL

## 2019-09-20 DIAGNOSIS — R92.8 ABNORMAL MAMMOGRAM OF LEFT BREAST: ICD-10-CM

## 2019-09-29 ENCOUNTER — HEALTH MAINTENANCE LETTER (OUTPATIENT)
Age: 59
End: 2019-09-29

## 2019-10-10 ENCOUNTER — OFFICE VISIT (OUTPATIENT)
Dept: OBGYN | Facility: CLINIC | Age: 59
End: 2019-10-10
Payer: COMMERCIAL

## 2019-10-10 VITALS
HEART RATE: 97 BPM | OXYGEN SATURATION: 100 % | WEIGHT: 139 LBS | BODY MASS INDEX: 20.97 KG/M2 | SYSTOLIC BLOOD PRESSURE: 112 MMHG | DIASTOLIC BLOOD PRESSURE: 66 MMHG

## 2019-10-10 DIAGNOSIS — Z23 NEED FOR PROPHYLACTIC VACCINATION AND INOCULATION AGAINST INFLUENZA: ICD-10-CM

## 2019-10-10 DIAGNOSIS — R87.810 CERVICAL HIGH RISK HPV (HUMAN PAPILLOMAVIRUS) TEST POSITIVE: Primary | ICD-10-CM

## 2019-10-10 PROCEDURE — 88305 TISSUE EXAM BY PATHOLOGIST: CPT | Performed by: OBSTETRICS & GYNECOLOGY

## 2019-10-10 PROCEDURE — 90471 IMMUNIZATION ADMIN: CPT | Performed by: OBSTETRICS & GYNECOLOGY

## 2019-10-10 PROCEDURE — 90682 RIV4 VACC RECOMBINANT DNA IM: CPT | Performed by: OBSTETRICS & GYNECOLOGY

## 2019-10-10 PROCEDURE — 57454 BX/CURETT OF CERVIX W/SCOPE: CPT | Performed by: OBSTETRICS & GYNECOLOGY

## 2019-10-10 NOTE — PATIENT INSTRUCTIONS

## 2019-10-10 NOTE — PROGRESS NOTES
Beronica Azevedo is a 59 year old female  who presents for repeat colposcopy, referred by Dr. Bridges . Pap smear on 2019 showed: normal and with high risk HPV present: other. The prior pap showed ASCUS and with high risk HPV present: other.       Patient's last menstrual period was 2010.  UPT today is not done  Patient does not smoke  Type of contraception: none  Age at first sexual intercourse:   Number of sexual partners (lifetime): about 6  Past GYN history: Gonorrhea and HPV  Prior cervical/vaginal disease: BEVERLY 2.  Prior cervical treatment: LEEP.      PROCEDURE:      Before the procedure, it was ensured that the patient was educated regarding the nature of her findings to date, the implications, and what was to be done. She has been made aware of the role of HPV, the natural history of infection, ways to minimize her future risk, the effect of HPV on the cervix, and treatment options available should they be indicated. The details of the colposcopic procedure were reviewed. All questions were answered before proceeding, and informed consent was therefore obtained.      Speculum placed in vagina and excellent visualization of cervix acheived, cervix swabbed x 3 with acetic acid solution.      FINDINGS:  Cervix: slight acetowhitening noted 10:00, biopsy taken  Please refer to images section for details.  SCJ seen?: no   ECC done?: Yes   Satisfactory examination?: yes      ASSESSMENT: HPV related changes.  PLAN: specimens labelled and sent to Pathology, will base further treatment on Pathology findings, treatment options discussed with patient and post biopsy instructions given to patient      Shwetha Ibrahim MD

## 2019-10-13 LAB — COPATH REPORT: NORMAL

## 2019-10-18 ENCOUNTER — TRANSFERRED RECORDS (OUTPATIENT)
Dept: HEALTH INFORMATION MANAGEMENT | Facility: CLINIC | Age: 59
End: 2019-10-18

## 2019-10-25 ENCOUNTER — OFFICE VISIT (OUTPATIENT)
Dept: URGENT CARE | Facility: URGENT CARE | Age: 59
End: 2019-10-25
Payer: COMMERCIAL

## 2019-10-25 VITALS
HEART RATE: 90 BPM | HEIGHT: 68 IN | DIASTOLIC BLOOD PRESSURE: 64 MMHG | BODY MASS INDEX: 21.07 KG/M2 | SYSTOLIC BLOOD PRESSURE: 100 MMHG | RESPIRATION RATE: 12 BRPM | WEIGHT: 139 LBS | TEMPERATURE: 97.4 F

## 2019-10-25 DIAGNOSIS — R82.90 NONSPECIFIC FINDING ON EXAMINATION OF URINE: ICD-10-CM

## 2019-10-25 DIAGNOSIS — N30.90 BLADDER INFECTION: Primary | ICD-10-CM

## 2019-10-25 LAB
ALBUMIN UR-MCNC: 100 MG/DL
APPEARANCE UR: ABNORMAL
BACTERIA #/AREA URNS HPF: ABNORMAL /HPF
BILIRUB UR QL STRIP: NEGATIVE
COLOR UR AUTO: YELLOW
GLUCOSE UR STRIP-MCNC: NEGATIVE MG/DL
HGB UR QL STRIP: ABNORMAL
KETONES UR STRIP-MCNC: NEGATIVE MG/DL
LEUKOCYTE ESTERASE UR QL STRIP: ABNORMAL
NITRATE UR QL: POSITIVE
PH UR STRIP: 5.5 PH (ref 5–7)
RBC #/AREA URNS AUTO: >100 /HPF
SOURCE: ABNORMAL
SP GR UR STRIP: 1.02 (ref 1–1.03)
UROBILINOGEN UR STRIP-ACNC: 0.2 EU/DL (ref 0.2–1)
WBC #/AREA URNS AUTO: >100 /HPF

## 2019-10-25 PROCEDURE — 87086 URINE CULTURE/COLONY COUNT: CPT | Performed by: FAMILY MEDICINE

## 2019-10-25 PROCEDURE — 99213 OFFICE O/P EST LOW 20 MIN: CPT | Performed by: FAMILY MEDICINE

## 2019-10-25 PROCEDURE — 81001 URINALYSIS AUTO W/SCOPE: CPT | Performed by: FAMILY MEDICINE

## 2019-10-25 RX ORDER — NITROFURANTOIN 25; 75 MG/1; MG/1
100 CAPSULE ORAL 2 TIMES DAILY
Qty: 10 CAPSULE | Refills: 0 | Status: SHIPPED | OUTPATIENT
Start: 2019-10-25 | End: 2019-11-21

## 2019-10-25 RX ORDER — FLUCONAZOLE 150 MG/1
150 TABLET ORAL ONCE
Qty: 1 TABLET | Refills: 0 | Status: SHIPPED | OUTPATIENT
Start: 2019-10-25 | End: 2020-03-27

## 2019-10-25 ASSESSMENT — MIFFLIN-ST. JEOR: SCORE: 1252.97

## 2019-10-25 NOTE — PROGRESS NOTES
Subjective: Patient has had bladder infections before, came on yesterday with frequency and burning, no odor, feels achy but no fevers.  These seem to come out of the blue.  She did have a colposcopy 2 weeks ago.    Objective: No CVAT.  Abdomen is benign.  Urine is grossly positive culture sent    Assessment and plan: Simple UTI, often gets yeast infection so I gave her a prescription for Diflucan but Septra is the one that usually causes it so we will try nitrofurantoin this time

## 2019-10-26 LAB
BACTERIA SPEC CULT: NORMAL
SPECIMEN SOURCE: NORMAL

## 2019-10-27 NOTE — RESULT ENCOUNTER NOTE
We can't identify which of the bacteria caused the bladder infection as there are multiple, so hopefully you will get better with the one you have

## 2019-11-11 PROBLEM — M25.561 KNEE PAIN, RIGHT: Status: RESOLVED | Noted: 2019-06-07 | Resolved: 2019-11-11

## 2019-11-11 NOTE — PROGRESS NOTES
Patient did not return for further treatment and no additional progress was noted.  Please refer to the progress note and goal flowsheet completed on 09/06/19 for discharge information.

## 2019-11-21 ENCOUNTER — OFFICE VISIT (OUTPATIENT)
Dept: FAMILY MEDICINE | Facility: CLINIC | Age: 59
End: 2019-11-21
Payer: COMMERCIAL

## 2019-11-21 VITALS
WEIGHT: 138.7 LBS | OXYGEN SATURATION: 98 % | BODY MASS INDEX: 21.02 KG/M2 | DIASTOLIC BLOOD PRESSURE: 73 MMHG | SYSTOLIC BLOOD PRESSURE: 108 MMHG | HEART RATE: 111 BPM | HEIGHT: 68 IN | TEMPERATURE: 97.3 F

## 2019-11-21 DIAGNOSIS — H69.93 DYSFUNCTION OF BOTH EUSTACHIAN TUBES: Primary | ICD-10-CM

## 2019-11-21 PROCEDURE — 99213 OFFICE O/P EST LOW 20 MIN: CPT | Performed by: PHYSICIAN ASSISTANT

## 2019-11-21 RX ORDER — GLUCOSA SU 2KCL/CHONDROITIN SU 500-400 MG
100 CAPSULE ORAL 2 TIMES DAILY
COMMUNITY
End: 2024-01-25

## 2019-11-21 ASSESSMENT — MIFFLIN-ST. JEOR: SCORE: 1247.64

## 2019-11-21 NOTE — PROGRESS NOTES
Beronica Azevedo is a 59 year old female who presents to clinic today for the following health issues:    Concern - Ear pain  Onset: around July 12     Description:   Infective otitis media (bilateral)    Intensity: moderate, 6/10    Progression of Symptoms:  worsening, constant and intermittent    Accompanying Signs & Symptoms:  none    Previous history of similar problem:   no    Precipitating factors:   Worsened by: none noticed     Alleviating factors:  Improved by: nothing     Therapies Tried and outcome: Tried     -Patient states that she was seen for ear pain on July 12, was given ear drops that she was supposed to use 4x per day, which is tough for her due to long work shifts  -She has used q tips in the past, has not been using them  -Pain is intermittent, but has not been going away, describes pain as a sharp, sudden pain, occurs bilaterally  -Does not occur daily  -Reports having rhinorrhea yesterday, but has relieved today  -Patient takes Astelin, does not take decongestants, has tried Flonase in the past but it wasn't effective  -Notes that she is sensitive to smells, certain smells will sometimes trigger a migraine    Problem list and histories reviewed & adjusted, as indicated.  Additional history: as documented    ROS:  CONSTITUTIONAL: NEGATIVE for fever, chills, change in weight  INTEGUMENTARY/SKIN: NEGATIVE for worrisome rashes, moles or lesions  EYES: NEGATIVE for vision changes or irritation  ENT/MOUTH: NEGATIVE for mouth and throat problems, POSITIVE ear pain  RESP: NEGATIVE for significant cough or SOB  BREAST: NEGATIVE for masses, tenderness or discharge  CV: NEGATIVE for chest pain, palpitations or peripheral edema  GI: NEGATIVE for nausea, abdominal pain, heartburn, or change in bowel habits  : NEGATIVE for frequency, dysuria, or hematuria  MUSCULOSKELETAL: NEGATIVE for significant arthralgias or myalgia  NEURO: NEGATIVE for weakness, dizziness or paresthesias  ENDOCRINE: NEGATIVE for  temperature intolerance, skin/hair changes  HEME: NEGATIVE for bleeding problems  PSYCHIATRIC: NEGATIVE for changes in mood or affect    This document serves as a record of the services and decisions personally performed and made by Nadia Greer PA-C. It was created on her behalf by Jas Brewer, trained medical scribe. The creation of this document is based on the provider's statements to the medical scribe.  Jas Brewer 3:32 PM November 21, 2019    Patient Active Problem List   Diagnosis     Headache     Insomnia     Moderate dysplasia of cervix (BEVERLY II)     CARDIOVASCULAR SCREENING; LDL GOAL LESS THAN 160     Endometrial polyp     Iron deficiency anemia due to chronic blood loss     Rosacea     Onychodystrophy     Dermatitis     Osteopenia     Post-menopausal     Mild major depression (H)     IBS (irritable bowel syndrome)     Myopia     Regular astigmatism     Presbyopia     Posterior subcapsular polar senile cataract     Senile nuclear sclerosis     Macular puckering of retina     Cervicalgia     Pain in thoracic spine     Digital mucous cyst     Seborrheic keratosis, inflamed     Chronic pain of right knee     Acute pain of right knee     FREDIS (generalized anxiety disorder)     Neoplasm of uncertain behavior of skin     Dermatitis, seborrheic     Plantar fasciitis     Endometrial cells on cervical Pap smear inconsistent with last menstrual period     Past Surgical History:   Procedure Laterality Date     BIOPSY  Moles skin tags     CHEST TUBE INSERTION       COLONOSCOPY  Normal     DILATION AND CURETTAGE, OPERATIVE HYSTEROSCOPY WITH MORCELLATOR, COMBINED  1/2011    endometrial polyp     LEEP TX, CERVICAL  8/2009    BEVERLY II, clear margins     THORACIC SURGERY  1976    Collapsed lung       Social History     Tobacco Use     Smoking status: Never Smoker     Smokeless tobacco: Never Used   Substance Use Topics     Alcohol use: Yes     Comment: 1 8oz mixed drink or beer, minimal 1-2 per week     Family  History   Problem Relation Age of Onset     Depression Sister      Uterine Cancer Sister      Neurologic Disorder Sister      Neurologic Disorder Brother      Neurologic Disorder Father      Lipids Father         high cholesterol     Gastrointestinal Disease Father         stomach ulcers     Cancer Father         melanoma     Skin Cancer Father      Hypertension Father      Hyperlipidemia Father      Neurologic Disorder Mother      Allergies Mother      Arthritis Mother      Depression Mother      Eye Disorder Mother      Lipids Mother         high cholesterol     Osteoporosis Mother      Hypertension Mother      Hyperlipidemia Mother      Thyroid Disease Mother      Neurologic Disorder Sister         epilepsy     Alcohol/Drug Sister         alcohol and drug dependency     Alcohol/Drug Brother         recovery alcohol and drug addit     Asthma Son         excercise and allergy induced     Asthma Daughter         excercise and allergy induced     Lipids Sister         high cholesterol     Gastrointestinal Disease Sister         hepatitis ??     Osteoporosis Sister      Breast Cancer Maternal Aunt      Asthma Daughter      Asthma Son      Melanoma No family hx of            Labs reviewed in EPIC  Patient Active Problem List   Diagnosis     Headache     Insomnia     Moderate dysplasia of cervix (BEVERLY II)     CARDIOVASCULAR SCREENING; LDL GOAL LESS THAN 160     Endometrial polyp     Iron deficiency anemia due to chronic blood loss     Rosacea     Onychodystrophy     Dermatitis     Osteopenia     Post-menopausal     Mild major depression (H)     IBS (irritable bowel syndrome)     Myopia     Regular astigmatism     Presbyopia     Posterior subcapsular polar senile cataract     Senile nuclear sclerosis     Macular puckering of retina     Cervicalgia     Pain in thoracic spine     Digital mucous cyst     Seborrheic keratosis, inflamed     Chronic pain of right knee     Acute pain of right knee     FREDIS (generalized anxiety  disorder)     Neoplasm of uncertain behavior of skin     Dermatitis, seborrheic     Plantar fasciitis     Endometrial cells on cervical Pap smear inconsistent with last menstrual period     Past Surgical History:   Procedure Laterality Date     BIOPSY  Moles skin tags     CHEST TUBE INSERTION       COLONOSCOPY  Normal     DILATION AND CURETTAGE, OPERATIVE HYSTEROSCOPY WITH MORCELLATOR, COMBINED  1/2011    endometrial polyp     LEEP TX, CERVICAL  8/2009    BEVERLY II, clear margins     THORACIC SURGERY  1976    Collapsed lung       Social History     Tobacco Use     Smoking status: Never Smoker     Smokeless tobacco: Never Used   Substance Use Topics     Alcohol use: Yes     Comment: 1 8oz mixed drink or beer, minimal 1-2 per week     Family History   Problem Relation Age of Onset     Depression Sister      Uterine Cancer Sister      Neurologic Disorder Sister      Neurologic Disorder Brother      Neurologic Disorder Father      Lipids Father         high cholesterol     Gastrointestinal Disease Father         stomach ulcers     Cancer Father         melanoma     Skin Cancer Father      Hypertension Father      Hyperlipidemia Father      Neurologic Disorder Mother      Allergies Mother      Arthritis Mother      Depression Mother      Eye Disorder Mother      Lipids Mother         high cholesterol     Osteoporosis Mother      Hypertension Mother      Hyperlipidemia Mother      Thyroid Disease Mother      Neurologic Disorder Sister         epilepsy     Alcohol/Drug Sister         alcohol and drug dependency     Alcohol/Drug Brother         recovery alcohol and drug addit     Asthma Son         excercise and allergy induced     Asthma Daughter         excercise and allergy induced     Lipids Sister         high cholesterol     Gastrointestinal Disease Sister         hepatitis ??     Osteoporosis Sister      Breast Cancer Maternal Aunt      Asthma Daughter      Asthma Son      Melanoma No family hx of          Current  "Outpatient Medications   Medication Sig Dispense Refill     azelastine (ASTELIN) 0.1 % nasal spray INSTILL 1 SPRAY INTO THE NOSTIL(S) TWICE DAILY 30 mL 0     BENADRYL ALLERGY PO prn       biotin 2.5 mg/mL Take by mouth daily       butalbital-acetaminophen-caffeine (FIORICET/ESGIC) -40 MG per tablet TAKE 1 TABLET BY MOUTH EVERY 4 HOURS AS NEEDED 28 tablet 0     Coenzyme Q10 (CO Q10) 100 MG CAPS Take 100 mg by mouth 2 times daily       eletriptan (RELPAX) 40 MG tablet Take 40 mg by mouth as needed for headaches  10     ketoconazole (NIZORAL) 2 % external shampoo Apply topically daily as needed for itching or irritation 120 mL 11     magnesium 100 MG CAPS Take 400 mg by mouth       estradiol (VAGIFEM) 10 MCG TABS vaginal tablet Place 1 tablet (10 mcg) vaginally daily For 2 weeks, then twice a week (Patient not taking: Reported on 11/21/2019) 32 tablet 3     nortriptyline (PAMELOR) 50 MG capsule TAKE 1 CAPSULE(50 MG) BY MOUTH AT BEDTIME 90 capsule 1     Riboflavin (VITAMIN B-2 PO) Take 400 mg by mouth       topiramate (TOPAMAX) 25 MG tablet Take 25 mg by mouth 2 times daily       traZODone (DESYREL) 50 MG tablet 1/2 TABLET AT BEDTIME AS NEEDED FOR SLEEP 45 tablet 1       OBJECTIVE:                                                    /73   Pulse 111   Temp 97.3  F (36.3  C) (Oral)   Ht 1.727 m (5' 8\")   Wt 62.9 kg (138 lb 11.2 oz)   LMP 04/06/2010   SpO2 98%   BMI 21.09 kg/m   Body mass index is 21.09 kg/m .   GENERAL:: healthy, alert and no distress  HENT: mild irritation of the left ear canal; Nose- normal; Mouth- no ulcers, no lesions  NECK: no tenderness, no adenopathy, no asymmetry, no masses, no stiffness; thyroid- normal to palpation  RESP: lungs clear to auscultation - no rales, no rhonchi, no wheezes  CV: regular rates and rhythm, normal S1 S2, no S3 or S4 and no murmur, no click or rub -  MS: extremities- no gross deformities noted, no edema  SKIN: no suspicious lesions, no rashes  NEURO: " strength and tone- normal, sensory exam- grossly normal, mentation- intact, speech- normal, reflexes- symmetric  PSYCH: Alert and oriented times 3; speech- coherent , normal rate and volume; able to articulate logical thoughts, able to abstract reason, no tangential thoughts, no hallucinations or delusions, affect- normal          ASSESSMENT/PLAN:                                                        ICD-10-CM    1. Dysfunction of both eustachian tubes H69.83        Patient Instructions   Start zyrtec daily   Sudafed during the day if needed  If no improvement, follow up with ENT  Return to clinic for any new or worsening symptoms or go to ER Urgent care in off hours       Patient Education     Earache, No Infection (Adult)  Earaches can happen without an infection. This occurs when air and fluid build up behind the eardrum causing a feeling of fullness and discomfort and reduced hearing. This is called otitis media with effusion (OME) or serous otitis media. It means there is fluid in the middle ear. It is not the same as acute otitis media, which is typically from infection.  OME can happen when you have a cold if congestion blocks the passage that drains the middle ear. This passage is called the eustachian tube. OME may also occur with nasal allergies or after a bacterial middle ear infection.    The pain or discomfort may come and go. You may hear clicking or popping sounds when you chew or swallow. You may feel that your balance is off. Or you may hear ringing in the ear.  It often takes from several weeks up to 3 months for the fluid to clear on its own. Oral pain relievers and ear drops help if there is pain. Decongestants and antihistamines sometimes help. Antibiotics don't help since there is no infection. Your doctor may prescribe a nasal spray to help reduce swelling in the nose and eustachian tube. This can allow the ear to drain.  If your OME doesn't improve after 3 months, surgery may be used to  drain the fluid and insert a small tube in the eardrum to allow continued drainage.  Because the middle ear fluid can become infected, it is important to watch for signs of an ear infection which may develop later. These signs include increased ear pain, fever, or drainage from the ear.  Home care  The following guidelines will help you care for yourself at home:    You may use over-the-counter medicine as directed to control pain, unless another medicine was prescribed. If you have chronic liver or kidney disease or ever had a stomach ulcer or GI bleeding, talk with your doctor before using these medicines. Aspirin should never be used in anyone under 18 years of age who is ill with a fever. It may cause severe liver damage.    You may use over-the-counter decongestants such as phenylephrine or pseudoephedrine. But they are not always helpful. Don't use nasal spray decongestants more than 3 days. Longer use can make congestion worse. Prescription nasal sprays from your doctor don't typically have those restrictions.    Antihistamines may help if you are also having allergy symptoms.    You may use medicines such as guaifenesin to thin mucus and promote drainage.  Follow-up care  Follow up with your healthcare provider or as advised if you are not feeling better after 3 days.  When to seek medical advice  Call your healthcare provider right away if any of the following occur:    Your ear pain gets worse or does not start to improve     Fever of 100.4 F (38 C) or higher, or as directed by your healthcare provider    Fluid or blood draining from the ear    Headache or sinus pain    Stiff neck    Unusual drowsiness or confusion  Date Last Reviewed: 10/1/2016    7926-6462 [employer]. [address] [place] [address], [place] [address]. All rights reserved. This information is not intended as a substitute for professional medical care. Always follow your healthcare professional's instructions.        "        Estimated body mass index is 21.09 kg/m  as calculated from the following:    Height as of this encounter: 1.727 m (5' 8\").    Weight as of this encounter: 62.9 kg (138 lb 11.2 oz).     The information in this document, created by the medical scribe for me, accurately reflects the services I personally performed and the decisions made by me. I have reviewed and approved this document for accuracy prior to leaving the patient care area.  November 21, 2019 3:33 PM    Nadia Greer  [facility]        "

## 2019-11-21 NOTE — PATIENT INSTRUCTIONS
Start zyrtec daily   Sudafed during the day if needed  If no improvement, follow up with ENT  Return to clinic for any new or worsening symptoms or go to ER Urgent care in off hours       Patient Education     Earache, No Infection (Adult)  Earaches can happen without an infection. This occurs when air and fluid build up behind the eardrum causing a feeling of fullness and discomfort and reduced hearing. This is called otitis media with effusion (OME) or serous otitis media. It means there is fluid in the middle ear. It is not the same as acute otitis media, which is typically from infection.  OME can happen when you have a cold if congestion blocks the passage that drains the middle ear. This passage is called the eustachian tube. OME may also occur with nasal allergies or after a bacterial middle ear infection.    The pain or discomfort may come and go. You may hear clicking or popping sounds when you chew or swallow. You may feel that your balance is off. Or you may hear ringing in the ear.  It often takes from several weeks up to 3 months for the fluid to clear on its own. Oral pain relievers and ear drops help if there is pain. Decongestants and antihistamines sometimes help. Antibiotics don't help since there is no infection. Your doctor may prescribe a nasal spray to help reduce swelling in the nose and eustachian tube. This can allow the ear to drain.  If your OME doesn't improve after 3 months, surgery may be used to drain the fluid and insert a small tube in the eardrum to allow continued drainage.  Because the middle ear fluid can become infected, it is important to watch for signs of an ear infection which may develop later. These signs include increased ear pain, fever, or drainage from the ear.  Home care  The following guidelines will help you care for yourself at home:    You may use over-the-counter medicine as directed to control pain, unless another medicine was prescribed. If you have chronic  liver or kidney disease or ever had a stomach ulcer or GI bleeding, talk with your doctor before using these medicines. Aspirin should never be used in anyone under 18 years of age who is ill with a fever. It may cause severe liver damage.    You may use over-the-counter decongestants such as phenylephrine or pseudoephedrine. But they are not always helpful. Don't use nasal spray decongestants more than 3 days. Longer use can make congestion worse. Prescription nasal sprays from your doctor don't typically have those restrictions.    Antihistamines may help if you are also having allergy symptoms.    You may use medicines such as guaifenesin to thin mucus and promote drainage.  Follow-up care  Follow up with your healthcare provider or as advised if you are not feeling better after 3 days.  When to seek medical advice  Call your healthcare provider right away if any of the following occur:    Your ear pain gets worse or does not start to improve     Fever of 100.4 F (38 C) or higher, or as directed by your healthcare provider    Fluid or blood draining from the ear    Headache or sinus pain    Stiff neck    Unusual drowsiness or confusion  Date Last Reviewed: 10/1/2016    4101-9225 [employer]. [address] [place] [address], [place] [address]. All rights reserved. This information is not intended as a substitute for professional medical care. Always follow your healthcare professional's instructions.

## 2020-01-28 DIAGNOSIS — J31.0 CHRONIC RHINITIS: ICD-10-CM

## 2020-01-29 RX ORDER — AZELASTINE 1 MG/ML
SPRAY, METERED NASAL
Qty: 30 ML | Refills: 1 | Status: SHIPPED | OUTPATIENT
Start: 2020-01-29 | End: 2022-01-10

## 2020-01-29 NOTE — TELEPHONE ENCOUNTER
"Requested Prescriptions   Pending Prescriptions Disp Refills     azelastine (ASTELIN) 0.1 % nasal spray [Pharmacy Med Name: AZELASTINE 0.1%(137MCG) NASAL-200SP] 30 mL 0     Sig: INSTILL 1 SPRAY INTO THE NOSTRIL TWICE DAILY  Last Written Prescription Date:  03/26/209  Last Fill Quantity: 30,  # refills: 0   Last office visit: 11/21/2019 with prescribing provider:  11/21/2019   Future Office Visit:         Antihistamines Protocol Passed - 1/28/2020  6:19 PM        Passed - Patient is 3-64 years of age     Apply weight-based dosing for peds patients age 3 - 12 years of age.    Forward request to provider for patients under the age of 3 or over the age of 64.          Passed - Recent (12 mo) or future (30 days) visit within the authorizing provider's specialty     Patient has had an office visit with the authorizing provider or a provider within the authorizing providers department within the previous 12 mos or has a future within next 30 days. See \"Patient Info\" tab in inbasket, or \"Choose Columns\" in Meds & Orders section of the refill encounter.              Passed - Medication is active on med list        "

## 2020-01-29 NOTE — TELEPHONE ENCOUNTER
Prescription approved per [facility] [facility].    Delmis Fallon RN   [facility]

## 2020-03-12 ENCOUNTER — OFFICE VISIT (OUTPATIENT)
Dept: DERMATOLOGY | Facility: CLINIC | Age: 60
End: 2020-03-12
Payer: COMMERCIAL

## 2020-03-12 DIAGNOSIS — L21.9 DERMATITIS, SEBORRHEIC: ICD-10-CM

## 2020-03-12 DIAGNOSIS — L60.3 NAIL DYSTROPHY: Primary | ICD-10-CM

## 2020-03-12 RX ORDER — UREA 200 MG/G
CREAM TOPICAL PRN
Qty: 113.4 G | Refills: 11 | Status: SHIPPED | OUTPATIENT
Start: 2020-03-12 | End: 2020-07-24

## 2020-03-12 RX ORDER — KETOCONAZOLE 20 MG/ML
SHAMPOO TOPICAL DAILY PRN
Qty: 120 ML | Refills: 11 | Status: SHIPPED | OUTPATIENT
Start: 2020-03-12 | End: 2021-07-19

## 2020-03-12 ASSESSMENT — PAIN SCALES - GENERAL: PAINLEVEL: NO PAIN (0)

## 2020-03-12 NOTE — LETTER
3/12/2020       RE: Beronica Azevedo  [address]  [facility] [id number]     Dear Colleague,    Thank you for referring your patient, Beronica Azevedo, to the [facility]ER[facility]OLOG[facility] at [facility]. Please see a copy of my visit note below.    [facility] Dermatology Note      Dermatology Problem List:    Last UBSE: 3/12/2020    [facility] patient of Dr. Sean Barreto  1. Seborrheic dermatitis, scalp  - Current tx: ketoconazole shampoo three times per week  2. Eczematous dermatitis  3. Erythematotelangiectatic rosacea  - Referred to laser clinic but not pursued  4. Epidermal inclusion cyst, upper back  - s/p excisional bx of dilated pore of Nichole 8/1/2017  5. Scattered benign nevi  6. Seborrheic keratoses, non-irritated  7. 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 Beronica Azevedo is a 59 year old female who presents as a follow-up for a few spots of the trunk. The patient was last seen on 2/12/2019, when she was told to continue to use ketoconazole 2% shampoo at least once weekly for seborrheic dermatitis. She was also noted to have an epidermoid cyst of the upper back that had been biopsied before in 2017. The patient says that this is asymptomatic and does not want it removed with surgery.    The patient presents to the dermatology clinic for a skin cancer screening/spot check. The patient says they have seen a dermatologist before for a skin cancer screening. The patient is not particularly concerned about any spots today, as well as denies any other spots of concern, including any that are burning, bleeding, tingling or pruritic.    The patient does have a history of frequent blistering  sunburns as a child. There is no personal history of skin cancer. There is possible family history of skin cancer of her father. The patient denies being on immunosuppressive medications that could increase the risk of skin cancer. The patient does try to use sunscreen regularly when outside and does attempt to stay out of the sun whenever possible.    The patient denies any constitutional symptoms, lymphadenopathy, unintentional weight loss or decreased appetite. No other skin complaints today.    Past Medical History:   Patient Active Problem List   Diagnosis     Headache     Insomnia     Moderate dysplasia of cervix (BEVERLY II)     CARDIOVASCULAR SCREENING; LDL GOAL LESS THAN 160     Endometrial polyp     Iron deficiency anemia due to chronic blood loss     Rosacea     Onychodystrophy     Dermatitis     Osteopenia     Post-menopausal     Mild major depression (H)     IBS (irritable bowel syndrome)     Myopia     Regular astigmatism     Presbyopia     Posterior subcapsular polar senile cataract     Senile nuclear sclerosis     Macular puckering of retina     Cervicalgia     Pain in thoracic spine     Digital mucous cyst     Seborrheic keratosis, inflamed     Chronic pain of right knee     Acute pain of right knee     FREDIS (generalized anxiety disorder)     Neoplasm of uncertain behavior of skin     Dermatitis, seborrheic     Plantar fasciitis     Endometrial cells on cervical Pap smear inconsistent with last menstrual period     Past Medical History:   Diagnosis Date     Anxiety      Arthritis 2010     Cervical high risk HPV (human papillomavirus) test positive 03/04/2016, 09/12/19    See problem list.      BEVERLY II (cervical intraepithelial neoplasia II) 2009    LEEP     Depression      Depressive disorder Adolescence    Managed with medication and therapy     Dry eye syndrome      Frequent UTI 2009     Headache(784.0)      IBS (irritable bowel syndrome)      Insomnia      Lattice degeneration of retina      Lyme  disease      Myopic astigmatism      Nonsenile cataract      Osteopenia 2001,2012    Fosamax     Pneumothorax on right     at 17 y/o     PVD (posterior vitreous detachment), both eyes 7/19/10     Rosacea      Vasomotor rhinitis      Past Surgical History:   Procedure Laterality Date     BIOPSY  Moles skin tags     CHEST TUBE INSERTION       COLONOSCOPY  Normal     DILATION AND CURETTAGE, OPERATIVE HYSTEROSCOPY WITH MORCELLATOR, COMBINED  1/2011    endometrial polyp     LEEP TX, CERVICAL  8/2009    BEVERLY II, clear margins     THORACIC SURGERY  1976    Collapsed lung       Social History:  Patient reports that she has never smoked. She has never used smokeless tobacco. She reports current alcohol use. She reports that she does not use drugs.    Family History:  Family History   Problem Relation Age of Onset     Depression Sister      Uterine Cancer Sister      Neurologic Disorder Sister      Neurologic Disorder Brother      Neurologic Disorder Father      Lipids Father         high cholesterol     Gastrointestinal Disease Father         stomach ulcers     Cancer Father         melanoma     Skin Cancer Father      Hypertension Father      Hyperlipidemia Father      Neurologic Disorder Mother      Allergies Mother      Arthritis Mother      Depression Mother      Eye Disorder Mother      Lipids Mother         high cholesterol     Osteoporosis Mother      Hypertension Mother      Hyperlipidemia Mother      Thyroid Disease Mother      Neurologic Disorder Sister         epilepsy     Alcohol/Drug Sister         alcohol and drug dependency     Alcohol/Drug Brother         recovery alcohol and drug addit     Asthma Son         excercise and allergy induced     Asthma Daughter         excercise and allergy induced     Lipids Sister         high cholesterol     Gastrointestinal Disease Sister         hepatitis ??     Osteoporosis Sister      Breast Cancer Maternal Aunt      Asthma Daughter      Asthma Son      Melanoma No family  hx of        Medications:  Current Outpatient Medications   Medication Sig Dispense Refill     ketoconazole (NIZORAL) 2 % external shampoo Apply topically daily as needed for itching or irritation 120 mL 11     urea (GORMEL) 20 % external cream Apply topically as needed To the nails 113.4 g 11     azelastine (ASTELIN) 0.1 % nasal spray INSTILL 1 SPRAY INTO THE NOSTRIL TWICE DAILY 30 mL 1     BENADRYL ALLERGY PO prn       biotin 2.5 mg/mL Take by mouth daily       butalbital-acetaminophen-caffeine (FIORICET/ESGIC) -40 MG per tablet TAKE 1 TABLET BY MOUTH EVERY 4 HOURS AS NEEDED 28 tablet 0     Coenzyme Q10 (CO Q10) 100 MG CAPS Take 100 mg by mouth 2 times daily       eletriptan (RELPAX) 40 MG tablet Take 40 mg by mouth as needed for headaches  10     estradiol (VAGIFEM) 10 MCG TABS vaginal tablet Place 1 tablet (10 mcg) vaginally daily For 2 weeks, then twice a week (Patient not taking: Reported on 11/21/2019) 32 tablet 3     magnesium 100 MG CAPS Take 400 mg by mouth       nortriptyline (PAMELOR) 50 MG capsule TAKE 1 CAPSULE(50 MG) BY MOUTH AT BEDTIME 90 capsule 1     Riboflavin (VITAMIN B-2 PO) Take 400 mg by mouth       topiramate (TOPAMAX) 25 MG tablet Take 25 mg by mouth 2 times daily       traZODone (DESYREL) 50 MG tablet 1/2 TABLET AT BEDTIME AS NEEDED FOR SLEEP 45 tablet 1        Allergies   Allergen Reactions     Perfume Other (See Comments)     Scents/smells     Tape [Adhesive Tape] Rash     Compazine Other (See Comments)     Hyperactivity       Paxil [Paroxetine] Other (See Comments)     Ropinirole Hcl Unknown     TABS     Depakote [Valproic Acid] Rash     Rash       Food Itching and Rash     mushrooms     Mushroom Itching and Rash         Review of Systems:  -Skin Establ Pt: The patient denies any new rash, pruritus, or lesions that are symptomatic, changing or bleeding, except as per HPI.  -Constitutional: Otherwise feeling well today, in usual state of health.  -HEENT: Patient denies nonhealing  oral sores.  -Skin: As above in HPI. No additional skin concerns.    Physical exam:  Vitals: LMP 04/06/2010   GEN: This is a well developed, well-nourished female in no acute distress, in a pleasant mood.    SKIN: Waist-up skin, which includes the head/face, neck, both arms, chest, back, abdomen, digits and/or nails was examined.  -Delgado skin type: II  -There are dome shaped bright red papules on the trunk.   Multiple regular brown pigmented macules and papules are identified on the trunk and upper extremities.   There are fine lines and dyspigmentation on sun exposed areas of the face and chest.  There is macular erythema of the scalp with mild flaky white scale.  Scattered brown macules on sun exposed areas.  There are waxy stuck on tan to brown papules on the trunk, including the upper central back.  - At the upper central back, there is a soft, somewhat mobile, roughly 8 mm nodule with a central punctum that appears non-irritated and is non-tender to palpation.  -No other lesions of concern on areas examined.     Impression/Plan:  1. Longitudinall ridging of the fingernails  - This is a normal physiologic development with age. It is reasonable to treat this with urea 20% cream daily as needed, an exfoliating agent.    2. Seborrheic dermatitis  - Use ketoconazole 2% shampoo three times per week    3. Seborrheic keratosis, non irritated, of the upper central back  - ABCDs of melanoma were discussed and self skin checks were advised. No further intervention required. Patient to report changes. Seborrheic keratosis: Discussed benign nature of lesions.    CC Referred MD Dipesh  No address on file on close of this encounter.  Follow-up prn for new or changing lesions. Okay for her PCP to refill the above medications as needed given their relatively low risk adverse effect profile.    Dr. Quiroz staffed the patient.    Staff Involved:  Resident(Dr. Sean Barreto)/Staff    Patient was seen and examined with the  dermatology resident. I agree with the history, review of systems, physical examination, assessments and plan.    Lluvia Quiroz MD  Professor and  Chair  Department of Dermatology  [facility]

## 2020-03-12 NOTE — NURSING NOTE
Dermatology Rooming Note    Beronica Azevedo's goals for this visit include:   Chief Complaint   Patient presents with     Derm Problem     Seb derm f/u - Beronica is concerned about spots on the back and chest      Marivel Cottrell, EMT

## 2020-03-14 NOTE — PROGRESS NOTES
[facility] Dermatology Note      Dermatology Problem List:    Last UBSE: 3/12/2020    [facility] of Dr. Sean Barreto  1. Seborrheic dermatitis, scalp  - Current tx: ketoconazole shampoo three times per week  2. Eczematous dermatitis  3. Erythematotelangiectatic rosacea  - Referred to laser clinic but not pursued  4. Epidermal inclusion cyst, upper back  - s/p excisional bx of dilated pore of Nichole 8/1/2017  5. Scattered benign nevi  6. Seborrheic keratoses, non-irritated  7. 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 Beronica Azevedo is a 59 year old female who presents as a follow-up for a few spots of the trunk. The patient was last seen on 2/12/2019, when she was told to continue to use ketoconazole 2% shampoo at least once weekly for seborrheic dermatitis. She was also noted to have an epidermoid cyst of the upper back that had been biopsied before in 2017. The patient says that this is asymptomatic and does not want it removed with surgery.    The patient presents to the dermatology clinic for a skin cancer screening/spot check. The patient says they have seen a dermatologist before for a skin cancer screening. The patient is not particularly concerned about any spots today, as well as denies any other spots of concern, including any that are burning, bleeding, tingling or pruritic.    The patient does have a history of frequent blistering sunburns as a child. There is no personal history of skin cancer. There is possible family history of skin cancer of her father. The patient denies being on immunosuppressive medications that could increase the risk of skin cancer. The patient does try to use sunscreen regularly  when outside and does attempt to stay out of the sun whenever possible.    The patient denies any constitutional symptoms, lymphadenopathy, unintentional weight loss or decreased appetite. No other skin complaints today.    Past Medical History:   Patient Active Problem List   Diagnosis     Headache     Insomnia     Moderate dysplasia of cervix (BEVERLY II)     CARDIOVASCULAR SCREENING; LDL GOAL LESS THAN 160     Endometrial polyp     Iron deficiency anemia due to chronic blood loss     Rosacea     Onychodystrophy     Dermatitis     Osteopenia     Post-menopausal     Mild major depression (H)     IBS (irritable bowel syndrome)     Myopia     Regular astigmatism     Presbyopia     Posterior subcapsular polar senile cataract     Senile nuclear sclerosis     Macular puckering of retina     Cervicalgia     Pain in thoracic spine     Digital mucous cyst     Seborrheic keratosis, inflamed     Chronic pain of right knee     Acute pain of right knee     FREDIS (generalized anxiety disorder)     Neoplasm of uncertain behavior of skin     Dermatitis, seborrheic     Plantar fasciitis     Endometrial cells on cervical Pap smear inconsistent with last menstrual period     Past Medical History:   Diagnosis Date     Anxiety      Arthritis 2010     Cervical high risk HPV (human papillomavirus) test positive 03/04/2016, 09/12/19    See problem list.      BEVERLY II (cervical intraepithelial neoplasia II) 2009    LEEP     Depression      Depressive disorder Adolescence    Managed with medication and therapy     Dry eye syndrome      Frequent UTI 2009     Headache(784.0)      IBS (irritable bowel syndrome)      Insomnia      Lattice degeneration of retina      Lyme disease      Myopic astigmatism      Nonsenile cataract      Osteopenia 2001,2012    Fosamax     Pneumothorax on right     at 15 y/o     PVD (posterior vitreous detachment), both eyes 7/19/10     Rosacea      Vasomotor rhinitis      Past Surgical History:   Procedure Laterality Date      BIOPSY  Moles skin tags     CHEST TUBE INSERTION       COLONOSCOPY  Normal     DILATION AND CURETTAGE, OPERATIVE HYSTEROSCOPY WITH MORCELLATOR, COMBINED  1/2011    endometrial polyp     LEEP TX, CERVICAL  8/2009    BEVERLY II, clear margins     THORACIC SURGERY  1976    Collapsed lung       Social History:  Patient reports that she has never smoked. She has never used smokeless tobacco. She reports current alcohol use. She reports that she does not use drugs.    Family History:  Family History   Problem Relation Age of Onset     Depression Sister      Uterine Cancer Sister      Neurologic Disorder Sister      Neurologic Disorder Brother      Neurologic Disorder Father      Lipids Father         high cholesterol     Gastrointestinal Disease Father         stomach ulcers     Cancer Father         melanoma     Skin Cancer Father      Hypertension Father      Hyperlipidemia Father      Neurologic Disorder Mother      Allergies Mother      Arthritis Mother      Depression Mother      Eye Disorder Mother      Lipids Mother         high cholesterol     Osteoporosis Mother      Hypertension Mother      Hyperlipidemia Mother      Thyroid Disease Mother      Neurologic Disorder Sister         epilepsy     Alcohol/Drug Sister         alcohol and drug dependency     Alcohol/Drug Brother         recovery alcohol and drug addit     Asthma Son         excercise and allergy induced     Asthma Daughter         excercise and allergy induced     Lipids Sister         high cholesterol     Gastrointestinal Disease Sister         hepatitis ??     Osteoporosis Sister      Breast Cancer Maternal Aunt      Asthma Daughter      Asthma Son      Melanoma No family hx of        Medications:  Current Outpatient Medications   Medication Sig Dispense Refill     ketoconazole (NIZORAL) 2 % external shampoo Apply topically daily as needed for itching or irritation 120 mL 11     urea (GORMEL) 20 % external cream Apply topically as needed To the nails  113.4 g 11     azelastine (ASTELIN) 0.1 % nasal spray INSTILL 1 SPRAY INTO THE NOSTRIL TWICE DAILY 30 mL 1     BENADRYL ALLERGY PO prn       biotin 2.5 mg/mL Take by mouth daily       butalbital-acetaminophen-caffeine (FIORICET/ESGIC) -40 MG per tablet TAKE 1 TABLET BY MOUTH EVERY 4 HOURS AS NEEDED 28 tablet 0     Coenzyme Q10 (CO Q10) 100 MG CAPS Take 100 mg by mouth 2 times daily       eletriptan (RELPAX) 40 MG tablet Take 40 mg by mouth as needed for headaches  10     estradiol (VAGIFEM) 10 MCG TABS vaginal tablet Place 1 tablet (10 mcg) vaginally daily For 2 weeks, then twice a week (Patient not taking: Reported on 11/21/2019) 32 tablet 3     magnesium 100 MG CAPS Take 400 mg by mouth       nortriptyline (PAMELOR) 50 MG capsule TAKE 1 CAPSULE(50 MG) BY MOUTH AT BEDTIME 90 capsule 1     Riboflavin (VITAMIN B-2 PO) Take 400 mg by mouth       topiramate (TOPAMAX) 25 MG tablet Take 25 mg by mouth 2 times daily       traZODone (DESYREL) 50 MG tablet 1/2 TABLET AT BEDTIME AS NEEDED FOR SLEEP 45 tablet 1        Allergies   Allergen Reactions     Perfume Other (See Comments)     Scents/smells     Tape [Adhesive Tape] Rash     Compazine Other (See Comments)     Hyperactivity       Paxil [Paroxetine] Other (See Comments)     Ropinirole Hcl Unknown     TABS     Depakote [Valproic Acid] Rash     Rash       Food Itching and Rash     mushrooms     Mushroom Itching and Rash         Review of Systems:  -Skin Establ Pt: The patient denies any new rash, pruritus, or lesions that are symptomatic, changing or bleeding, except as per HPI.  -Constitutional: Otherwise feeling well today, in usual state of health.  -HEENT: Patient denies nonhealing oral sores.  -Skin: As above in HPI. No additional skin concerns.    Physical exam:  Vitals: LMP 04/06/2010   GEN: This is a well developed, well-nourished female in no acute distress, in a pleasant mood.    SKIN: Waist-up skin, which includes the head/face, neck, both arms, chest,  back, abdomen, digits and/or nails was examined.  -Delgado skin type: II  -There are dome shaped bright red papules on the trunk.   Multiple regular brown pigmented macules and papules are identified on the trunk and upper extremities.   There are fine lines and dyspigmentation on sun exposed areas of the face and chest.  There is macular erythema of the scalp with mild flaky white scale.  Scattered brown macules on sun exposed areas.  There are waxy stuck on tan to brown papules on the trunk, including the upper central back.  - At the upper central back, there is a soft, somewhat mobile, roughly 8 mm nodule with a central punctum that appears non-irritated and is non-tender to palpation.  -No other lesions of concern on areas examined.     Impression/Plan:  1. Longitudinall ridging of the fingernails  - This is a normal physiologic development with age. It is reasonable to treat this with urea 20% cream daily as needed, an exfoliating agent.    2. Seborrheic dermatitis  - Use ketoconazole 2% shampoo three times per week    3. Seborrheic keratosis, non irritated, of the upper central back  - ABCDs of melanoma were discussed and self skin checks were advised. No further intervention required. Patient to report changes. Seborrheic keratosis: Discussed benign nature of lesions.    CC Referred MD Dipesh  No address on file on close of this encounter.  Follow-up prn for new or changing lesions. Okay for her PCP to refill the above medications as needed given their relatively low risk adverse effect profile.    Dr. Quiroz staffed the patient.    Staff Involved:  Resident(Dr. Sean Barreto)/Staff    Patient was seen and examined with the dermatology resident. I agree with the history, review of systems, physical examination, assessments and plan.    Lluvia Quiroz MD  Professor and  Chair  Department of Dermatology  [facility]

## 2020-03-27 ENCOUNTER — VIRTUAL VISIT (OUTPATIENT)
Dept: FAMILY MEDICINE | Facility: CLINIC | Age: 60
End: 2020-03-27
Payer: COMMERCIAL

## 2020-03-27 DIAGNOSIS — M25.561 CHRONIC PAIN OF RIGHT KNEE: Primary | ICD-10-CM

## 2020-03-27 DIAGNOSIS — G89.29 CHRONIC PAIN OF RIGHT KNEE: Primary | ICD-10-CM

## 2020-03-27 PROCEDURE — 99213 OFFICE O/P EST LOW 20 MIN: CPT | Mod: TEL | Performed by: FAMILY MEDICINE

## 2020-03-27 NOTE — PROGRESS NOTES
"Beronica Azevedo is a 59 year old female who is being evaluated via a billable telephone visit.      The patient has been notified of following:     \"This telephone visit will be conducted via a call between you and your physician/provider. We have found that certain health care needs can be provided without the need for a physical exam.  This service lets us provide the care you need with a short phone conversation.  If a prescription is necessary we can send it directly to your pharmacy.  If lab work is needed we can place an order for that and you can then stop by our lab to have the test done at a later time.    If during the course of the call the physician/provider feels a telephone visit is not appropriate, you will not be charged for this service.\"     Muna Asencio MA on 3/27/2020 at 3:42 PM      Beronica Azevedo complains of   Chief Complaint   Patient presents with     Knee Problems     ongoing for a couple of yrs.     Trouble sleeping       I have reviewed and updated the patient's Past Medical History, Social History, Family History and Medication List.    ALLERGIES  Perfume; Tape [adhesive tape]; Compazine; Paxil [paroxetine]; Ropinirole hcl; Depakote [valproic acid]; Food; and Mushroom      SUBJECTIVE:  Beronica Azevedo is a 59 year old female who developed  left knee injury over a year  ago. Mechanism of injury: no one episode. Immediate symptoms: delayed pain, no deformity was noted by the patient. Symptoms have been waxing and waning since that time. Prior history of related problems: no prior problems with this area in the past. Has done PHYSICAL THERAPY without relief   no swelling, pain worse at night( hard to sleep at times)  No NSAID use   xray last year showed Mild degenerative changes in the bilateral knees,  otherwise no acute bone abnormality noted      Assessment/Plan:  1. Chronic pain of right knee  Not improved with PHYSICAL THERAPY   see  - [facility] Sp[facility]  Referral; " Future   try lidocaine patch     NSAID, ice suggested  continue previously taught exercises  activity modification  See orders in EpicCare    2  Phone call duration:  12 minutes   Joaquin Rowe MD

## 2020-04-30 NOTE — TELEPHONE ENCOUNTER
RECORDS RECEIVED FROM:   Right Knee no surgery, imaging Yes XR Knee Bilateral 1/2 Views 5/21/20 [facility], [place],  Radiology [facility] [phone] per patient   DATE RECEIVED: May 15, 2020   NOTES STATUS DETAILS   OFFICE NOTE from referring provider Internal  Joaquin Rowe MD   OFFICE NOTE from other specialist N/A    DISCHARGE SUMMARY from hospital N/A    DISCHARGE REPORT from the ER N/A    OPERATIVE REPORT N/A    MEDICATION LIST Internal    IMPLANT RECORD/STICKER N/A    LABS     CBC/DIFF N/A    CULTURES N/A    INJECTIONS DONE IN RADIOLOGY N/A    MRI N/A    CT SCAN N/A    XRAYS (IMAGES & REPORTS) Internal    TUMOR     PATHOLOGY  Slides & report N/A      04/30/20   9:37 AM   Pre-visit complete  Nica Spann CMA

## 2020-05-15 ENCOUNTER — PRE VISIT (OUTPATIENT)
Dept: ORTHOPEDICS | Facility: CLINIC | Age: 60
End: 2020-05-15

## 2020-05-15 ENCOUNTER — VIRTUAL VISIT (OUTPATIENT)
Dept: ORTHOPEDICS | Facility: CLINIC | Age: 60
End: 2020-05-15
Attending: FAMILY MEDICINE
Payer: COMMERCIAL

## 2020-05-15 DIAGNOSIS — M25.561 CHRONIC PAIN OF RIGHT KNEE: ICD-10-CM

## 2020-05-15 DIAGNOSIS — G89.29 CHRONIC PAIN OF RIGHT KNEE: ICD-10-CM

## 2020-05-15 DIAGNOSIS — M54.16 LUMBAR RADICULAR PAIN: Primary | ICD-10-CM

## 2020-05-15 RX ORDER — DICLOFENAC SODIUM 75 MG/1
75 TABLET, DELAYED RELEASE ORAL 2 TIMES DAILY
Qty: 60 TABLET | Refills: 0 | Status: SHIPPED | OUTPATIENT
Start: 2020-05-15 | End: 2020-07-24

## 2020-05-15 NOTE — PROGRESS NOTES
"Beronica Azevedo is a 60 year old female who is being evaluated via a billable telephone visit.      The patient has been notified of following:   After review of patient's medical issues this visit was conducted over the phone, as opposed to in person, in effort to reduce risk of COVID-19 exposure.    \"This telephone visit will be conducted via a call between you and your physician/provider. We have found that certain health care needs can be provided without the need for a physical exam.  This service lets us provide the care you need with a short phone conversation.  If a prescription is necessary we can send it directly to your pharmacy.  If lab work is needed we can place an order for that and you can then stop by our lab to have the test done at a later time.    Telephone visits are billed at different rates depending on your insurance coverage. During this emergency period, for some insurers they may be billed the same as an in-person visit.  Please reach out to your insurance provider with any questions.    If during the course of the call the physician/provider feels a telephone visit is not appropriate, you will not be charged for this service.\"    Patient has given verbal consent for Telephone visit?  Yes    What phone number would you like to be contacted at? [phone]    How would you like to obtain your AVS? MyChart   HISTORY OF PRESENT ILLNESS  Ms. Azevedo is a pleasant 60 year old year old female who presents with chronic right knee pain  Beronica explains that she had xrays of her knee and has not had any treatments for it  Has not had a great deal of back pain but pain is more at night when she tries to sleep and feels pain around her right knee and down her right leg  Location: right leg/knee  Quality:  achy pain    Severity:7/10 at worst    Duration: months  Timing: occurs intermittently  Context: occurs while exercising and lifting  Modifying factors:  resting and non-use makes it better, movement " and use makes it worse  Associated signs & symptoms: radiation of pain down right leg    MEDICAL HISTORY  Patient Active Problem List   Diagnosis     Headache     Insomnia     Moderate dysplasia of cervix (BEVERLY II)     CARDIOVASCULAR SCREENING; LDL GOAL LESS THAN 160     Endometrial polyp     Iron deficiency anemia due to chronic blood loss     Rosacea     Onychodystrophy     Dermatitis     Osteopenia     Post-menopausal     Mild major depression (H)     IBS (irritable bowel syndrome)     Myopia     Regular astigmatism     Presbyopia     Posterior subcapsular polar senile cataract     Senile nuclear sclerosis     Macular puckering of retina     Cervicalgia     Pain in thoracic spine     Digital mucous cyst     Seborrheic keratosis, inflamed     Chronic pain of right knee     Acute pain of right knee     FREDIS (generalized anxiety disorder)     Neoplasm of uncertain behavior of skin     Dermatitis, seborrheic     Plantar fasciitis     Endometrial cells on cervical Pap smear inconsistent with last menstrual period       Current Outpatient Medications   Medication Sig Dispense Refill     diclofenac (VOLTAREN) 75 MG EC tablet Take 1 tablet (75 mg) by mouth 2 times daily 60 tablet 0     azelastine (ASTELIN) 0.1 % nasal spray INSTILL 1 SPRAY INTO THE NOSTRIL TWICE DAILY 30 mL 1     BENADRYL ALLERGY PO prn       biotin 2.5 mg/mL Take by mouth daily       butalbital-acetaminophen-caffeine (FIORICET/ESGIC) -40 MG per tablet TAKE 1 TABLET BY MOUTH EVERY 4 HOURS AS NEEDED 28 tablet 0     Coenzyme Q10 (CO Q10) 100 MG CAPS Take 100 mg by mouth 2 times daily       eletriptan (RELPAX) 40 MG tablet Take 40 mg by mouth as needed for headaches  10     estradiol (VAGIFEM) 10 MCG TABS vaginal tablet Place 1 tablet (10 mcg) vaginally daily For 2 weeks, then twice a week (Patient not taking: Reported on 11/21/2019) 32 tablet 3     ketoconazole (NIZORAL) 2 % external shampoo Apply topically daily as needed for itching or irritation 120  mL 11     magnesium 100 MG CAPS Take 400 mg by mouth       nortriptyline (PAMELOR) 50 MG capsule TAKE 1 CAPSULE(50 MG) BY MOUTH AT BEDTIME 90 capsule 1     Riboflavin (VITAMIN B-2 PO) Take 400 mg by mouth       topiramate (TOPAMAX) 25 MG tablet Take 25 mg by mouth 2 times daily       traZODone (DESYREL) 50 MG tablet 1/2 TABLET AT BEDTIME AS NEEDED FOR SLEEP 45 tablet 1       Allergies   Allergen Reactions     Perfume Other (See Comments)     Scents/smells     Tape [Adhesive Tape] Rash     Compazine Other (See Comments)     Hyperactivity       Paxil [Paroxetine] Other (See Comments)     Ropinirole Hcl Unknown     TABS     Depakote [Valproic Acid] Rash     Rash       Food Itching and Rash     mushrooms     Mushroom Itching and Rash       Family History   Problem Relation Age of Onset     Depression Sister      Uterine Cancer Sister      Neurologic Disorder Sister      Neurologic Disorder Brother      Neurologic Disorder Father      Lipids Father         high cholesterol     Gastrointestinal Disease Father         stomach ulcers     Cancer Father         melanoma     Skin Cancer Father      Hypertension Father      Hyperlipidemia Father      Neurologic Disorder Mother      Allergies Mother      Arthritis Mother      Depression Mother      Eye Disorder Mother      Lipids Mother         high cholesterol     Osteoporosis Mother      Hypertension Mother      Hyperlipidemia Mother      Thyroid Disease Mother      Neurologic Disorder Sister         epilepsy     Alcohol/Drug Sister         alcohol and drug dependency     Alcohol/Drug Brother         recovery alcohol and drug addit     Asthma Son         excercise and allergy induced     Asthma Daughter         excercise and allergy induced     Lipids Sister         high cholesterol     Gastrointestinal Disease Sister         hepatitis ??     Osteoporosis Sister      Breast Cancer Maternal Aunt      Asthma Daughter      Asthma Son      Melanoma No family hx of      Social  History     Socioeconomic History     Marital status:      Spouse name: None     Number of children: None     Years of education: None     Highest education level: None   Occupational History     Occupation: Psychologist   Social Needs     Financial resource strain: None     Food insecurity     Worry: None     Inability: None     Transportation needs     Medical: None     Non-medical: None   Tobacco Use     Smoking status: Never Smoker     Smokeless tobacco: Never Used   Substance and Sexual Activity     Alcohol use: Yes     Comment: 1 8oz mixed drink or beer, minimal 1-2 per week     Drug use: No     Sexual activity: Never     Partners: Male     Birth control/protection: Abstinence   Lifestyle     Physical activity     Days per week: None     Minutes per session: None     Stress: None   Relationships     Social connections     Talks on phone: None     Gets together: None     Attends Anabaptism service: None     Active member of club or organization: None     Attends meetings of clubs or organizations: None     Relationship status: None     Intimate partner violence     Fear of current or ex partner: None     Emotionally abused: None     Physically abused: None     Forced sexual activity: None   Other Topics Concern     Parent/sibling w/ CABG, MI or angioplasty before 65F 55M? No   Social History Narrative     None       Additional medical/Social/Surgical histories reviewed in [facility] and updated as appropriate.     REVIEW OF SYSTEMS (5/15/2020)  10 point ROS of systems including Constitutional, Eyes, Respiratory, Cardiovascular, Gastroenterology, Genitourinary, Integumentary, Musculoskeletal, Psychiatric, Allergic/Immunologic were all negative except for pertinent positives noted in my HPI.       ASSESSMENT & PLAN  59 yo female with lumbar radicular pain and right knee arthritis  Reviewed her knee xrays: shows moderate knee arthritis  Need to consider lumbar xray and consider lumbar MRI  Her pain is more  related to lumbar radiculopathy  Will start her on voltaren and f/u with her next week in person for xrays      Marcelo Ochoa MD, CAQSM      Phone call duration: 14 minutes  Phone call start: 12:55pm  Phone call end: 1:09pm  Marcelo Ochoa MD

## 2020-05-21 ENCOUNTER — OFFICE VISIT (OUTPATIENT)
Dept: ORTHOPEDICS | Facility: CLINIC | Age: 60
End: 2020-05-21
Payer: COMMERCIAL

## 2020-05-21 ENCOUNTER — ANCILLARY PROCEDURE (OUTPATIENT)
Dept: GENERAL RADIOLOGY | Facility: CLINIC | Age: 60
End: 2020-05-21
Attending: PREVENTIVE MEDICINE
Payer: COMMERCIAL

## 2020-05-21 VITALS — HEIGHT: 68 IN | RESPIRATION RATE: 16 BRPM | WEIGHT: 137 LBS | BODY MASS INDEX: 20.76 KG/M2

## 2020-05-21 DIAGNOSIS — M54.16 LUMBAR RADICULAR PAIN: ICD-10-CM

## 2020-05-21 DIAGNOSIS — M51.369 DDD (DEGENERATIVE DISC DISEASE), LUMBAR: ICD-10-CM

## 2020-05-21 DIAGNOSIS — M17.11 ARTHRITIS OF RIGHT KNEE: Primary | ICD-10-CM

## 2020-05-21 RX ORDER — GABAPENTIN 100 MG/1
100-200 CAPSULE ORAL AT BEDTIME
Qty: 60 CAPSULE | Refills: 1 | Status: SHIPPED | OUTPATIENT
Start: 2020-05-21 | End: 2021-07-15

## 2020-05-21 ASSESSMENT — MIFFLIN-ST. JEOR: SCORE: 1234.93

## 2020-05-21 NOTE — PROGRESS NOTES
HISTORY OF PRESENT ILLNESS  Ms. Azevedo is a pleasant 60 year old year old female who presents to clinic today with chronic right knee pain and lumbar radicular pain into right leg  Beronica explains that this has gotten worse over the past few months  Location: lumbar radicular pain and right knee  Quality:  achy pain    Severity: 7/10 at worst    Duration: months worse  Timing: occurs intermittently  Context: occurs while lying flat at night in bed  Modifying factors:  resting and non-use makes it better, movement and use makes it worse  Associated signs & symptoms: radiation down right leg pain  Additional history: as documented    MEDICAL HISTORY  Patient Active Problem List   Diagnosis     Headache     Insomnia     Moderate dysplasia of cervix (BEVERLY II)     CARDIOVASCULAR SCREENING; LDL GOAL LESS THAN 160     Endometrial polyp     Iron deficiency anemia due to chronic blood loss     Rosacea     Onychodystrophy     Dermatitis     Osteopenia     Post-menopausal     Mild major depression (H)     IBS (irritable bowel syndrome)     Myopia     Regular astigmatism     Presbyopia     Posterior subcapsular polar senile cataract     Senile nuclear sclerosis     Macular puckering of retina     Cervicalgia     Pain in thoracic spine     Digital mucous cyst     Seborrheic keratosis, inflamed     Chronic pain of right knee     Acute pain of right knee     FREDIS (generalized anxiety disorder)     Neoplasm of uncertain behavior of skin     Dermatitis, seborrheic     Plantar fasciitis     Endometrial cells on cervical Pap smear inconsistent with last menstrual period       Current Outpatient Medications   Medication Sig Dispense Refill     azelastine (ASTELIN) 0.1 % nasal spray INSTILL 1 SPRAY INTO THE NOSTRIL TWICE DAILY 30 mL 1     BENADRYL ALLERGY PO prn       biotin 2.5 mg/mL Take by mouth daily       butalbital-acetaminophen-caffeine (FIORICET/ESGIC) -40 MG per tablet TAKE 1 TABLET BY MOUTH EVERY 4 HOURS AS NEEDED 28 tablet  0     Coenzyme Q10 (CO Q10) 100 MG CAPS Take 100 mg by mouth 2 times daily       diclofenac (VOLTAREN) 75 MG EC tablet Take 1 tablet (75 mg) by mouth 2 times daily 60 tablet 0     eletriptan (RELPAX) 40 MG tablet Take 40 mg by mouth as needed for headaches  10     ketoconazole (NIZORAL) 2 % external shampoo Apply topically daily as needed for itching or irritation 120 mL 11     magnesium 100 MG CAPS Take 400 mg by mouth       nortriptyline (PAMELOR) 50 MG capsule TAKE 1 CAPSULE(50 MG) BY MOUTH AT BEDTIME 90 capsule 1     Riboflavin (VITAMIN B-2 PO) Take 400 mg by mouth       topiramate (TOPAMAX) 25 MG tablet Take 25 mg by mouth 2 times daily       traZODone (DESYREL) 50 MG tablet 1/2 TABLET AT BEDTIME AS NEEDED FOR SLEEP 45 tablet 1     estradiol (VAGIFEM) 10 MCG TABS vaginal tablet Place 1 tablet (10 mcg) vaginally daily For 2 weeks, then twice a week (Patient not taking: Reported on 11/21/2019) 32 tablet 3       Allergies   Allergen Reactions     Perfume Other (See Comments)     Scents/smells     Tape [Adhesive Tape] Rash     Compazine Other (See Comments)     Hyperactivity       Paxil [Paroxetine] Other (See Comments)     Ropinirole Hcl Unknown     TABS     Depakote [Valproic Acid] Rash     Rash       Food Itching and Rash     mushrooms     Mushroom Itching and Rash       Family History   Problem Relation Age of Onset     Depression Sister      Uterine Cancer Sister      Neurologic Disorder Sister      Neurologic Disorder Brother      Neurologic Disorder Father      Lipids Father         high cholesterol     Gastrointestinal Disease Father         stomach ulcers     Cancer Father         melanoma     Skin Cancer Father      Hypertension Father      Hyperlipidemia Father      Neurologic Disorder Mother      Allergies Mother      Arthritis Mother      Depression Mother      Eye Disorder Mother      Lipids Mother         high cholesterol     Osteoporosis Mother      Hypertension Mother      Hyperlipidemia Mother       Thyroid Disease Mother      Neurologic Disorder Sister         epilepsy     Alcohol/Drug Sister         alcohol and drug dependency     Alcohol/Drug Brother         recovery alcohol and drug addit     Asthma Son         excercise and allergy induced     Asthma Daughter         excercise and allergy induced     Lipids Sister         high cholesterol     Gastrointestinal Disease Sister         hepatitis ??     Osteoporosis Sister      Breast Cancer Maternal Aunt      Asthma Daughter      Asthma Son      Melanoma No family hx of      Social History     Socioeconomic History     Marital status:      Spouse name: Not on file     Number of children: Not on file     Years of education: Not on file     Highest education level: Not on file   Occupational History     Occupation: Psychologist   Social Needs     Financial resource strain: Not on file     Food insecurity     Worry: Not on file     Inability: Not on file     Transportation needs     Medical: Not on file     Non-medical: Not on file   Tobacco Use     Smoking status: Never Smoker     Smokeless tobacco: Never Used   Substance and Sexual Activity     Alcohol use: Yes     Comment: 1 8oz mixed drink or beer, minimal 1-2 per week     Drug use: No     Sexual activity: Never     Partners: Male     Birth control/protection: Abstinence   Lifestyle     Physical activity     Days per week: Not on file     Minutes per session: Not on file     Stress: Not on file   Relationships     Social connections     Talks on phone: Not on file     Gets together: Not on file     Attends Jew service: Not on file     Active member of club or organization: Not on file     Attends meetings of clubs or organizations: Not on file     Relationship status: Not on file     Intimate partner violence     Fear of current or ex partner: Not on file     Emotionally abused: Not on file     Physically abused: Not on file     Forced sexual activity: Not on file   Other Topics Concern      "Parent/sibling w/ CABG, MI or angioplasty before 65F 55M? No   Social History Narrative     Not on file       Additional medical/Social/Surgical histories reviewed in [facility] and updated as appropriate.     REVIEW OF SYSTEMS (5/21/2020)  10 point ROS of systems including Constitutional, Eyes, Respiratory, Cardiovascular, Gastroenterology, Genitourinary, Integumentary, Musculoskeletal, Psychiatric, Allergic/Immunologic were all negative except for pertinent positives noted in my HPI.     PHYSICAL EXAM  Vitals:    05/21/20 1347   Resp: 16   Weight: 62.1 kg (137 lb)   Height: 1.727 m (5' 8\")     Vital Signs: Resp 16   Ht 1.727 m (5' 8\")   Wt 62.1 kg (137 lb)   LMP 04/06/2010   BMI 20.83 kg/m   Patient declined being weighed. Body mass index is 20.83 kg/m .    General  - normal appearance, in no obvious distress  HEENT  - conjunctivae not injected, moist mucous membranes, normocephalic/atraumatic head, ears normal appearance, no lesions, mouth normal appearance, no scars, normal dentition and teeth present  CV  - normal peripheral perfusion  Pulm  - normal respiratory pattern, non-labored  Musculoskeletal - lumbar spine  - stance: normal gait without limp, no obvious leg length discrepancy, normal heel and toe walk  - inspection: normal bone and joint alignment, no obvious scoliosis  - palpation: no paravertebral or bony tenderness  - ROM: flexion slightly exacerbates pain, normal extension, sidebending, rotation  - strength: lower extremities 5/5 in all planes  - special tests:  (+) straight leg raise- right  (+) slump test  Neuro  - patellar and Achilles DTRs 2+ bilaterally, some right lower extremity sensory deficit throughout L5 distribution, grossly normal coordination, normal muscle tone  Skin  - no ecchymosis, erythema, warmth, or induration, no obvious rash  Psych  - interactive, appropriate, normal mood and affect  Right knee: no pain with joint line palpation and no pain with flexion, no pain with patellar " compression pain  ASSESSMENT & PLAN  59 yo female with chronic right knee pain due to arthritis, and lumbar radicular pain  Reviewed previous knee xrays: shows some OA- moderate  Reviewed lumbar xrays: shows ddd  Ordered lumbar MRI  Consider PRESTON  Given HEP  Cont. voltaren PRN and start gabapentin nightly   F/u after MRI by phone to discuss possible PRESTON  Start PT after that        Marcelo Ochoa MD, CAQSM

## 2020-05-21 NOTE — PROGRESS NOTES
SPORTS & ORTHOPEDIC WALK-IN FOLLOW-UP VISIT 5/21/2020    Interval History:     Follow up reason: lumbar pain    Date of injury: NA    Date last seen: 5/15/20    Following Therapeutic Plan: Yes     Pain: Improving    Function: Unchanged    Interval History:      Medical History:    Any recent changes to your medical history? No    Any new medication prescribed since last visit? No    Review of Systems:    Do you have fever, chills, weight loss? No    Do you have any vision problems? Yes, blurry vision since Monday    Do you have any chest pain or edema? No    Do you have any shortness of breath or wheezing?  No    Do you have stomach problems? No    Do you have any numbness or focal weakness? No    Do you have diabetes? No    Do you have problems with bleeding or clotting? No    Do you have an rashes or other skin lesions? No

## 2020-05-21 NOTE — LETTER
5/21/2020         RE: Beronica Azevedo  [address]  [facility] [address]        Dear Colleague,    Thank you for referring your patient, Beronica Azevedo, to the [facility] SP[facility]TS AND [facility]THOPAEDIC WALK IN CLINIC. Please see a copy of my visit note below.          SPORTS & ORTHOPEDIC WALK-IN FOLLOW-UP VISIT 5/21/2020    Interval History:     Follow up reason: lumbar pain    Date of injury: NA    Date last seen: 5/15/20    Following Therapeutic Plan: Yes     Pain: Improving    Function: Unchanged    Interval History:      Medical History:    Any recent changes to your medical history? No    Any new medication prescribed since last visit? No    Review of Systems:    Do you have fever, chills, weight loss? No    Do you have any vision problems? Yes, blurry vision since Monday    Do you have any chest pain or edema? No    Do you have any shortness of breath or wheezing?  No    Do you have stomach problems? No    Do you have any numbness or focal weakness? No    Do you have diabetes? No    Do you have problems with bleeding or clotting? No    Do you have an rashes or other skin lesions? No               HISTORY OF PRESENT ILLNESS  Ms. Azevedo is a pleasant 60 year old year old female who presents to clinic today with chronic right knee pain and lumbar radicular pain into right leg  Beronica explains that this has gotten worse over the past few months  Location: lumbar radicular pain and right knee  Quality:  achy pain    Severity: 7/10 at worst    Duration: months worse  Timing: occurs intermittently  Context: occurs while lying flat at night in bed  Modifying factors:  resting and non-use makes it better, movement and use makes it worse  Associated signs & symptoms: radiation down right leg pain  Additional history: as documented    MEDICAL HISTORY  Patient Active Problem List   Diagnosis     Headache     Insomnia     Moderate dysplasia of cervix (BEVERLY II)     CARDIOVASCULAR SCREENING; LDL GOAL LESS THAN 160      Endometrial polyp     Iron deficiency anemia due to chronic blood loss     Rosacea     Onychodystrophy     Dermatitis     Osteopenia     Post-menopausal     Mild major depression (H)     IBS (irritable bowel syndrome)     Myopia     Regular astigmatism     Presbyopia     Posterior subcapsular polar senile cataract     Senile nuclear sclerosis     Macular puckering of retina     Cervicalgia     Pain in thoracic spine     Digital mucous cyst     Seborrheic keratosis, inflamed     Chronic pain of right knee     Acute pain of right knee     FREDIS (generalized anxiety disorder)     Neoplasm of uncertain behavior of skin     Dermatitis, seborrheic     Plantar fasciitis     Endometrial cells on cervical Pap smear inconsistent with last menstrual period       Current Outpatient Medications   Medication Sig Dispense Refill     azelastine (ASTELIN) 0.1 % nasal spray INSTILL 1 SPRAY INTO THE NOSTRIL TWICE DAILY 30 mL 1     BENADRYL ALLERGY PO prn       biotin 2.5 mg/mL Take by mouth daily       butalbital-acetaminophen-caffeine (FIORICET/ESGIC) -40 MG per tablet TAKE 1 TABLET BY MOUTH EVERY 4 HOURS AS NEEDED 28 tablet 0     Coenzyme Q10 (CO Q10) 100 MG CAPS Take 100 mg by mouth 2 times daily       diclofenac (VOLTAREN) 75 MG EC tablet Take 1 tablet (75 mg) by mouth 2 times daily 60 tablet 0     eletriptan (RELPAX) 40 MG tablet Take 40 mg by mouth as needed for headaches  10     ketoconazole (NIZORAL) 2 % external shampoo Apply topically daily as needed for itching or irritation 120 mL 11     magnesium 100 MG CAPS Take 400 mg by mouth       nortriptyline (PAMELOR) 50 MG capsule TAKE 1 CAPSULE(50 MG) BY MOUTH AT BEDTIME 90 capsule 1     Riboflavin (VITAMIN B-2 PO) Take 400 mg by mouth       topiramate (TOPAMAX) 25 MG tablet Take 25 mg by mouth 2 times daily       traZODone (DESYREL) 50 MG tablet 1/2 TABLET AT BEDTIME AS NEEDED FOR SLEEP 45 tablet 1     estradiol (VAGIFEM) 10 MCG TABS vaginal tablet Place 1 tablet (10 mcg)  vaginally daily For 2 weeks, then twice a week (Patient not taking: Reported on 11/21/2019) 32 tablet 3       Allergies   Allergen Reactions     Perfume Other (See Comments)     Scents/smells     Tape [Adhesive Tape] Rash     Compazine Other (See Comments)     Hyperactivity       Paxil [Paroxetine] Other (See Comments)     Ropinirole Hcl Unknown     TABS     Depakote [Valproic Acid] Rash     Rash       Food Itching and Rash     mushrooms     Mushroom Itching and Rash       Family History   Problem Relation Age of Onset     Depression Sister      Uterine Cancer Sister      Neurologic Disorder Sister      Neurologic Disorder Brother      Neurologic Disorder Father      Lipids Father         high cholesterol     Gastrointestinal Disease Father         stomach ulcers     Cancer Father         melanoma     Skin Cancer Father      Hypertension Father      Hyperlipidemia Father      Neurologic Disorder Mother      Allergies Mother      Arthritis Mother      Depression Mother      Eye Disorder Mother      Lipids Mother         high cholesterol     Osteoporosis Mother      Hypertension Mother      Hyperlipidemia Mother      Thyroid Disease Mother      Neurologic Disorder Sister         epilepsy     Alcohol/Drug Sister         alcohol and drug dependency     Alcohol/Drug Brother         recovery alcohol and drug addit     Asthma Son         excercise and allergy induced     Asthma Daughter         excercise and allergy induced     Lipids Sister         high cholesterol     Gastrointestinal Disease Sister         hepatitis ??     Osteoporosis Sister      Breast Cancer Maternal Aunt      Asthma Daughter      Asthma Son      Melanoma No family hx of      Social History     Socioeconomic History     Marital status:      Spouse name: Not on file     Number of children: Not on file     Years of education: Not on file     Highest education level: Not on file   Occupational History     Occupation: Psychologist   Social Needs  "    Financial resource strain: Not on file     Food insecurity     Worry: Not on file     Inability: Not on file     Transportation needs     Medical: Not on file     Non-medical: Not on file   Tobacco Use     Smoking status: Never Smoker     Smokeless tobacco: Never Used   Substance and Sexual Activity     Alcohol use: Yes     Comment: 1 8oz mixed drink or beer, minimal 1-2 per week     Drug use: No     Sexual activity: Never     Partners: Male     Birth control/protection: Abstinence   Lifestyle     Physical activity     Days per week: Not on file     Minutes per session: Not on file     Stress: Not on file   Relationships     Social connections     Talks on phone: Not on file     Gets together: Not on file     Attends Hinduism service: Not on file     Active member of club or organization: Not on file     Attends meetings of clubs or organizations: Not on file     Relationship status: Not on file     Intimate partner violence     Fear of current or ex partner: Not on file     Emotionally abused: Not on file     Physically abused: Not on file     Forced sexual activity: Not on file   Other Topics Concern     Parent/sibling w/ CABG, MI or angioplasty before 65F 55M? No   Social History Narrative     Not on file       Additional medical/Social/Surgical histories reviewed in [facility] and updated as appropriate.     REVIEW OF SYSTEMS (5/21/2020)  10 point ROS of systems including Constitutional, Eyes, Respiratory, Cardiovascular, Gastroenterology, Genitourinary, Integumentary, Musculoskeletal, Psychiatric, Allergic/Immunologic were all negative except for pertinent positives noted in my HPI.     PHYSICAL EXAM  Vitals:    05/21/20 1347   Resp: 16   Weight: 62.1 kg (137 lb)   Height: 1.727 m (5' 8\")     Vital Signs: Resp 16   Ht 1.727 m (5' 8\")   Wt 62.1 kg (137 lb)   LMP 04/06/2010   BMI 20.83 kg/m   Patient declined being weighed. Body mass index is 20.83 kg/m .    General  - normal appearance, in no obvious " distress  HEENT  - conjunctivae not injected, moist mucous membranes, normocephalic/atraumatic head, ears normal appearance, no lesions, mouth normal appearance, no scars, normal dentition and teeth present  CV  - normal peripheral perfusion  Pulm  - normal respiratory pattern, non-labored  Musculoskeletal - lumbar spine  - stance: normal gait without limp, no obvious leg length discrepancy, normal heel and toe walk  - inspection: normal bone and joint alignment, no obvious scoliosis  - palpation: no paravertebral or bony tenderness  - ROM: flexion slightly exacerbates pain, normal extension, sidebending, rotation  - strength: lower extremities 5/5 in all planes  - special tests:  (+) straight leg raise- right  (+) slump test  Neuro  - patellar and Achilles DTRs 2+ bilaterally, some right lower extremity sensory deficit throughout L5 distribution, grossly normal coordination, normal muscle tone  Skin  - no ecchymosis, erythema, warmth, or induration, no obvious rash  Psych  - interactive, appropriate, normal mood and affect  Right knee: no pain with joint line palpation and no pain with flexion, no pain with patellar compression pain  ASSESSMENT & PLAN  61 yo female with chronic right knee pain due to arthritis, and lumbar radicular pain  Reviewed previous knee xrays: shows some OA- moderate  Reviewed lumbar xrays: shows ddd  Ordered lumbar MRI  Consider PRESTON  Given HEP  Cont. voltaren PRN and start gabapentin nightly   F/u after MRI by phone to discuss possible PRESTON  Start PT after that        Marcelo Ochoa MD, CAQSM      Again, thank you for allowing me to participate in the care of your patient.        Sincerely,        Marcelo Ochoa MD

## 2020-05-28 ENCOUNTER — ANCILLARY PROCEDURE (OUTPATIENT)
Dept: MRI IMAGING | Facility: CLINIC | Age: 60
End: 2020-05-28
Attending: PREVENTIVE MEDICINE
Payer: COMMERCIAL

## 2020-05-28 DIAGNOSIS — M51.369 DDD (DEGENERATIVE DISC DISEASE), LUMBAR: ICD-10-CM

## 2020-05-28 DIAGNOSIS — M54.16 LUMBAR RADICULAR PAIN: ICD-10-CM

## 2020-06-12 ENCOUNTER — VIRTUAL VISIT (OUTPATIENT)
Dept: FAMILY MEDICINE | Facility: CLINIC | Age: 60
End: 2020-06-12
Payer: COMMERCIAL

## 2020-06-12 DIAGNOSIS — J06.9 URI WITH COUGH AND CONGESTION: Primary | ICD-10-CM

## 2020-06-12 PROCEDURE — 99213 OFFICE O/P EST LOW 20 MIN: CPT | Mod: TEL | Performed by: FAMILY MEDICINE

## 2020-06-12 NOTE — PROGRESS NOTES
"Beronica Azevedo is a 60 year old female who is being evaluated via a billable telephone visit.      The patient has been notified of following:     \"This telephone visit will be conducted via a call between you and your physician/provider. We have found that certain health care needs can be provided without the need for a physical exam.  This service lets us provide the care you need with a short phone conversation.  If a prescription is necessary we can send it directly to your pharmacy.  If lab work is needed we can place an order for that and you can then stop by our lab to have the test done at a later time.    Telephone visits are billed at different rates depending on your insurance coverage. During this emergency period, for some insurers they may be billed the same as an in-person visit.  Please reach out to your insurance provider with any questions.    If during the course of the call the physician/provider feels a telephone visit is not appropriate, you will not be charged for this service.\"    Patient has given verbal consent for Telephone visit?  Yes    What phone number would you like to be contacted at? [phone]    How would you like to obtain your AVS? Mail a copy    Hiren Estrada MA      Subjective     Beronica Azevedo is a 60 year old female who presents via phone visit today for the following health issues:    HPI  RESPIRATORY SYMPTOMS      Duration: a couple weeks    Description  nasal congestion, cough and fatigue/malaise    Severity: moderate    Accompanying signs and symptoms: better    History (predisposing factors):  none    Precipitating or alleviating factors: None    Therapies tried and outcome:  rest and fluids             Current Outpatient Medications   Medication Sig Dispense Refill     azelastine (ASTELIN) 0.1 % nasal spray INSTILL 1 SPRAY INTO THE NOSTRIL TWICE DAILY 30 mL 1     BENADRYL ALLERGY PO prn       biotin 2.5 mg/mL Take by mouth daily       " butalbital-acetaminophen-caffeine (FIORICET/ESGIC) -40 MG per tablet TAKE 1 TABLET BY MOUTH EVERY 4 HOURS AS NEEDED 28 tablet 0     Coenzyme Q10 (CO Q10) 100 MG CAPS Take 100 mg by mouth 2 times daily       diclofenac (VOLTAREN) 75 MG EC tablet Take 1 tablet (75 mg) by mouth 2 times daily 60 tablet 0     eletriptan (RELPAX) 40 MG tablet Take 40 mg by mouth as needed for headaches  10     estradiol (VAGIFEM) 10 MCG TABS vaginal tablet Place 1 tablet (10 mcg) vaginally daily For 2 weeks, then twice a week (Patient not taking: Reported on 11/21/2019) 32 tablet 3     gabapentin (NEURONTIN) 100 MG capsule Take 1-2 capsules (100-200 mg) by mouth At Bedtime 60 capsule 1     ketoconazole (NIZORAL) 2 % external shampoo Apply topically daily as needed for itching or irritation 120 mL 11     magnesium 100 MG CAPS Take 400 mg by mouth       nortriptyline (PAMELOR) 50 MG capsule TAKE 1 CAPSULE(50 MG) BY MOUTH AT BEDTIME 90 capsule 1     Riboflavin (VITAMIN B-2 PO) Take 400 mg by mouth       topiramate (TOPAMAX) 25 MG tablet Take 25 mg by mouth 2 times daily       traZODone (DESYREL) 50 MG tablet 1/2 TABLET AT BEDTIME AS NEEDED FOR SLEEP 45 tablet 1       Reviewed and updated as needed this visit by Provider         Review of Systems   Constitutional, HEENT, cardiovascular, pulmonary, gi and gu systems are negative, except as otherwise noted.       Objective   Reported vitals:  [facility] 04/06/2010    alert and mild distress  PSYCH: Alert and oriented times 3; coherent speech, normal   rate and volume, able to articulate logical thoughts, able   to abstract reason, no tangential thoughts, no hallucinations   or delusions  Her affect is normal  RESP: No cough, no audible wheezing, able to talk in full sentences  Remainder of exam unable to be completed due to telephone visits    Diagnostic Test Results:  Labs reviewed in Epic        Assessment/Plan:  1. URI with cough and congestion  Needs testing  - Symptomatic COVID-19 Virus  (Coronavirus) by PCR; Future  Will self quarantine for now  No follow-ups on file.      Phone call duration:  12 minutes    Joaquin Rowe MD

## 2020-06-15 DIAGNOSIS — J06.9 URI WITH COUGH AND CONGESTION: ICD-10-CM

## 2020-06-15 PROCEDURE — 99000 SPECIMEN HANDLING OFFICE-LAB: CPT | Performed by: FAMILY MEDICINE

## 2020-06-15 PROCEDURE — U0003 INFECTIOUS AGENT DETECTION BY NUCLEIC ACID (DNA OR RNA); SEVERE ACUTE RESPIRATORY SYNDROME CORONAVIRUS 2 (SARS-COV-2) (CORONAVIRUS DISEASE [COVID-19]), AMPLIFIED PROBE TECHNIQUE, MAKING USE OF HIGH THROUGHPUT TECHNOLOGIES AS DESCRIBED BY CMS-2020-01-R: HCPCS | Mod: 90 | Performed by: FAMILY MEDICINE

## 2020-06-15 NOTE — LETTER
June 16, 2020        Beronica Azevedo  [address] [place] [id number]    This letter provides a written record that you were tested for COVID-19 on 6/15/20.   Your result was negative.    This means that we didn t find the virus that causes COVID-19 in your sample. A test may show negative when you do actually have the virus. This can happen when the virus is in the early stages of infection, before you feel illness symptoms.    Even if you don t have symptoms, they may still appear. For safety, it s very important to follow these rules.    Keep yourself away from others (self-isolation):      Stay home. Don t go to work, school or anywhere else.     Stay in your own room (and use your own bathroom), if you can.    Stay away from others in your home. No hugging, kissing or shaking hands. No visitors.    Clean  high touch  surfaces often (doorknobs, counters, handles, etc.). Use a household cleaning spray or wipes.    Cover your mouth and nose with a mask, tissue or washcloth to avoid spreading germs.    Wash your hands and face often with soap and water.    Stay in self-isolation until you meet ALL of the guidelines below:    1. You have had no fever for at least 72 hours (that is 3 full days of no fever without the use of medicine that reduces fevers), AND  2. other symptoms (such as cough, shortness of breath) have gotten better, AND  3. at least 10 days have passed since your symptoms first appeared.    Going back to work  Check with your employer for any guidelines to follow for going back to work.    Employers: This document serves as formal notice that your employee tested negative for COVID-19, as of the testing date shown above.    For questions regarding this letter or your Negative COVID-19 result, call [phone] between 8A to 6:30P (M-F) and 10A to 6:30P (weekends).

## 2020-06-16 LAB
SARS-COV-2 RNA SPEC QL NAA+PROBE: NOT DETECTED
SPECIMEN SOURCE: NORMAL

## 2020-06-24 ENCOUNTER — TELEPHONE (OUTPATIENT)
Dept: ORTHOPEDICS | Facility: CLINIC | Age: 60
End: 2020-06-24

## 2020-06-24 NOTE — TELEPHONE ENCOUNTER
[facility] Call Center    Phone Message    May a detailed message be left on voicemail: yes     Reason for Call: Other: pt calling because she believes that she is having a recaction to an medication. Please call the pt back to discuss as soon as possible. Thank You.      Action Taken: Message routed to:  [facility] ([facility] orthopedics    Travel Screening: Not Applicable

## 2020-06-24 NOTE — TELEPHONE ENCOUNTER
Mentions that she is experiencing a significant amount of drowsiness side effects from her gabapentin.  She is concerned because she is frequently tired and has trouble waking even after sleeping 10-12 hours some nights.  I let her know that Dr. Ochoa will be in clinic tomorrow and that is most likely the best time for him to be able to reach out to Beronica.  She was agreeable with the plan and will look forward to our communication tomorrow.     - Elie ZAPIEN ATC

## 2020-06-25 ENCOUNTER — VIRTUAL VISIT (OUTPATIENT)
Dept: FAMILY MEDICINE | Facility: CLINIC | Age: 60
End: 2020-06-25
Payer: COMMERCIAL

## 2020-06-25 DIAGNOSIS — G43.809 OTHER MIGRAINE WITHOUT STATUS MIGRAINOSUS, NOT INTRACTABLE: ICD-10-CM

## 2020-06-25 DIAGNOSIS — J30.2 SEASONAL ALLERGIC RHINITIS, UNSPECIFIED TRIGGER: Primary | ICD-10-CM

## 2020-06-25 PROBLEM — G43.909 MIGRAINE WITHOUT STATUS MIGRAINOSUS, NOT INTRACTABLE: Status: ACTIVE | Noted: 2020-06-25

## 2020-06-25 PROCEDURE — 99213 OFFICE O/P EST LOW 20 MIN: CPT | Mod: TEL | Performed by: NURSE PRACTITIONER

## 2020-06-25 RX ORDER — FLUTICASONE PROPIONATE 50 MCG
2 SPRAY, SUSPENSION (ML) NASAL DAILY
Qty: 18.2 ML | Refills: 3 | Status: SHIPPED | OUTPATIENT
Start: 2020-06-25 | End: 2020-09-23

## 2020-06-26 ENCOUNTER — VIRTUAL VISIT (OUTPATIENT)
Dept: ORTHOPEDICS | Facility: CLINIC | Age: 60
End: 2020-06-26
Payer: COMMERCIAL

## 2020-06-26 DIAGNOSIS — M51.26 HERNIATED LUMBAR INTERVERTEBRAL DISC: Primary | ICD-10-CM

## 2020-06-26 ASSESSMENT — PATIENT HEALTH QUESTIONNAIRE - PHQ9: SUM OF ALL RESPONSES TO PHQ QUESTIONS 1-9: 2

## 2020-06-26 NOTE — LETTER
"    6/26/2020         RE: Beronica Azevedo  [address]  [facility] [address]        Dear Colleague,    Thank you for referring your patient, Beronica Azevedo, to the [facility]. Please see a copy of my visit note below.    Beronica Azevedo is a 60 year old female who is being evaluated via a billable telephone visit.      The patient has been notified of following:     \"This telephone visit will be conducted via a call between you and your physician/provider. We have found that certain health care needs can be provided without the need for a physical exam.  This service lets us provide the care you need with a short phone conversation.  If a prescription is necessary we can send it directly to your pharmacy.  If lab work is needed we can place an order for that and you can then stop by our lab to have the test done at a later time.    Telephone visits are billed at different rates depending on your insurance coverage. During this emergency period, for some insurers they may be billed the same as an in-person visit.  Please reach out to your insurance provider with any questions.    If during the course of the call the physician/provider feels a telephone visit is not appropriate, you will not be charged for this service.\"    Patient has given verbal consent for Telephone visit?  Yes    What phone number would you like to be contacted at? [phone]    How would you like to obtain your AVS? MyChart  HISTORY OF PRESENT ILLNESS  Ms. Azevedo is a pleasant 60 year old year old female who presents to discuss her MRI  Beronica explains that she has had MRI and wants to discuss possible injection  Location: low back and leg  Quality:  achy pain    Severity:7/10 at worst    Duration: months   Timing: occurs intermittently  Context: occurs while exercising and lifting  Modifying factors:  resting and non-use makes it better, movement and use makes it worse  Associated signs & symptoms: radiation of pain " into legs    MEDICAL HISTORY  Patient Active Problem List   Diagnosis     Headache     Insomnia     Moderate dysplasia of cervix (BEVERLY II)     CARDIOVASCULAR SCREENING; LDL GOAL LESS THAN 160     Endometrial polyp     Iron deficiency anemia due to chronic blood loss     Rosacea     Onychodystrophy     Dermatitis     Osteopenia     Post-menopausal     Mild major depression (H)     IBS (irritable bowel syndrome)     Myopia     Regular astigmatism     Presbyopia     Posterior subcapsular polar senile cataract     Senile nuclear sclerosis     Macular puckering of retina     Cervicalgia     Pain in thoracic spine     Digital mucous cyst     Seborrheic keratosis, inflamed     Chronic pain of right knee     Acute pain of right knee     FREDIS (generalized anxiety disorder)     Neoplasm of uncertain behavior of skin     Dermatitis, seborrheic     Plantar fasciitis     Endometrial cells on cervical Pap smear inconsistent with last menstrual period     Migraine without status migrainosus, not intractable       Current Outpatient Medications   Medication Sig Dispense Refill     azelastine (ASTELIN) 0.1 % nasal spray INSTILL 1 SPRAY INTO THE NOSTRIL TWICE DAILY 30 mL 1     BENADRYL ALLERGY PO prn       biotin 2.5 mg/mL Take by mouth daily       butalbital-acetaminophen-caffeine (FIORICET/ESGIC) -40 MG per tablet TAKE 1 TABLET BY MOUTH EVERY 4 HOURS AS NEEDED 28 tablet 0     Coenzyme Q10 (CO Q10) 100 MG CAPS Take 100 mg by mouth 2 times daily       eletriptan (RELPAX) 40 MG tablet Take 40 mg by mouth as needed for headaches  10     fluticasone (FLONASE) 50 MCG/ACT nasal spray Spray 2 sprays into both nostrils daily 18.2 mL 3     gabapentin (NEURONTIN) 100 MG capsule Take 1-2 capsules (100-200 mg) by mouth At Bedtime (Patient not taking: Reported on 7/24/2020) 60 capsule 1     ketoconazole (NIZORAL) 2 % external shampoo Apply topically daily as needed for itching or irritation 120 mL 11     magnesium 100 MG CAPS Take 400 mg by  mouth       nortriptyline (PAMELOR) 50 MG capsule TAKE 1 CAPSULE(50 MG) BY MOUTH AT BEDTIME 90 capsule 1     Riboflavin (VITAMIN B-2 PO) Take 400 mg by mouth       topiramate (TOPAMAX) 25 MG tablet Take 25 mg by mouth 2 times daily       traZODone (DESYREL) 50 MG tablet 1/2 TABLET AT BEDTIME AS NEEDED FOR SLEEP 45 tablet 1     predniSONE (DELTASONE) 20 MG tablet Take 2 tablets (40 mg) by mouth daily 10 tablet 0     tiZANidine (ZANAFLEX) 4 MG tablet Take 1-2 tablets (4-8 mg) by mouth nightly as needed 30 tablet 1       Allergies   Allergen Reactions     Perfume Other (See Comments)     Scents/smells     Tape [Adhesive Tape] Rash     Compazine Other (See Comments)     Hyperactivity       Paxil [Paroxetine] Other (See Comments)     Ropinirole Hcl Unknown     TABS     Depakote [Valproic Acid] Rash     Rash       Food Itching and Rash     mushrooms     Mushroom Itching and Rash       Family History   Problem Relation Age of Onset     Depression Sister      Uterine Cancer Sister      Tumor Sister      Neurologic Disorder Sister      Neurologic Disorder Brother      Neurologic Disorder Father      Lipids Father         high cholesterol     Gastrointestinal Disease Father         stomach ulcers     Cancer Father         melanoma     Skin Cancer Father      Hypertension Father      Hyperlipidemia Father      Neurologic Disorder Mother      Allergies Mother      Arthritis Mother      Depression Mother      Eye Disorder Mother      Lipids Mother         high cholesterol     Osteoporosis Mother      Hypertension Mother      Hyperlipidemia Mother      Thyroid Disease Mother      Neurologic Disorder Sister         epilepsy     Alcohol/Drug Sister         alcohol and drug dependency     Alcohol/Drug Brother         recovery alcohol and drug addit     Asthma Son         excercise and allergy induced     Asthma Daughter         excercise and allergy induced     Lipids Sister         high cholesterol     Gastrointestinal Disease  Sister         hepatitis ??     Osteoporosis Sister      Breast Cancer Maternal Aunt      Asthma Daughter      Asthma Son      Melanoma No family hx of      Social History     Socioeconomic History     Marital status:      Spouse name: Not on file     Number of children: Not on file     Years of education: Not on file     Highest education level: Not on file   Occupational History     Occupation: Psychologist   Social Needs     Financial resource strain: Not on file     Food insecurity     Worry: Not on file     Inability: Not on file     Transportation needs     Medical: Not on file     Non-medical: Not on file   Tobacco Use     Smoking status: Never Smoker     Smokeless tobacco: Never Used   Substance and Sexual Activity     Alcohol use: Yes     Comment: 1 8oz mixed drink or beer, minimal 1-2 per week     Drug use: No     Sexual activity: Never     Partners: Male     Birth control/protection: Abstinence   Lifestyle     Physical activity     Days per week: Not on file     Minutes per session: Not on file     Stress: Not on file   Relationships     Social connections     Talks on phone: Not on file     Gets together: Not on file     Attends Catholic service: Not on file     Active member of club or organization: Not on file     Attends meetings of clubs or organizations: Not on file     Relationship status: Not on file     Intimate partner violence     Fear of current or ex partner: Not on file     Emotionally abused: Not on file     Physically abused: Not on file     Forced sexual activity: Not on file   Other Topics Concern     Parent/sibling w/ CABG, MI or angioplasty before 65F 55M? No   Social History Narrative     Not on file       Additional medical/Social/Surgical histories reviewed in [facility] and updated as appropriate.     REVIEW OF SYSTEMS (7/26/2020)  10 point ROS of systems including Constitutional, Eyes, Respiratory, Cardiovascular, Gastroenterology, Genitourinary, Integumentary, Musculoskeletal,  Psychiatric, Allergic/Immunologic were all negative except for pertinent positives noted in my HPI.       ASSESSMENT & PLAN  61 yo female with lumbar disc herniation, ddd, radicular pain  Reviewed lumbar MRI: shows ddd  Ordered lumbar PRESTON  F/u after    Marcelo Ochoa MD, CAQSM    Phone call duration: 13 minutes  Phone call start: 3:01pm  Phone call end: 3:14pm  Marcelo Ochoa MD

## 2020-06-26 NOTE — PROGRESS NOTES
"Beronica Azevedo is a 60 year old female who is being evaluated via a billable telephone visit.      The patient has been notified of following:     \"This telephone visit will be conducted via a call between you and your physician/provider. We have found that certain health care needs can be provided without the need for a physical exam.  This service lets us provide the care you need with a short phone conversation.  If a prescription is necessary we can send it directly to your pharmacy.  If lab work is needed we can place an order for that and you can then stop by our lab to have the test done at a later time.    Telephone visits are billed at different rates depending on your insurance coverage. During this emergency period, for some insurers they may be billed the same as an in-person visit.  Please reach out to your insurance provider with any questions.    If during the course of the call the physician/provider feels a telephone visit is not appropriate, you will not be charged for this service.\"    Patient has given verbal consent for Telephone visit?  Yes    What phone number would you like to be contacted at? [phone]    How would you like to obtain your AVS? MyChart  HISTORY OF PRESENT ILLNESS  Ms. Azevedo is a pleasant 60 year old year old female who presents to discuss her MRI  Beronica explains that she has had MRI and wants to discuss possible injection  Location: low back and leg  Quality:  achy pain    Severity:7/10 at worst    Duration: months   Timing: occurs intermittently  Context: occurs while exercising and lifting  Modifying factors:  resting and non-use makes it better, movement and use makes it worse  Associated signs & symptoms: radiation of pain into legs    MEDICAL HISTORY  Patient Active Problem List   Diagnosis     Headache     Insomnia     Moderate dysplasia of cervix (BEVERLY II)     CARDIOVASCULAR SCREENING; LDL GOAL LESS THAN 160     Endometrial polyp     Iron deficiency anemia due to " chronic blood loss     Rosacea     Onychodystrophy     Dermatitis     Osteopenia     Post-menopausal     Mild major depression (H)     IBS (irritable bowel syndrome)     Myopia     Regular astigmatism     Presbyopia     Posterior subcapsular polar senile cataract     Senile nuclear sclerosis     Macular puckering of retina     Cervicalgia     Pain in thoracic spine     Digital mucous cyst     Seborrheic keratosis, inflamed     Chronic pain of right knee     Acute pain of right knee     FREDIS (generalized anxiety disorder)     Neoplasm of uncertain behavior of skin     Dermatitis, seborrheic     Plantar fasciitis     Endometrial cells on cervical Pap smear inconsistent with last menstrual period     Migraine without status migrainosus, not intractable       Current Outpatient Medications   Medication Sig Dispense Refill     azelastine (ASTELIN) 0.1 % nasal spray INSTILL 1 SPRAY INTO THE NOSTRIL TWICE DAILY 30 mL 1     BENADRYL ALLERGY PO prn       biotin 2.5 mg/mL Take by mouth daily       butalbital-acetaminophen-caffeine (FIORICET/ESGIC) -40 MG per tablet TAKE 1 TABLET BY MOUTH EVERY 4 HOURS AS NEEDED 28 tablet 0     Coenzyme Q10 (CO Q10) 100 MG CAPS Take 100 mg by mouth 2 times daily       eletriptan (RELPAX) 40 MG tablet Take 40 mg by mouth as needed for headaches  10     fluticasone (FLONASE) 50 MCG/ACT nasal spray Spray 2 sprays into both nostrils daily 18.2 mL 3     gabapentin (NEURONTIN) 100 MG capsule Take 1-2 capsules (100-200 mg) by mouth At Bedtime (Patient not taking: Reported on 7/24/2020) 60 capsule 1     ketoconazole (NIZORAL) 2 % external shampoo Apply topically daily as needed for itching or irritation 120 mL 11     magnesium 100 MG CAPS Take 400 mg by mouth       nortriptyline (PAMELOR) 50 MG capsule TAKE 1 CAPSULE(50 MG) BY MOUTH AT BEDTIME 90 capsule 1     Riboflavin (VITAMIN B-2 PO) Take 400 mg by mouth       topiramate (TOPAMAX) 25 MG tablet Take 25 mg by mouth 2 times daily       traZODone  (DESYREL) 50 MG tablet 1/2 TABLET AT BEDTIME AS NEEDED FOR SLEEP 45 tablet 1     predniSONE (DELTASONE) 20 MG tablet Take 2 tablets (40 mg) by mouth daily 10 tablet 0     tiZANidine (ZANAFLEX) 4 MG tablet Take 1-2 tablets (4-8 mg) by mouth nightly as needed 30 tablet 1       Allergies   Allergen Reactions     Perfume Other (See Comments)     Scents/smells     Tape [Adhesive Tape] Rash     Compazine Other (See Comments)     Hyperactivity       Paxil [Paroxetine] Other (See Comments)     Ropinirole Hcl Unknown     TABS     Depakote [Valproic Acid] Rash     Rash       Food Itching and Rash     mushrooms     Mushroom Itching and Rash       Family History   Problem Relation Age of Onset     Depression Sister      Uterine Cancer Sister      Tumor Sister      Neurologic Disorder Sister      Neurologic Disorder Brother      Neurologic Disorder Father      Lipids Father         high cholesterol     Gastrointestinal Disease Father         stomach ulcers     Cancer Father         melanoma     Skin Cancer Father      Hypertension Father      Hyperlipidemia Father      Neurologic Disorder Mother      Allergies Mother      Arthritis Mother      Depression Mother      Eye Disorder Mother      Lipids Mother         high cholesterol     Osteoporosis Mother      Hypertension Mother      Hyperlipidemia Mother      Thyroid Disease Mother      Neurologic Disorder Sister         epilepsy     Alcohol/Drug Sister         alcohol and drug dependency     Alcohol/Drug Brother         recovery alcohol and drug addit     Asthma Son         excercise and allergy induced     Asthma Daughter         excercise and allergy induced     Lipids Sister         high cholesterol     Gastrointestinal Disease Sister         hepatitis ??     Osteoporosis Sister      Breast Cancer Maternal Aunt      Asthma Daughter      Asthma Son      Melanoma No family hx of      Social History     Socioeconomic History     Marital status:      Spouse name: Not on  file     Number of children: Not on file     Years of education: Not on file     Highest education level: Not on file   Occupational History     Occupation: Psychologist   Social Needs     Financial resource strain: Not on file     Food insecurity     Worry: Not on file     Inability: Not on file     Transportation needs     Medical: Not on file     Non-medical: Not on file   Tobacco Use     Smoking status: Never Smoker     Smokeless tobacco: Never Used   Substance and Sexual Activity     Alcohol use: Yes     Comment: 1 8oz mixed drink or beer, minimal 1-2 per week     Drug use: No     Sexual activity: Never     Partners: Male     Birth control/protection: Abstinence   Lifestyle     Physical activity     Days per week: Not on file     Minutes per session: Not on file     Stress: Not on file   Relationships     Social connections     Talks on phone: Not on file     Gets together: Not on file     Attends Jewish service: Not on file     Active member of club or organization: Not on file     Attends meetings of clubs or organizations: Not on file     Relationship status: Not on file     Intimate partner violence     Fear of current or ex partner: Not on file     Emotionally abused: Not on file     Physically abused: Not on file     Forced sexual activity: Not on file   Other Topics Concern     Parent/sibling w/ CABG, MI or angioplasty before 65F 55M? No   Social History Narrative     Not on file       Additional medical/Social/Surgical histories reviewed in [facility] and updated as appropriate.     REVIEW OF SYSTEMS (7/26/2020)  10 point ROS of systems including Constitutional, Eyes, Respiratory, Cardiovascular, Gastroenterology, Genitourinary, Integumentary, Musculoskeletal, Psychiatric, Allergic/Immunologic were all negative except for pertinent positives noted in my HPI.       ASSESSMENT & PLAN  61 yo female with lumbar disc herniation, ddd, radicular pain  Reviewed lumbar MRI: shows ddd  Ordered lumbar PRESTON  F/u  after    Marcelo Ochoa MD, CAQSM    Phone call duration: 13 minutes  Phone call start: 3:01pm  Phone call end: 3:14pm  Marcelo Ochoa MD

## 2020-06-26 NOTE — TELEPHONE ENCOUNTER
[facility] Call Center    Phone Message    May a detailed message be left on voicemail: yes     Reason for Call: Medication Question or concern regarding medication   Prescription Clarification  Name of Medication: gabapentin (NEURONTIN) 100 MG capsule   Prescribing Provider: Dr. Ochoa   [facility]   What on the order needs clarification? Pt needing call back asap to discuss this medication. Pt stated it makes her very tired. Pt needing to know if she should discontinue or go down on the dosing. Pt needing to know before the weekend            Action Taken: Message routed to:  [facility] ortho

## 2020-07-02 ENCOUNTER — TRANSFERRED RECORDS (OUTPATIENT)
Dept: HEALTH INFORMATION MANAGEMENT | Facility: CLINIC | Age: 60
End: 2020-07-02

## 2020-07-16 ENCOUNTER — TELEPHONE (OUTPATIENT)
Dept: ORTHOPEDICS | Facility: CLINIC | Age: 60
End: 2020-07-16

## 2020-07-16 ENCOUNTER — VIRTUAL VISIT (OUTPATIENT)
Dept: ORTHOPEDICS | Facility: CLINIC | Age: 60
End: 2020-07-16
Payer: COMMERCIAL

## 2020-07-16 DIAGNOSIS — M54.16 LUMBAR RADICULAR PAIN: Primary | ICD-10-CM

## 2020-07-16 RX ORDER — PREDNISONE 20 MG/1
40 TABLET ORAL DAILY
Qty: 10 TABLET | Refills: 0 | Status: SHIPPED | OUTPATIENT
Start: 2020-07-16 | End: 2021-07-15

## 2020-07-16 NOTE — LETTER
"    7/16/2020         RE: Beronica Azevedo  [address]  [facility] [address]        Dear Colleague,    Thank you for referring your patient, Beronica Azevedo, to the [facility]. Please see a copy of my visit note below.    Beronica Azevedo is a 60 year old female who is being evaluated via a billable video visit.      After review of patient's medical issues this visit was conducted over the phone, as opposed to in person, in effort to reduce risk of COVID-19 exposure.      The patient has been notified of following:     \"This video visit will be conducted via a call between you and your physician/provider. We have found that certain health care needs can be provided without the need for an in-person physical exam.  This service lets us provide the care you need with a video conversation.  If a prescription is necessary we can send it directly to your pharmacy.  If lab work is needed we can place an order for that and you can then stop by our lab to have the test done at a later time.    Video visits are billed at different rates depending on your insurance coverage.  Please reach out to your insurance provider with any questions.    If during the course of the call the physician/provider feels a video visit is not appropriate, you will not be charged for this service.\"    Patient has given verbal consent for Video visit? Yes  How would you like to obtain your AVS? MyChart  If you are dropped from the video visit, the video invite should be resent to: Text to cell phone: [phone]  Will anyone else be joining your video visit? No      HISTORY OF PRESENT ILLNESS  Ms. Azevedo is a pleasant 60 year old year old female who presents with f/u for PRESTON  Beronica explains that she had her PRESTON and felt some improvement but had a lot of pain following the injection  Location: low back  Quality:  achy pain    Severity: 6/10 at worst    Duration: past few months worse  Timing: occurs " intermittently    Modifying factors:  resting and non-use makes it better, movement and use makes it worse  Associated signs & symptoms: some radiation into legs    MEDICAL HISTORY  Patient Active Problem List   Diagnosis     Headache     Insomnia     Moderate dysplasia of cervix (BEVERLY II)     CARDIOVASCULAR SCREENING; LDL GOAL LESS THAN 160     Endometrial polyp     Iron deficiency anemia due to chronic blood loss     Rosacea     Onychodystrophy     Dermatitis     Osteopenia     Post-menopausal     Mild major depression (H)     IBS (irritable bowel syndrome)     Myopia     Regular astigmatism     Presbyopia     Posterior subcapsular polar senile cataract     Senile nuclear sclerosis     Macular puckering of retina     Cervicalgia     Pain in thoracic spine     Digital mucous cyst     Seborrheic keratosis, inflamed     Chronic pain of right knee     Acute pain of right knee     FREDIS (generalized anxiety disorder)     Neoplasm of uncertain behavior of skin     Dermatitis, seborrheic     Plantar fasciitis     Endometrial cells on cervical Pap smear inconsistent with last menstrual period     Migraine without status migrainosus, not intractable       Current Outpatient Medications   Medication Sig Dispense Refill     azelastine (ASTELIN) 0.1 % nasal spray INSTILL 1 SPRAY INTO THE NOSTRIL TWICE DAILY 30 mL 1     BENADRYL ALLERGY PO prn       biotin 2.5 mg/mL Take by mouth daily       butalbital-acetaminophen-caffeine (FIORICET/ESGIC) -40 MG per tablet TAKE 1 TABLET BY MOUTH EVERY 4 HOURS AS NEEDED 28 tablet 0     Coenzyme Q10 (CO Q10) 100 MG CAPS Take 100 mg by mouth 2 times daily       eletriptan (RELPAX) 40 MG tablet Take 40 mg by mouth as needed for headaches  10     fluticasone (FLONASE) 50 MCG/ACT nasal spray Spray 2 sprays into both nostrils daily 18.2 mL 3     gabapentin (NEURONTIN) 100 MG capsule Take 1-2 capsules (100-200 mg) by mouth At Bedtime (Patient not taking: Reported on 8/7/2020) 60 capsule 1      ketoconazole (NIZORAL) 2 % external shampoo Apply topically daily as needed for itching or irritation 120 mL 11     magnesium 100 MG CAPS Take 400 mg by mouth       nortriptyline (PAMELOR) 50 MG capsule TAKE 1 CAPSULE(50 MG) BY MOUTH AT BEDTIME 90 capsule 1     predniSONE (DELTASONE) 20 MG tablet Take 2 tablets (40 mg) by mouth daily 10 tablet 0     Riboflavin (VITAMIN B-2 PO) Take 400 mg by mouth       tiZANidine (ZANAFLEX) 4 MG tablet Take 1-2 tablets (4-8 mg) by mouth nightly as needed 30 tablet 1     topiramate (TOPAMAX) 25 MG tablet Take 25 mg by mouth 2 times daily       traZODone (DESYREL) 50 MG tablet 1/2 TABLET AT BEDTIME AS NEEDED FOR SLEEP 45 tablet 1     famciclovir (FAMVIR) 500 MG tablet Take 1 tablet (500 mg) by mouth 3 times daily for 7 days 21 tablet 0       Allergies   Allergen Reactions     Perfume Other (See Comments)     Scents/smells     Tape [Adhesive Tape] Rash     Compazine Other (See Comments)     Hyperactivity       Paxil [Paroxetine] Other (See Comments)     Ropinirole Hcl Unknown     TABS     Depakote [Valproic Acid] Rash     Rash       Food Itching and Rash     mushrooms     Mushroom Itching and Rash       Family History   Problem Relation Age of Onset     Depression Sister      Uterine Cancer Sister      Tumor Sister      Neurologic Disorder Sister      Neurologic Disorder Brother      Neurologic Disorder Father      Lipids Father         high cholesterol     Gastrointestinal Disease Father         stomach ulcers     Cancer Father         melanoma     Skin Cancer Father      Hypertension Father      Hyperlipidemia Father      Neurologic Disorder Mother      Allergies Mother      Arthritis Mother      Depression Mother      Eye Disorder Mother      Lipids Mother         high cholesterol     Osteoporosis Mother      Hypertension Mother      Hyperlipidemia Mother      Thyroid Disease Mother      Neurologic Disorder Sister         epilepsy     Alcohol/Drug Sister         alcohol and drug  dependency     Alcohol/Drug Brother         recovery alcohol and drug addit     Asthma Son         excercise and allergy induced     Asthma Daughter         excercise and allergy induced     Lipids Sister         high cholesterol     Gastrointestinal Disease Sister         hepatitis ??     Osteoporosis Sister      Breast Cancer Maternal Aunt      Asthma Daughter      Asthma Son      Melanoma No family hx of      Social History     Socioeconomic History     Marital status:      Spouse name: Not on file     Number of children: Not on file     Years of education: Not on file     Highest education level: Not on file   Occupational History     Occupation: Psychologist   Social Needs     Financial resource strain: Not on file     Food insecurity     Worry: Not on file     Inability: Not on file     Transportation needs     Medical: Not on file     Non-medical: Not on file   Tobacco Use     Smoking status: Never Smoker     Smokeless tobacco: Never Used   Substance and Sexual Activity     Alcohol use: Yes     Comment: 1 8oz mixed drink or beer, minimal 1-2 per week     Drug use: No     Sexual activity: Never     Partners: Male     Birth control/protection: Abstinence   Lifestyle     Physical activity     Days per week: Not on file     Minutes per session: Not on file     Stress: Not on file   Relationships     Social connections     Talks on phone: Not on file     Gets together: Not on file     Attends Mosque service: Not on file     Active member of club or organization: Not on file     Attends meetings of clubs or organizations: Not on file     Relationship status: Not on file     Intimate partner violence     Fear of current or ex partner: Not on file     Emotionally abused: Not on file     Physically abused: Not on file     Forced sexual activity: Not on file   Other Topics Concern     Parent/sibling w/ CABG, MI or angioplasty before 65F 55M? No   Social History Narrative     Not on file       Additional  medical/Social/Surgical histories reviewed in [facility] and updated as appropriate.     REVIEW OF SYSTEMS (8/14/2020)  10 point ROS of systems including Constitutional, Eyes, Respiratory, Cardiovascular, Gastroenterology, Genitourinary, Integumentary, Musculoskeletal, Psychiatric, Allergic/Immunologic were all negative except for pertinent positives noted in my HPI.     PHYSICAL EXAM  Vital Signs: LMP 04/06/2010 (Exact Date)  Patient declined being weighed. There is no height or weight on file to calculate BMI.    General  - normal appearance, in no obvious distress  HEENT  - conjunctivae not injected, moist mucous membranes, normocephalic/atraumatic head, ears normal appearance, no lesions, mouth normal appearance, no scars, normal dentition and teeth present  CV  - normal peripheral perfusion  Pulm  - normal respiratory pattern, non-labored  Musculoskeletal - lumbar spine  - stance: normal gait without limp, no obvious leg length discrepancy, normal heel and toe walk  - inspection: normal bone and joint alignment, no obvious scoliosis  - palpation: no paravertebral or bony tenderness  - ROM: flexion exacerbates  some pain, normal extension, sidebending, rotation    Neuro  grossly normal coordination, normal muscle tone  Skin  - no ecchymosis, erythema, warmth, or induration, no obvious rash  Psych  - interactive, appropriate, normal mood and affect  ASSESSMENT & PLAN  61 yo female with lumbar radicular pain, disc herniation, stable  Reviewed response to injection, has some improvement, but had pain afterwards  She would like prednisone and tizanadine   F/u in a few weeks    Marcelo Ochoa MD, CAQSM    Video-Visit Details    Type of service:  Video Visit    Video Start Time: 4:40 pM  Video End Time: 4:53pM    Originating Location (pt. Location): Home    Distant Location (provider location):  [facility]     Platform used for Video Visit: [facility]    Marcelo Ochoa MD

## 2020-07-16 NOTE — PROGRESS NOTES
"Beronica Azevedo is a 60 year old female who is being evaluated via a billable video visit.      After review of patient's medical issues this visit was conducted over the phone, as opposed to in person, in effort to reduce risk of COVID-19 exposure.      The patient has been notified of following:     \"This video visit will be conducted via a call between you and your physician/provider. We have found that certain health care needs can be provided without the need for an in-person physical exam.  This service lets us provide the care you need with a video conversation.  If a prescription is necessary we can send it directly to your pharmacy.  If lab work is needed we can place an order for that and you can then stop by our lab to have the test done at a later time.    Video visits are billed at different rates depending on your insurance coverage.  Please reach out to your insurance provider with any questions.    If during the course of the call the physician/provider feels a video visit is not appropriate, you will not be charged for this service.\"    Patient has given verbal consent for Video visit? Yes  How would you like to obtain your AVS? MyChart  If you are dropped from the video visit, the video invite should be resent to: Text to cell phone: [phone]  Will anyone else be joining your video visit? No      HISTORY OF PRESENT ILLNESS  Ms. Azevedo is a pleasant 60 year old year old female who presents with f/u for PRESTON  Beronica explains that she had her PRESTON and felt some improvement but had a lot of pain following the injection  Location: low back  Quality:  achy pain    Severity: 6/10 at worst    Duration: past few months worse  Timing: occurs intermittently    Modifying factors:  resting and non-use makes it better, movement and use makes it worse  Associated signs & symptoms: some radiation into legs    MEDICAL HISTORY  Patient Active Problem List   Diagnosis     Headache     Insomnia     Moderate dysplasia " of cervix (BEVERLY II)     CARDIOVASCULAR SCREENING; LDL GOAL LESS THAN 160     Endometrial polyp     Iron deficiency anemia due to chronic blood loss     Rosacea     Onychodystrophy     Dermatitis     Osteopenia     Post-menopausal     Mild major depression (H)     IBS (irritable bowel syndrome)     Myopia     Regular astigmatism     Presbyopia     Posterior subcapsular polar senile cataract     Senile nuclear sclerosis     Macular puckering of retina     Cervicalgia     Pain in thoracic spine     Digital mucous cyst     Seborrheic keratosis, inflamed     Chronic pain of right knee     Acute pain of right knee     FREDIS (generalized anxiety disorder)     Neoplasm of uncertain behavior of skin     Dermatitis, seborrheic     Plantar fasciitis     Endometrial cells on cervical Pap smear inconsistent with last menstrual period     Migraine without status migrainosus, not intractable       Current Outpatient Medications   Medication Sig Dispense Refill     azelastine (ASTELIN) 0.1 % nasal spray INSTILL 1 SPRAY INTO THE NOSTRIL TWICE DAILY 30 mL 1     BENADRYL ALLERGY PO prn       biotin 2.5 mg/mL Take by mouth daily       butalbital-acetaminophen-caffeine (FIORICET/ESGIC) -40 MG per tablet TAKE 1 TABLET BY MOUTH EVERY 4 HOURS AS NEEDED 28 tablet 0     Coenzyme Q10 (CO Q10) 100 MG CAPS Take 100 mg by mouth 2 times daily       eletriptan (RELPAX) 40 MG tablet Take 40 mg by mouth as needed for headaches  10     fluticasone (FLONASE) 50 MCG/ACT nasal spray Spray 2 sprays into both nostrils daily 18.2 mL 3     gabapentin (NEURONTIN) 100 MG capsule Take 1-2 capsules (100-200 mg) by mouth At Bedtime (Patient not taking: Reported on 8/7/2020) 60 capsule 1     ketoconazole (NIZORAL) 2 % external shampoo Apply topically daily as needed for itching or irritation 120 mL 11     magnesium 100 MG CAPS Take 400 mg by mouth       nortriptyline (PAMELOR) 50 MG capsule TAKE 1 CAPSULE(50 MG) BY MOUTH AT BEDTIME 90 capsule 1     predniSONE  (DELTASONE) 20 MG tablet Take 2 tablets (40 mg) by mouth daily 10 tablet 0     Riboflavin (VITAMIN B-2 PO) Take 400 mg by mouth       tiZANidine (ZANAFLEX) 4 MG tablet Take 1-2 tablets (4-8 mg) by mouth nightly as needed 30 tablet 1     topiramate (TOPAMAX) 25 MG tablet Take 25 mg by mouth 2 times daily       traZODone (DESYREL) 50 MG tablet 1/2 TABLET AT BEDTIME AS NEEDED FOR SLEEP 45 tablet 1     famciclovir (FAMVIR) 500 MG tablet Take 1 tablet (500 mg) by mouth 3 times daily for 7 days 21 tablet 0       Allergies   Allergen Reactions     Perfume Other (See Comments)     Scents/smells     Tape [Adhesive Tape] Rash     Compazine Other (See Comments)     Hyperactivity       Paxil [Paroxetine] Other (See Comments)     Ropinirole Hcl Unknown     TABS     Depakote [Valproic Acid] Rash     Rash       Food Itching and Rash     mushrooms     Mushroom Itching and Rash       Family History   Problem Relation Age of Onset     Depression Sister      Uterine Cancer Sister      Tumor Sister      Neurologic Disorder Sister      Neurologic Disorder Brother      Neurologic Disorder Father      Lipids Father         high cholesterol     Gastrointestinal Disease Father         stomach ulcers     Cancer Father         melanoma     Skin Cancer Father      Hypertension Father      Hyperlipidemia Father      Neurologic Disorder Mother      Allergies Mother      Arthritis Mother      Depression Mother      Eye Disorder Mother      Lipids Mother         high cholesterol     Osteoporosis Mother      Hypertension Mother      Hyperlipidemia Mother      Thyroid Disease Mother      Neurologic Disorder Sister         epilepsy     Alcohol/Drug Sister         alcohol and drug dependency     Alcohol/Drug Brother         recovery alcohol and drug addit     Asthma Son         excercise and allergy induced     Asthma Daughter         excercise and allergy induced     Lipids Sister         high cholesterol     Gastrointestinal Disease Sister          hepatitis ??     Osteoporosis Sister      Breast Cancer Maternal Aunt      Asthma Daughter      Asthma Son      Melanoma No family hx of      Social History     Socioeconomic History     Marital status:      Spouse name: Not on file     Number of children: Not on file     Years of education: Not on file     Highest education level: Not on file   Occupational History     Occupation: Psychologist   Social Needs     Financial resource strain: Not on file     Food insecurity     Worry: Not on file     Inability: Not on file     Transportation needs     Medical: Not on file     Non-medical: Not on file   Tobacco Use     Smoking status: Never Smoker     Smokeless tobacco: Never Used   Substance and Sexual Activity     Alcohol use: Yes     Comment: 1 8oz mixed drink or beer, minimal 1-2 per week     Drug use: No     Sexual activity: Never     Partners: Male     Birth control/protection: Abstinence   Lifestyle     Physical activity     Days per week: Not on file     Minutes per session: Not on file     Stress: Not on file   Relationships     Social connections     Talks on phone: Not on file     Gets together: Not on file     Attends Pentecostal service: Not on file     Active member of club or organization: Not on file     Attends meetings of clubs or organizations: Not on file     Relationship status: Not on file     Intimate partner violence     Fear of current or ex partner: Not on file     Emotionally abused: Not on file     Physically abused: Not on file     Forced sexual activity: Not on file   Other Topics Concern     Parent/sibling w/ CABG, MI or angioplasty before 65F 55M? No   Social History Narrative     Not on file       Additional medical/Social/Surgical histories reviewed in [facility] and updated as appropriate.     REVIEW OF SYSTEMS (8/14/2020)  10 point ROS of systems including Constitutional, Eyes, Respiratory, Cardiovascular, Gastroenterology, Genitourinary, Integumentary, Musculoskeletal, Psychiatric,  Allergic/Immunologic were all negative except for pertinent positives noted in my HPI.     PHYSICAL EXAM  Vital Signs: LMP 04/06/2010 (Exact Date)  Patient declined being weighed. There is no height or weight on file to calculate BMI.    General  - normal appearance, in no obvious distress  HEENT  - conjunctivae not injected, moist mucous membranes, normocephalic/atraumatic head, ears normal appearance, no lesions, mouth normal appearance, no scars, normal dentition and teeth present  CV  - normal peripheral perfusion  Pulm  - normal respiratory pattern, non-labored  Musculoskeletal - lumbar spine  - stance: normal gait without limp, no obvious leg length discrepancy, normal heel and toe walk  - inspection: normal bone and joint alignment, no obvious scoliosis  - palpation: no paravertebral or bony tenderness  - ROM: flexion exacerbates  some pain, normal extension, sidebending, rotation    Neuro  grossly normal coordination, normal muscle tone  Skin  - no ecchymosis, erythema, warmth, or induration, no obvious rash  Psych  - interactive, appropriate, normal mood and affect  ASSESSMENT & PLAN  61 yo female with lumbar radicular pain, disc herniation, stable  Reviewed response to injection, has some improvement, but had pain afterwards  She would like prednisone and tizanadine   F/u in a few weeks    Marcelo Ochoa MD, CAQSM    Video-Visit Details    Type of service:  Video Visit    Video Start Time: 4:40 pM  Video End Time: 4:53pM    Originating Location (pt. Location): Home    Distant Location (provider location):  [facility]     Platform [facility] for Video Visit: [facility]    Marcelo Ochoa MD

## 2020-07-16 NOTE — TELEPHONE ENCOUNTER
[facility] Call Center    Phone Message    May a detailed message be left on voicemail: yes     Reason for Call: Symptoms or Concerns     If patient has red-flag symptoms, warm transfer to triage line    Current symptom or concern: Worse pain in back and now knee after the injection pt had on 07/02/2020 ordered by Dr. Ochoa.  Pt declined an appt but wanted to talk to Dr. Ochoa's care team    Symptoms have been present for:  2 week(s)    Has patient previously been seen for this? Yes    By Dr. Ochoa    Date:     Are there any new or worsening symptoms? Yes:       Action Taken: Message routed to:  [facility] ([facility] Ortho    Travel Screening: Not Applicable

## 2020-07-16 NOTE — TELEPHONE ENCOUNTER
Called and spoke to pt. Pt stated she had her PRESTON on 7/2/20. She reported no relief or improvements. She said her pain has greatly increased and much worse then when she started. Call and scheduled appt, pt requested first available and hopes of getting some relief and answers.

## 2020-07-19 ENCOUNTER — NURSE TRIAGE (OUTPATIENT)
Dept: NURSING | Facility: CLINIC | Age: 60
End: 2020-07-19

## 2020-07-19 NOTE — TELEPHONE ENCOUNTER
"Patient calling  reporting she is on prednisone and tizanidine for back pain. Stating she had symptoms of \"insomnia\" during the night and took an extra Trazodone \"maybe 75 mg.\"   Patient reporting she was waking x 2 during the night with stomach cramping. Pain is in lower abdomen, intermittent.  Reporting symptoms of constipation. Abdominal area feels \"tighter.\" Denies visible abdominal swelling.  Afebrile. Reporting passing smaller stools x 2 days. Patient is questioning if she may have an UTI. Denies urinary frequency or pain. Reporting in the past she has had stomach pain with UTI symptoms.   Advised patient to be seen with in 24 hours.   Patient is currently traveling and will find Urgent Care location to be seen at today.    Reviewed with patient to consult with MD regarding Trazodone dosing and increase prior to taking or changing dosing.     Caller verbalized understanding. Denies further questions.    Nadia Gutierrez RN  [facility]      COVID 19 Nurse Triage Plan/Patient Instructions    Please be aware that novel coronavirus (COVID-19) may be circulating in the community. If you develop symptoms such as fever, cough, or SOB or if you have concerns about the presence of another infection including coronavirus (COVID-19), please contact your health care provider or visit [email or web address].     Disposition/Instructions    In-Person Visit with provider recommended. Reference Visit Selection Guide.    Thank you for taking steps to prevent the spread of this virus.  o Limit your contact with others.  o Wear a simple mask to cover your cough.  o Wash your hands well and often.    Resources    [facility]: About COVID-19: [email or web address]    CDC: What to Do If You're Sick: www.cdc.gov/coronavirus/2019-ncov/about/steps-when-sick.html    CDC: Ending Home Isolation: www.cdc.gov/coronavirus/2019-ncov/hcp/disposition-in-home-patients.html     CDC: Caring for Someone: " www.cdc.gov/coronavirus/2019-ncov/if-you-are-sick/care-for-someone.html     [facility]: Interim Guidance for Hospital Discharge to Home: www.health.[facility].mn.us/diseases/coronavirus/hcp/hospdischarge.pdf    [facility] clinical trials (COVID-19 research studies): [email or web address]     Below are the COVID-19 hotlines at the [place] Department of Health ([facility]). Interpreters are available.   o For health questions: Call 102-344-2097 or 1-383.452.7444 (7 a.m. to 7 p.m.)  o For questions about schools and childcare: Call 900-108-1832 or 1-990.844.2774 (7 a.m. to 7 p.m.)                     Additional Information    Negative: [1] Abdomen pain is main symptom AND [2] male    Negative: [1] Abdomen pain is main symptom AND [2] adult female    Negative: Rectal bleeding or blood in stool is main symptom    Negative: Rectal pain or itching is main symptom    Negative: Constipation in a cancer patient who is currently (or recently) receiving chemotherapy or radiation therapy, or cancer patient who has metastatic or end-stage cancer and is receiving palliative care    Negative: Patient sounds very sick or weak to the triager    Negative: [1] Vomiting AND [2] abdomen looks much more swollen than usual    Negative: [1] Vomiting AND [2] contains bile (green color)    Negative: [1] Constant abdominal pain AND [2] present > 2 hours    Negative: [1] Rectal pain or fullness from fecal impaction (rectum full of stool) AND [2] NOT better after SITZ bath, suppository or enema    Negative: [1] Intermittent mild abdominal pain AND [2] fever    Abdomen is more swollen than usual    Protocols used: CONSTIPATION-A-AH

## 2020-07-22 ENCOUNTER — DOCUMENTATION ONLY (OUTPATIENT)
Dept: CARE COORDINATION | Facility: CLINIC | Age: 60
End: 2020-07-22

## 2020-07-23 NOTE — PROGRESS NOTES
SUBJECTIVE:   CC: Beronica Azevedo is an 60 year old woman who presents for preventive health visit.     Healthy Habits:    Do you get at least three servings of calcium containing foods daily (dairy, green leafy vegetables, etc.)? yes    Amount of exercise or daily activities, outside of work: 1-2 day(s) per week    Problems taking medications regularly No    Medication side effects: Yes - tiZanidine, dose lowered and now eats before taking the medication.    Have you had an eye exam in the past two years? yes    Do you see a dentist twice per year? no    Do you have sleep apnea, excessive snoring or daytime drowsiness?no      Depression Followup    How are you doing with your depression since your last visit? No change    Are you having other symptoms that might be associated with depression? No    Have you had a significant life event?  Job Concerns     Are you feeling anxious or having panic attacks?   No    Do you have any concerns with your use of alcohol or other drugs? No    Social History     Tobacco Use     Smoking status: Never Smoker     Smokeless tobacco: Never Used   Substance Use Topics     Alcohol use: Yes     Comment: 1 8oz mixed drink or beer, minimal 1-2 per week     Drug use: No     PHQ 7/12/2019 6/26/2020 7/24/2020   PHQ-9 Total Score 3 2 2   Q9: Thoughts of better off dead/self-harm past 2 weeks Not at all Not at all Not at all     FREDIS-7 SCORE 1/30/2018 11/2/2018 7/12/2019   Total Score 4 6 3           Suicide Assessment Five-step Evaluation and Treatment (SAFE-T)      Today's PHQ-2 Score:   PHQ-2 ( 1999 Pfizer) 7/24/2020 6/25/2020   Q1: Little interest or pleasure in doing things 0 0   Q2: Feeling down, depressed or hopeless 0 0   PHQ-2 Score 0 0       Abuse: Current or Past(Physical, Sexual or Emotional)- No  Do you feel safe in your environment? Yes    Have you ever done Advance Care Planning? (For example, a Health Directive, POLST, or a discussion with a medical provider or your loved  ones about your wishes): No, advance care planning information given to patient to review.  Patient declined advance care planning discussion at this time.    Social History     Tobacco Use     Smoking status: Never Smoker     Smokeless tobacco: Never Used   Substance Use Topics     Alcohol use: Yes     Comment: 1 8oz mixed drink or beer, minimal 1-2 per week     If you drink alcohol do you typically have >3 drinks per day or >7 drinks per week? No                     Reviewed orders with patient.  Reviewed health maintenance and updated orders accordingly - Yes  Lab work is in process    Mammogram Screening: Patient over age 50, mutual decision to screen reflected in health maintenance.    Pertinent mammograms are reviewed under the imaging tab.  History of abnormal Pap smear: YES - updated in Problem List and Health Maintenance accordingly  PAP / HPV Latest Ref Rng & Units 9/12/2019 6/13/2018 6/6/2017   PAP - NIL ASC-US(A) NIL   HPV 16 DNA NEG:Negative Negative Negative Negative   HPV 18 DNA NEG:Negative Negative Negative Negative   OTHER HR HPV NEG:Negative Positive(A) Positive(A) Negative     Reviewed and updated as needed this visit by clinical staff  Tobacco  Allergies  Meds  Problems  Med Hx  Surg Hx  Fam Hx         Reviewed and updated as needed this visit by Provider        Past Medical History:   Diagnosis Date     Anxiety      Arthritis 2010     Cervical high risk HPV (human papillomavirus) test positive 03/04/2016, 09/12/19    See problem list.      BEVERLY II (cervical intraepithelial neoplasia II) 2009    LEEP     Depression      Depressive disorder Adolescence    Managed with medication and therapy     Dry eye syndrome      Frequent UTI 2009     Headache(784.0)      IBS (irritable bowel syndrome)      Insomnia      Lattice degeneration of retina      Lyme disease      Myopic astigmatism      Nonsenile cataract      Osteopenia 2001,2012    Fosamax     Pneumothorax on right     at 15 y/o     PVD  "(posterior vitreous detachment), both eyes 7/19/10     Rosacea      Vasomotor rhinitis         ROS:  CONSTITUTIONAL: NEGATIVE for fever, chills, change in weight  INTEGUMENTARY/SKIN: NEGATIVE for worrisome rashes, moles or lesions  EYES: NEGATIVE for vision changes or irritation  ENT: NEGATIVE for ear, mouth and throat problems  RESP: NEGATIVE for significant cough or SOB  BREAST: NEGATIVE for masses, tenderness or discharge  CV: NEGATIVE for chest pain, palpitations or peripheral edema  GI: NEGATIVE for nausea, abdominal pain, heartburn, or change in bowel habits  : NEGATIVE for unusual urinary or vaginal symptoms. No vaginal bleeding.  MUSCULOSKELETAL: NEGATIVE for significant arthralgias or myalgia  NEURO: NEGATIVE for weakness, dizziness or paresthesias  ENDOCRINE: NEGATIVE for temperature intolerance, skin/hair changes  HEME/ALLERGY/IMMUNE: NEGATIVE for bleeding problems  PSYCHIATRIC: NEGATIVE for changes in mood or affect     OBJECTIVE:   BP 98/62   Pulse 100   Temp 98  F (36.7  C) (Oral)   Resp 18   Ht 1.72 m (5' 7.72\")   Wt 64.1 kg (141 lb 5 oz)   LMP 04/06/2010   SpO2 99%   BMI 21.67 kg/m    EXAM:  GENERAL: healthy, alert and no distress  EYES: Eyes grossly normal to inspection, PERRL and conjunctivae and sclerae normal  HENT: ear canals and TM's normal, nose and mouth without ulcers or lesions  NECK: no adenopathy, no asymmetry, masses, or scars and thyroid normal to palpation  RESP: lungs clear to auscultation - no rales, rhonchi or wheezes  CV: regular rate and rhythm, normal S1 S2, no S3 or S4, no murmur, click or rub, no peripheral edema and peripheral pulses strong  ABDOMEN: soft, nontender, no hepatosplenomegaly, no masses and bowel sounds normal  MS: no gross musculoskeletal defects noted, no edema  SKIN: no suspicious lesions or rashes  NEURO: Normal strength and tone, mentation intact and speech normal  BACK: no CVA tenderness, no paralumbar tenderness  PSYCH: mentation appears normal, " "affect normal/bright  LYMPH: no cervical, supraclavicular, axillary, or inguinal adenopathy    Diagnostic Test Results:  Labs reviewed in Epic    ASSESSMENT/PLAN:   1. Routine general medical examination at a health care facility  Overall well  - GASTROENTEROLOGY ADULT REF PROCEDURE ONLY; Future  - Lipid panel reflex to direct LDL Fasting  - Comprehensive metabolic panel  - TSH with free T4 reflex    2. Depressive disorder in remission  Dealing with covid/ working from home   having low back pain wit h radiculoahy  Follow up with consultant as planned.     3. Irritable bowel syndrome with constipation  Well controlled on diet/ exercise      COUNSELING:   Reviewed preventive health counseling, as reflected in patient instructions       Regular exercise       Healthy diet/nutrition       Vision screening       Hearing screening       Osteoporosis Prevention/Bone Health       Safe sex practices/STD prevention       Colon cancer screening       ([place])menopause management    Estimated body mass index is 21.67 kg/m  as calculated from the following:    Height as of this encounter: 1.72 m (5' 7.72\").    Weight as of this encounter: 64.1 kg (141 lb 5 oz).         reports that she has never smoked. She has never used smokeless tobacco.      Counseling Resources:  ATP IV Guidelines  Pooled Cohorts Equation Calculator  Breast Cancer Risk Calculator  FRAX Risk Assessment  ICSI Preventive Guidelines  Dietary Guidelines for Americans, 2010  USDA's MyPlate  ASA Prophylaxis  Lung CA Screening    Joaquin Rowe MD  [facility]  "

## 2020-07-24 ENCOUNTER — OFFICE VISIT (OUTPATIENT)
Dept: FAMILY MEDICINE | Facility: CLINIC | Age: 60
End: 2020-07-24
Payer: COMMERCIAL

## 2020-07-24 VITALS
OXYGEN SATURATION: 99 % | BODY MASS INDEX: 21.42 KG/M2 | TEMPERATURE: 98 F | WEIGHT: 141.31 LBS | DIASTOLIC BLOOD PRESSURE: 62 MMHG | RESPIRATION RATE: 18 BRPM | SYSTOLIC BLOOD PRESSURE: 98 MMHG | HEART RATE: 100 BPM | HEIGHT: 68 IN

## 2020-07-24 DIAGNOSIS — Z00.00 ROUTINE GENERAL MEDICAL EXAMINATION AT A HEALTH CARE FACILITY: Primary | ICD-10-CM

## 2020-07-24 DIAGNOSIS — K58.1 IRRITABLE BOWEL SYNDROME WITH CONSTIPATION: ICD-10-CM

## 2020-07-24 DIAGNOSIS — F32.A DEPRESSIVE DISORDER IN REMISSION: ICD-10-CM

## 2020-07-24 PROCEDURE — 80061 LIPID PANEL: CPT | Performed by: FAMILY MEDICINE

## 2020-07-24 PROCEDURE — 84443 ASSAY THYROID STIM HORMONE: CPT | Performed by: FAMILY MEDICINE

## 2020-07-24 PROCEDURE — 36415 COLL VENOUS BLD VENIPUNCTURE: CPT | Performed by: FAMILY MEDICINE

## 2020-07-24 PROCEDURE — 99396 PREV VISIT EST AGE 40-64: CPT | Performed by: FAMILY MEDICINE

## 2020-07-24 PROCEDURE — 80053 COMPREHEN METABOLIC PANEL: CPT | Performed by: FAMILY MEDICINE

## 2020-07-24 ASSESSMENT — MIFFLIN-ST. JEOR: SCORE: 1250

## 2020-07-24 ASSESSMENT — PATIENT HEALTH QUESTIONNAIRE - PHQ9: SUM OF ALL RESPONSES TO PHQ QUESTIONS 1-9: 2

## 2020-07-25 LAB
ALBUMIN SERPL-MCNC: 3.7 G/DL (ref 3.4–5)
ALP SERPL-CCNC: 75 U/L (ref 40–150)
ALT SERPL W P-5'-P-CCNC: 19 U/L (ref 0–50)
ANION GAP SERPL CALCULATED.3IONS-SCNC: 6 MMOL/L (ref 3–14)
AST SERPL W P-5'-P-CCNC: 15 U/L (ref 0–45)
BILIRUB SERPL-MCNC: 0.2 MG/DL (ref 0.2–1.3)
BUN SERPL-MCNC: 13 MG/DL (ref 7–30)
CALCIUM SERPL-MCNC: 9 MG/DL (ref 8.5–10.1)
CHLORIDE SERPL-SCNC: 111 MMOL/L (ref 94–109)
CHOLEST SERPL-MCNC: 216 MG/DL
CO2 SERPL-SCNC: 24 MMOL/L (ref 20–32)
CREAT SERPL-MCNC: 0.89 MG/DL (ref 0.52–1.04)
GFR SERPL CREATININE-BSD FRML MDRD: 69 ML/MIN/{1.73_M2}
GLUCOSE SERPL-MCNC: 70 MG/DL (ref 70–99)
HDLC SERPL-MCNC: 68 MG/DL
LDLC SERPL CALC-MCNC: 127 MG/DL
NONHDLC SERPL-MCNC: 148 MG/DL
POTASSIUM SERPL-SCNC: 4.2 MMOL/L (ref 3.4–5.3)
PROT SERPL-MCNC: 6.9 G/DL (ref 6.8–8.8)
SODIUM SERPL-SCNC: 141 MMOL/L (ref 133–144)
TRIGL SERPL-MCNC: 106 MG/DL
TSH SERPL DL<=0.005 MIU/L-ACNC: 1.29 MU/L (ref 0.4–4)

## 2020-07-27 ENCOUNTER — NURSE TRIAGE (OUTPATIENT)
Dept: NURSING | Facility: CLINIC | Age: 60
End: 2020-07-27

## 2020-07-27 NOTE — TELEPHONE ENCOUNTER
"\"I have right ear pain\".  Caller reports having had this in the past and was told to use Flonase as they felt that she had fluid in her ear, which hasn't helped.      She rates pain 5/10 and it's been constant for the last couple of days.    Although she appears to understand directives to be seen in the next 24 hours, she declines offer to make an appointment.  Indicates that she will try OTC pain medications and see how she does.      Additional Information    Negative: Moving the earlobe or touching the ear clearly increases the pain    Negative: Foreign body struck in the ear (e.g., bug, piece of cotton)    Negative: Followed an ear injury    Negative: [1] Recently diagnosed with otitis media AND [2] currently on oral antibiotics    Negative: [1] Stiff neck (unable to touch chin to chest) AND [2] fever    Negative: [1] Bony area of skull behind the ear is pink or swollen AND [2] fever    Negative: Fever > 104 F (40 C)    Negative: Patient sounds very sick or weak to the triager    Negative: [1] SEVERE pain AND [2] not improved 2 hours after taking analgesic medication (e.g., ibuprofen or acetaminophen)    Negative: Walking is very unsteady    Negative: Sudden onset of ear pain after long - thin object was inserted into the ear canal (e.g., pencil, Q-tip)    Negative: Diabetes mellitus or weak immune system (e.g., HIV positive, cancer chemo, splenectomy, organ transplant, chronic steroids)    Negative: New blurred vision or vision changes    Negative: White, yellow, or green discharge    Negative: Bloody discharge or unexplained bleeding from ear canal    Earache  (Exceptions: brief ear pain of < 60 minutes duration, earache occurring during air travel    Protocols used: EARACHE-A-    Glenys Rangel, RADHA  [facility]      "

## 2020-07-28 ENCOUNTER — OFFICE VISIT (OUTPATIENT)
Dept: FAMILY MEDICINE | Facility: CLINIC | Age: 60
End: 2020-07-28
Payer: COMMERCIAL

## 2020-07-28 VITALS
DIASTOLIC BLOOD PRESSURE: 70 MMHG | BODY MASS INDEX: 21.47 KG/M2 | WEIGHT: 140 LBS | TEMPERATURE: 98 F | SYSTOLIC BLOOD PRESSURE: 120 MMHG

## 2020-07-28 DIAGNOSIS — H60.391 INFECTIVE OTITIS EXTERNA, RIGHT: Primary | ICD-10-CM

## 2020-07-28 PROCEDURE — 99214 OFFICE O/P EST MOD 30 MIN: CPT | Performed by: FAMILY MEDICINE

## 2020-07-28 RX ORDER — NEOMYCIN SULFATE, POLYMYXIN B SULFATE, HYDROCORTISONE 3.5; 10000; 1 MG/ML; [USP'U]/ML; MG/ML
3 SOLUTION/ DROPS AURICULAR (OTIC) 4 TIMES DAILY
Qty: 5 ML | Refills: 0 | Status: SHIPPED | OUTPATIENT
Start: 2020-07-28 | End: 2020-08-04

## 2020-07-28 RX ORDER — FAMCICLOVIR 500 MG/1
500 TABLET ORAL 3 TIMES DAILY
Qty: 21 TABLET | Refills: 0 | Status: SHIPPED | OUTPATIENT
Start: 2020-07-28 | End: 2021-11-23

## 2020-07-28 RX ORDER — AMOXICILLIN 500 MG/1
500 CAPSULE ORAL 2 TIMES DAILY
Qty: 14 CAPSULE | Refills: 0 | Status: SHIPPED | OUTPATIENT
Start: 2020-07-28 | End: 2020-08-04

## 2020-07-28 NOTE — PROGRESS NOTES
Subjective     Beronica Azevedo is a 60 year old female who presents to clinic today for the following health issues:    HPI       ENT Symptoms             Symptoms: cc Present Absent Comment   Fever/Chills   *    Fatigue   *    Muscle Aches  *     Eye Irritation   *    Sneezing   *    Nasal Nato/Drg   *    Sinus Pressure/Pain  *     Loss of smell   *    Dental pain   *    Sore Throat  *     Swollen Glands   *    Ear Pain/Fullness  *  Right ear   Cough   *    Wheeze   *    Chest Pain   *    Shortness of breath   *    Rash   *    Other         Symptom duration:  Saturday    Symptom severity:  Moderate   Treatments tried:  Ibuprofen, ice, heat    Contacts:  none            Current Outpatient Medications   Medication Sig Dispense Refill     amoxicillin (AMOXIL) 500 MG capsule Take 1 capsule (500 mg) by mouth 2 times daily for 7 days 14 capsule 0     azelastine (ASTELIN) 0.1 % nasal spray INSTILL 1 SPRAY INTO THE NOSTRIL TWICE DAILY 30 mL 1     BENADRYL ALLERGY PO prn       biotin 2.5 mg/mL Take by mouth daily       butalbital-acetaminophen-caffeine (FIORICET/ESGIC) -40 MG per tablet TAKE 1 TABLET BY MOUTH EVERY 4 HOURS AS NEEDED 28 tablet 0     Coenzyme Q10 (CO Q10) 100 MG CAPS Take 100 mg by mouth 2 times daily       eletriptan (RELPAX) 40 MG tablet Take 40 mg by mouth as needed for headaches  10     famciclovir (FAMVIR) 500 MG tablet Take 1 tablet (500 mg) by mouth 3 times daily for 7 days 21 tablet 0     fluticasone (FLONASE) 50 MCG/ACT nasal spray Spray 2 sprays into both nostrils daily 18.2 mL 3     gabapentin (NEURONTIN) 100 MG capsule Take 1-2 capsules (100-200 mg) by mouth At Bedtime 60 capsule 1     ketoconazole (NIZORAL) 2 % external shampoo Apply topically daily as needed for itching or irritation 120 mL 11     magnesium 100 MG CAPS Take 400 mg by mouth       neomycin-polymyxin-hydrocortisone (CORTISPORIN) 3.5-45763-4 otic solution Place 3 drops into the right ear 4 times daily for 7 days 5 mL 0      nortriptyline (PAMELOR) 50 MG capsule TAKE 1 CAPSULE(50 MG) BY MOUTH AT BEDTIME 90 capsule 1     predniSONE (DELTASONE) 20 MG tablet Take 2 tablets (40 mg) by mouth daily 10 tablet 0     Riboflavin (VITAMIN B-2 PO) Take 400 mg by mouth       tiZANidine (ZANAFLEX) 4 MG tablet Take 1-2 tablets (4-8 mg) by mouth nightly as needed 30 tablet 1     topiramate (TOPAMAX) 25 MG tablet Take 25 mg by mouth 2 times daily       traZODone (DESYREL) 50 MG tablet 1/2 TABLET AT BEDTIME AS NEEDED FOR SLEEP 45 tablet 1       Reviewed and updated as needed this visit by Provider         Review of Systems   Constitutional, HEENT, cardiovascular, pulmonary, gi and gu systems are negative, except as otherwise noted.      Objective    /70   Temp 98  F (36.7  C) (Temporal)   Wt 63.5 kg (140 lb)   LMP 04/06/2010 (Exact Date)   Breastfeeding No   BMI 21.47 kg/m    Body mass index is 21.47 kg/m .  Physical Exam   GENERAL: alert and mild distress  HENT: normal cephalic/atraumatic, right ear: red and boggy canal, left ear: normal: no effusions, no erythema, normal landmarks, nose and mouth without ulcers or lesions, oropharynx clear and oral mucous membranes moist  NECK: no adenopathy, no asymmetry, masses, or scars and thyroid normal to palpation  SKIN: no suspicious lesions or rashes    Diagnostic Test Results:  Labs reviewed in Epic        Assessment & Plan     1. Infective otitis externa, right  use  - neomycin-polymyxin-hydrocortisone (CORTISPORIN) 3.5-62482-3 otic solution; Place 3 drops into the right ear 4 times daily for 7 days  Dispense: 5 mL; Refill: 0  - amoxicillin (AMOXIL) 500 MG capsule; Take 1 capsule (500 mg) by mouth 2 times daily for 7 days  Dispense: 14 capsule; Refill: 0  -if by chance developed vesicukr rash start on  famciclovir (FAMVIR) 500 MG tablet; Take 1 tablet (500 mg) by mouth 3 times daily for 7 days  Dispense: 21 tablet; Refill: 0       See Patient Instructions    No follow-ups on file.    Joaquin  Sarthak Rowe MD  [facility]

## 2020-08-03 ENCOUNTER — TELEPHONE (OUTPATIENT)
Dept: FAMILY MEDICINE | Facility: CLINIC | Age: 60
End: 2020-08-03

## 2020-08-03 NOTE — TELEPHONE ENCOUNTER
Reason for call:  Other   Patient called regarding (reason for call): call back  Additional comments: Patient states that right ear pain is still pretty bad despite taking medications. Patient is almost out of medications and would like some guidance on what else she can do to ease the pain.    Phone number to reach patient:  Home number on file [phone] (home)    Best Time:  anytime    Can we leave a detailed message on this number?  YES    Travel screening: Not Applicable

## 2020-08-03 NOTE — TELEPHONE ENCOUNTER
Ear is still painful, but is somewhat better, will be finishing antibiotic up tomorrow morning and ear drops also tomorrow  Will use OTC pain/antiinflamatory as needed, if not better or worsens will call clinic on Wednesday after completing all meds, she will also try a warm compress to area a couple times a day    Delmis Fallon RN   [facility]

## 2020-08-05 ENCOUNTER — TELEPHONE (OUTPATIENT)
Dept: ORTHOPEDICS | Facility: CLINIC | Age: 60
End: 2020-08-05

## 2020-08-05 NOTE — TELEPHONE ENCOUNTER
LVM if pt wants an in person appt with an injection or would like to do a virtual appt. Encouraged her to call back on what she decides

## 2020-08-07 ENCOUNTER — TELEPHONE (OUTPATIENT)
Dept: FAMILY MEDICINE | Facility: CLINIC | Age: 60
End: 2020-08-07

## 2020-08-07 ENCOUNTER — VIRTUAL VISIT (OUTPATIENT)
Dept: ORTHOPEDICS | Facility: CLINIC | Age: 60
End: 2020-08-07
Payer: COMMERCIAL

## 2020-08-07 DIAGNOSIS — M54.16 LUMBAR RADICULAR PAIN: Primary | ICD-10-CM

## 2020-08-07 NOTE — TELEPHONE ENCOUNTER
Reason for call:  Symptom     Symptom or request: ear pain    Duration (how long have symptoms been present): a week ago    Have you been treated for this before? Yes    Additional comments: Patient was in clinic on 7/28 for infective otitis externa, right.  8/3 patient called ear is still painful. 8/7, pt called, she explain, feel like the pain is not completely gone and still there.  She finished antibiotic and requesting to see if provider can help give a refill or something else.    Phone number to reach patient:  Home number on file [phone] (home)    Best Time:  anytime    Can we leave a detailed message on this number?  YES    Travel screening: Not Applicable

## 2020-08-07 NOTE — LETTER
"    8/7/2020         RE: Beronica Azevedo  [address]  [facility] [address]        Dear Colleague,    Thank you for referring your patient, Beronica Azevedo, to the [facility]. Please see a copy of my visit note below.    Beronica Azevedo is a 60 year old female who is being evaluated via a billable telephone visit.      The patient has been notified of following:     \"This telephone visit will be conducted via a call between you and your physician/provider. We have found that certain health care needs can be provided without the need for a physical exam.  This service lets us provide the care you need with a short phone conversation.  If a prescription is necessary we can send it directly to your pharmacy.  If lab work is needed we can place an order for that and you can then stop by our lab to have the test done at a later time.    Telephone visits are billed at different rates depending on your insurance coverage. During this emergency period, for some insurers they may be billed the same as an in-person visit.  Please reach out to your insurance provider with any questions.    If during the course of the call the physician/provider feels a telephone visit is not appropriate, you will not be charged for this service.\"    Patient has given verbal consent for Telephone visit?  Yes    What phone number would you like to be contacted at? cell    How would you like to obtain your AVS? MyChart  HISTORY OF PRESENT ILLNESS  Ms. Azevedo is a pleasant 60 year old year old female who presents to discuss low back pain and radicular pain  Beronica explains that her last PRESTON did help for about a month, now her symptoms are increasing again  Location: low back and right leg  Quality:  achy pain    Severity:6/10 at worst    Duration: 6+ months  Timing: occurs intermittently  Context: occurs while exercising and lifting  Modifying factors:  resting and non-use makes it better, movement and use makes it " worse  Associated signs & symptoms: radiation into right leg  MEDICAL HISTORY  Patient Active Problem List   Diagnosis     Headache     Insomnia     Moderate dysplasia of cervix (BEVERLY II)     CARDIOVASCULAR SCREENING; LDL GOAL LESS THAN 160     Endometrial polyp     Iron deficiency anemia due to chronic blood loss     Rosacea     Onychodystrophy     Dermatitis     Osteopenia     Post-menopausal     Mild major depression (H)     IBS (irritable bowel syndrome)     Myopia     Regular astigmatism     Presbyopia     Posterior subcapsular polar senile cataract     Senile nuclear sclerosis     Macular puckering of retina     Cervicalgia     Pain in thoracic spine     Digital mucous cyst     Seborrheic keratosis, inflamed     Chronic pain of right knee     Acute pain of right knee     FREDIS (generalized anxiety disorder)     Neoplasm of uncertain behavior of skin     Dermatitis, seborrheic     Plantar fasciitis     Endometrial cells on cervical Pap smear inconsistent with last menstrual period     Migraine without status migrainosus, not intractable       Current Outpatient Medications   Medication Sig Dispense Refill     azelastine (ASTELIN) 0.1 % nasal spray INSTILL 1 SPRAY INTO THE NOSTRIL TWICE DAILY 30 mL 1     BENADRYL ALLERGY PO prn       biotin 2.5 mg/mL Take by mouth daily       butalbital-acetaminophen-caffeine (FIORICET/ESGIC) -40 MG per tablet TAKE 1 TABLET BY MOUTH EVERY 4 HOURS AS NEEDED 28 tablet 0     Coenzyme Q10 (CO Q10) 100 MG CAPS Take 100 mg by mouth 2 times daily       eletriptan (RELPAX) 40 MG tablet Take 40 mg by mouth as needed for headaches  10     fluticasone (FLONASE) 50 MCG/ACT nasal spray Spray 2 sprays into both nostrils daily 18.2 mL 3     ketoconazole (NIZORAL) 2 % external shampoo Apply topically daily as needed for itching or irritation 120 mL 11     magnesium 100 MG CAPS Take 400 mg by mouth       nortriptyline (PAMELOR) 50 MG capsule TAKE 1 CAPSULE(50 MG) BY MOUTH AT BEDTIME 90  capsule 1     predniSONE (DELTASONE) 20 MG tablet Take 2 tablets (40 mg) by mouth daily 10 tablet 0     Riboflavin (VITAMIN B-2 PO) Take 400 mg by mouth       tiZANidine (ZANAFLEX) 4 MG tablet Take 1-2 tablets (4-8 mg) by mouth nightly as needed 30 tablet 1     topiramate (TOPAMAX) 25 MG tablet Take 25 mg by mouth 2 times daily       traZODone (DESYREL) 50 MG tablet 1/2 TABLET AT BEDTIME AS NEEDED FOR SLEEP 45 tablet 1     diclofenac (VOLTAREN) 75 MG EC tablet Take 1 tablet (75 mg) by mouth 2 times daily 40 tablet 1     famciclovir (FAMVIR) 500 MG tablet Take 1 tablet (500 mg) by mouth 3 times daily for 7 days 21 tablet 0     gabapentin (NEURONTIN) 100 MG capsule Take 1-2 capsules (100-200 mg) by mouth At Bedtime 60 capsule 1       Allergies   Allergen Reactions     Perfume Other (See Comments)     Scents/smells     Tape [Adhesive Tape] Rash     Compazine Other (See Comments)     Hyperactivity       Paxil [Paroxetine] Other (See Comments)     Ropinirole Hcl Unknown     TABS     Depakote [Valproic Acid] Rash     Rash       Food Itching and Rash     mushrooms     Mushroom Itching and Rash       Family History   Problem Relation Age of Onset     Depression Sister      Uterine Cancer Sister      Tumor Sister      Neurologic Disorder Sister      Neurologic Disorder Brother      Neurologic Disorder Father      Lipids Father         high cholesterol     Gastrointestinal Disease Father         stomach ulcers     Cancer Father         melanoma     Skin Cancer Father      Hypertension Father      Hyperlipidemia Father      Neurologic Disorder Mother      Allergies Mother      Arthritis Mother      Depression Mother      Eye Disorder Mother      Lipids Mother         high cholesterol     Osteoporosis Mother      Hypertension Mother      Hyperlipidemia Mother      Thyroid Disease Mother      Neurologic Disorder Sister         epilepsy     Alcohol/Drug Sister         alcohol and drug dependency     Alcohol/Drug Brother          recovery alcohol and drug addit     Asthma Son         excercise and allergy induced     Asthma Daughter         excercise and allergy induced     Lipids Sister         high cholesterol     Gastrointestinal Disease Sister         hepatitis ??     Osteoporosis Sister      Breast Cancer Maternal Aunt      Asthma Daughter      Asthma Son      Melanoma No family hx of      Social History     Socioeconomic History     Marital status:      Spouse name: Not on file     Number of children: Not on file     Years of education: Not on file     Highest education level: Not on file   Occupational History     Occupation: Psychologist   Social Needs     Financial resource strain: Not on file     Food insecurity     Worry: Not on file     Inability: Not on file     Transportation needs     Medical: Not on file     Non-medical: Not on file   Tobacco Use     Smoking status: Never Smoker     Smokeless tobacco: Never Used   Substance and Sexual Activity     Alcohol use: Yes     Comment: 1 8oz mixed drink or beer, minimal 1-2 per week     Drug use: No     Sexual activity: Never     Partners: Male     Birth control/protection: Abstinence   Lifestyle     Physical activity     Days per week: Not on file     Minutes per session: Not on file     Stress: Not on file   Relationships     Social connections     Talks on phone: Not on file     Gets together: Not on file     Attends Congregation service: Not on file     Active member of club or organization: Not on file     Attends meetings of clubs or organizations: Not on file     Relationship status: Not on file     Intimate partner violence     Fear of current or ex partner: Not on file     Emotionally abused: Not on file     Physically abused: Not on file     Forced sexual activity: Not on file   Other Topics Concern     Parent/sibling w/ CABG, MI or angioplasty before 65F 55M? No   Social History Narrative     Not on file       Additional medical/Social/Surgical histories reviewed in  EP[facility] and updated as appropriate.     REVIEW OF SYSTEMS (9/3/2020)  10 point ROS of systems including Constitutional, Eyes, Respiratory, Cardiovascular, Gastroenterology, Genitourinary, Integumentary, Musculoskeletal, Psychiatric, Allergic/Immunologic were all negative except for pertinent positives noted in my HPI.       ASSESSMENT & PLAN  59 yo female with ongoing lumbar ddd, radicular pain  reviewd previous lumbar MRI: shows ddd  Ordered another PRESTON  Cont. HEP  F/u after PRESTON      Marcelo Ochoa MD, CAQSM    Phone call duration: 14 minutes  Phone call start:12:40pm  Phone call end:12:54pm  Marcelo Ochoa MD

## 2020-08-07 NOTE — NURSING NOTE
Reason For Visit:   Chief Complaint   Patient presents with     Follow Up     Following up on Back/Knee      LMP 04/06/2010 (Exact Date)     Pain Assessment  Patient Currently in Pain: Yes  0-10 Pain Scale: 2  Pain Descriptors: Jomar Antunez ATC

## 2020-08-11 NOTE — TELEPHONE ENCOUNTER
Pt has not had any ear pain today. The right ear pain was present yesterday, but intermittent.   She will call back for in person appt if pain returns.     Elin Taylor RN   [facility]

## 2020-08-21 ENCOUNTER — OFFICE VISIT (OUTPATIENT)
Dept: URGENT CARE | Facility: URGENT CARE | Age: 60
End: 2020-08-21
Payer: COMMERCIAL

## 2020-08-21 ENCOUNTER — OFFICE VISIT (OUTPATIENT)
Dept: ORTHOPEDICS | Facility: CLINIC | Age: 60
End: 2020-08-21
Payer: COMMERCIAL

## 2020-08-21 VITALS — HEIGHT: 68 IN | RESPIRATION RATE: 17 BRPM | HEART RATE: 99 BPM | BODY MASS INDEX: 21.42 KG/M2

## 2020-08-21 VITALS
HEART RATE: 64 BPM | RESPIRATION RATE: 12 BRPM | WEIGHT: 140 LBS | HEIGHT: 68 IN | SYSTOLIC BLOOD PRESSURE: 100 MMHG | DIASTOLIC BLOOD PRESSURE: 74 MMHG | TEMPERATURE: 98.1 F | BODY MASS INDEX: 21.22 KG/M2

## 2020-08-21 DIAGNOSIS — H92.01 OTALGIA, RIGHT: Primary | ICD-10-CM

## 2020-08-21 DIAGNOSIS — M51.26 HERNIATED LUMBAR INTERVERTEBRAL DISC: Primary | ICD-10-CM

## 2020-08-21 PROCEDURE — 99213 OFFICE O/P EST LOW 20 MIN: CPT | Performed by: FAMILY MEDICINE

## 2020-08-21 RX ORDER — DICLOFENAC SODIUM 75 MG/1
75 TABLET, DELAYED RELEASE ORAL 2 TIMES DAILY
Qty: 40 TABLET | Refills: 1 | Status: SHIPPED | OUTPATIENT
Start: 2020-08-21 | End: 2021-11-23

## 2020-08-21 RX ORDER — TRAMADOL HYDROCHLORIDE 50 MG/1
50 TABLET ORAL 3 TIMES DAILY PRN
Qty: 10 TABLET | Refills: 0 | Status: SHIPPED | OUTPATIENT
Start: 2020-08-21 | End: 2020-08-31

## 2020-08-21 ASSESSMENT — MIFFLIN-ST. JEOR: SCORE: 1244.57

## 2020-08-21 ASSESSMENT — ENCOUNTER SYMPTOMS
EYE PAIN: 1
EYE DISCHARGE: 0
EYE REDNESS: 0
EYE ITCHING: 0
SORE THROAT: 0
FEVER: 0

## 2020-08-21 NOTE — LETTER
8/21/2020         RE: Beronica Azevedo  [address]  [facility] [address]        Dear Colleague,    Thank you for referring your patient, Beronica Azevedo, to the [facility]. Please see a copy of my visit note below.    HISTORY OF PRESENT ILLNESS  Ms. Azevedo is a pleasant 60 year old year old female who presents to clinic today with lumbar disc herniation  Had injection about two months  Using tizanadine and ibuprofen PRN  Beronica explains that her last injection did not help much  Was done about 2 months ago  Location: low back, legs  Quality:  achy pain    Severity: 7/10 at worst    Duration: 6+ months  Timing: occurs intermittently  Context: occurs while sitting and standing and walking  Modifying factors:  resting and non-use makes it better, movement and use makes it worse  Associated signs & symptoms: radiation down right leg    MEDICAL HISTORY  Patient Active Problem List   Diagnosis     Headache     Insomnia     Moderate dysplasia of cervix (BEVERLY II)     CARDIOVASCULAR SCREENING; LDL GOAL LESS THAN 160     Endometrial polyp     Iron deficiency anemia due to chronic blood loss     Rosacea     Onychodystrophy     Dermatitis     Osteopenia     Post-menopausal     Mild major depression (H)     IBS (irritable bowel syndrome)     Myopia     Regular astigmatism     Presbyopia     Posterior subcapsular polar senile cataract     Senile nuclear sclerosis     Macular puckering of retina     Cervicalgia     Pain in thoracic spine     Digital mucous cyst     Seborrheic keratosis, inflamed     Chronic pain of right knee     Acute pain of right knee     FREDIS (generalized anxiety disorder)     Neoplasm of uncertain behavior of skin     Dermatitis, seborrheic     Plantar fasciitis     Endometrial cells on cervical Pap smear inconsistent with last menstrual period     Migraine without status migrainosus, not intractable       Current Outpatient Medications   Medication Sig Dispense Refill     azelastine  (ASTELIN) 0.1 % nasal spray INSTILL 1 SPRAY INTO THE NOSTRIL TWICE DAILY 30 mL 1     BENADRYL ALLERGY PO prn       biotin 2.5 mg/mL Take by mouth daily       butalbital-acetaminophen-caffeine (FIORICET/ESGIC) -40 MG per tablet TAKE 1 TABLET BY MOUTH EVERY 4 HOURS AS NEEDED 28 tablet 0     Coenzyme Q10 (CO Q10) 100 MG CAPS Take 100 mg by mouth 2 times daily       eletriptan (RELPAX) 40 MG tablet Take 40 mg by mouth as needed for headaches  10     fluticasone (FLONASE) 50 MCG/ACT nasal spray Spray 2 sprays into both nostrils daily 18.2 mL 3     ketoconazole (NIZORAL) 2 % external shampoo Apply topically daily as needed for itching or irritation 120 mL 11     magnesium 100 MG CAPS Take 400 mg by mouth       nortriptyline (PAMELOR) 50 MG capsule TAKE 1 CAPSULE(50 MG) BY MOUTH AT BEDTIME 90 capsule 1     predniSONE (DELTASONE) 20 MG tablet Take 2 tablets (40 mg) by mouth daily 10 tablet 0     Riboflavin (VITAMIN B-2 PO) Take 400 mg by mouth       topiramate (TOPAMAX) 25 MG tablet Take 25 mg by mouth 2 times daily       traZODone (DESYREL) 50 MG tablet 1/2 TABLET AT BEDTIME AS NEEDED FOR SLEEP 45 tablet 1     famciclovir (FAMVIR) 500 MG tablet Take 1 tablet (500 mg) by mouth 3 times daily for 7 days 21 tablet 0     gabapentin (NEURONTIN) 100 MG capsule Take 1-2 capsules (100-200 mg) by mouth At Bedtime (Patient not taking: Reported on 8/7/2020) 60 capsule 1     tiZANidine (ZANAFLEX) 4 MG tablet Take 1-2 tablets (4-8 mg) by mouth nightly as needed (Patient not taking: Reported on 8/21/2020) 30 tablet 1       Allergies   Allergen Reactions     Perfume Other (See Comments)     Scents/smells     Tape [Adhesive Tape] Rash     Compazine Other (See Comments)     Hyperactivity       Paxil [Paroxetine] Other (See Comments)     Ropinirole Hcl Unknown     TABS     Depakote [Valproic Acid] Rash     Rash       Food Itching and Rash     mushrooms     Mushroom Itching and Rash       Family History   Problem Relation Age of Onset      Depression Sister      Uterine Cancer Sister      Tumor Sister      Neurologic Disorder Sister      Neurologic Disorder Brother      Neurologic Disorder Father      Lipids Father         high cholesterol     Gastrointestinal Disease Father         stomach ulcers     Cancer Father         melanoma     Skin Cancer Father      Hypertension Father      Hyperlipidemia Father      Neurologic Disorder Mother      Allergies Mother      Arthritis Mother      Depression Mother      Eye Disorder Mother      Lipids Mother         high cholesterol     Osteoporosis Mother      Hypertension Mother      Hyperlipidemia Mother      Thyroid Disease Mother      Neurologic Disorder Sister         epilepsy     Alcohol/Drug Sister         alcohol and drug dependency     Alcohol/Drug Brother         recovery alcohol and drug addit     Asthma Son         excercise and allergy induced     Asthma Daughter         excercise and allergy induced     Lipids Sister         high cholesterol     Gastrointestinal Disease Sister         hepatitis ??     Osteoporosis Sister      Breast Cancer Maternal Aunt      Asthma Daughter      Asthma Son      Melanoma No family hx of      Social History     Socioeconomic History     Marital status:      Spouse name: None     Number of children: None     Years of education: None     Highest education level: None   Occupational History     Occupation: Psychologist   Social Needs     Financial resource strain: None     Food insecurity     Worry: None     Inability: None     Transportation needs     Medical: None     Non-medical: None   Tobacco Use     Smoking status: Never Smoker     Smokeless tobacco: Never Used   Substance and Sexual Activity     Alcohol use: Yes     Comment: 1 8oz mixed drink or beer, minimal 1-2 per week     Drug use: No     Sexual activity: Never     Partners: Male     Birth control/protection: Abstinence   Lifestyle     Physical activity     Days per week: None     Minutes per  "session: None     Stress: None   Relationships     Social connections     Talks on phone: None     Gets together: None     Attends Jew service: None     Active member of club or organization: None     Attends meetings of clubs or organizations: None     Relationship status: None     Intimate partner violence     Fear of current or ex partner: None     Emotionally abused: None     Physically abused: None     Forced sexual activity: None   Other Topics Concern     Parent/sibling w/ CABG, MI or angioplasty before 65F 55M? No   Social History Narrative     None       Additional medical/Social/Surgical histories reviewed in [facility] and updated as appropriate.     REVIEW OF SYSTEMS (8/21/2020)  10 point ROS of systems including Constitutional, Eyes, Respiratory, Cardiovascular, Gastroenterology, Genitourinary, Integumentary, Musculoskeletal, Psychiatric, Allergic/Immunologic were all negative except for pertinent positives noted in my HPI.     PHYSICAL EXAM  Vitals:    08/21/20 1406   Pulse: 99   Resp: 17   Height: 1.722 m (5' 7.8\")     Vital Signs: Pulse 99   Resp 17   Ht 1.722 m (5' 7.8\")   LMP 04/06/2010 (Exact Date)   BMI 21.42 kg/m   Patient declined being weighed. Body mass index is 21.42 kg/m .    General  - normal appearance, in no obvious distress  HEENT  - conjunctivae not injected, moist mucous membranes, normocephalic/atraumatic head, ears normal appearance, no lesions, mouth normal appearance, no scars, normal dentition and teeth present  CV  - normal peripheral perfusion  Pulm  - normal respiratory pattern, non-labored  Musculoskeletal - lumbar spine  - stance: normal gait without limp, no obvious leg length discrepancy, normal heel and toe walk  - inspection: normal bone and joint alignment, no obvious scoliosis  - palpation: no paravertebral or bony tenderness  - ROM: flexion exacerbates pain, normal extension, sidebending, rotation  - strength: lower extremities 5/5 in all planes  - special " tests:  (+) straight leg raise  (+) slump test  Neuro  - patellar and Achilles DTRs 2+ bilaterally, some right lower extremity sensory deficit throughout L5 distribution, grossly normal coordination, normal muscle tone  Skin  - no ecchymosis, erythema, warmth, or induration, no obvious rash  Psych  - interactive, appropriate, normal mood and affect  ASSESSMENT & PLAN  61 yo female with lumbar disc herniation, radicular pain  Reviewed lumbar MRI: shows disc herniation at L4/5  Ordered right L4/5 transforaminal injection  Restart voltaren and given some tramadol to use at time of injection  Consider start PT  F/u after PRESTON        Marcelo Ochoa MD, CAQSM

## 2020-08-21 NOTE — NURSING NOTE
"Reason For Visit:   Chief Complaint   Patient presents with     RECHECK     F/U knee/Back     Pulse 99   Resp 17   Ht 1.722 m (5' 7.8\")   LMP 04/06/2010 (Exact Date)   BMI 21.42 kg/m      Pain Assessment  Patient Currently in Pain: Yes  0-10 Pain Scale: 4  Aggravating Factors: Standing    Brigid Antunez ATC     "

## 2020-08-21 NOTE — PROGRESS NOTES
HISTORY OF PRESENT ILLNESS  Ms. Azevedo is a pleasant 60 year old year old female who presents to clinic today with lumbar disc herniation  Had injection about two months  Using tizanadine and ibuprofen PRN  Beronica explains that her last injection did not help much  Was done about 2 months ago  Location: low back, legs  Quality:  achy pain    Severity: 7/10 at worst    Duration: 6+ months  Timing: occurs intermittently  Context: occurs while sitting and standing and walking  Modifying factors:  resting and non-use makes it better, movement and use makes it worse  Associated signs & symptoms: radiation down right leg    MEDICAL HISTORY  Patient Active Problem List   Diagnosis     Headache     Insomnia     Moderate dysplasia of cervix (BEVERLY II)     CARDIOVASCULAR SCREENING; LDL GOAL LESS THAN 160     Endometrial polyp     Iron deficiency anemia due to chronic blood loss     Rosacea     Onychodystrophy     Dermatitis     Osteopenia     Post-menopausal     Mild major depression (H)     IBS (irritable bowel syndrome)     Myopia     Regular astigmatism     Presbyopia     Posterior subcapsular polar senile cataract     Senile nuclear sclerosis     Macular puckering of retina     Cervicalgia     Pain in thoracic spine     Digital mucous cyst     Seborrheic keratosis, inflamed     Chronic pain of right knee     Acute pain of right knee     FREDIS (generalized anxiety disorder)     Neoplasm of uncertain behavior of skin     Dermatitis, seborrheic     Plantar fasciitis     Endometrial cells on cervical Pap smear inconsistent with last menstrual period     Migraine without status migrainosus, not intractable       Current Outpatient Medications   Medication Sig Dispense Refill     azelastine (ASTELIN) 0.1 % nasal spray INSTILL 1 SPRAY INTO THE NOSTRIL TWICE DAILY 30 mL 1     BENADRYL ALLERGY PO prn       biotin 2.5 mg/mL Take by mouth daily       butalbital-acetaminophen-caffeine (FIORICET/ESGIC) -40 MG per tablet TAKE 1 TABLET  BY MOUTH EVERY 4 HOURS AS NEEDED 28 tablet 0     Coenzyme Q10 (CO Q10) 100 MG CAPS Take 100 mg by mouth 2 times daily       eletriptan (RELPAX) 40 MG tablet Take 40 mg by mouth as needed for headaches  10     fluticasone (FLONASE) 50 MCG/ACT nasal spray Spray 2 sprays into both nostrils daily 18.2 mL 3     ketoconazole (NIZORAL) 2 % external shampoo Apply topically daily as needed for itching or irritation 120 mL 11     magnesium 100 MG CAPS Take 400 mg by mouth       nortriptyline (PAMELOR) 50 MG capsule TAKE 1 CAPSULE(50 MG) BY MOUTH AT BEDTIME 90 capsule 1     predniSONE (DELTASONE) 20 MG tablet Take 2 tablets (40 mg) by mouth daily 10 tablet 0     Riboflavin (VITAMIN B-2 PO) Take 400 mg by mouth       topiramate (TOPAMAX) 25 MG tablet Take 25 mg by mouth 2 times daily       traZODone (DESYREL) 50 MG tablet 1/2 TABLET AT BEDTIME AS NEEDED FOR SLEEP 45 tablet 1     famciclovir (FAMVIR) 500 MG tablet Take 1 tablet (500 mg) by mouth 3 times daily for 7 days 21 tablet 0     gabapentin (NEURONTIN) 100 MG capsule Take 1-2 capsules (100-200 mg) by mouth At Bedtime (Patient not taking: Reported on 8/7/2020) 60 capsule 1     tiZANidine (ZANAFLEX) 4 MG tablet Take 1-2 tablets (4-8 mg) by mouth nightly as needed (Patient not taking: Reported on 8/21/2020) 30 tablet 1       Allergies   Allergen Reactions     Perfume Other (See Comments)     Scents/smells     Tape [Adhesive Tape] Rash     Compazine Other (See Comments)     Hyperactivity       Paxil [Paroxetine] Other (See Comments)     Ropinirole Hcl Unknown     TABS     Depakote [Valproic Acid] Rash     Rash       Food Itching and Rash     mushrooms     Mushroom Itching and Rash       Family History   Problem Relation Age of Onset     Depression Sister      Uterine Cancer Sister      Tumor Sister      Neurologic Disorder Sister      Neurologic Disorder Brother      Neurologic Disorder Father      Lipids Father         high cholesterol     Gastrointestinal Disease Father          stomach ulcers     Cancer Father         melanoma     Skin Cancer Father      Hypertension Father      Hyperlipidemia Father      Neurologic Disorder Mother      Allergies Mother      Arthritis Mother      Depression Mother      Eye Disorder Mother      Lipids Mother         high cholesterol     Osteoporosis Mother      Hypertension Mother      Hyperlipidemia Mother      Thyroid Disease Mother      Neurologic Disorder Sister         epilepsy     Alcohol/Drug Sister         alcohol and drug dependency     Alcohol/Drug Brother         recovery alcohol and drug addit     Asthma Son         excercise and allergy induced     Asthma Daughter         excercise and allergy induced     Lipids Sister         high cholesterol     Gastrointestinal Disease Sister         hepatitis ??     Osteoporosis Sister      Breast Cancer Maternal Aunt      Asthma Daughter      Asthma Son      Melanoma No family hx of      Social History     Socioeconomic History     Marital status:      Spouse name: None     Number of children: None     Years of education: None     Highest education level: None   Occupational History     Occupation: Psychologist   Social Needs     Financial resource strain: None     Food insecurity     Worry: None     Inability: None     Transportation needs     Medical: None     Non-medical: None   Tobacco Use     Smoking status: Never Smoker     Smokeless tobacco: Never Used   Substance and Sexual Activity     Alcohol use: Yes     Comment: 1 8oz mixed drink or beer, minimal 1-2 per week     Drug use: No     Sexual activity: Never     Partners: Male     Birth control/protection: Abstinence   Lifestyle     Physical activity     Days per week: None     Minutes per session: None     Stress: None   Relationships     Social connections     Talks on phone: None     Gets together: None     Attends Congregation service: None     Active member of club or organization: None     Attends meetings of clubs or organizations:  "None     Relationship status: None     Intimate partner violence     Fear of current or ex partner: None     Emotionally abused: None     Physically abused: None     Forced sexual activity: None   Other Topics Concern     Parent/sibling w/ CABG, MI or angioplasty before 65F 55M? No   Social History Narrative     None       Additional medical/Social/Surgical histories reviewed in [facility] and updated as appropriate.     REVIEW OF SYSTEMS (8/21/2020)  10 point ROS of systems including Constitutional, Eyes, Respiratory, Cardiovascular, Gastroenterology, Genitourinary, Integumentary, Musculoskeletal, Psychiatric, Allergic/Immunologic were all negative except for pertinent positives noted in my HPI.     PHYSICAL EXAM  Vitals:    08/21/20 1406   Pulse: 99   Resp: 17   Height: 1.722 m (5' 7.8\")     Vital Signs: Pulse 99   Resp 17   Ht 1.722 m (5' 7.8\")   LMP 04/06/2010 (Exact Date)   BMI 21.42 kg/m   Patient declined being weighed. Body mass index is 21.42 kg/m .    General  - normal appearance, in no obvious distress  HEENT  - conjunctivae not injected, moist mucous membranes, normocephalic/atraumatic head, ears normal appearance, no lesions, mouth normal appearance, no scars, normal dentition and teeth present  CV  - normal peripheral perfusion  Pulm  - normal respiratory pattern, non-labored  Musculoskeletal - lumbar spine  - stance: normal gait without limp, no obvious leg length discrepancy, normal heel and toe walk  - inspection: normal bone and joint alignment, no obvious scoliosis  - palpation: no paravertebral or bony tenderness  - ROM: flexion exacerbates pain, normal extension, sidebending, rotation  - strength: lower extremities 5/5 in all planes  - special tests:  (+) straight leg raise  (+) slump test  Neuro  - patellar and Achilles DTRs 2+ bilaterally, some right lower extremity sensory deficit throughout L5 distribution, grossly normal coordination, normal muscle tone  Skin  - no ecchymosis, erythema, " warmth, or induration, no obvious rash  Psych  - interactive, appropriate, normal mood and affect  ASSESSMENT & PLAN  61 yo female with lumbar disc herniation, radicular pain  Reviewed lumbar MRI: shows disc herniation at L4/5  Ordered right L4/5 transforaminal injection  Restart voltaren and given some tramadol to use at time of injection  Consider start PT  F/u after PRESTON        Marcelo Ochoa MD, CAQSM

## 2020-08-22 NOTE — PATIENT INSTRUCTIONS
Patient Education     Earache, No Infection (Adult)  Earaches can happen without an infection. This occurs when air and fluid build up behind the eardrum causing a feeling of fullness and discomfort and reduced hearing. This is called otitis media with effusion (OME) or serous otitis media. It means there is fluid in the middle ear. It is not the same as acute otitis media, which is typically from infection.  OME can happen when you have a cold if congestion blocks the passage that drains the middle ear. This passage is called the eustachian tube. OME may also occur with nasal allergies or after a bacterial middle ear infection.    The pain or discomfort may come and go. You may hear clicking or popping sounds when you chew or swallow. You may feel that your balance is off. Or you may hear ringing in the ear.  It often takes from several weeks up to 3 months for the fluid to clear on its own. Oral pain relievers and ear drops help if there is pain. Decongestants and antihistamines sometimes help. Antibiotics don't help since there is no infection. Your doctor may prescribe a nasal spray to help reduce swelling in the nose and eustachian tube. This can allow the ear to drain.  If your OME doesn't improve after 3 months, surgery may be used to drain the fluid and insert a small tube in the eardrum to allow continued drainage.  Because the middle ear fluid can become infected, it is important to watch for signs of an ear infection which may develop later. These signs include increased ear pain, fever, or drainage from the ear.  Home care  The following guidelines will help you care for yourself at home:    You may use over-the-counter medicine as directed to control pain, unless another medicine was prescribed. If you have chronic liver or kidney disease or ever had a stomach ulcer or GI bleeding, talk with your doctor before using these medicines. Aspirin should never be used in anyone under 18 years of age who is ill  with a fever. It may cause severe liver damage.    You may use over-the-counter decongestants such as phenylephrine or pseudoephedrine. But they are not always helpful. Don't use nasal spray decongestants more than 3 days. Longer use can make congestion worse. Prescription nasal sprays from your doctor don't typically have those restrictions.    Antihistamines may help if you are also having allergy symptoms.    You may use medicines such as guaifenesin to thin mucus and promote drainage.  Follow-up care  Follow up with your healthcare provider or as advised if you are not feeling better after 3 days.  When to seek medical advice  Call your healthcare provider right away if any of the following occur:    Your ear pain gets worse or does not start to improve     Fever of 100.4 F (38 C) or higher, or as directed by your healthcare provider    Fluid or blood draining from the ear    Headache or sinus pain    Stiff neck    Unusual drowsiness or confusion  Date Last Reviewed: 10/1/2016    4813-0722 [employer]. [address] [place] [address], [place] [address]. All rights reserved. This information is not intended as a substitute for professional medical care. Always follow your healthcare professional's instructions.

## 2020-08-22 NOTE — PROGRESS NOTES
"Subjective     Beronica Azevedo is a 60 year old female who presents to clinic today for the following health issues:    HPI   Patient is a pleasant 60-year-old female who presents to urgent care with concerns of right ear pain. Recently treated for otitis externa; 7-; s/p amoxcillin + corticosporin. No improvement. History of TMJ, patient reports symptoms may be related to after eating and drinking.  She gets a sharp intermittent pain that lasts only for a few seconds and comes on sporadically throughout the day.  She is currently not experiencing any pain.  No ear discharge, no fever or decreased hearing.      Review of Systems   Constitutional: Negative for fever.   HENT: Negative for sore throat.    Eyes: Positive for pain. Negative for discharge, redness and itching.            Objective    /74   Pulse 64   Temp 98.1  F (36.7  C) (Oral)   Resp 12   Ht 1.721 m (5' 7.75\")   Wt 63.5 kg (140 lb)   LMP 04/06/2010 (Exact Date)   BMI 21.44 kg/m    Body mass index is 21.44 kg/m .     Physical Exam  HENT:      Right Ear: Tympanic membrane, ear canal and external ear normal. There is no impacted cerumen.      Left Ear: Tympanic membrane, ear canal and external ear normal. There is no impacted cerumen.      Ears:      Comments: No reported tenderness with insertion of otoscope.  Bilateral mastoids are normal.  Cardiovascular:      Rate and Rhythm: Normal rate.   Pulmonary:      Effort: No respiratory distress.   Lymphadenopathy:      Cervical: No cervical adenopathy.             Assessment & Plan     Otalgia, right  Acute on chronic.  Symptoms refractory to amoxicillin and Corticosporin.  Informed patient physical exam findings are not suggestive of otitis media or externa.  Recommend she has further evaluations for TMJ and possible referral to ENT by her primary.  Patient declined any pain medications.  Voiced appreciation of follow-up per PCP.      Return in about 3 days (around 8/24/2020) for If " symptoms do not improve or gets worse..    Wilfred Fu MD  [facility]

## 2020-08-23 DIAGNOSIS — Z11.59 ENCOUNTER FOR SCREENING FOR OTHER VIRAL DISEASES: Primary | ICD-10-CM

## 2020-08-31 ENCOUNTER — VIRTUAL VISIT (OUTPATIENT)
Dept: FAMILY MEDICINE | Facility: CLINIC | Age: 60
End: 2020-08-31
Payer: COMMERCIAL

## 2020-08-31 DIAGNOSIS — H92.01 RIGHT EAR PAIN: Primary | ICD-10-CM

## 2020-08-31 PROCEDURE — 99213 OFFICE O/P EST LOW 20 MIN: CPT | Mod: TEL | Performed by: FAMILY MEDICINE

## 2020-08-31 NOTE — PROGRESS NOTES
"Beronica Azevedo is a 60 year old female who is being evaluated via a billable telephone visit.      The patient has been notified of following:     \"This phone visit will be conducted via a call between you and your physician/provider. We have found that certain health care needs can be provided without the need for an in-person physical exam.  This service lets us provide the care you need with a video conversation.  If a prescription is necessary we can send it directly to your pharmacy.  If lab work is needed we can place an order for that and you can then stop by our lab to have the test done at a later time.    virtual visits are billed at different rates depending on your insurance coverage.  Please reach out to your insurance provider with any questions.    If during the course of the call the physician/provider feels a video visit is not appropriate, you will not be charged for this service.\"    Patient has given verbal consent for  visit? Yes  How would you like to obtain your AVS? MyChart  If you are dropped from the  visit, the video invite should be resent to: Text to cell phone: [phone]  Will anyone else be joining your  visit? No       Zehra Mendieta MA Subjective     Beronica Azevedo is a 60 year old female who presents today via video visit for the following health issues:    HPI    Needs ENT Referral - patient thought she had an ear infection due to have pain in her ear but she said the pain is not constant . She came last week but was told she does not have an ear infection. She mentioned it could be related to her TMJ possibly.      Wants to go over labs              Review of Systems   Constitutional, HEENT, cardiovascular, pulmonary, gi and gu systems are negative, except as otherwise noted.      Objective           Vitals:  No vitals were obtained today due to virtual visit.    Physical Exam     GENERAL: Healthy, alert and no distress  RESP: No audible wheeze, cough, or visible " cyanosis.  No visible retractions or increased work of breathing.    NEURO: Cranial nerves grossly intact.  Mentation and speech appropriate for age.  PSYCH: Mentation appears normal, affect normal/bright, judgement and insight intact, normal speech                Assessment & Plan     Right ear pain  Follow up with consultant as planned.   - OTOLARYNGOLOGY REFERRAL     We reviwd vinny ramos    See Patient Instructions    No follow-ups on file.    Joaquin Rowe MD  [facility] [place] [facility]      telephone-Visit Details  12 minutes

## 2020-09-03 NOTE — PROGRESS NOTES
"Beronica Azevedo is a 60 year old female who is being evaluated via a billable telephone visit.      The patient has been notified of following:     \"This telephone visit will be conducted via a call between you and your physician/provider. We have found that certain health care needs can be provided without the need for a physical exam.  This service lets us provide the care you need with a short phone conversation.  If a prescription is necessary we can send it directly to your pharmacy.  If lab work is needed we can place an order for that and you can then stop by our lab to have the test done at a later time.    Telephone visits are billed at different rates depending on your insurance coverage. During this emergency period, for some insurers they may be billed the same as an in-person visit.  Please reach out to your insurance provider with any questions.    If during the course of the call the physician/provider feels a telephone visit is not appropriate, you will not be charged for this service.\"    Patient has given verbal consent for Telephone visit?  Yes    What phone number would you like to be contacted at? cell    How would you like to obtain your AVS? MyChart  HISTORY OF PRESENT ILLNESS  Ms. Azevedo is a pleasant 60 year old year old female who presents to discuss low back pain and radicular pain  Beronica explains that her last PRESTON did help for about a month, now her symptoms are increasing again  Location: low back and right leg  Quality:  achy pain    Severity:6/10 at worst    Duration: 6+ months  Timing: occurs intermittently  Context: occurs while exercising and lifting  Modifying factors:  resting and non-use makes it better, movement and use makes it worse  Associated signs & symptoms: radiation into right leg  MEDICAL HISTORY  Patient Active Problem List   Diagnosis     Headache     Insomnia     Moderate dysplasia of cervix (BEVERLY II)     CARDIOVASCULAR SCREENING; LDL GOAL LESS THAN 160     Endometrial " polyp     Iron deficiency anemia due to chronic blood loss     Rosacea     Onychodystrophy     Dermatitis     Osteopenia     Post-menopausal     Mild major depression (H)     IBS (irritable bowel syndrome)     Myopia     Regular astigmatism     Presbyopia     Posterior subcapsular polar senile cataract     Senile nuclear sclerosis     Macular puckering of retina     Cervicalgia     Pain in thoracic spine     Digital mucous cyst     Seborrheic keratosis, inflamed     Chronic pain of right knee     Acute pain of right knee     FREDIS (generalized anxiety disorder)     Neoplasm of uncertain behavior of skin     Dermatitis, seborrheic     Plantar fasciitis     Endometrial cells on cervical Pap smear inconsistent with last menstrual period     Migraine without status migrainosus, not intractable       Current Outpatient Medications   Medication Sig Dispense Refill     azelastine (ASTELIN) 0.1 % nasal spray INSTILL 1 SPRAY INTO THE NOSTRIL TWICE DAILY 30 mL 1     BENADRYL ALLERGY PO prn       biotin 2.5 mg/mL Take by mouth daily       butalbital-acetaminophen-caffeine (FIORICET/ESGIC) -40 MG per tablet TAKE 1 TABLET BY MOUTH EVERY 4 HOURS AS NEEDED 28 tablet 0     Coenzyme Q10 (CO Q10) 100 MG CAPS Take 100 mg by mouth 2 times daily       eletriptan (RELPAX) 40 MG tablet Take 40 mg by mouth as needed for headaches  10     fluticasone (FLONASE) 50 MCG/ACT nasal spray Spray 2 sprays into both nostrils daily 18.2 mL 3     ketoconazole (NIZORAL) 2 % external shampoo Apply topically daily as needed for itching or irritation 120 mL 11     magnesium 100 MG CAPS Take 400 mg by mouth       nortriptyline (PAMELOR) 50 MG capsule TAKE 1 CAPSULE(50 MG) BY MOUTH AT BEDTIME 90 capsule 1     predniSONE (DELTASONE) 20 MG tablet Take 2 tablets (40 mg) by mouth daily 10 tablet 0     Riboflavin (VITAMIN B-2 PO) Take 400 mg by mouth       tiZANidine (ZANAFLEX) 4 MG tablet Take 1-2 tablets (4-8 mg) by mouth nightly as needed 30 tablet 1      topiramate (TOPAMAX) 25 MG tablet Take 25 mg by mouth 2 times daily       traZODone (DESYREL) 50 MG tablet 1/2 TABLET AT BEDTIME AS NEEDED FOR SLEEP 45 tablet 1     diclofenac (VOLTAREN) 75 MG EC tablet Take 1 tablet (75 mg) by mouth 2 times daily 40 tablet 1     famciclovir (FAMVIR) 500 MG tablet Take 1 tablet (500 mg) by mouth 3 times daily for 7 days 21 tablet 0     gabapentin (NEURONTIN) 100 MG capsule Take 1-2 capsules (100-200 mg) by mouth At Bedtime 60 capsule 1       Allergies   Allergen Reactions     Perfume Other (See Comments)     Scents/smells     Tape [Adhesive Tape] Rash     Compazine Other (See Comments)     Hyperactivity       Paxil [Paroxetine] Other (See Comments)     Ropinirole Hcl Unknown     TABS     Depakote [Valproic Acid] Rash     Rash       Food Itching and Rash     mushrooms     Mushroom Itching and Rash       Family History   Problem Relation Age of Onset     Depression Sister      Uterine Cancer Sister      Tumor Sister      Neurologic Disorder Sister      Neurologic Disorder Brother      Neurologic Disorder Father      Lipids Father         high cholesterol     Gastrointestinal Disease Father         stomach ulcers     Cancer Father         melanoma     Skin Cancer Father      Hypertension Father      Hyperlipidemia Father      Neurologic Disorder Mother      Allergies Mother      Arthritis Mother      Depression Mother      Eye Disorder Mother      Lipids Mother         high cholesterol     Osteoporosis Mother      Hypertension Mother      Hyperlipidemia Mother      Thyroid Disease Mother      Neurologic Disorder Sister         epilepsy     Alcohol/Drug Sister         alcohol and drug dependency     Alcohol/Drug Brother         recovery alcohol and drug addit     Asthma Son         excercise and allergy induced     Asthma Daughter         excercise and allergy induced     Lipids Sister         high cholesterol     Gastrointestinal Disease Sister         hepatitis ??     Osteoporosis  Sister      Breast Cancer Maternal Aunt      Asthma Daughter      Asthma Son      Melanoma No family hx of      Social History     Socioeconomic History     Marital status:      Spouse name: Not on file     Number of children: Not on file     Years of education: Not on file     Highest education level: Not on file   Occupational History     Occupation: Psychologist   Social Needs     Financial resource strain: Not on file     Food insecurity     Worry: Not on file     Inability: Not on file     Transportation needs     Medical: Not on file     Non-medical: Not on file   Tobacco Use     Smoking status: Never Smoker     Smokeless tobacco: Never Used   Substance and Sexual Activity     Alcohol use: Yes     Comment: 1 8oz mixed drink or beer, minimal 1-2 per week     Drug use: No     Sexual activity: Never     Partners: Male     Birth control/protection: Abstinence   Lifestyle     Physical activity     Days per week: Not on file     Minutes per session: Not on file     Stress: Not on file   Relationships     Social connections     Talks on phone: Not on file     Gets together: Not on file     Attends Jainism service: Not on file     Active member of club or organization: Not on file     Attends meetings of clubs or organizations: Not on file     Relationship status: Not on file     Intimate partner violence     Fear of current or ex partner: Not on file     Emotionally abused: Not on file     Physically abused: Not on file     Forced sexual activity: Not on file   Other Topics Concern     Parent/sibling w/ CABG, MI or angioplasty before 65F 55M? No   Social History Narrative     Not on file       Additional medical/Social/Surgical histories reviewed in [facility] and updated as appropriate.     REVIEW OF SYSTEMS (9/3/2020)  10 point ROS of systems including Constitutional, Eyes, Respiratory, Cardiovascular, Gastroenterology, Genitourinary, Integumentary, Musculoskeletal, Psychiatric, Allergic/Immunologic were all  negative except for pertinent positives noted in my HPI.       ASSESSMENT & PLAN  61 yo female with ongoing lumbar ddd, radicular pain  reviewd previous lumbar MRI: shows ddd  Ordered another PRESTON  Cont. HEP  F/u after PRESTON      Marcelo Ochoa MD, CAQSM    Phone call duration: 14 minutes  Phone call start:12:40pm  Phone call end:12:54pm  Marcelo Ochoa MD

## 2020-09-10 ENCOUNTER — TRANSFERRED RECORDS (OUTPATIENT)
Dept: HEALTH INFORMATION MANAGEMENT | Facility: CLINIC | Age: 60
End: 2020-09-10

## 2020-09-18 DIAGNOSIS — Z11.59 ENCOUNTER FOR SCREENING FOR OTHER VIRAL DISEASES: ICD-10-CM

## 2020-09-18 PROCEDURE — U0003 INFECTIOUS AGENT DETECTION BY NUCLEIC ACID (DNA OR RNA); SEVERE ACUTE RESPIRATORY SYNDROME CORONAVIRUS 2 (SARS-COV-2) (CORONAVIRUS DISEASE [COVID-19]), AMPLIFIED PROBE TECHNIQUE, MAKING USE OF HIGH THROUGHPUT TECHNOLOGIES AS DESCRIBED BY CMS-2020-01-R: HCPCS | Performed by: PREVENTIVE MEDICINE

## 2020-09-19 LAB
SARS-COV-2 RNA SPEC QL NAA+PROBE: NOT DETECTED
SPECIMEN SOURCE: NORMAL

## 2020-09-21 ENCOUNTER — ANCILLARY PROCEDURE (OUTPATIENT)
Dept: GENERAL RADIOLOGY | Facility: CLINIC | Age: 60
End: 2020-09-21
Attending: PREVENTIVE MEDICINE
Payer: COMMERCIAL

## 2020-09-21 VITALS
OXYGEN SATURATION: 99 % | DIASTOLIC BLOOD PRESSURE: 67 MMHG | RESPIRATION RATE: 20 BRPM | TEMPERATURE: 97.9 F | HEART RATE: 102 BPM | SYSTOLIC BLOOD PRESSURE: 101 MMHG

## 2020-09-21 DIAGNOSIS — M51.26 HERNIATED LUMBAR INTERVERTEBRAL DISC: ICD-10-CM

## 2020-09-21 RX ORDER — METHYLPREDNISOLONE ACETATE 80 MG/ML
80 INJECTION, SUSPENSION INTRA-ARTICULAR; INTRALESIONAL; INTRAMUSCULAR; SOFT TISSUE ONCE
Status: COMPLETED | OUTPATIENT
Start: 2020-09-21 | End: 2020-09-21

## 2020-09-21 RX ORDER — IOPAMIDOL 408 MG/ML
10 INJECTION, SOLUTION INTRATHECAL ONCE
Status: COMPLETED | OUTPATIENT
Start: 2020-09-21 | End: 2020-09-21

## 2020-09-21 RX ORDER — LIDOCAINE HYDROCHLORIDE 10 MG/ML
30 INJECTION, SOLUTION EPIDURAL; INFILTRATION; INTRACAUDAL; PERINEURAL ONCE
Status: COMPLETED | OUTPATIENT
Start: 2020-09-21 | End: 2020-09-21

## 2020-09-21 RX ORDER — BUPIVACAINE HYDROCHLORIDE 5 MG/ML
10 INJECTION, SOLUTION PERINEURAL ONCE
Status: COMPLETED | OUTPATIENT
Start: 2020-09-21 | End: 2020-09-21

## 2020-09-21 RX ADMIN — LIDOCAINE HYDROCHLORIDE 30 ML: 10 INJECTION, SOLUTION EPIDURAL; INFILTRATION; INTRACAUDAL; PERINEURAL at 09:11

## 2020-09-21 RX ADMIN — BUPIVACAINE HYDROCHLORIDE 50 MG: 5 INJECTION, SOLUTION PERINEURAL at 09:11

## 2020-09-21 RX ADMIN — IOPAMIDOL 4 ML: 408 INJECTION, SOLUTION INTRATHECAL at 09:11

## 2020-09-21 RX ADMIN — METHYLPREDNISOLONE ACETATE 80 MG: 80 INJECTION, SUSPENSION INTRA-ARTICULAR; INTRALESIONAL; INTRAMUSCULAR; SOFT TISSUE at 09:11

## 2020-09-21 NOTE — PROGRESS NOTES
Pt did well with the procedure, no complications.  AVSS.  Pt escorted to waiting room.    Tess Webb, BS, BSN, RN, PHN

## 2020-09-21 NOTE — DISCHARGE INSTRUCTIONS
Discharge Instructions for Epidural Steroid Injection/Nerve Block    Care of injection site:    If you have new numbness down your leg after the injection, this may last up to 4-6 hours, but should go away. You may need help with walking until your leg feels normal.    Over the next 24 to 48 hours, pain at the injection site may increase before it gets better.    For the next 48 hours, use ice packs for 15 minutes,3-4 times a day for pain relief.    If you have a bandage, you may remove it the next morning     Do not submerge injection site in water for 24 hours, (no baths. pools, hot tubs). Showers are OK.            Activity:    You should relax today and then you may return to your regular activity tomorrow.    You may eat a normal diet.          If you received steroids: The steroid should help reduce swelling and pain. This may take from 3-14 days. Pain from the shot will be mild and go away in 2-3 days.       Common side effects may include:    Insomnia    Heartburn    Flushed face    Water retention    Increased appetite    Increased blood sugar      If you have diabetes, watch your blood sugar closely. If needed, call your doctor to help control your blood sugar.        Call your doctor or go to the Emergency Room if you have new severe pain or fever.

## 2020-09-25 ENCOUNTER — PATIENT OUTREACH (OUTPATIENT)
Dept: OBGYN | Facility: CLINIC | Age: 60
End: 2020-09-25

## 2020-09-25 DIAGNOSIS — N87.1 MODERATE DYSPLASIA OF CERVIX (CIN II): ICD-10-CM

## 2020-10-09 ENCOUNTER — OFFICE VISIT (OUTPATIENT)
Dept: ORTHOPEDICS | Facility: CLINIC | Age: 60
End: 2020-10-09
Payer: COMMERCIAL

## 2020-10-09 VITALS — BODY MASS INDEX: 21.22 KG/M2 | RESPIRATION RATE: 17 BRPM | HEIGHT: 68 IN | WEIGHT: 140 LBS

## 2020-10-09 DIAGNOSIS — M54.16 LUMBAR RADICULOPATHY: Primary | ICD-10-CM

## 2020-10-09 PROCEDURE — 99213 OFFICE O/P EST LOW 20 MIN: CPT | Performed by: PREVENTIVE MEDICINE

## 2020-10-09 RX ORDER — TRAMADOL HYDROCHLORIDE 50 MG/1
50 TABLET ORAL
Qty: 10 TABLET | Refills: 0 | Status: SHIPPED | OUTPATIENT
Start: 2020-10-09 | End: 2020-10-19

## 2020-10-09 ASSESSMENT — MIFFLIN-ST. JEOR: SCORE: 1244.05

## 2020-10-09 NOTE — LETTER
10/9/2020      RE: Beronica Azevedo  [address]  [facility] [address]       HISTORY OF PRESENT ILLNESS  Ms. Azevedo is a pleasant 60 year old year old female who presents to clinic today with f/u for lumbar pain after PRESTON  Beronica explains that she had improvement in her symptoms since injection, but they are still present  Location: low back  Quality:  achy pain    Severity: 5/10 at worst    Duration: 6 months  Timing: occurs intermittently  Context: occurs while exercising and lifting  Modifying factors:  resting and non-use makes it better, movement and use makes it worse  Associated signs & symptoms: some radiation into legs  MEDICAL HISTORY  Patient Active Problem List   Diagnosis     Headache     Insomnia     Moderate dysplasia of cervix (BEVERLY II)     CARDIOVASCULAR SCREENING; LDL GOAL LESS THAN 160     Endometrial polyp     Iron deficiency anemia due to chronic blood loss     Rosacea     Onychodystrophy     Dermatitis     Osteopenia     Post-menopausal     Mild major depression (H)     IBS (irritable bowel syndrome)     Myopia     Regular astigmatism     Presbyopia     Posterior subcapsular polar senile cataract     Senile nuclear sclerosis     Macular puckering of retina     Cervicalgia     Pain in thoracic spine     Digital mucous cyst     Seborrheic keratosis, inflamed     Chronic pain of right knee     Acute pain of right knee     FREDIS (generalized anxiety disorder)     Neoplasm of uncertain behavior of skin     Dermatitis, seborrheic     Plantar fasciitis     Endometrial cells on cervical Pap smear inconsistent with last menstrual period     Migraine without status migrainosus, not intractable       Current Outpatient Medications   Medication Sig Dispense Refill     azelastine (ASTELIN) 0.1 % nasal spray INSTILL 1 SPRAY INTO THE NOSTRIL TWICE DAILY 30 mL 1     BENADRYL ALLERGY PO prn       biotin 2.5 mg/mL Take by mouth daily       butalbital-acetaminophen-caffeine (FIORICET/ESGIC) -40 MG per  tablet TAKE 1 TABLET BY MOUTH EVERY 4 HOURS AS NEEDED 28 tablet 0     Coenzyme Q10 (CO Q10) 100 MG CAPS Take 100 mg by mouth 2 times daily       diclofenac (VOLTAREN) 75 MG EC tablet Take 1 tablet (75 mg) by mouth 2 times daily 40 tablet 1     eletriptan (RELPAX) 40 MG tablet Take 40 mg by mouth as needed for headaches  10     fluticasone (FLONASE) 50 MCG/ACT nasal spray USE 2 SPRAYS IN EACH NOSTRIL DAILY 48 g 3     gabapentin (NEURONTIN) 100 MG capsule Take 1-2 capsules (100-200 mg) by mouth At Bedtime 60 capsule 1     ketoconazole (NIZORAL) 2 % external shampoo Apply topically daily as needed for itching or irritation 120 mL 11     magnesium 100 MG CAPS Take 400 mg by mouth       nortriptyline (PAMELOR) 50 MG capsule TAKE 1 CAPSULE(50 MG) BY MOUTH AT BEDTIME 90 capsule 1     predniSONE (DELTASONE) 20 MG tablet Take 2 tablets (40 mg) by mouth daily 10 tablet 0     Riboflavin (VITAMIN B-2 PO) Take 400 mg by mouth       tiZANidine (ZANAFLEX) 4 MG tablet Take 1-2 tablets (4-8 mg) by mouth nightly as needed 30 tablet 1     topiramate (TOPAMAX) 25 MG tablet Take 25 mg by mouth 2 times daily       traZODone (DESYREL) 50 MG tablet 1/2 TABLET AT BEDTIME AS NEEDED FOR SLEEP 45 tablet 1     famciclovir (FAMVIR) 500 MG tablet Take 1 tablet (500 mg) by mouth 3 times daily for 7 days 21 tablet 0       Allergies   Allergen Reactions     Perfume Other (See Comments)     Scents/smells     Tape [Adhesive Tape] Rash     Compazine Other (See Comments)     Hyperactivity       Paxil [Paroxetine] Other (See Comments)     Ropinirole Hcl Unknown     TABS     Depakote [Valproic Acid] Rash     Rash       Food Itching and Rash     mushrooms     Mushroom Itching and Rash       Family History   Problem Relation Age of Onset     Depression Sister      Uterine Cancer Sister      Tumor Sister      Neurologic Disorder Sister      Neurologic Disorder Brother      Neurologic Disorder Father      Lipids Father         high cholesterol      Gastrointestinal Disease Father         stomach ulcers     Cancer Father         melanoma     Skin Cancer Father      Hypertension Father      Hyperlipidemia Father      Neurologic Disorder Mother      Allergies Mother      Arthritis Mother      Depression Mother      Eye Disorder Mother      Lipids Mother         high cholesterol     Osteoporosis Mother      Hypertension Mother      Hyperlipidemia Mother      Thyroid Disease Mother      Neurologic Disorder Sister         epilepsy     Alcohol/Drug Sister         alcohol and drug dependency     Alcohol/Drug Brother         recovery alcohol and drug addit     Asthma Son         excercise and allergy induced     Asthma Daughter         excercise and allergy induced     Lipids Sister         high cholesterol     Gastrointestinal Disease Sister         hepatitis ??     Osteoporosis Sister      Breast Cancer Maternal Aunt      Asthma Daughter      Asthma Son      Melanoma No family hx of      Social History     Socioeconomic History     Marital status:      Spouse name: None     Number of children: None     Years of education: None     Highest education level: None   Occupational History     Occupation: Psychologist   Social Needs     Financial resource strain: None     Food insecurity     Worry: None     Inability: None     Transportation needs     Medical: None     Non-medical: None   Tobacco Use     Smoking status: Never Smoker     Smokeless tobacco: Never Used   Substance and Sexual Activity     Alcohol use: Yes     Comment: 1 8oz mixed drink or beer, minimal 1-2 per week     Drug use: No     Sexual activity: Never     Partners: Male     Birth control/protection: Abstinence   Lifestyle     Physical activity     Days per week: None     Minutes per session: None     Stress: None   Relationships     Social connections     Talks on phone: None     Gets together: None     Attends Confucianism service: None     Active member of club or organization: None     Attends  "meetings of clubs or organizations: None     Relationship status: None     Intimate partner violence     Fear of current or ex partner: None     Emotionally abused: None     Physically abused: None     Forced sexual activity: None   Other Topics Concern     Parent/sibling w/ CABG, MI or angioplasty before 65F 55M? No   Social History Narrative     None       Additional medical/Social/Surgical histories reviewed in [facility] and updated as appropriate.     REVIEW OF SYSTEMS (10/9/2020)  10 point ROS of systems including Constitutional, Eyes, Respiratory, Cardiovascular, Gastroenterology, Genitourinary, Integumentary, Musculoskeletal, Psychiatric, Allergic/Immunologic were all negative except for pertinent positives noted in my HPI.     PHYSICAL EXAM  Vitals:    10/09/20 1244   Resp: 17   Weight: 63.5 kg (140 lb)   Height: 1.72 m (5' 7.72\")     Vital Signs: Resp 17   Ht 1.72 m (5' 7.72\")   Wt 63.5 kg (140 lb)   LMP 04/06/2010 (Exact Date)   BMI 21.47 kg/m   Patient declined being weighed. Body mass index is 21.47 kg/m .    General  - normal appearance, in no obvious distress  HEENT  - conjunctivae not injected, moist mucous membranes, normocephalic/atraumatic head, ears normal appearance, no lesions, mouth normal appearance, no scars, normal dentition and teeth present  CV  - normal peripheral perfusion  Pulm  - normal respiratory pattern, non-labored  Musculoskeletal - lumbar spine  - stance: normal gait without limp, no obvious leg length discrepancy, normal heel and toe walk  - inspection: normal bone and joint alignment, no obvious scoliosis  - palpation: no paravertebral or bony tenderness  - ROM: flexion exacerbates pain, normal extension, sidebending, rotation  - strength: lower extremities 5/5 in all planes  - special tests:  (-) straight leg raise  (+) slump test  Neuro  - patellar and Achilles DTRs 2+ bilaterally, today, no lower extremity sensory deficit throughout L5 distribution, grossly normal " coordination, normal muscle tone  Skin  - no ecchymosis, erythema, warmth, or induration, no obvious rash  Psych  - interactive, appropriate, normal mood and affect  ASSESSMENT & PLAN  59 yo female with lumbar disc herniation, radicular pain, improved, not resolved  Reviewed lumbar MRI; shows disc herniation at L4/5  Consider repeat PRESTON in 4-6 weeks  Start PT  Cont. voltaren and tizanadine  Tramadol PRN  F/u in 3 weeks    Appropriate PPE was utilized for prevention of spread of Covid-19.  Marcelo Ochoa MD, CAQSM        Marcelo Ochoa MD

## 2020-10-09 NOTE — PROGRESS NOTES
HISTORY OF PRESENT ILLNESS  Ms. Azevedo is a pleasant 60 year old year old female who presents to clinic today with f/u for lumbar pain after PRESTON  Beronica explains that she had improvement in her symptoms since injection, but they are still present  Location: low back  Quality:  achy pain    Severity: 5/10 at worst    Duration: 6 months  Timing: occurs intermittently  Context: occurs while exercising and lifting  Modifying factors:  resting and non-use makes it better, movement and use makes it worse  Associated signs & symptoms: some radiation into legs  MEDICAL HISTORY  Patient Active Problem List   Diagnosis     Headache     Insomnia     Moderate dysplasia of cervix (BEVERLY II)     CARDIOVASCULAR SCREENING; LDL GOAL LESS THAN 160     Endometrial polyp     Iron deficiency anemia due to chronic blood loss     Rosacea     Onychodystrophy     Dermatitis     Osteopenia     Post-menopausal     Mild major depression (H)     IBS (irritable bowel syndrome)     Myopia     Regular astigmatism     Presbyopia     Posterior subcapsular polar senile cataract     Senile nuclear sclerosis     Macular puckering of retina     Cervicalgia     Pain in thoracic spine     Digital mucous cyst     Seborrheic keratosis, inflamed     Chronic pain of right knee     Acute pain of right knee     FREDIS (generalized anxiety disorder)     Neoplasm of uncertain behavior of skin     Dermatitis, seborrheic     Plantar fasciitis     Endometrial cells on cervical Pap smear inconsistent with last menstrual period     Migraine without status migrainosus, not intractable       Current Outpatient Medications   Medication Sig Dispense Refill     azelastine (ASTELIN) 0.1 % nasal spray INSTILL 1 SPRAY INTO THE NOSTRIL TWICE DAILY 30 mL 1     BENADRYL ALLERGY PO prn       biotin 2.5 mg/mL Take by mouth daily       butalbital-acetaminophen-caffeine (FIORICET/ESGIC) -40 MG per tablet TAKE 1 TABLET BY MOUTH EVERY 4 HOURS AS NEEDED 28 tablet 0     Coenzyme Q10 (CO  Q10) 100 MG CAPS Take 100 mg by mouth 2 times daily       diclofenac (VOLTAREN) 75 MG EC tablet Take 1 tablet (75 mg) by mouth 2 times daily 40 tablet 1     eletriptan (RELPAX) 40 MG tablet Take 40 mg by mouth as needed for headaches  10     fluticasone (FLONASE) 50 MCG/ACT nasal spray USE 2 SPRAYS IN EACH NOSTRIL DAILY 48 g 3     gabapentin (NEURONTIN) 100 MG capsule Take 1-2 capsules (100-200 mg) by mouth At Bedtime 60 capsule 1     ketoconazole (NIZORAL) 2 % external shampoo Apply topically daily as needed for itching or irritation 120 mL 11     magnesium 100 MG CAPS Take 400 mg by mouth       nortriptyline (PAMELOR) 50 MG capsule TAKE 1 CAPSULE(50 MG) BY MOUTH AT BEDTIME 90 capsule 1     predniSONE (DELTASONE) 20 MG tablet Take 2 tablets (40 mg) by mouth daily 10 tablet 0     Riboflavin (VITAMIN B-2 PO) Take 400 mg by mouth       tiZANidine (ZANAFLEX) 4 MG tablet Take 1-2 tablets (4-8 mg) by mouth nightly as needed 30 tablet 1     topiramate (TOPAMAX) 25 MG tablet Take 25 mg by mouth 2 times daily       traZODone (DESYREL) 50 MG tablet 1/2 TABLET AT BEDTIME AS NEEDED FOR SLEEP 45 tablet 1     famciclovir (FAMVIR) 500 MG tablet Take 1 tablet (500 mg) by mouth 3 times daily for 7 days 21 tablet 0       Allergies   Allergen Reactions     Perfume Other (See Comments)     Scents/smells     Tape [Adhesive Tape] Rash     Compazine Other (See Comments)     Hyperactivity       Paxil [Paroxetine] Other (See Comments)     Ropinirole Hcl Unknown     TABS     Depakote [Valproic Acid] Rash     Rash       Food Itching and Rash     mushrooms     Mushroom Itching and Rash       Family History   Problem Relation Age of Onset     Depression Sister      Uterine Cancer Sister      Tumor Sister      Neurologic Disorder Sister      Neurologic Disorder Brother      Neurologic Disorder Father      Lipids Father         high cholesterol     Gastrointestinal Disease Father         stomach ulcers     Cancer Father         melanoma     Skin  Cancer Father      Hypertension Father      Hyperlipidemia Father      Neurologic Disorder Mother      Allergies Mother      Arthritis Mother      Depression Mother      Eye Disorder Mother      Lipids Mother         high cholesterol     Osteoporosis Mother      Hypertension Mother      Hyperlipidemia Mother      Thyroid Disease Mother      Neurologic Disorder Sister         epilepsy     Alcohol/Drug Sister         alcohol and drug dependency     Alcohol/Drug Brother         recovery alcohol and drug addit     Asthma Son         excercise and allergy induced     Asthma Daughter         excercise and allergy induced     Lipids Sister         high cholesterol     Gastrointestinal Disease Sister         hepatitis ??     Osteoporosis Sister      Breast Cancer Maternal Aunt      Asthma Daughter      Asthma Son      Melanoma No family hx of      Social History     Socioeconomic History     Marital status:      Spouse name: None     Number of children: None     Years of education: None     Highest education level: None   Occupational History     Occupation: Psychologist   Social Needs     Financial resource strain: None     Food insecurity     Worry: None     Inability: None     Transportation needs     Medical: None     Non-medical: None   Tobacco Use     Smoking status: Never Smoker     Smokeless tobacco: Never Used   Substance and Sexual Activity     Alcohol use: Yes     Comment: 1 8oz mixed drink or beer, minimal 1-2 per week     Drug use: No     Sexual activity: Never     Partners: Male     Birth control/protection: Abstinence   Lifestyle     Physical activity     Days per week: None     Minutes per session: None     Stress: None   Relationships     Social connections     Talks on phone: None     Gets together: None     Attends Congregation service: None     Active member of club or organization: None     Attends meetings of clubs or organizations: None     Relationship status: None     Intimate partner violence  "    Fear of current or ex partner: None     Emotionally abused: None     Physically abused: None     Forced sexual activity: None   Other Topics Concern     Parent/sibling w/ CABG, MI or angioplasty before 65F 55M? No   Social History Narrative     None       Additional medical/Social/Surgical histories reviewed in [facility] and updated as appropriate.     REVIEW OF SYSTEMS (10/9/2020)  10 point ROS of systems including Constitutional, Eyes, Respiratory, Cardiovascular, Gastroenterology, Genitourinary, Integumentary, Musculoskeletal, Psychiatric, Allergic/Immunologic were all negative except for pertinent positives noted in my HPI.     PHYSICAL EXAM  Vitals:    10/09/20 1244   Resp: 17   Weight: 63.5 kg (140 lb)   Height: 1.72 m (5' 7.72\")     Vital Signs: Resp 17   Ht 1.72 m (5' 7.72\")   Wt 63.5 kg (140 lb)   LMP 04/06/2010 (Exact Date)   BMI 21.47 kg/m   Patient declined being weighed. Body mass index is 21.47 kg/m .    General  - normal appearance, in no obvious distress  HEENT  - conjunctivae not injected, moist mucous membranes, normocephalic/atraumatic head, ears normal appearance, no lesions, mouth normal appearance, no scars, normal dentition and teeth present  CV  - normal peripheral perfusion  Pulm  - normal respiratory pattern, non-labored  Musculoskeletal - lumbar spine  - stance: normal gait without limp, no obvious leg length discrepancy, normal heel and toe walk  - inspection: normal bone and joint alignment, no obvious scoliosis  - palpation: no paravertebral or bony tenderness  - ROM: flexion exacerbates pain, normal extension, sidebending, rotation  - strength: lower extremities 5/5 in all planes  - special tests:  (-) straight leg raise  (+) slump test  Neuro  - patellar and Achilles DTRs 2+ bilaterally, today, no lower extremity sensory deficit throughout L5 distribution, grossly normal coordination, normal muscle tone  Skin  - no ecchymosis, erythema, warmth, or induration, no obvious " rash  Psych  - interactive, appropriate, normal mood and affect  ASSESSMENT & PLAN  59 yo female with lumbar disc herniation, radicular pain, improved, not resolved  Reviewed lumbar MRI; shows disc herniation at L4/5  Consider repeat PRESTON in 4-6 weeks  Start PT  Cont. voltaren and tizanadine  Tramadol PRN  F/u in 3 weeks    Appropriate PPE was utilized for prevention of spread of Covid-19.  Marcelo Ochoa MD, CAQSM

## 2020-10-09 NOTE — NURSING NOTE
"Reason For Visit:   Chief Complaint   Patient presents with     RECHECK     F/U after PRESTON injection      Resp 17   Ht 1.72 m (5' 7.72\")   Wt 63.5 kg (140 lb)   LMP 04/06/2010 (Exact Date)   BMI 21.47 kg/m      Pain Assessment  Patient Currently in Pain: Yes  0-10 Pain Scale: 3  Primary Pain Location: Back    Brigid Antunez ATC     "

## 2020-10-22 ENCOUNTER — ANCILLARY PROCEDURE (OUTPATIENT)
Dept: MAMMOGRAPHY | Facility: CLINIC | Age: 60
End: 2020-10-22
Attending: OBSTETRICS & GYNECOLOGY
Payer: COMMERCIAL

## 2020-10-22 DIAGNOSIS — Z12.31 VISIT FOR SCREENING MAMMOGRAM: ICD-10-CM

## 2020-10-22 PROCEDURE — 77067 SCR MAMMO BI INCL CAD: CPT | Mod: GC | Performed by: RADIOLOGY

## 2020-10-22 PROCEDURE — 77063 BREAST TOMOSYNTHESIS BI: CPT | Mod: GC | Performed by: RADIOLOGY

## 2020-10-23 ENCOUNTER — TRANSFERRED RECORDS (OUTPATIENT)
Dept: HEALTH INFORMATION MANAGEMENT | Facility: CLINIC | Age: 60
End: 2020-10-23

## 2020-10-23 ENCOUNTER — NURSE TRIAGE (OUTPATIENT)
Dept: NURSING | Facility: CLINIC | Age: 60
End: 2020-10-23

## 2020-10-24 NOTE — TELEPHONE ENCOUNTER
Triage Call:   Pt called stating she is having a lot of post colonoscopy localized rectal pain. Pt reported having soreness and 8/10 pain with bowel movement, painful to touch. Pt is still having diarrhea. Pt stated she had a polyp removed and having pinkish blood when she wipes and that the surgeon expressed some blood is normal. Pt has tried Vaseline to sooth the area. No abdominal pain. Pt wanted home care advised, advised some home cares. Advised to make a pcp appointment. Pt stated she will call back tomorrow is her symptoms don't improve.     COVID 19 Nurse Triage Plan/Patient Instructions    Please be aware that novel coronavirus (COVID-19) may be circulating in the community. If you develop symptoms such as fever, cough, or SOB or if you have concerns about the presence of another infection including coronavirus (COVID-19), please contact your health care provider or visit [email or web address].     Disposition/Instructions    In-Person Visit with provider recommended. Reference Visit Selection Guide.    Thank you for taking steps to prevent the spread of this virus.  o Limit your contact with others.  o Wear a simple mask to cover your cough.  o Wash your hands well and often.    Resources    [facility]: About COVID-19: [email or web address]    CDC: What to Do If You're Sick: www.cdc.gov/coronavirus/2019-ncov/about/steps-when-sick.html    CDC: Ending Home Isolation: www.cdc.gov/coronavirus/2019-ncov/hcp/disposition-in-home-patients.html     CDC: Caring for Someone: www.cdc.gov/coronavirus/2019-ncov/if-you-are-sick/care-for-someone.html     [facility]: Interim Guidance for Hospital Discharge to Home: [email or web address]/coronavirus/hcp/hospdischarge.pdf    [facility] clinical trials (COVID-19 research studies): [email or web address]     Below are the COVID-19 hotlines at the [place] Department of [facility] ([facility]). Interpreters are available.   o For IPPLEX  questions: Call 067-887-9235 or 1-470.474.3774 (7 a.m. to 7 p.m.)  o For questions about schools and childcare: Call 282-998-0194 or 1-222.509.7362 (7 a.m. to 7 p.m.)     Brigid Hopson RN Nursing Advisor 10/23/2020 8:59 PM       Additional Information    Negative: Breathing difficulty    Negative: Chest pain    Negative: [1] Abdomen pain is main concern AND [2] started > 3 days (72 hours) after colonoscopy AND [3] Female    Negative: [1] Abdomen pain is main concern AND [2] started > 3 days (72 hours) after colonoscopy AND [3] male    Negative: [1] Vomiting is main concern AND [2] started > 3 days after colonoscopy    Negative: Shock suspected (e.g., cold/pale/clammy skin, too weak to stand, low BP, rapid pulse)    Negative: Difficult to awaken or acting confused (e.g., disoriented, slurred speech)    Negative: Passed out (i.e., lost consciousness, collapsed and was not responding)    Negative: Sounds like a life-threatening emergency to the triager    Negative: SEVERE abdomen pain (e.g., excruciating)    Negative: SEVERE rectal bleeding (large blood clots; on and off, or constant bleeding)    Negative: SEVERE dizziness (e.g., unable to stand, requires support to walk, feels like passing out now)    Negative: SEVERE vomiting (e.g., 6 or more times/day)    Negative: [1] MODERATE rectal bleeding (small blood clots, passing blood without stool, or toilet water turns red) AND [2] more than once    Negative: [1] MILD-MODERATE abdomen pain AND [2] constant AND [3] present > 2 hours    Negative: [1] Drinking very little AND [2] dehydration suspected (e.g., no urine > 12 hours, very dry mouth, very lightheaded)    Negative: Patient sounds very sick or weak to the triager    Negative: Fever > 100.4 F (38.0 C)    Negative: [1] Abdominal bloating, cramping, nausea, or vomiting while drinking bowel prep AND [2] CANNOT finish bowel prep AND [3] has tried recommended Care Advice    Rectal bleeding (slight; streaks or less than 1  teaspoon or 5 ml) after colonoscopy, questions about    Negative: Sexual assault    Negative: Injury to rectum    Negative: Large mass protruding out of rectum    Negative: Foreign body in rectum    Negative: Diarrhea is main symptom    Negative: Constipation is main symptom (e.g., pain or discomfort caused by passage of hard BMs)    Negative: Blood in or on bowel movement is main symptom    Negative: Pregnant    Negative: Pinworms are suspected (rectal itching; (white thread-like worm about size of a staple, moves)    Negative: SEVERE rectal pain (e.g., excruciating, unable to have a bowel movement)    Negative: [1] Rectal pain or redness AND [2] fever    Negative: [1] Sudden onset rectal pain AND [2] constipated (straining, rectal pressure or fullness) AND [3] NOT better after SITZ bath, suppository or enema    Negative: Patient sounds very sick or weak to the triager    MODERATE-SEVERE rectal pain (i.e., interferes with school, work, or sleep)    Protocols used: COLONOSCOPY SYMPTOMS AND [id number], RECTAL SYMPTOMS-A-AH

## 2020-10-30 ENCOUNTER — OFFICE VISIT (OUTPATIENT)
Dept: OBGYN | Facility: CLINIC | Age: 60
End: 2020-10-30
Payer: COMMERCIAL

## 2020-10-30 ENCOUNTER — THERAPY VISIT (OUTPATIENT)
Dept: PHYSICAL THERAPY | Facility: CLINIC | Age: 60
End: 2020-10-30
Attending: PREVENTIVE MEDICINE
Payer: COMMERCIAL

## 2020-10-30 VITALS
DIASTOLIC BLOOD PRESSURE: 67 MMHG | BODY MASS INDEX: 21.53 KG/M2 | WEIGHT: 140.4 LBS | SYSTOLIC BLOOD PRESSURE: 114 MMHG | HEART RATE: 106 BPM

## 2020-10-30 DIAGNOSIS — Z01.419 ENCOUNTER FOR GYNECOLOGICAL EXAMINATION WITHOUT ABNORMAL FINDING: Primary | ICD-10-CM

## 2020-10-30 DIAGNOSIS — R87.810 CERVICAL HIGH RISK HPV (HUMAN PAPILLOMAVIRUS) TEST POSITIVE: ICD-10-CM

## 2020-10-30 DIAGNOSIS — M54.16 LUMBAR RADICULOPATHY: ICD-10-CM

## 2020-10-30 PROCEDURE — 97161 PT EVAL LOW COMPLEX 20 MIN: CPT | Mod: GP | Performed by: PHYSICAL THERAPIST

## 2020-10-30 PROCEDURE — 99N1016 PR STATISTIC CYTO-THIN LAYER QC G0145: Performed by: OBSTETRICS & GYNECOLOGY

## 2020-10-30 PROCEDURE — 99396 PREV VISIT EST AGE 40-64: CPT | Performed by: OBSTETRICS & GYNECOLOGY

## 2020-10-30 PROCEDURE — 87624 HPV HI-RISK TYP POOLED RSLT: CPT | Performed by: OBSTETRICS & GYNECOLOGY

## 2020-10-30 PROCEDURE — 97110 THERAPEUTIC EXERCISES: CPT | Mod: GP | Performed by: PHYSICAL THERAPIST

## 2020-10-30 PROCEDURE — 97530 THERAPEUTIC ACTIVITIES: CPT | Mod: GP | Performed by: PHYSICAL THERAPIST

## 2020-10-30 NOTE — PROGRESS NOTES
"  [facility] Physical Therapy Initial Evaluation  10/30/2020     Precautions/Restrictions/MD instructions: eval and treat    Therapist Assessment: Beronica Azevdeo is a 60 year old female patient presenting to Physical Therapy with LBP with referred pain into R knee. Patient demonstrates decreased strength in bilateral hips, decreased glute cachorro activation, and pain with repeated lumbar flexion in standing. Signs and symptoms are consistent with discogenic lumbar pair. These impairments limit their ability to sit and stand for prolonged periods of time. Skilled PT services are necessary in order to reduce impairments and improve independent function.    Subjective History    Injury/Condition Details:  Presenting Complaint Low back pain with radiation in R knee    Onset Timing/Date March 1st, 2020    Mechanism Insidious office      Symptom Behavior Details    Primary Symptoms Constant symptoms; worsen with activity, Sporadic symptoms; Activity/position dependent, pain (Location: upper lumbar spine (patient pointing to L1-L2 area, does not wrap around R side, stays medial on spine) Quality: Sharp and Aching/Throbbing), numbness and tingling into R leg down to knee. Denies locking, catching, giving way, or instability. Weakness in back with moments of sharp pain   Worst Pain 8/10 (with waking up in the morning, and sitting for long periods of time)   Symptom Provocators Sitting for long periods of time, standing for long periods of time, laying down with use of back, lifting heavy    Best Pain 1/10    Symptom Relievers Exercises, injections, tramadol (but doesn't like the side effects), heat or cold    Time of day dependent? Worse in morning and Stiffness in morning   Recent symptom change? symptoms worsening     Prior Testing/Intervention for current condition:  Prior Tests  x-ray and MRI   Prior Treatment Injections: steriod injection 9/21/2020 (\"somewhat\" helpful)     Lifestyle & General " Medical History:  Employment Psychologist, crisis responding    Usual physical activities  (within past year) Walking, cooking   Orthopaedic History  R knee, migraines    Medication  Tramadol (but not taking it)    Notable medical history See Epic Chart   Patient goals Avoid surgery, decrease pain, sitting for prolonged periods    Patient Reported Health good     Denies locking, catching, giving way, or instability.    Red Flags: (Bold when present) - reviewed the following and denies  Cauda equina: progressive motor or sensory loss, urinary retention or overflow incontinence, new fecal incontinence, saddle anesthesia, significant motor deficits encompassing multiple nerve roots  Fracture: Significant trauma, prolonged corticosteroid use, osteoporosis, age >70  Infection: spinal procedure in the last 12 months, IV drug use, immunosuppression, fever, wound in spinal region, generally unwell  Malignancy: history of metastatic cancer, unexplained weight loss        PHYSICAL THERAPY SPINE EXAMINATION    Dynamic Movement Screen:  2 leg stance: decreased lumbar lordosis   2 leg squat:Excessive anterior knee excursion (reduced posterior hip excursion) and Femoral ADD/IR noted at bilateral limb(s)  Spine rotation in stance: adequate rotational mobility and control observed    1 leg stance:   Right: normal  Left: normal    1 leg squat:   Right: excessive contralateral pelvic drop, excessive femoral IR/ADD and excessive anterior knee excursion  Left: excessive contralateral pelvic drop, excessive femoral IR/ADD and excessive anterior knee excursion    Gait: Excessive trunk sway indicating weak glute medius and pelvis stabilizers    Lumbar & Thoracic Spine ROM Screen   RIGHT LEFT   Standing Lumbar Spine ROM   Flexion Hands to low shin*   Extension WFL    Sidebend WFL WFL   Seated Thoracic Spine ROM   Rotation WFL WFL   *Indicates patient's pain    Repeated Movement Testing  Baseline symptoms seated: 1/10 with pain in upper  lumbar region on R medial side   Symptoms During Symptoms After ROM ? ROM ? No Effect   Flexion Standing Increases and   Better and             Repeated Produces and   Worse and        Extension Standing Abolishes and   Better and             Repeated Produces, Centralising and   Better and        SI testing: - sacral thrust, - thigh thrust, - compression  Passive Joint Mobility Screen: somewhat general restriction with BPA L1-L5    Tender to palpation at the following structures: none  NOT tender to palpation at the following structures: NA     SUSPECTED DIRECTIONAL PREFERENCE: flexion     Motor Deficit:  Myotomes L R   L1-2 (hip flexion) 4/5 4/5   L3 (knee extension) 4/5 4/5   L4 (ankle DF) 4+/5 4+/5   L5 (g. toe ext) 4/5 4/5   S1 (ankle PF or knee flex) 4/5 4/5   (* = patient s pain)    Sensory Exam: SILT and symmetrical for bilateral LE's. Reflexes 2+ and symmetrical for bilateral patellar and Achilles.     Lower Extremity Neural System Examination   Right Left   NEURAL TENSION     Seated Slump Test - -   Supine SLR Test (Sciatic n.) ++ -   Prone KF (Femoral n.) NT NT     Lower Extremity Flexibility Screen:   Right Left   Hamstring + -   SHER - -   Goalie Stretch - -   Hip Flexor - -   ITB/Lat Hip in SL - -   Quadriceps - -   Gastroc/ Soleus ++ ++   - = normal  + = mild tightness  ++ = moderate tightness  +++ = significant tightness    Lower Extremity Muscle Strength (x/5)   Right Left   Hip Flex 4/5 4/5   Hip ER 4-/5 4-/5   Hip IR 4-/5 4-/5   Hip ABD 3+/5 3+/5   Hip ADD 4/5 4/5   Hip Ext 4/5 4/5   Knee Flex 4/5 4/5     ASSESSMENT/PLAN:  The patient is a 60 year old female with chief complaint of LBP.    The patient has the following significant findings with corresponding treatment plan.  Diagnosis 1:  Discogenic LBP with referral down R leg to knee     Pain -  hot/cold therapy, electric stimulation, self management, education, directional preference exercise and home program  Decreased ROM/flexibility -  manual therapy, therapeutic exercise and home program  Decreased joint mobility - manual therapy, therapeutic exercise and home program  Decreased strength - therapeutic exercise, therapeutic activities and home program  Impaired balance - neuro re-education, therapeutic activities and home program  Decreased proprioception - neuro re-education and therapeutic activities  Impaired gait - gait training and assistive devices  Impaired muscle performance - neuro re-education and home program  Decreased function - therapeutic activities and home program  Impaired posture - neuro re-education, therapeutic activities and home program  Instability -  Therapeutic Activity, Therapeutic Exercise, Neuromuscular Re-education, Splinting/Taping/Bracing/Orthotic, home program    Therapy Evaluation Codes:   1) History comprised of:   Personal factors that impact the plan of care:      None.    Comorbidity factors that impact the plan of care are:      None.     Medications impacting care: None.  2) Examination of Body Systems comprised of:   Body structures and functions that impact the plan of care:      Lumbar spine.   Activity limitations that impact the plan of care are:      Bending, Lifting, Sitting and Standing.  3) Clinical presentation characteristics are:   Stable/Uncomplicated.  4) Decision-Making    Low complexity using standardized patient assessment instrument and/or measureable assessment of functional outcome.  Cumulative Therapy Evaluation is: Low complexity.    Previous and current functional limitations:  (See Goal Flow Sheet for this information)    Short term and Long term goals: (See Goal Flow Sheet for this information)     Communication ability:  Patient appears to be able to clearly communicate and understand verbal and written communication and follow directions correctly.  Treatment Explanation - The following has been discussed with the patient:   RX ordered/plan of care  Anticipated outcomes  Possible  risks and side effects  This patient would benefit from PT intervention to resume normal activities.   Rehab potential is excellent.    Frequency:  1 X week, once daily  Duration:  for 2 months tapering to 2 X a month over 2 months  Discharge Plan: Achieve all LTGs, be independent in home treatment program, and reach maximal therapeutic benefit.    Please refer to the daily flowsheet for treatment today, total treatment time and time spent performing 1:1 timed codes.

## 2020-10-30 NOTE — PROGRESS NOTES
Beronica is a 60 year old  who presents for annual exam.   Postmenopausal since 2010.  She is having no menopausal symptoms. No vaginal bleeding noted.     Besides routine health maintenance, she has no other health concerns today.  She reports some discomfort with vaginal dryness and was previously prescribed vaginal estrogen, wondering if that will help and if it will be messy.  GYNECOLOGIC HISTORY:  Menarche: 12   Age at first intercourse: 16 Number of lifetime partners: >6  She is not sexually active with male partner(s) and she is not currently in monogamous relationship.    History sexually transmitted infections:Gonorrhea and HPV  STI testing offered?  Declined  Estrogen replacement therapy: No  HOLLIE exposure: Unknown    History of abnormal Pap smear: YES - updated in Problem List and Health Maintenance accordingly  Family history of breast CA: Yes (Please explain): Mat aunt  Family history of uterine/ovarian CA: Yes (Please explain): sister  Family history of colon CA: No    HEALTH MAINTENANCE:  Cholesterol: (No components found for: CHOL2 ) History of abnormal lipids: No  Mammo: 10/22/20. History of abnormal Mammo: No  Regular Self Breast Exams: No  Colonoscopy: 10/23/20 done at [facility] History of abnormal Colonoscopy: Yes and found a polyp but results aren't back yet  Dexa: 13 History of abnormal Dexa: No, otesopenia  Calcium/Vitamin D intake: source:  dairy, dietary supplement(s) Adequate? Yes  TSH: (No components found for: TSH1 )  Pap; (  Lab Results   Component Value Date    PAP NIL 2019    PAP ASC-US 2018    PAP NIL 2017    )    HISTORY:  OB History    Para Term  AB Living   2 2 0 0 0 2   SAB TAB Ectopic Multiple Live Births   0 0 0 0 2      # Outcome Date GA Lbr Deuce/2nd Weight Sex Delivery Anes PTL Lv   2 Para 89        JAVY   1 Para 85        JAVY     Past Medical History:   Diagnosis Date     Anxiety      Arthritis      Cervical high  risk HPV (human papillomavirus) test positive 03/04/2016, 09/12/19    See problem list.      BEVERLY II (cervical intraepithelial neoplasia II) 2009    LEEP     Depression      Depressive disorder Adolescence    Managed with medication and therapy     Dry eye syndrome      Frequent UTI 2009     Headache(784.0)      IBS (irritable bowel syndrome)      Insomnia      Lattice degeneration of retina      Lyme disease      Myopic astigmatism      Nonsenile cataract      Osteopenia 2001,2012    Fosamax     Pneumothorax on right     at 17 y/o     PVD (posterior vitreous detachment), both eyes 7/19/10     Rosacea      Vasomotor rhinitis      Past Surgical History:   Procedure Laterality Date     BIOPSY  Moles skin tags     CHEST TUBE INSERTION       COLONOSCOPY  Normal     DILATION AND CURETTAGE, OPERATIVE HYSTEROSCOPY WITH MORCELLATOR, COMBINED  1/2011    endometrial polyp     LEEP TX, CERVICAL  8/2009    BEVERLY II, clear margins     THORACIC SURGERY  1976    Collapsed lung     Family History   Problem Relation Age of Onset     Depression Sister      Uterine Cancer Sister      Tumor Sister      Neurologic Disorder Sister      Neurologic Disorder Brother      Neurologic Disorder Father      Lipids Father         high cholesterol     Gastrointestinal Disease Father         stomach ulcers     Cancer Father         melanoma     Skin Cancer Father      Hypertension Father      Hyperlipidemia Father      Neurologic Disorder Mother      Allergies Mother      Arthritis Mother      Depression Mother      Eye Disorder Mother      Lipids Mother         high cholesterol     Osteoporosis Mother      Hypertension Mother      Hyperlipidemia Mother      Thyroid Disease Mother      Neurologic Disorder Sister         epilepsy     Alcohol/Drug Sister         alcohol and drug dependency     Alcohol/Drug Brother         recovery alcohol and drug addit     Asthma Son         excercise and allergy induced     Asthma Daughter         excercise and  allergy induced     Lipids Sister         high cholesterol     Gastrointestinal Disease Sister         hepatitis ??     Osteoporosis Sister      Breast Cancer Maternal Aunt      Asthma Daughter      Asthma Son      Melanoma No family hx of      Social History     Socioeconomic History     Marital status:      Spouse name: None     Number of children: None     Years of education: None     Highest education level: None   Occupational History     Occupation: Psychologist   Social Needs     Financial resource strain: None     Food insecurity     Worry: None     Inability: None     Transportation needs     Medical: None     Non-medical: None   Tobacco Use     Smoking status: Never Smoker     Smokeless tobacco: Never Used   Substance and Sexual Activity     Alcohol use: Yes     Comment: 1 8oz mixed drink or beer, minimal 1-2 per week     Drug use: No     Sexual activity: Never     Partners: Male     Birth control/protection: Abstinence   Lifestyle     Physical activity     Days per week: None     Minutes per session: None     Stress: None   Relationships     Social connections     Talks on phone: None     Gets together: None     Attends Shinto service: None     Active member of club or organization: None     Attends meetings of clubs or organizations: None     Relationship status: None     Intimate partner violence     Fear of current or ex partner: None     Emotionally abused: None     Physically abused: None     Forced sexual activity: None   Other Topics Concern     Parent/sibling w/ CABG, MI or angioplasty before 65F 55M? No   Social History Narrative     None       Current Outpatient Medications:      azelastine (ASTELIN) 0.1 % nasal spray, INSTILL 1 SPRAY INTO THE NOSTRIL TWICE DAILY, Disp: 30 mL, Rfl: 1     BENADRYL ALLERGY PO, prn, Disp: , Rfl:      biotin 2.5 mg/mL, Take by mouth daily, Disp: , Rfl:      butalbital-acetaminophen-caffeine (FIORICET/ESGIC) -40 MG per tablet, TAKE 1 TABLET BY MOUTH  EVERY 4 HOURS AS NEEDED, Disp: 28 tablet, Rfl: 0     Coenzyme Q10 (CO Q10) 100 MG CAPS, Take 100 mg by mouth 2 times daily, Disp: , Rfl:      diclofenac (VOLTAREN) 75 MG EC tablet, Take 1 tablet (75 mg) by mouth 2 times daily, Disp: 40 tablet, Rfl: 1     eletriptan (RELPAX) 40 MG tablet, Take 40 mg by mouth as needed for headaches, Disp: , Rfl: 10     famciclovir (FAMVIR) 500 MG tablet, Take 1 tablet (500 mg) by mouth 3 times daily for 7 days, Disp: 21 tablet, Rfl: 0     fluticasone (FLONASE) 50 MCG/ACT nasal spray, USE 2 SPRAYS IN EACH NOSTRIL DAILY, Disp: 48 g, Rfl: 3     gabapentin (NEURONTIN) 100 MG capsule, Take 1-2 capsules (100-200 mg) by mouth At Bedtime, Disp: 60 capsule, Rfl: 1     ketoconazole (NIZORAL) 2 % external shampoo, Apply topically daily as needed for itching or irritation, Disp: 120 mL, Rfl: 11     magnesium 100 MG CAPS, Take 400 mg by mouth, Disp: , Rfl:      nortriptyline (PAMELOR) 50 MG capsule, TAKE 1 CAPSULE(50 MG) BY MOUTH AT BEDTIME, Disp: 90 capsule, Rfl: 1     predniSONE (DELTASONE) 20 MG tablet, Take 2 tablets (40 mg) by mouth daily, Disp: 10 tablet, Rfl: 0     Riboflavin (VITAMIN B-2 PO), Take 400 mg by mouth, Disp: , Rfl:      tiZANidine (ZANAFLEX) 4 MG tablet, Take 1-2 tablets (4-8 mg) by mouth nightly as needed, Disp: 30 tablet, Rfl: 1     topiramate (TOPAMAX) 25 MG tablet, Take 25 mg by mouth 2 times daily, Disp: , Rfl:      traZODone (DESYREL) 50 MG tablet, 1/2 TABLET AT BEDTIME AS NEEDED FOR SLEEP, Disp: 45 tablet, Rfl: 1     Allergies   Allergen Reactions     Perfume Other (See Comments)     Scents/smells     Tape [Adhesive Tape] Rash     Compazine Other (See Comments)     Hyperactivity       Paxil [Paroxetine] Other (See Comments)     Ropinirole Hcl Unknown     TABS     Depakote [Valproic Acid] Rash     Rash       Food Itching and Rash     mushrooms     Mushroom Itching and Rash       Past medical, surgical, social and family history were reviewed and updated in [facility].    ROS:    C:       NEGATIVE for fever, chills, change in weight  I:         NEGATIVE for worrisome rashes, moles or lesions  E:       NEGATIVE for vision changes or irritation  E/M:   NEGATIVE for ear, mouth and throat problems  R:       NEGATIVE for significant cough or SOB  CV:     NEGATIVE for chest pain, palpitations or peripheral edema  GI:      NEGATIVE for nausea, abdominal pain, heartburn, or change in bowel habits  :    NEGATIVE for frequency, dysuria, hematuria, vaginal discharge, or bleeding  M:       NEGATIVE for significant arthralgias or myalgia  N:       NEGATIVE for weakness, dizziness or paresthesias  E:       NEGATIVE for temperature intolerance, skin/hair changes  P:       NEGATIVE for changes in mood or affect    EXAM:  /67   Pulse 106   Wt 63.7 kg (140 lb 6.4 oz)   LMP 04/06/2010 (Exact Date)   BMI 21.53 kg/m     BMI: Body mass index is 21.53 kg/m .  Constitutional: healthy, alert and no distress  Head: Normocephalic. No masses, lesions, tenderness or abnormalities  Neck: Neck supple. Trachea midline. No adenopathy. Thyroid symmetric, normal size.   Cardiovascular: RRR.   Respiratory: Negative.   Breast: No nodularity, asymmetry or nipple discharge bilaterally.  Gastrointestinal: Abdomen soft, non-tender, non-distended. No masses, organomegaly  :  Vulva:  No external lesions, normal female hair distribution, no inguinal adenopathy.    Urethra:  Midline, non-tender, well supported, no discharge  Vagina:  Atrophic, no abnormal discharge, no lesions  Uterus:  Normal size, non-tender, freely mobile  Ovaries:  No masses appreciated, non-tender, mobile  Rectal Exam: deferred  Musculoskeletal: extremities normal  Skin: no suspicious lesions or rashes  Psychiatric: Affect appropriate, cooperative,mentation appears normal.     COUNSELING:   Reviewed preventive health counseling, as reflected in patient instructions  Special attention given to:        Regular exercise       Healthy diet/nutrition        Osteoporosis Prevention/Bone Health       Safe sex practices/STD prevention       (Zuleika)menopause management   reports that she has never smoked. She has never used smokeless tobacco.    Body mass index is 21.53 kg/m .      FRAX Risk Assessment  ASSESSMENT:  60 year old  with satisfactory annual exam, h/o HPV, vaginal dryness  Plan: dx cotesting done.  Discussed use of vaginal estrogen and how to deal with mess of cream.  Different dosing schedules discussed.  mammo and colonoscopy UTD, labs with PCP.  Discussed open notes and automatic results release. RTC yearly or prn.    MELIA AQUINO MD

## 2020-11-04 ENCOUNTER — TELEPHONE (OUTPATIENT)
Dept: FAMILY MEDICINE | Facility: CLINIC | Age: 60
End: 2020-11-04

## 2020-11-04 DIAGNOSIS — R30.0 DYSURIA: Primary | ICD-10-CM

## 2020-11-04 DIAGNOSIS — N39.0 URINARY TRACT INFECTION: ICD-10-CM

## 2020-11-04 LAB
COPATH REPORT: NORMAL
PAP: NORMAL

## 2020-11-04 NOTE — TELEPHONE ENCOUNTER
Reason for call:  Other   Patient called regarding (reason for call): call back  Additional comments: pt would like to come in to leave a urine sample as she is having symptoms of a UTI- does not want to schedule a full appointment. Also sees Dr. Ibrahim, so if she is available to sign the order sooner, pt would be okay with that    Phone number to reach patient:  Home number on file [phone] (home)    Best Time:  n/a    Can we leave a detailed message on this number?  YES    Travel screening: Not Applicable

## 2020-11-04 NOTE — TELEPHONE ENCOUNTER
TC to patient. Left message orders signed and can schedule lab only appointment.   Deysi Hicks RN-BSN

## 2020-11-05 ENCOUNTER — VIRTUAL VISIT (OUTPATIENT)
Dept: ORTHOPEDICS | Facility: CLINIC | Age: 60
End: 2020-11-05
Payer: COMMERCIAL

## 2020-11-05 DIAGNOSIS — M54.16 LUMBAR RADICULOPATHY: Primary | ICD-10-CM

## 2020-11-05 DIAGNOSIS — M54.16 LUMBAR RADICULOPATHY: ICD-10-CM

## 2020-11-05 DIAGNOSIS — M51.16 LUMBAR DISC HERNIATION WITH RADICULOPATHY: ICD-10-CM

## 2020-11-05 PROCEDURE — 99213 OFFICE O/P EST LOW 20 MIN: CPT | Mod: TEL | Performed by: PREVENTIVE MEDICINE

## 2020-11-05 RX ORDER — MELOXICAM 15 MG/1
TABLET ORAL
Qty: 90 TABLET | Refills: 1 | Status: SHIPPED | OUTPATIENT
Start: 2020-11-05 | End: 2021-07-15

## 2020-11-05 RX ORDER — MELOXICAM 15 MG/1
7.5 TABLET ORAL 2 TIMES DAILY
Qty: 30 TABLET | Refills: 0 | Status: SHIPPED | OUTPATIENT
Start: 2020-11-05 | End: 2020-11-05

## 2020-11-05 NOTE — PROGRESS NOTES
"Beronica Azevedo is a 60 year old female who is being evaluated via a billable telephone visit.      The patient has been notified of following:     \"This telephone visit will be conducted via a call between you and your physician/provider. We have found that certain health care needs can be provided without the need for a physical exam.  This service lets us provide the care you need with a short phone conversation.  If a prescription is necessary we can send it directly to your pharmacy.  If lab work is needed we can place an order for that and you can then stop by our lab to have the test done at a later time.    Telephone visits are billed at different rates depending on your insurance coverage. During this emergency period, for some insurers they may be billed the same as an in-person visit.  Please reach out to your insurance provider with any questions.    If during the course of the call the physician/provider feels a telephone visit is not appropriate, you will not be charged for this service.\"    Patient has given verbal consent for Telephone visit?  Yes    What phone number would you like to be contacted at? cell    How would you like to obtain your AVS? MyChart  HISTORY OF PRESENT ILLNESS  Ms. Azevedo is a pleasant 60 year old year old female who presents to f/u for lumbar radicular pain  Improving, PT is helping, but still only about 40% improvement overall with pain and symptoms from low back since injection  Beronica explains that she does think that the PT is helping her improve her motion and mobility and pain  Location: low back, legs  Quality:  achy pain    Severity:5/10 at worst    Duration: past 6+ months  Timing: occurs intermittently  Context: occurs while exercising and lifting  Modifying factors:  resting and non-use makes it better, movement and use makes it worse  Associated signs & symptoms: still having pain and symptoms into legs    MEDICAL HISTORY  Patient Active Problem List   Diagnosis     " Headache     Insomnia     Moderate dysplasia of cervix (BEVERLY II)     CARDIOVASCULAR SCREENING; LDL GOAL LESS THAN 160     Endometrial polyp     Iron deficiency anemia due to chronic blood loss     Rosacea     Onychodystrophy     Dermatitis     Osteopenia     Post-menopausal     Mild major depression (H)     IBS (irritable bowel syndrome)     Myopia     Regular astigmatism     Presbyopia     Posterior subcapsular polar senile cataract     Senile nuclear sclerosis     Macular puckering of retina     Cervicalgia     Pain in thoracic spine     Digital mucous cyst     Seborrheic keratosis, inflamed     Chronic pain of right knee     Acute pain of right knee     FREDIS (generalized anxiety disorder)     Neoplasm of uncertain behavior of skin     Dermatitis, seborrheic     Plantar fasciitis     Endometrial cells on cervical Pap smear inconsistent with last menstrual period     Migraine without status migrainosus, not intractable       Current Outpatient Medications   Medication Sig Dispense Refill     azelastine (ASTELIN) 0.1 % nasal spray INSTILL 1 SPRAY INTO THE NOSTRIL TWICE DAILY 30 mL 1     BENADRYL ALLERGY PO prn       biotin 2.5 mg/mL Take by mouth daily       butalbital-acetaminophen-caffeine (FIORICET/ESGIC) -40 MG per tablet TAKE 1 TABLET BY MOUTH EVERY 4 HOURS AS NEEDED 28 tablet 0     Coenzyme Q10 (CO Q10) 100 MG CAPS Take 100 mg by mouth 2 times daily       diclofenac (VOLTAREN) 75 MG EC tablet Take 1 tablet (75 mg) by mouth 2 times daily 40 tablet 1     eletriptan (RELPAX) 40 MG tablet Take 40 mg by mouth as needed for headaches  10     famciclovir (FAMVIR) 500 MG tablet Take 1 tablet (500 mg) by mouth 3 times daily for 7 days 21 tablet 0     fluticasone (FLONASE) 50 MCG/ACT nasal spray USE 2 SPRAYS IN EACH NOSTRIL DAILY 48 g 3     gabapentin (NEURONTIN) 100 MG capsule Take 1-2 capsules (100-200 mg) by mouth At Bedtime 60 capsule 1     ketoconazole (NIZORAL) 2 % external shampoo Apply topically daily as  needed for itching or irritation 120 mL 11     magnesium 100 MG CAPS Take 400 mg by mouth       nortriptyline (PAMELOR) 50 MG capsule TAKE 1 CAPSULE(50 MG) BY MOUTH AT BEDTIME 90 capsule 1     predniSONE (DELTASONE) 20 MG tablet Take 2 tablets (40 mg) by mouth daily 10 tablet 0     Riboflavin (VITAMIN B-2 PO) Take 400 mg by mouth       tiZANidine (ZANAFLEX) 4 MG tablet Take 1-2 tablets (4-8 mg) by mouth nightly as needed 30 tablet 1     topiramate (TOPAMAX) 25 MG tablet Take 25 mg by mouth 2 times daily       traZODone (DESYREL) 50 MG tablet 1/2 TABLET AT BEDTIME AS NEEDED FOR SLEEP 45 tablet 1       Allergies   Allergen Reactions     Perfume Other (See Comments)     Scents/smells     Tape [Adhesive Tape] Rash     Compazine Other (See Comments)     Hyperactivity       Paxil [Paroxetine] Other (See Comments)     Ropinirole Hcl Unknown     TABS     Depakote [Valproic Acid] Rash     Rash       Food Itching and Rash     mushrooms     Mushroom Itching and Rash       Family History   Problem Relation Age of Onset     Depression Sister      Uterine Cancer Sister      Tumor Sister      Neurologic Disorder Sister      Neurologic Disorder Brother      Neurologic Disorder Father      Lipids Father         high cholesterol     Gastrointestinal Disease Father         stomach ulcers     Cancer Father         melanoma     Skin Cancer Father      Hypertension Father      Hyperlipidemia Father      Neurologic Disorder Mother      Allergies Mother      Arthritis Mother      Depression Mother      Eye Disorder Mother      Lipids Mother         high cholesterol     Osteoporosis Mother      Hypertension Mother      Hyperlipidemia Mother      Thyroid Disease Mother      Neurologic Disorder Sister         epilepsy     Alcohol/Drug Sister         alcohol and drug dependency     Alcohol/Drug Brother         recovery alcohol and drug addit     Asthma Son         excercise and allergy induced     Asthma Daughter         excercise and allergy  induced     Lipids Sister         high cholesterol     Gastrointestinal Disease Sister         hepatitis ??     Osteoporosis Sister      Breast Cancer Maternal Aunt      Asthma Daughter      Asthma Son      Melanoma No family hx of      Social History     Socioeconomic History     Marital status:      Spouse name: Not on file     Number of children: Not on file     Years of education: Not on file     Highest education level: Not on file   Occupational History     Occupation: Psychologist   Social Needs     Financial resource strain: Not on file     Food insecurity     Worry: Not on file     Inability: Not on file     Transportation needs     Medical: Not on file     Non-medical: Not on file   Tobacco Use     Smoking status: Never Smoker     Smokeless tobacco: Never Used   Substance and Sexual Activity     Alcohol use: Yes     Comment: 1 8oz mixed drink or beer, minimal 1-2 per week     Drug use: No     Sexual activity: Not Currently     Partners: Male     Birth control/protection: Abstinence   Lifestyle     Physical activity     Days per week: Not on file     Minutes per session: Not on file     Stress: Not on file   Relationships     Social connections     Talks on phone: Not on file     Gets together: Not on file     Attends Yarsanism service: Not on file     Active member of club or organization: Not on file     Attends meetings of clubs or organizations: Not on file     Relationship status: Not on file     Intimate partner violence     Fear of current or ex partner: Not on file     Emotionally abused: Not on file     Physically abused: Not on file     Forced sexual activity: Not on file   Other Topics Concern     Parent/sibling w/ CABG, MI or angioplasty before 65F 55M? No   Social History Narrative     Not on file       Additional medical/Social/Surgical histories reviewed in [facility] and updated as appropriate.     REVIEW OF SYSTEMS (11/5/2020)  10 point ROS of systems including Constitutional, Eyes,  Respiratory, Cardiovascular, Gastroenterology, Genitourinary, Integumentary, Musculoskeletal, Psychiatric, Allergic/Immunologic were all negative except for pertinent positives noted in my HPI.       ASSESSMENT & PLAN  61 yo female with lumbar ddd, radicular pain, improved, not resolved  reveiwed lumbar MRI: shows disc herniations, ddd  After discussion, ordered another Ramin  Cont. PT  mobic Prn to replace diclofenac  F/u after RAMIN  Marcelo Ochoa MD, CAQSM    Phone call duration: 14 minutes  Phone call start: 8:26am  Phone call end: 8:40am  Marcelo Ochoa MD

## 2020-11-05 NOTE — LETTER
"    11/5/2020         RE: Beronica Azevedo  [address]  [facility] [address]        Dear Colleague,    Thank you for referring your patient, Beronica Azevedo, to the [facility] [place]. Please see a copy of my visit note below.    Beronica Azevedo is a 60 year old female who is being evaluated via a billable telephone visit.      The patient has been notified of following:     \"This telephone visit will be conducted via a call between you and your physician/provider. We have found that certain health care needs can be provided without the need for a physical exam.  This service lets us provide the care you need with a short phone conversation.  If a prescription is necessary we can send it directly to your pharmacy.  If lab work is needed we can place an order for that and you can then stop by our lab to have the test done at a later time.    Telephone visits are billed at different rates depending on your insurance coverage. During this emergency period, for some insurers they may be billed the same as an in-person visit.  Please reach out to your insurance provider with any questions.    If during the course of the call the physician/provider feels a telephone visit is not appropriate, you will not be charged for this service.\"    Patient has given verbal consent for Telephone visit?  Yes    What phone number would you like to be contacted at? cell    How would you like to obtain your AVS? MyChart  HISTORY OF PRESENT ILLNESS  Ms. Azevedo is a pleasant 60 year old year old female who presents to f/u for lumbar radicular pain  Improving, PT is helping, but still only about 40% improvement overall with pain and symptoms from low back since injection  Beronica explains that she does think that the PT is helping her improve her motion and mobility and pain  Location: low back, legs  Quality:  achy pain    Severity:5/10 at worst    Duration: past 6+ months  Timing: occurs " intermittently  Context: occurs while exercising and lifting  Modifying factors:  resting and non-use makes it better, movement and use makes it worse  Associated signs & symptoms: still having pain and symptoms into legs    MEDICAL HISTORY  Patient Active Problem List   Diagnosis     Headache     Insomnia     Moderate dysplasia of cervix (BEVERLY II)     CARDIOVASCULAR SCREENING; LDL GOAL LESS THAN 160     Endometrial polyp     Iron deficiency anemia due to chronic blood loss     Rosacea     Onychodystrophy     Dermatitis     Osteopenia     Post-menopausal     Mild major depression (H)     IBS (irritable bowel syndrome)     Myopia     Regular astigmatism     Presbyopia     Posterior subcapsular polar senile cataract     Senile nuclear sclerosis     Macular puckering of retina     Cervicalgia     Pain in thoracic spine     Digital mucous cyst     Seborrheic keratosis, inflamed     Chronic pain of right knee     Acute pain of right knee     FREDIS (generalized anxiety disorder)     Neoplasm of uncertain behavior of skin     Dermatitis, seborrheic     Plantar fasciitis     Endometrial cells on cervical Pap smear inconsistent with last menstrual period     Migraine without status migrainosus, not intractable       Current Outpatient Medications   Medication Sig Dispense Refill     azelastine (ASTELIN) 0.1 % nasal spray INSTILL 1 SPRAY INTO THE NOSTRIL TWICE DAILY 30 mL 1     BENADRYL ALLERGY PO prn       biotin 2.5 mg/mL Take by mouth daily       butalbital-acetaminophen-caffeine (FIORICET/ESGIC) -40 MG per tablet TAKE 1 TABLET BY MOUTH EVERY 4 HOURS AS NEEDED 28 tablet 0     Coenzyme Q10 (CO Q10) 100 MG CAPS Take 100 mg by mouth 2 times daily       diclofenac (VOLTAREN) 75 MG EC tablet Take 1 tablet (75 mg) by mouth 2 times daily 40 tablet 1     eletriptan (RELPAX) 40 MG tablet Take 40 mg by mouth as needed for headaches  10     famciclovir (FAMVIR) 500 MG tablet Take 1 tablet (500 mg) by mouth 3 times daily for 7 days  21 tablet 0     fluticasone (FLONASE) 50 MCG/ACT nasal spray USE 2 SPRAYS IN EACH NOSTRIL DAILY 48 g 3     gabapentin (NEURONTIN) 100 MG capsule Take 1-2 capsules (100-200 mg) by mouth At Bedtime 60 capsule 1     ketoconazole (NIZORAL) 2 % external shampoo Apply topically daily as needed for itching or irritation 120 mL 11     magnesium 100 MG CAPS Take 400 mg by mouth       nortriptyline (PAMELOR) 50 MG capsule TAKE 1 CAPSULE(50 MG) BY MOUTH AT BEDTIME 90 capsule 1     predniSONE (DELTASONE) 20 MG tablet Take 2 tablets (40 mg) by mouth daily 10 tablet 0     Riboflavin (VITAMIN B-2 PO) Take 400 mg by mouth       tiZANidine (ZANAFLEX) 4 MG tablet Take 1-2 tablets (4-8 mg) by mouth nightly as needed 30 tablet 1     topiramate (TOPAMAX) 25 MG tablet Take 25 mg by mouth 2 times daily       traZODone (DESYREL) 50 MG tablet 1/2 TABLET AT BEDTIME AS NEEDED FOR SLEEP 45 tablet 1       Allergies   Allergen Reactions     Perfume Other (See Comments)     Scents/smells     Tape [Adhesive Tape] Rash     Compazine Other (See Comments)     Hyperactivity       Paxil [Paroxetine] Other (See Comments)     Ropinirole Hcl Unknown     TABS     Depakote [Valproic Acid] Rash     Rash       Food Itching and Rash     mushrooms     Mushroom Itching and Rash       Family History   Problem Relation Age of Onset     Depression Sister      Uterine Cancer Sister      Tumor Sister      Neurologic Disorder Sister      Neurologic Disorder Brother      Neurologic Disorder Father      Lipids Father         high cholesterol     Gastrointestinal Disease Father         stomach ulcers     Cancer Father         melanoma     Skin Cancer Father      Hypertension Father      Hyperlipidemia Father      Neurologic Disorder Mother      Allergies Mother      Arthritis Mother      Depression Mother      Eye Disorder Mother      Lipids Mother         high cholesterol     Osteoporosis Mother      Hypertension Mother      Hyperlipidemia Mother      Thyroid Disease  Mother      Neurologic Disorder Sister         epilepsy     Alcohol/Drug Sister         alcohol and drug dependency     Alcohol/Drug Brother         recovery alcohol and drug addit     Asthma Son         excercise and allergy induced     Asthma Daughter         excercise and allergy induced     Lipids Sister         high cholesterol     Gastrointestinal Disease Sister         hepatitis ??     Osteoporosis Sister      Breast Cancer Maternal Aunt      Asthma Daughter      Asthma Son      Melanoma No family hx of      Social History     Socioeconomic History     Marital status:      Spouse name: Not on file     Number of children: Not on file     Years of education: Not on file     Highest education level: Not on file   Occupational History     Occupation: Psychologist   Social Needs     Financial resource strain: Not on file     Food insecurity     Worry: Not on file     Inability: Not on file     Transportation needs     Medical: Not on file     Non-medical: Not on file   Tobacco Use     Smoking status: Never Smoker     Smokeless tobacco: Never Used   Substance and Sexual Activity     Alcohol use: Yes     Comment: 1 8oz mixed drink or beer, minimal 1-2 per week     Drug use: No     Sexual activity: Not Currently     Partners: Male     Birth control/protection: Abstinence   Lifestyle     Physical activity     Days per week: Not on file     Minutes per session: Not on file     Stress: Not on file   Relationships     Social connections     Talks on phone: Not on file     Gets together: Not on file     Attends Mor[employer] service: Not on file     Active member of club or organization: Not on file     Attends meetings of clubs or organizations: Not on file     Relationship status: Not on file     Intimate partner violence     Fear of current or ex partner: Not on file     Emotionally abused: Not on file     Physically abused: Not on file     Forced sexual activity: Not on file   Other Topics Concern      Parent/sibling w/ CABG, MI or angioplasty before 65F 55M? No   Social History Narrative     Not on file       Additional medical/Social/Surgical histories reviewed in EPIC and updated as appropriate.     REVIEW OF SYSTEMS (11/5/2020)  10 point ROS of systems including Constitutional, Eyes, Respiratory, Cardiovascular, Gastroenterology, Genitourinary, Integumentary, Musculoskeletal, Psychiatric, Allergic/Immunologic were all negative except for pertinent positives noted in my HPI.       ASSESSMENT & PLAN  61 yo female with lumbar ddd, radicular pain, improved, not resolved  reveiwed lumbar MRI: shows disc herniations, ddd  After discussion, ordered another Ramin  Cont. PT  mobic Prn to replace diclofenac  F/u after RAMIN  Marcelo Ochoa MD, CAQSM    Phone call duration: 14 minutes  Phone call start: 8:26am  Phone call end: 8:40am  Marcelo Ochoa MD        Again, thank you for allowing me to participate in the care of your patient.        Sincerely,        Marcelo Ochoa MD

## 2020-11-05 NOTE — LETTER
"    11/5/2020         RE: Beronica Azevedo  [address]  [facility] [address]        Dear Colleague,    Thank you for referring your patient, Beronica Azevedo, to the [facility] [place]. Please see a copy of my visit note below.    Beronica Azevedo is a 60 year old female who is being evaluated via a billable telephone visit.      The patient has been notified of following:     \"This telephone visit will be conducted via a call between you and your physician/provider. We have found that certain health care needs can be provided without the need for a physical exam.  This service lets us provide the care you need with a short phone conversation.  If a prescription is necessary we can send it directly to your pharmacy.  If lab work is needed we can place an order for that and you can then stop by our lab to have the test done at a later time.    Telephone visits are billed at different rates depending on your insurance coverage. During this emergency period, for some insurers they may be billed the same as an in-person visit.  Please reach out to your insurance provider with any questions.    If during the course of the call the physician/provider feels a telephone visit is not appropriate, you will not be charged for this service.\"    Patient has given verbal consent for Telephone visit?  Yes    What phone number would you like to be contacted at? cell    How would you like to obtain your AVS? MyChart  HISTORY OF PRESENT ILLNESS  Ms. Azevedo is a pleasant 60 year old year old female who presents to f/u for lumbar radicular pain  Improving, PT is helping, but still only about 40% improvement overall with pain and symptoms from low back since injection  Beronica explains that she does think that the PT is helping her improve her motion and mobility and pain  Location: low back, legs  Quality:  achy pain    Severity:5/10 at worst    Duration: past 6+ months  Timing: occurs " intermittently  Context: occurs while exercising and lifting  Modifying factors:  resting and non-use makes it better, movement and use makes it worse  Associated signs & symptoms: still having pain and symptoms into legs    MEDICAL HISTORY  Patient Active Problem List   Diagnosis     Headache     Insomnia     Moderate dysplasia of cervix (BEVERLY II)     CARDIOVASCULAR SCREENING; LDL GOAL LESS THAN 160     Endometrial polyp     Iron deficiency anemia due to chronic blood loss     Rosacea     Onychodystrophy     Dermatitis     Osteopenia     Post-menopausal     Mild major depression (H)     IBS (irritable bowel syndrome)     Myopia     Regular astigmatism     Presbyopia     Posterior subcapsular polar senile cataract     Senile nuclear sclerosis     Macular puckering of retina     Cervicalgia     Pain in thoracic spine     Digital mucous cyst     Seborrheic keratosis, inflamed     Chronic pain of right knee     Acute pain of right knee     FREDIS (generalized anxiety disorder)     Neoplasm of uncertain behavior of skin     Dermatitis, seborrheic     Plantar fasciitis     Endometrial cells on cervical Pap smear inconsistent with last menstrual period     Migraine without status migrainosus, not intractable       Current Outpatient Medications   Medication Sig Dispense Refill     azelastine (ASTELIN) 0.1 % nasal spray INSTILL 1 SPRAY INTO THE NOSTRIL TWICE DAILY 30 mL 1     BENADRYL ALLERGY PO prn       biotin 2.5 mg/mL Take by mouth daily       butalbital-acetaminophen-caffeine (FIORICET/ESGIC) -40 MG per tablet TAKE 1 TABLET BY MOUTH EVERY 4 HOURS AS NEEDED 28 tablet 0     Coenzyme Q10 (CO Q10) 100 MG CAPS Take 100 mg by mouth 2 times daily       diclofenac (VOLTAREN) 75 MG EC tablet Take 1 tablet (75 mg) by mouth 2 times daily 40 tablet 1     eletriptan (RELPAX) 40 MG tablet Take 40 mg by mouth as needed for headaches  10     famciclovir (FAMVIR) 500 MG tablet Take 1 tablet (500 mg) by mouth 3 times daily for 7 days  21 tablet 0     fluticasone (FLONASE) 50 MCG/ACT nasal spray USE 2 SPRAYS IN EACH NOSTRIL DAILY 48 g 3     gabapentin (NEURONTIN) 100 MG capsule Take 1-2 capsules (100-200 mg) by mouth At Bedtime 60 capsule 1     ketoconazole (NIZORAL) 2 % external shampoo Apply topically daily as needed for itching or irritation 120 mL 11     magnesium 100 MG CAPS Take 400 mg by mouth       nortriptyline (PAMELOR) 50 MG capsule TAKE 1 CAPSULE(50 MG) BY MOUTH AT BEDTIME 90 capsule 1     predniSONE (DELTASONE) 20 MG tablet Take 2 tablets (40 mg) by mouth daily 10 tablet 0     Riboflavin (VITAMIN B-2 PO) Take 400 mg by mouth       tiZANidine (ZANAFLEX) 4 MG tablet Take 1-2 tablets (4-8 mg) by mouth nightly as needed 30 tablet 1     topiramate (TOPAMAX) 25 MG tablet Take 25 mg by mouth 2 times daily       traZODone (DESYREL) 50 MG tablet 1/2 TABLET AT BEDTIME AS NEEDED FOR SLEEP 45 tablet 1       Allergies   Allergen Reactions     Perfume Other (See Comments)     Scents/smells     Tape [Adhesive Tape] Rash     Compazine Other (See Comments)     Hyperactivity       Paxil [Paroxetine] Other (See Comments)     Ropinirole Hcl Unknown     TABS     Depakote [Valproic Acid] Rash     Rash       Food Itching and Rash     mushrooms     Mushroom Itching and Rash       Family History   Problem Relation Age of Onset     Depression Sister      Uterine Cancer Sister      Tumor Sister      Neurologic Disorder Sister      Neurologic Disorder Brother      Neurologic Disorder Father      Lipids Father         high cholesterol     Gastrointestinal Disease Father         stomach ulcers     Cancer Father         melanoma     Skin Cancer Father      Hypertension Father      Hyperlipidemia Father      Neurologic Disorder Mother      Allergies Mother      Arthritis Mother      Depression Mother      Eye Disorder Mother      Lipids Mother         high cholesterol     Osteoporosis Mother      Hypertension Mother      Hyperlipidemia Mother      Thyroid Disease  Mother      Neurologic Disorder Sister         epilepsy     Alcohol/Drug Sister         alcohol and drug dependency     Alcohol/Drug Brother         recovery alcohol and drug addit     Asthma Son         excercise and allergy induced     Asthma Daughter         excercise and allergy induced     Lipids Sister         high cholesterol     Gastrointestinal Disease Sister         hepatitis ??     Osteoporosis Sister      Breast Cancer Maternal Aunt      Asthma Daughter      Asthma Son      Melanoma No family hx of      Social History     Socioeconomic History     Marital status:      Spouse name: Not on file     Number of children: Not on file     Years of education: Not on file     Highest education level: Not on file   Occupational History     Occupation: Psychologist   Social Needs     Financial resource strain: Not on file     Food insecurity     Worry: Not on file     Inability: Not on file     Transportation needs     Medical: Not on file     Non-medical: Not on file   Tobacco Use     Smoking status: Never Smoker     Smokeless tobacco: Never Used   Substance and Sexual Activity     Alcohol use: Yes     Comment: 1 8oz mixed drink or beer, minimal 1-2 per week     Drug use: No     Sexual activity: Not Currently     Partners: Male     Birth control/protection: Abstinence   Lifestyle     Physical activity     Days per week: Not on file     Minutes per session: Not on file     Stress: Not on file   Relationships     Social connections     Talks on phone: Not on file     Gets together: Not on file     Attends Protestant service: Not on file     Active member of club or organization: Not on file     Attends meetings of clubs or organizations: Not on file     Relationship status: Not on file     Intimate partner violence     Fear of current or ex partner: Not on file     Emotionally abused: Not on file     Physically abused: Not on file     Forced sexual activity: Not on file   Other Topics Concern      Parent/sibling w/ CABG, MI or angioplasty before 65F 55M? No   Social History Narrative     Not on file       Additional medical/Social/Surgical histories reviewed in EPIC and updated as appropriate.     REVIEW OF SYSTEMS (11/5/2020)  10 point ROS of systems including Constitutional, Eyes, Respiratory, Cardiovascular, Gastroenterology, Genitourinary, Integumentary, Musculoskeletal, Psychiatric, Allergic/Immunologic were all negative except for pertinent positives noted in my HPI.       ASSESSMENT & PLAN  61 yo female with lumbar ddd, radicular pain, improved, not resolved  reveiwed lumbar MRI: shows disc herniations, ddd  After discussion, ordered another Ramin  Cont. PT  mobic Prn to replace diclofenac  F/u after RAMIN  Marcelo Ochoa MD, CAQSM    Phone call duration: 14 minutes  Phone call start: 8:26am  Phone call end: 8:40am  Marcelo Ochoa MD        Again, thank you for allowing me to participate in the care of your patient.        Sincerely,        Marcelo Ochoa MD

## 2020-11-06 DIAGNOSIS — R30.0 DYSURIA: ICD-10-CM

## 2020-11-06 LAB
ALBUMIN UR-MCNC: NEGATIVE MG/DL
APPEARANCE UR: CLEAR
BACTERIA #/AREA URNS HPF: ABNORMAL /HPF
BILIRUB UR QL STRIP: NEGATIVE
COLOR UR AUTO: YELLOW
FINAL DIAGNOSIS: NORMAL
GLUCOSE UR STRIP-MCNC: NEGATIVE MG/DL
HGB UR QL STRIP: ABNORMAL
HPV HR 12 DNA CVX QL NAA+PROBE: NEGATIVE
HPV16 DNA SPEC QL NAA+PROBE: NEGATIVE
HPV18 DNA SPEC QL NAA+PROBE: NEGATIVE
KETONES UR STRIP-MCNC: NEGATIVE MG/DL
LEUKOCYTE ESTERASE UR QL STRIP: ABNORMAL
NITRATE UR QL: NEGATIVE
NON-SQ EPI CELLS #/AREA URNS LPF: ABNORMAL /LPF
PH UR STRIP: 7 PH (ref 5–7)
RBC #/AREA URNS AUTO: ABNORMAL /HPF
SOURCE: ABNORMAL
SP GR UR STRIP: <=1.005 (ref 1–1.03)
SPECIMEN DESCRIPTION: NORMAL
SPECIMEN SOURCE CVX/VAG CYTO: NORMAL
UROBILINOGEN UR STRIP-ACNC: 0.2 EU/DL (ref 0.2–1)
WBC #/AREA URNS AUTO: ABNORMAL /HPF

## 2020-11-06 PROCEDURE — 87086 URINE CULTURE/COLONY COUNT: CPT | Performed by: OBSTETRICS & GYNECOLOGY

## 2020-11-06 PROCEDURE — 81001 URINALYSIS AUTO W/SCOPE: CPT | Performed by: OBSTETRICS & GYNECOLOGY

## 2020-11-07 ENCOUNTER — E-VISIT (OUTPATIENT)
Dept: OBGYN | Facility: CLINIC | Age: 60
End: 2020-11-07
Payer: COMMERCIAL

## 2020-11-07 DIAGNOSIS — N30.00 ACUTE CYSTITIS WITHOUT HEMATURIA: Primary | ICD-10-CM

## 2020-11-07 LAB
BACTERIA SPEC CULT: NORMAL
Lab: NORMAL
SPECIMEN SOURCE: NORMAL

## 2020-11-07 PROCEDURE — 99207 PR NON-BILLABLE SERV PER CHARTING: CPT | Performed by: OBSTETRICS & GYNECOLOGY

## 2020-11-07 RX ORDER — SULFAMETHOXAZOLE/TRIMETHOPRIM 800-160 MG
1 TABLET ORAL 2 TIMES DAILY
Qty: 6 TABLET | Refills: 0 | Status: SHIPPED | OUTPATIENT
Start: 2020-11-07 | End: 2020-11-10

## 2020-11-10 ENCOUNTER — PATIENT OUTREACH (OUTPATIENT)
Dept: OBGYN | Facility: CLINIC | Age: 60
End: 2020-11-10

## 2020-11-10 DIAGNOSIS — Z11.59 ENCOUNTER FOR SCREENING FOR OTHER VIRAL DISEASES: Primary | ICD-10-CM

## 2020-11-10 NOTE — TELEPHONE ENCOUNTER
6/09: [facility] H,   8/09: [place] - BEVERLY II  8/18/09: LEEP - BEVERLY II, clear margins  NIL paps: 2/10, 7/10, 9/12.  Plan pap in 1 yr.  3/4/16: Pap - NIL, + HR HPV.   3/18/16: [place] - ECC - negative. Plan cotest in 1 year.  06/06/17: NIL pap, Neg HR HPV result. Plan cotest in 1 year.  Pt has had a LEEP, needs 2 consecutive NIL/Neg cotest's then a 3 year cotest.   6/13/18 ASCUS pap/+ HR HPV (not 16 or 18) and endometrial cells.  Plan: colposcopy and endometrial cell follow up by 9/13/18.  07/25/18:[place] ECC Endocervical cells showed possible changes. Endometrial cells normal per provider Plan ECC in 3-6 months. Cotest in 1 year.   12/12/18: ECC Neg for dysplasia. Plan cotest due on 07/25/19.   09/12/19: NIL Pap, + HR HPV (not 16 or 18) result. Plan [place].   10/10/19: [place] Bx and ECC benign. Plan cotest in 1 year.  9/25/20 My Chart Reminder Sent.  10/30/20 NIL pap, neg HPV. Patient will need three consecutive years of pap/HPV cotesting  (Hx LEEP: BEVERLY 2). Plan cotest in 1 year

## 2020-11-20 ENCOUNTER — THERAPY VISIT (OUTPATIENT)
Dept: PHYSICAL THERAPY | Facility: CLINIC | Age: 60
End: 2020-11-20
Payer: COMMERCIAL

## 2020-11-20 DIAGNOSIS — M54.16 LUMBAR RADICULOPATHY: ICD-10-CM

## 2020-11-20 PROCEDURE — 97140 MANUAL THERAPY 1/> REGIONS: CPT | Mod: GP | Performed by: PHYSICAL THERAPIST

## 2020-11-20 PROCEDURE — 97110 THERAPEUTIC EXERCISES: CPT | Mod: GP | Performed by: PHYSICAL THERAPIST

## 2020-11-27 ENCOUNTER — THERAPY VISIT (OUTPATIENT)
Dept: PHYSICAL THERAPY | Facility: CLINIC | Age: 60
End: 2020-11-27
Payer: COMMERCIAL

## 2020-11-27 DIAGNOSIS — M54.16 LUMBAR RADICULOPATHY: ICD-10-CM

## 2020-11-27 PROCEDURE — 97140 MANUAL THERAPY 1/> REGIONS: CPT | Mod: GP | Performed by: PHYSICAL THERAPIST

## 2020-11-27 PROCEDURE — 97110 THERAPEUTIC EXERCISES: CPT | Mod: GP | Performed by: PHYSICAL THERAPIST

## 2020-12-03 ENCOUNTER — THERAPY VISIT (OUTPATIENT)
Dept: PHYSICAL THERAPY | Facility: CLINIC | Age: 60
End: 2020-12-03
Payer: COMMERCIAL

## 2020-12-03 DIAGNOSIS — M54.16 LUMBAR RADICULOPATHY: ICD-10-CM

## 2020-12-03 PROCEDURE — 97110 THERAPEUTIC EXERCISES: CPT | Mod: GP | Performed by: PHYSICAL THERAPIST

## 2020-12-03 PROCEDURE — 97140 MANUAL THERAPY 1/> REGIONS: CPT | Mod: GP | Performed by: PHYSICAL THERAPIST

## 2020-12-12 DIAGNOSIS — Z11.59 ENCOUNTER FOR SCREENING FOR OTHER VIRAL DISEASES: ICD-10-CM

## 2020-12-12 PROCEDURE — U0003 INFECTIOUS AGENT DETECTION BY NUCLEIC ACID (DNA OR RNA); SEVERE ACUTE RESPIRATORY SYNDROME CORONAVIRUS 2 (SARS-COV-2) (CORONAVIRUS DISEASE [COVID-19]), AMPLIFIED PROBE TECHNIQUE, MAKING USE OF HIGH THROUGHPUT TECHNOLOGIES AS DESCRIBED BY CMS-2020-01-R: HCPCS | Performed by: PREVENTIVE MEDICINE

## 2020-12-13 LAB
SARS-COV-2 RNA SPEC QL NAA+PROBE: NORMAL
SPECIMEN SOURCE: NORMAL

## 2020-12-14 LAB
LABORATORY COMMENT REPORT: NORMAL
SARS-COV-2 RNA SPEC QL NAA+PROBE: NEGATIVE
SPECIMEN SOURCE: NORMAL

## 2020-12-16 ENCOUNTER — ANCILLARY PROCEDURE (OUTPATIENT)
Dept: GENERAL RADIOLOGY | Facility: CLINIC | Age: 60
End: 2020-12-16
Attending: PREVENTIVE MEDICINE
Payer: COMMERCIAL

## 2020-12-16 VITALS
HEART RATE: 106 BPM | DIASTOLIC BLOOD PRESSURE: 76 MMHG | SYSTOLIC BLOOD PRESSURE: 115 MMHG | OXYGEN SATURATION: 97 % | RESPIRATION RATE: 16 BRPM | TEMPERATURE: 97.8 F

## 2020-12-16 DIAGNOSIS — M54.16 LUMBAR RADICULOPATHY: ICD-10-CM

## 2020-12-16 DIAGNOSIS — M51.16 LUMBAR DISC HERNIATION WITH RADICULOPATHY: ICD-10-CM

## 2020-12-16 PROCEDURE — 62323 NJX INTERLAMINAR LMBR/SAC: CPT | Performed by: RADIOLOGY

## 2020-12-16 RX ORDER — LIDOCAINE HYDROCHLORIDE 10 MG/ML
30 INJECTION, SOLUTION EPIDURAL; INFILTRATION; INTRACAUDAL; PERINEURAL ONCE
Status: COMPLETED | OUTPATIENT
Start: 2020-12-16 | End: 2020-12-16

## 2020-12-16 RX ORDER — METHYLPREDNISOLONE ACETATE 80 MG/ML
80 INJECTION, SUSPENSION INTRA-ARTICULAR; INTRALESIONAL; INTRAMUSCULAR; SOFT TISSUE ONCE
Status: COMPLETED | OUTPATIENT
Start: 2020-12-16 | End: 2020-12-16

## 2020-12-16 RX ORDER — BUPIVACAINE HYDROCHLORIDE 5 MG/ML
10 INJECTION, SOLUTION PERINEURAL ONCE
Status: COMPLETED | OUTPATIENT
Start: 2020-12-16 | End: 2020-12-16

## 2020-12-16 RX ORDER — IOPAMIDOL 408 MG/ML
10 INJECTION, SOLUTION INTRATHECAL ONCE
Status: COMPLETED | OUTPATIENT
Start: 2020-12-16 | End: 2020-12-16

## 2020-12-16 RX ADMIN — IOPAMIDOL 2 ML: 408 INJECTION, SOLUTION INTRATHECAL at 09:21

## 2020-12-16 RX ADMIN — METHYLPREDNISOLONE ACETATE 80 MG: 80 INJECTION, SUSPENSION INTRA-ARTICULAR; INTRALESIONAL; INTRAMUSCULAR; SOFT TISSUE at 09:21

## 2020-12-16 RX ADMIN — BUPIVACAINE HYDROCHLORIDE 10 MG: 5 INJECTION, SOLUTION PERINEURAL at 09:21

## 2020-12-16 RX ADMIN — LIDOCAINE HYDROCHLORIDE 5 ML: 10 INJECTION, SOLUTION EPIDURAL; INFILTRATION; INTRACAUDAL; PERINEURAL at 09:21

## 2020-12-16 NOTE — DISCHARGE INSTRUCTIONS
Discharge Instructions for Epidural Steroid Injection/Nerve Block    Care of injection site:    If you have new numbness down your leg after the injection, this may last up to 4-6 hours, but should go away. You may need help with walking until your leg feels normal.    Over the next 24 to 48 hours, pain at the injection site may increase before it gets better.    For the next 48 hours, use ice packs for 15 minutes, 3-4 times a day for pain relief.    If you have a bandage, you may remove it the next morning     Do not submerge injection site in water for 24 hours, (no baths. pools, hot tubs). Showers are OK.            Activity:    You should relax today and then you may return to your regular activity tomorrow.    You may eat a normal diet.    Medicines:    Restart all your blood thinner medicines tomorrow at your regular dose, including Coumadin (Warfarin).    If you are restarting Coumadin, talk to your doctor about having your INR checked.      If you received steroids: The steroid should help reduce swelling and pain. This may take from 3-14 days. Pain from the shot will be mild and go away in 2-3 days.       DO NOT GET A FLU SHOT FOR AT LEAST 1 WEEK.      Common side effects may include:    Insomnia    Heartburn    Flushed face    Water retention    Increased appetite    Increased blood sugar      If you have diabetes, watch your blood sugar closely. If needed, call your doctor to help control your blood sugar.        Call your doctor or go to the Emergency Room if you have new severe pain or fever.

## 2020-12-16 NOTE — PROGRESS NOTES
Patient tolerated procedure well. All vital signs stable. Escorted to waiting room after giving discharge instructions    Prudence Faulkner RN

## 2020-12-31 ENCOUNTER — THERAPY VISIT (OUTPATIENT)
Dept: PHYSICAL THERAPY | Facility: CLINIC | Age: 60
End: 2020-12-31
Payer: COMMERCIAL

## 2020-12-31 DIAGNOSIS — M54.16 LUMBAR RADICULOPATHY: ICD-10-CM

## 2020-12-31 PROCEDURE — 97110 THERAPEUTIC EXERCISES: CPT | Mod: GP | Performed by: PHYSICAL THERAPIST

## 2021-01-03 PROBLEM — M54.16 LUMBAR RADICULOPATHY: Status: RESOLVED | Noted: 2020-11-20 | Resolved: 2021-01-03

## 2021-01-03 NOTE — PROGRESS NOTES
Discharge Note  Patient seen for 5 visits.    SUBJECTIVE  Subjective changes noted by patient:  Patient states that she is able to manage low flare ups of back pain with the exercises provided. She works 10 hours days, and has decided to get a new office chair for home with a lumbar support. This seems to be helping pain during day. Patient states the injection went okay, she feels there was some relief from this   .  Current pain level is 0/10.     Previous pain level was  8/10.   Changes in function:  Yes (See Goal flowsheet attached for changes in current functional level)  Adverse reaction to treatment or activity: None    OBJECTIVE  Changes noted in objective findings: Patient demonstrates good control of neutral pelvis with TA contraction during standing activities. Patient educated to trial pressups during mid day to become more mobile, also suggested short walks throughout day even if just in home down hallway.      ASSESSMENT/PLAN  Diagnosis: LBP with R referred pain    Updated problem list and treatment plan:   Pain - HEP  Decreased ROM/flexibility - HEP  Decreased function - HEP  Decreased strength - HEP  Self Management Plans:  HEP  STG/LTGs have been met or progress has been made towards goals:  Yes, please see goal flowsheet for most current information    Beronica has met all STG/LTG's during therapy. She will continue to work toward maintaining good back health independently at home with use of HEP. Patient verbally agrees with plan.      Recommendations:  Discharge with current home program.  Patient to follow up with PT as needed.    Please refer to the daily flowsheet for treatment today, total treatment time and time spent performing 1:1 timed codes.

## 2021-02-02 ENCOUNTER — NURSE TRIAGE (OUTPATIENT)
Dept: FAMILY MEDICINE | Facility: CLINIC | Age: 61
End: 2021-02-02

## 2021-02-02 NOTE — TELEPHONE ENCOUNTER
Patient is having: Nausea, Headaches, Decreased appetite, Low back pain, Stomach pain with constant cramping in lower abdomen, Fatigue, sleeping a lot since work last night.    Last night took gas X, and a magnesium pill    Has a Chemical allergy/artificial scent  Started using an air purifier, got headaches from that recently.    Sent patient to ED per RN triage protocols    Ana Martinez RN   [facility]     Additional Information    Negative: Passed out (i.e., fainted, collapsed and was not responding)    Negative: Shock suspected (e.g., cold/pale/clammy skin, too weak to stand, low BP, rapid pulse)    Negative: Sounds like a life-threatening emergency to the triager    Negative: Major injury to the back (e.g., MVA, fall > 10 feet or 3 meters, penetrating injury, etc.)    Negative: Pain in the upper back over the ribs (rib cage) that radiates (travels) into the chest    Negative: Pain in the upper back over the ribs (rib cage) and worsened by coughing (or clearly increases with breathing)    Negative: SEVERE back pain of sudden onset and age > 60    Negative: SEVERE abdominal pain (e.g., excruciating)    Negative: Abdominal pain and age > 60    Negative: Unable to urinate (or only a few drops) and bladder feels very full    Negative: Loss of bladder or bowel control (urine or bowel incontinence; wetting self, leaking stool) of new onset    Negative: Numbness (loss of sensation) in groin or rectal area    Negative: Pain radiates into groin, scrotum    Negative: Blood in urine (red, pink, or tea-colored)    Negative: Vomiting and pain over lower ribs of back (i.e., flank - kidney area)    Negative: Weakness of a leg or foot (e.g., unable to bear weight, dragging foot)    Negative: Patient sounds very sick or weak to the triager    Negative: Fever > 100.5 F (38.1 C) and flank pain    Negative: Pain or burning with passing urine (urination)    Negative: SEVERE back pain (e.g.,  "excruciating, unable to do any normal activities) and not improved after pain medicine and CARE ADVICE    Negative: Numbness in an arm or hand (i.e., loss of sensation) and upper back pain    Negative: Numbness in a leg or foot (i.e., loss of sensation)    Negative: High-risk adult (e.g., history of cancer, history of HIV, or history of IV drug abuse)    Negative: Painful rash with multiple small blisters grouped together (i.e., dermatomal distribution or 'band' or 'stripe')    Negative: Pain radiates into the thigh or further down the leg, and in both legs    Negative: Age > 50 and no history of prior similar back pain    MODERATE back pain (e.g., interferes with normal activities) and present > 3 days    Negative: Passed out (i.e., fainted, collapsed and was not responding)    Negative: Shock suspected (e.g., cold/pale/clammy skin, too weak to stand, low BP, rapid pulse)    Negative: Sounds like a life-threatening emergency to the triager    Negative: Chest pain    Negative: Pain is mainly in upper abdomen (if needed ask: 'is it mainly above the belly button?')    Negative: Abdominal pain and pregnant > 20 weeks    Negative: Abdominal pain and pregnant < 20 weeks    Negative: SEVERE abdominal pain (e.g., excruciating)    Negative: Vomiting red blood or black (coffee ground) material    Negative: Bloody, black, or tarry bowel movements    Constant abdominal pain lasting > 2 hours    Answer Assessment - Initial Assessment Questions  1. ONSET: \"When did the pain begin?\"       yesterday  2. LOCATION: \"Where does it hurt?\" (upper, mid or lower back)      Lower back  3. SEVERITY: \"How bad is the pain?\"  (e.g., Scale 1-10; mild, moderate, or severe)    - MILD (1-3): doesn't interfere with normal activities     - MODERATE (4-7): interferes with normal activities or awakens from sleep     - SEVERE (8-10): excruciating pain, unable to do any normal activities       6-7/10  4. PATTERN: \"Is the pain constant?\" (e.g., yes, no; " "constant, intermittent)       constant  5. RADIATION: \"Does the pain shoot into your legs or elsewhere?\"      Shoots into legs  6. CAUSE:  \"What do you think is causing the back pain?\"       unknown  7. BACK OVERUSE:  \"Any recent lifting of heavy objects, strenuous work or exercise?\"      no  8. MEDICATIONS: \"What have you taken so far for the pain?\" (e.g., nothing, acetaminophen, NSAIDS)      Ibuprofen 200 mg, took 2 tablets  9. NEUROLOGIC SYMPTOMS: \"Do you have any weakness, numbness, or problems with bowel/bladder control?\"      No, weakness due to not eating as much  10. OTHER SYMPTOMS: \"Do you have any other symptoms?\" (e.g., fever, abdominal pain, burning with urination, blood in urine)        Abdominal pain,   11. PREGNANCY: \"Is there any chance you are pregnant?\" (e.g., yes, no; LMP)        no    Answer Assessment - Initial Assessment Questions  1. LOCATION: \"Where does it hurt?\"       Lower abdomen  2. RADIATION: \"Does the pain shoot anywhere else?\" (e.g., chest, back)      No, but back and abdomen pain are related  3. ONSET: \"When did the pain begin?\" (e.g., minutes, hours or days ago)       yesterday  4. SUDDEN: \"Gradual or sudden onset?\"      sudden  5. PATTERN \"Does the pain come and go, or is it constant?\"     - If constant: \"Is it getting better, staying the same, or worsening?\"       (Note: Constant means the pain never goes away completely; most serious pain is constant and it progresses)      - If intermittent: \"How long does it last?\" \"Do you have pain now?\"      (Note: Intermittent means the pain goes away completely between bouts)      constant  6. SEVERITY: \"How bad is the pain?\"  (e.g., Scale 1-10; mild, moderate, or severe)    - MILD (1-3): doesn't interfere with normal activities, abdomen soft and not tender to touch     - MODERATE (4-7): interferes with normal activities or awakens from sleep, tender to touch     - SEVERE (8-10): excruciating pain, doubled over, unable to do any normal " "activities       4/10  7. RECURRENT SYMPTOM: \"Have you ever had this type of abdominal pain before?\" If so, ask: \"When was the last time?\" and \"What happened that time?\"       No  8. CAUSE: \"What do you think is causing the abdominal pain?\"      unknown  9. RELIEVING/AGGRAVATING FACTORS: \"What makes it better or worse?\" (e.g., movement, antacids, bowel movement)      Feels like a bowel movement might help, but she already had one  10. OTHER SYMPTOMS: \"Has there been any vomiting, diarrhea, constipation, or urine problems?\"        no  11. PREGNANCY: \"Is there any chance you are pregnant?\" \"When was your last menstrual period?\"        no    Protocols used: BACK PAIN-A-OH, ABDOMINAL PAIN - FEMALE-A-OH      "

## 2021-02-03 ENCOUNTER — APPOINTMENT (OUTPATIENT)
Dept: CT IMAGING | Facility: CLINIC | Age: 61
DRG: 343 | End: 2021-02-03
Attending: EMERGENCY MEDICINE
Payer: COMMERCIAL

## 2021-02-03 ENCOUNTER — HOSPITAL ENCOUNTER (INPATIENT)
Facility: CLINIC | Age: 61
LOS: 1 days | Discharge: HOME | DRG: 343 | End: 2021-02-04
Attending: EMERGENCY MEDICINE | Admitting: SURGERY
Payer: COMMERCIAL

## 2021-02-03 ENCOUNTER — ANESTHESIA EVENT (OUTPATIENT)
Dept: SURGERY | Facility: CLINIC | Age: 61
DRG: 343 | End: 2021-02-03
Payer: COMMERCIAL

## 2021-02-03 DIAGNOSIS — K35.30 ACUTE APPENDICITIS WITH LOCALIZED PERITONITIS, WITHOUT PERFORATION, ABSCESS, OR GANGRENE: ICD-10-CM

## 2021-02-03 DIAGNOSIS — Z20.822 COVID-19 RULED OUT BY LABORATORY TESTING: ICD-10-CM

## 2021-02-03 LAB
ALBUMIN SERPL-MCNC: 3.6 G/DL (ref 3.4–5)
ALBUMIN UR-MCNC: NEGATIVE MG/DL
ALP SERPL-CCNC: 81 U/L (ref 40–150)
ALT SERPL W P-5'-P-CCNC: 12 U/L (ref 0–50)
ANION GAP SERPL CALCULATED.3IONS-SCNC: 6 MMOL/L (ref 3–14)
APPEARANCE UR: CLEAR
AST SERPL W P-5'-P-CCNC: 13 U/L (ref 0–45)
BACTERIA #/AREA URNS HPF: ABNORMAL /HPF
BASOPHILS # BLD AUTO: 0.1 10E9/L (ref 0–0.2)
BASOPHILS NFR BLD AUTO: 0.6 %
BILIRUB SERPL-MCNC: 0.4 MG/DL (ref 0.2–1.3)
BILIRUB UR QL STRIP: NEGATIVE
BUN SERPL-MCNC: 16 MG/DL (ref 7–30)
CALCIUM SERPL-MCNC: 9.1 MG/DL (ref 8.5–10.1)
CHLORIDE SERPL-SCNC: 105 MMOL/L (ref 94–109)
CO2 SERPL-SCNC: 26 MMOL/L (ref 20–32)
COLOR UR AUTO: ABNORMAL
CREAT SERPL-MCNC: 1.07 MG/DL (ref 0.52–1.04)
DIFFERENTIAL METHOD BLD: NORMAL
EOSINOPHIL # BLD AUTO: 0.1 10E9/L (ref 0–0.7)
EOSINOPHIL NFR BLD AUTO: 1.4 %
ERYTHROCYTE [DISTWIDTH] IN BLOOD BY AUTOMATED COUNT: 11.4 % (ref 10–15)
FLUAV RNA RESP QL NAA+PROBE: NEGATIVE
FLUBV RNA RESP QL NAA+PROBE: NEGATIVE
GFR SERPL CREATININE-BSD FRML MDRD: 56 ML/MIN/{1.73_M2}
GLUCOSE SERPL-MCNC: 94 MG/DL (ref 70–99)
GLUCOSE UR STRIP-MCNC: NEGATIVE MG/DL
HCT VFR BLD AUTO: 43.3 % (ref 35–47)
HGB BLD-MCNC: 14.5 G/DL (ref 11.7–15.7)
HGB UR QL STRIP: NEGATIVE
IMM GRANULOCYTES # BLD: 0 10E9/L (ref 0–0.4)
IMM GRANULOCYTES NFR BLD: 0.4 %
KETONES UR STRIP-MCNC: NEGATIVE MG/DL
LABORATORY COMMENT REPORT: NORMAL
LEUKOCYTE ESTERASE UR QL STRIP: NEGATIVE
LIPASE SERPL-CCNC: 84 U/L (ref 73–393)
LYMPHOCYTES # BLD AUTO: 1.5 10E9/L (ref 0.8–5.3)
LYMPHOCYTES NFR BLD AUTO: 18.9 %
MCH RBC QN AUTO: 30.5 PG (ref 26.5–33)
MCHC RBC AUTO-ENTMCNC: 33.5 G/DL (ref 31.5–36.5)
MCV RBC AUTO: 91 FL (ref 78–100)
MONOCYTES # BLD AUTO: 0.7 10E9/L (ref 0–1.3)
MONOCYTES NFR BLD AUTO: 9 %
MUCOUS THREADS #/AREA URNS LPF: PRESENT /LPF
NEUTROPHILS # BLD AUTO: 5.5 10E9/L (ref 1.6–8.3)
NEUTROPHILS NFR BLD AUTO: 69.7 %
NITRATE UR QL: NEGATIVE
NRBC # BLD AUTO: 0 10*3/UL
NRBC BLD AUTO-RTO: 0 /100
PH UR STRIP: 5.5 PH (ref 5–7)
PLATELET # BLD AUTO: 338 10E9/L (ref 150–450)
POTASSIUM SERPL-SCNC: 3.7 MMOL/L (ref 3.4–5.3)
PROT SERPL-MCNC: 7.3 G/DL (ref 6.8–8.8)
RBC # BLD AUTO: 4.76 10E12/L (ref 3.8–5.2)
RBC #/AREA URNS AUTO: 0 /HPF (ref 0–2)
RSV RNA SPEC QL NAA+PROBE: NORMAL
SARS-COV-2 RNA RESP QL NAA+PROBE: NEGATIVE
SODIUM SERPL-SCNC: 137 MMOL/L (ref 133–144)
SOURCE: ABNORMAL
SP GR UR STRIP: 1.01 (ref 1–1.03)
SPECIMEN SOURCE: NORMAL
SQUAMOUS #/AREA URNS AUTO: 1 /HPF (ref 0–1)
TSH SERPL DL<=0.005 MIU/L-ACNC: 1.14 MU/L (ref 0.4–4)
UROBILINOGEN UR STRIP-MCNC: NORMAL MG/DL (ref 0–2)
WBC # BLD AUTO: 7.9 10E9/L (ref 4–11)
WBC #/AREA URNS AUTO: 3 /HPF (ref 0–5)

## 2021-02-03 PROCEDURE — 250N000011 HC RX IP 250 OP 636: Performed by: EMERGENCY MEDICINE

## 2021-02-03 PROCEDURE — 84443 ASSAY THYROID STIM HORMONE: CPT | Performed by: EMERGENCY MEDICINE

## 2021-02-03 PROCEDURE — 99284 EMERGENCY DEPT VISIT MOD MDM: CPT | Performed by: EMERGENCY MEDICINE

## 2021-02-03 PROCEDURE — 250N000011 HC RX IP 250 OP 636: Performed by: INTERNAL MEDICINE

## 2021-02-03 PROCEDURE — 74177 CT ABD & PELVIS W/CONTRAST: CPT

## 2021-02-03 PROCEDURE — 96365 THER/PROPH/DIAG IV INF INIT: CPT | Performed by: EMERGENCY MEDICINE

## 2021-02-03 PROCEDURE — 258N000003 HC RX IP 258 OP 636: Performed by: EMERGENCY MEDICINE

## 2021-02-03 PROCEDURE — 80053 COMPREHEN METABOLIC PANEL: CPT | Performed by: EMERGENCY MEDICINE

## 2021-02-03 PROCEDURE — C9803 HOPD COVID-19 SPEC COLLECT: HCPCS | Performed by: EMERGENCY MEDICINE

## 2021-02-03 PROCEDURE — 83690 ASSAY OF LIPASE: CPT | Performed by: EMERGENCY MEDICINE

## 2021-02-03 PROCEDURE — 99285 EMERGENCY DEPT VISIT HI MDM: CPT | Mod: 25 | Performed by: EMERGENCY MEDICINE

## 2021-02-03 PROCEDURE — 87636 SARSCOV2 & INF A&B AMP PRB: CPT | Performed by: EMERGENCY MEDICINE

## 2021-02-03 PROCEDURE — 96361 HYDRATE IV INFUSION ADD-ON: CPT | Performed by: EMERGENCY MEDICINE

## 2021-02-03 PROCEDURE — 250N000013 HC RX MED GY IP 250 OP 250 PS 637

## 2021-02-03 PROCEDURE — 85025 COMPLETE CBC W/AUTO DIFF WBC: CPT | Performed by: EMERGENCY MEDICINE

## 2021-02-03 PROCEDURE — 81001 URINALYSIS AUTO W/SCOPE: CPT | Performed by: EMERGENCY MEDICINE

## 2021-02-03 PROCEDURE — 120N000002 HC R&B MED SURG/OB UMMC

## 2021-02-03 PROCEDURE — 250N000009 HC RX 250: Performed by: EMERGENCY MEDICINE

## 2021-02-03 RX ORDER — KETOROLAC TROMETHAMINE 15 MG/ML
15 INJECTION, SOLUTION INTRAMUSCULAR; INTRAVENOUS ONCE
Status: COMPLETED | OUTPATIENT
Start: 2021-02-03 | End: 2021-02-03

## 2021-02-03 RX ORDER — ONDANSETRON 4 MG/1
4 TABLET, ORALLY DISINTEGRATING ORAL EVERY 6 HOURS PRN
Status: DISCONTINUED | OUTPATIENT
Start: 2021-02-03 | End: 2021-02-04 | Stop reason: HOSPADM

## 2021-02-03 RX ORDER — IOPAMIDOL 755 MG/ML
100 INJECTION, SOLUTION INTRAVASCULAR ONCE
Status: COMPLETED | OUTPATIENT
Start: 2021-02-03 | End: 2021-02-03

## 2021-02-03 RX ORDER — NALOXONE HYDROCHLORIDE 0.4 MG/ML
0.4 INJECTION, SOLUTION INTRAMUSCULAR; INTRAVENOUS; SUBCUTANEOUS
Status: DISCONTINUED | OUTPATIENT
Start: 2021-02-03 | End: 2021-02-04 | Stop reason: HOSPADM

## 2021-02-03 RX ORDER — NORTRIPTYLINE HYDROCHLORIDE 50 MG/1
50 CAPSULE ORAL AT BEDTIME
Status: DISCONTINUED | OUTPATIENT
Start: 2021-02-03 | End: 2021-02-04 | Stop reason: HOSPADM

## 2021-02-03 RX ORDER — PIPERACILLIN SODIUM, TAZOBACTAM SODIUM 3; .375 G/15ML; G/15ML
3.38 INJECTION, POWDER, LYOPHILIZED, FOR SOLUTION INTRAVENOUS ONCE
Status: COMPLETED | OUTPATIENT
Start: 2021-02-03 | End: 2021-02-03

## 2021-02-03 RX ORDER — AMOXICILLIN 250 MG
1-2 CAPSULE ORAL 2 TIMES DAILY PRN
Status: DISCONTINUED | OUTPATIENT
Start: 2021-02-03 | End: 2021-02-04 | Stop reason: HOSPADM

## 2021-02-03 RX ORDER — NALOXONE HYDROCHLORIDE 0.4 MG/ML
0.2 INJECTION, SOLUTION INTRAMUSCULAR; INTRAVENOUS; SUBCUTANEOUS
Status: DISCONTINUED | OUTPATIENT
Start: 2021-02-03 | End: 2021-02-04 | Stop reason: HOSPADM

## 2021-02-03 RX ORDER — PIPERACILLIN SODIUM, TAZOBACTAM SODIUM 3; .375 G/15ML; G/15ML
3.38 INJECTION, POWDER, LYOPHILIZED, FOR SOLUTION INTRAVENOUS EVERY 6 HOURS
Status: DISCONTINUED | OUTPATIENT
Start: 2021-02-04 | End: 2021-02-04

## 2021-02-03 RX ORDER — CEFAZOLIN SODIUM 1 G/3ML
1 INJECTION, POWDER, FOR SOLUTION INTRAMUSCULAR; INTRAVENOUS SEE ADMIN INSTRUCTIONS
Status: CANCELLED | OUTPATIENT
Start: 2021-02-03

## 2021-02-03 RX ORDER — TRAZODONE HYDROCHLORIDE 50 MG/1
50 TABLET, FILM COATED ORAL AT BEDTIME
Status: DISCONTINUED | OUTPATIENT
Start: 2021-02-03 | End: 2021-02-04 | Stop reason: HOSPADM

## 2021-02-03 RX ORDER — CEFAZOLIN SODIUM 2 G/100ML
2 INJECTION, SOLUTION INTRAVENOUS
Status: CANCELLED | OUTPATIENT
Start: 2021-02-03

## 2021-02-03 RX ORDER — TOPIRAMATE 25 MG/1
25 TABLET, FILM COATED ORAL 2 TIMES DAILY
Status: DISCONTINUED | OUTPATIENT
Start: 2021-02-03 | End: 2021-02-04 | Stop reason: HOSPADM

## 2021-02-03 RX ORDER — SODIUM CHLORIDE 9 MG/ML
INJECTION, SOLUTION INTRAVENOUS ONCE
Status: COMPLETED | OUTPATIENT
Start: 2021-02-03 | End: 2021-02-03

## 2021-02-03 RX ORDER — ACETAMINOPHEN 325 MG/1
650 TABLET ORAL EVERY 4 HOURS PRN
Status: DISCONTINUED | OUTPATIENT
Start: 2021-02-03 | End: 2021-02-04 | Stop reason: HOSPADM

## 2021-02-03 RX ORDER — OXYCODONE HYDROCHLORIDE 5 MG/1
5 TABLET ORAL EVERY 4 HOURS PRN
Status: DISCONTINUED | OUTPATIENT
Start: 2021-02-03 | End: 2021-02-04 | Stop reason: HOSPADM

## 2021-02-03 RX ORDER — ONDANSETRON 2 MG/ML
4 INJECTION INTRAMUSCULAR; INTRAVENOUS EVERY 6 HOURS PRN
Status: DISCONTINUED | OUTPATIENT
Start: 2021-02-03 | End: 2021-02-04 | Stop reason: HOSPADM

## 2021-02-03 RX ADMIN — SODIUM CHLORIDE 21 ML: 9 INJECTION, SOLUTION INTRAVENOUS at 15:03

## 2021-02-03 RX ADMIN — TRAZODONE HYDROCHLORIDE 50 MG: 50 TABLET ORAL at 22:50

## 2021-02-03 RX ADMIN — IOPAMIDOL 69 ML: 755 INJECTION, SOLUTION INTRAVENOUS at 15:02

## 2021-02-03 RX ADMIN — SODIUM CHLORIDE 1000 ML: 9 INJECTION, SOLUTION INTRAVENOUS at 14:14

## 2021-02-03 RX ADMIN — SODIUM CHLORIDE: 9 INJECTION, SOLUTION INTRAVENOUS at 18:08

## 2021-02-03 RX ADMIN — NORTRIPTYLINE HYDROCHLORIDE 50 MG: 50 CAPSULE ORAL at 22:50

## 2021-02-03 RX ADMIN — PIPERACILLIN SODIUM AND TAZOBACTAM SODIUM 3.38 G: 3; .375 INJECTION, POWDER, LYOPHILIZED, FOR SOLUTION INTRAVENOUS at 18:08

## 2021-02-03 RX ADMIN — TOPIRAMATE 25 MG: 25 TABLET, FILM COATED ORAL at 22:50

## 2021-02-03 RX ADMIN — KETOROLAC TROMETHAMINE 15 MG: 15 INJECTION, SOLUTION INTRAMUSCULAR; INTRAVENOUS at 18:58

## 2021-02-03 ASSESSMENT — ENCOUNTER SYMPTOMS
NUMBNESS: 0
WEAKNESS: 1
APPETITE CHANGE: 1
BACK PAIN: 1
SHORTNESS OF BREATH: 1
NAUSEA: 1
VOMITING: 0
LIGHT-HEADEDNESS: 1
ABDOMINAL PAIN: 1
FEVER: 0
FATIGUE: 1
DYSURIA: 0

## 2021-02-03 ASSESSMENT — ACTIVITIES OF DAILY LIVING (ADL)
DIFFICULTY_COMMUNICATING: NO
TOILETING_ISSUES: NO
DIFFICULTY_EATING/SWALLOWING: NO
DRESSING/BATHING_DIFFICULTY: NO

## 2021-02-03 ASSESSMENT — MIFFLIN-ST. JEOR: SCORE: 1258.5

## 2021-02-03 NOTE — ED PROVIDER NOTES
[facility] EMERGENCY DEPARTMENT ([facility])     February 3, 2021    History     Chief Complaint   Patient presents with     Fatigue     Extreme fatigue since 1/30, back and abd pain, reports frequent UTIs      The history is provided by the patient and medical records.     Beronica Azevedo is a 60 year old female who presents here to the Emergency Department due to fatigue.  Patient has a past medical history significant for frequent UTIs, hypotension, migraines, IBS, Lyme disease, and anxiety.  Patient reports over the weekend she noticed she felt fatigued and lower interest in normal daily activities.  She states she typically goes on walks every day, but only went on one walk during her 4 days off from work.  Patient reports she feels some shortness of breath secondary to her fatigue.  Patient states two days ago, she started losing her appetite.  Patient states yesterday she only ate toast, but this morning she had more for breakfast.  Patient endorses feeling dehydrated.  She endorses nausea but denies vomiting.  She states she feels somewhat weak and lightheaded after not eating much the past couple days.  Patient endorses lower abdominal pain that is somewhat worse in her right lower quadrant.  She denies a history of abdominal surgeries.  Patient endorses bilateral low back pain.  She notes she took ibuprofen for her back pain as it was radiating down her legs.  No loss of bladder or bowel control.  Patient denies numbness or weakness in the lower extremiteis.  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  She states she takes calls from home, she works 10 hour shifts and has been doing a lot of overtime.  Patient states she feels burnt out from working so much.  Patient denies fevers.  No cough.  She denies dysuria or urinary urgency.   No new loss of taste or smell.       PAST MEDICAL HISTORY:   Past Medical History:   Diagnosis Date     Anxiety      Arthritis 2010      Cervical high risk HPV (human papillomavirus) test positive 03/04/2016, 09/12/19    See problem list.      BEVERLY II (cervical intraepithelial neoplasia II) 2009    LEEP     Depression      Depressive disorder Adolescence    Managed with medication and therapy     Dry eye syndrome      Frequent UTI 2009     Headache(784.0)      IBS (irritable bowel syndrome)      Insomnia      Lattice degeneration of retina      Lyme disease      Myopic astigmatism      Nonsenile cataract      Osteopenia 2001,2012    Fosamax     Pneumothorax on right     at 17 y/o     PVD (posterior vitreous detachment), both eyes 7/19/10     Rosacea      Vasomotor rhinitis        PAST SURGICAL HISTORY:   Past Surgical History:   Procedure Laterality Date     BIOPSY  Moles skin tags     CHEST TUBE INSERTION       COLONOSCOPY  Normal     DILATION AND CURETTAGE, OPERATIVE HYSTEROSCOPY WITH MORCELLATOR, COMBINED  1/2011    endometrial polyp     LEEP TX, CERVICAL  8/2009    BEVERLY II, clear margins     THORACIC SURGERY  1976    Collapsed lung       Past medical history, past surgical history, medications, and allergies were reviewed with the patient. Additional pertinent items: None    FAMILY HISTORY:   Family History   Problem Relation Age of Onset     Depression Sister      Uterine Cancer Sister      Tumor Sister      Neurologic Disorder Sister      Neurologic Disorder Brother      Neurologic Disorder Father      Lipids Father         high cholesterol     Gastrointestinal Disease Father         stomach ulcers     Cancer Father         melanoma     Skin Cancer Father      Hypertension Father      Hyperlipidemia Father      Neurologic Disorder Mother      Allergies Mother      Arthritis Mother      Depression Mother      Eye Disorder Mother      Lipids Mother         high cholesterol     Osteoporosis Mother      Hypertension Mother      Hyperlipidemia Mother      Thyroid Disease Mother      Neurologic Disorder Sister         epilepsy     Alcohol/Drug Sister          alcohol and drug dependency     Alcohol/Drug Brother         recovery alcohol and drug addit     Asthma Son         excercise and allergy induced     Asthma Daughter         excercise and allergy induced     Lipids Sister         high cholesterol     Gastrointestinal Disease Sister         hepatitis ??     Osteoporosis Sister      Breast Cancer Maternal Aunt      Asthma Daughter      Asthma Son      Melanoma No family hx of        SOCIAL HISTORY:   Social History     Tobacco Use     Smoking status: Never Smoker     Smokeless tobacco: Never Used   Substance Use Topics     Alcohol use: Yes     Comment: 1 8oz mixed drink or beer, minimal 1-2 per week     Social history was reviewed with the patient. Additional pertinent items: None      Patient's Medications   New Prescriptions    No medications on file   Previous Medications    AZELASTINE (ASTELIN) 0.1 % NASAL SPRAY    INSTILL 1 SPRAY INTO THE NOSTRIL TWICE DAILY    BENADRYL ALLERGY PO    prn    BIOTIN 2.5 MG/ML    Take by mouth daily    BUTALBITAL-ACETAMINOPHEN-CAFFEINE (FIORICET/ESGIC) -40 MG PER TABLET    TAKE 1 TABLET BY MOUTH EVERY 4 HOURS AS NEEDED    COENZYME Q10 (CO Q10) 100 MG CAPS    Take 100 mg by mouth 2 times daily    DICLOFENAC (VOLTAREN) 75 MG EC TABLET    Take 1 tablet (75 mg) by mouth 2 times daily    ELETRIPTAN (RELPAX) 40 MG TABLET    Take 40 mg by mouth as needed for headaches    FAMCICLOVIR (FAMVIR) 500 MG TABLET    Take 1 tablet (500 mg) by mouth 3 times daily for 7 days    FLUTICASONE (FLONASE) 50 MCG/ACT NASAL SPRAY    USE 2 SPRAYS IN EACH NOSTRIL DAILY    GABAPENTIN (NEURONTIN) 100 MG CAPSULE    Take 1-2 capsules (100-200 mg) by mouth At Bedtime    KETOCONAZOLE (NIZORAL) 2 % EXTERNAL SHAMPOO    Apply topically daily as needed for itching or irritation    MAGNESIUM 100 MG CAPS    Take 400 mg by mouth    MELOXICAM (MOBIC) 15 MG TABLET    TAKE 1/2 TABLET(7.5 MG) BY MOUTH TWICE DAILY    NORTRIPTYLINE (PAMELOR) 50 MG CAPSULE    TAKE 1  CAPSULE(50 MG) BY MOUTH AT BEDTIME    PREDNISONE (DELTASONE) 20 MG TABLET    Take 2 tablets (40 mg) by mouth daily    RIBOFLAVIN (VITAMIN B-2 PO)    Take 400 mg by mouth    TIZANIDINE (ZANAFLEX) 4 MG TABLET    Take 1-2 tablets (4-8 mg) by mouth nightly as needed    TOPIRAMATE (TOPAMAX) 25 MG TABLET    Take 25 mg by mouth 2 times daily    TRAZODONE (DESYREL) 50 MG TABLET    1/2 TABLET AT BEDTIME AS NEEDED FOR SLEEP   Modified Medications    No medications on file   Discontinued Medications    No medications on file          Allergies   Allergen Reactions     Perfume Other (See Comments)     Scents/smells     Tape [Adhesive Tape] Rash     Compazine Other (See Comments)     Hyperactivity       Paxil [Paroxetine] Other (See Comments)     Ropinirole Hcl Unknown     TABS     Depakote [Valproic Acid] Rash     Rash       Food Itching and Rash     mushrooms     Mushroom Itching and Rash        Review of Systems   Constitutional: Positive for appetite change (decreased) and fatigue. Negative for fever.   HENT:        Neg for loss of smell or taste   Respiratory: Positive for shortness of breath (2/2 fatigue).    Gastrointestinal: Positive for abdominal pain (RLQ) and nausea. Negative for vomiting.        Neg for bowel incontinence   Genitourinary: Negative for dysuria and urgency.        Neg for bladder incontinence   Musculoskeletal: Positive for back pain (bilat low).   Neurological: Positive for weakness (generalized 2/2 low appetite) and light-headedness. Negative for numbness.       Physical Exam   BP: 113/60  Pulse: 119  Temp: 97.1  F (36.2  C)  Resp: 20  SpO2: 100 %      Physical Exam  GEN:  Well developed, no acute distress  HEENT:  EOMI, Mucous membranes are moist.   Cardio:  RRR, no murmur, radial pulses equal bilaterally  PULM:  Lungs clear, good air movement, no wheezes, rales   Abd:  Soft, normal bowel sounds, there is right lower quadrant tenderness with percussion tenderness.  Musculoskeletal:  normal range of  motion, no lower extremity swelling or calf tenderness  Neuro:  Alert and oriented X3, Follows commands, moving all extremities spontaneously.  Normal strength and sensation in the lower extremities bilaterally, normal patellar reflexes bilaterally.  Skin:  Warm, dry   ED Course   1:35 PM  The patient was seen and examined by Kenzie Holden MD in Room ED18.       Procedures              Patient was offered pain medicine and she declined.  Labs are shown below.  CT scan of the abdomen pelvis was done as well and results are shown below.      Results for orders placed or performed during the hospital encounter of 02/03/21 (from the past 24 hour(s))   CBC with platelets differential   Result Value Ref Range    WBC 7.9 4.0 - 11.0 10e9/L    RBC Count 4.76 3.8 - 5.2 10e12/L    Hemoglobin 14.5 11.7 - 15.7 g/dL    Hematocrit 43.3 35.0 - 47.0 %    MCV 91 78 - 100 fl    MCH 30.5 26.5 - 33.0 pg    MCHC 33.5 31.5 - 36.5 g/dL    RDW 11.4 10.0 - 15.0 %    Platelet Count 338 150 - 450 10e9/L    Diff Method Automated Method     % Neutrophils 69.7 %    % Lymphocytes 18.9 %    % Monocytes 9.0 %    % Eosinophils 1.4 %    % Basophils 0.6 %    % Immature Granulocytes 0.4 %    Nucleated RBCs 0 0 /100    Absolute Neutrophil 5.5 1.6 - 8.3 10e9/L    Absolute Lymphocytes 1.5 0.8 - 5.3 10e9/L    Absolute Monocytes 0.7 0.0 - 1.3 10e9/L    Absolute Eosinophils 0.1 0.0 - 0.7 10e9/L    Absolute Basophils 0.1 0.0 - 0.2 10e9/L    Abs Immature Granulocytes 0.0 0 - 0.4 10e9/L    Absolute Nucleated RBC 0.0    Comprehensive metabolic panel   Result Value Ref Range    Sodium 137 133 - 144 mmol/L    Potassium 3.7 3.4 - 5.3 mmol/L    Chloride 105 94 - 109 mmol/L    Carbon Dioxide 26 20 - 32 mmol/L    Anion Gap 6 3 - 14 mmol/L    Glucose 94 70 - 99 mg/dL    Urea Nitrogen 16 7 - 30 mg/dL    Creatinine 1.07 (H) 0.52 - 1.04 mg/dL    GFR Estimate 56 (L) >60 mL/min/[1.73_m2]    GFR Estimate If Black 65 >60 mL/min/[1.73_m2]    Calcium 9.1 8.5 - 10.1 mg/dL     Bilirubin Total 0.4 0.2 - 1.3 mg/dL    Albumin 3.6 3.4 - 5.0 g/dL    Protein Total 7.3 6.8 - 8.8 g/dL    Alkaline Phosphatase 81 40 - 150 U/L    ALT 12 0 - 50 U/L    AST 13 0 - 45 U/L   Lipase   Result Value Ref Range    Lipase 84 73 - 393 U/L   TSH with free T4 reflex   Result Value Ref Range    TSH 1.14 0.40 - 4.00 mU/L   Symptomatic Influenza A/B & SARS-CoV2 (COVID-19) Virus PCR Multiplex    Specimen: Nasopharyngeal   Result Value Ref Range    Flu A/B & SARS-COV-2 PCR Source Nasopharyngeal     SARS-CoV-2 PCR Result NEGATIVE     Influenza A PCR Negative NEG^Negative    Influenza B PCR Negative NEG^Negative    Respiratory Syncytial Virus PCR (Note)     Flu A/B & SARS-CoV-2 PCR Comment (Note)    CT Abdomen Pelvis w Contrast    Narrative    CT ABDOMEN AND PELVIS WITH CONTRAST   2/3/2021 3:08 PM     HISTORY: Right lower quadrant abdominal pain, appendicitis suspected  (Age >= 14y).    TECHNIQUE:  CT abdomen and pelvis with 69mL of Isovue 370 IV.  Radiation dose for this scan was reduced using automated exposure  control, adjustment of the mA and/or kV according to patient size, or  iterative reconstruction technique.    COMPARISON: None available    FINDINGS:  Lower chest: Bibasilar pulmonary opacities, likely atelectasis.    Abdomen/pelvis: Scattered hypoattenuating foci in the liver, some can  be characterized as liver cysts, including 2.1 cm cyst in the right  hepatic lobe, while few others are subcentimeter and too small to  characterize. Hypoattenuating area along the fissure of falciform  ligament, likely represents area of focal fatty infiltration. Mild  dilatation of the common bile duct measure up to 7 mm in diameter. No  ductal stone visualized. No CT evidence of acute cholecystitis.  Subcentimeter hypoattenuating focus in the pancreatic body (series 4  image 105), too small to characterize, could represent sidebranch  intraductal papillary mucinous neoplasm versus other pancreatic cystic  lesions. No  splenomegaly. No adrenal nodules. No radiodense kidney  stones or hydronephrosis in either kidney.    No abnormally dilated bowel loops. The appendix is dilated measuring  14 mm in diameter and fluid-filled, however with no significant  periappendiceal fat stranding. Findings could represent early acute  appendicitis. No evidence of appendiceal perforation or  periappendiceal loculated collection. Moderate amount of stool  throughout the colon. No significant free fluid in the abdomen or  pelvis. No free peritoneal or portal venous gas. Prominent left pelvic  vasculature, nonspecific, can be seen with pelvic congestion syndrome.    Bones and soft tissues: Multilevel degenerative changes of the spine.      Impression    IMPRESSION:  1. The appendix is dilated and fluid-filled measuring 14 mm in  diameter, however with no significant periappendiceal fat stranding,  findings could represent early acute appendicitis. No evidence of  appendiceal perforation or periappendiceal loculated collection.  2. Subcentimeter hypoattenuating focus in the pancreatic body, too  small to characterize, could represent side branch intraductal  papillary mucinous neoplasm versus other pancreatic cystic lesions.  Recommend follow-up exam in one year with contrast-enhanced MRI/MRCP  to assess for interval change.  3. Prominent left pelvic vasculature, nonspecific, can be seen with  pelvic congestion syndrome.    CHANO HORAN MD   UA with Microscopic reflex to Culture    Specimen: Urine clean catch; Midstream Urine   Result Value Ref Range    Color Urine Light Yellow     Appearance Urine Clear     Glucose Urine Negative NEG^Negative mg/dL    Bilirubin Urine Negative NEG^Negative    Ketones Urine Negative NEG^Negative mg/dL    Specific Gravity Urine 1.007 1.003 - 1.035    Blood Urine Negative NEG^Negative    pH Urine 5.5 5.0 - 7.0 pH    Protein Albumin Urine Negative NEG^Negative mg/dL    Urobilinogen mg/dL Normal 0.0 - 2.0 mg/dL     Nitrite Urine Negative NEG^Negative    Leukocyte Esterase Urine Negative NEG^Negative    Source Midstream Urine     WBC Urine 3 0 - 5 /HPF    RBC Urine 0 0 - 2 /HPF    Bacteria Urine Few (A) NEG^Negative /HPF    Squamous Epithelial /HPF Urine 1 0 - 1 /HPF    Mucous Urine Present (A) NEG^Negative /LPF     Medications   sodium chloride 0.9 % bag 500mL for CT scan flush use (21 mLs Intravenous Given 2/3/21 1503)   piperacillin-tazobactam (ZOSYN) 3.375 g vial to attach to  mL bag (has no administration in time range)   0.9% sodium chloride BOLUS (0 mLs Intravenous Stopped 2/3/21 1539)   iopamidol (ISOVUE-370) solution 100 mL (69 mLs Intravenous Given 2/3/21 1502)        CT findings were discussed with the patient including need for follow-up of the pancreas lesion that is too small to characterize at this point.  Patient was discussed with Dr. Skaggs of the surgery service.  She does have right lower quadrant tenderness with percussion tenderness and CT findings suggestive of possible early acute appendicitis, so after discussing patient with surgery, the plan is for the patient to be admitted to the surgery service for possible appendectomy.  If she remains comfortable overnight, they may decide to do the appendectomy tomorrow.    Assessments & Plan (with Medical Decision Making)   Patient presents with fatigue, poor appetite, decreased activity level and right lower quadrant pain.  CT scan is showing possible early acute appendicitis.  No other cause of her symptoms has been identified in the emergency department.  Given her lack of interest in eating and going for her walk, I would consider depression as a possible diagnosis for those symptoms.  For tonight, patient will be admitted to the general surgery service on [place] for observation and possible appendectomy.    I have reviewed the nursing notes.    I have reviewed the findings, diagnosis, plan and need for follow up with the patient.    New  Prescriptions    No medications on file       Final diagnoses:   Acute appendicitis with localized peritonitis, without perforation, abscess, or gangrene   I, Donita Person, am serving as a trained medical scribe to document services personally performed by Kenzie Holden MD, based on the provider's statements to me.     I, Kenzie Holden MD, was physically present and have reviewed and verified the accuracy of this note documented by Donita Person.     --  Kenzie Holden MD  2/3/2021   [facility] EMERGENCY DEPARTMENT     Kenzie Holden MD  02/03/21 4422

## 2021-02-03 NOTE — ED NOTES
[facility]    ED Nurse to Floor Handoff     Beronica Azevedo is a 60 year old female who speaks English and lives with family members,  in a home  They arrived in the ED by car from home    ED Chief Complaint: Fatigue (Extreme fatigue since 1/30, back and abd pain, reports frequent UTIs )    ED Dx;   Final diagnoses:   None         Needed?: No    Allergies:   Allergies   Allergen Reactions     Perfume Other (See Comments)     Scents/smells     Tape [Adhesive Tape] Rash     Compazine Other (See Comments)     Hyperactivity       Paxil [Paroxetine] Other (See Comments)     Ropinirole Hcl Unknown     TABS     Depakote [Valproic Acid] Rash     Rash       Food Itching and Rash     mushrooms     Mushroom Itching and Rash   .  Past Medical Hx:   Past Medical History:   Diagnosis Date     Anxiety      Arthritis 2010     Cervical high risk HPV (human papillomavirus) test positive 03/04/2016, 09/12/19    See problem list.      BEVERLY II (cervical intraepithelial neoplasia II) 2009    LEEP     Depression      Depressive disorder Adolescence    Managed with medication and therapy     Dry eye syndrome      Frequent UTI 2009     Headache(784.0)      IBS (irritable bowel syndrome)      Insomnia      Lattice degeneration of retina      Lyme disease      Myopic astigmatism      Nonsenile cataract      Osteopenia 2001,2012    Fosamax     Pneumothorax on right     at 17 y/o     PVD (posterior vitreous detachment), both eyes 7/19/10     Rosacea      Vasomotor rhinitis       Baseline Mental status: WDL  Current Mental Status changes: at basesline    Infection present or suspected this encounter: no  Sepsis suspected: No  Isolation type: Special Precautions-COVID-19  Patient tested for COVID 19 prior to admission: YES     Activity level - Baseline/Home:  Independent  Activity Level - Current:   Independent    Bariatric equipment needed?: No    In the ED these meds were given:    Medications   sodium chloride 0.9 % bag 500mL for CT scan flush use (21 mLs Intravenous Given 2/3/21 1503)   0.9% sodium chloride BOLUS (0 mLs Intravenous Stopped 2/3/21 1539)   iopamidol (ISOVUE-370) solution 100 mL (69 mLs Intravenous Given 2/3/21 1502)       Drips running?  No    Home pump  No    Current LDAs  Peripheral IV 02/03/21 Right Upper forearm (Active)   Site Assessment WDL 02/03/21 1409   Line Status Infusing 02/03/21 1409   Dressing Intervention New dressing  02/03/21 1409   Phlebitis Scale 0-->no symptoms 02/03/21 1409   Infiltration Site Treatment Method  None 02/03/21 1409   If infiltrated, was a Vesicant infusing? No 02/03/21 1409   Number of days: 0       Peripheral IV 05/17/15 Right (Active)   Number of days: 2089       Labs results:   Labs Ordered and Resulted from Time of ED Arrival Up to the Time of Departure from the ED   COMPREHENSIVE METABOLIC PANEL - Abnormal; Notable for the following components:       Result Value    Creatinine 1.07 (*)     GFR Estimate 56 (*)     All other components within normal limits   CBC WITH PLATELETS DIFFERENTIAL   LIPASE   ROUTINE UA WITH MICROSCOPIC REFLEX TO CULTURE   TSH WITH FREE T4 REFLEX   INFLUENZA A/B & SARS-COV2 PCR MULTIPLEX       Imaging Studies:   Recent Results (from the past 24 hour(s))   CT Abdomen Pelvis w Contrast    Narrative    CT ABDOMEN AND PELVIS WITH CONTRAST   2/3/2021 3:08 PM     HISTORY: Right lower quadrant abdominal pain, appendicitis suspected  (Age >= 14y).    TECHNIQUE:  CT abdomen and pelvis with 69mL of Isovue 370 IV.  Radiation dose for this scan was reduced using automated exposure  control, adjustment of the mA and/or kV according to patient size, or  iterative reconstruction technique.    COMPARISON: None available    FINDINGS:  Lower chest: Bibasilar pulmonary opacities, likely atelectasis.    Abdomen/pelvis: Scattered hypoattenuating foci in the liver, some can  be characterized as liver cysts, including 2.1 cm cyst in  the right  hepatic lobe, while few others are subcentimeter and too small to  characterize. Hypoattenuating area along the fissure of falciform  ligament, likely represents area of focal fatty infiltration. Mild  dilatation of the common bile duct measure up to 7 mm in diameter. No  ductal stone visualized. No CT evidence of acute cholecystitis.  Subcentimeter hypoattenuating focus in the pancreatic body (series 4  image 105), too small to characterize, could represent sidebranch  intraductal papillary mucinous neoplasm versus other pancreatic cystic  lesions. No splenomegaly. No adrenal nodules. No radiodense kidney  stones or hydronephrosis in either kidney.    No abnormally dilated bowel loops. The appendix is dilated measuring  14 mm in diameter and fluid-filled, however with no significant  periappendiceal fat stranding. Findings could represent early acute  appendicitis. No evidence of appendiceal perforation or  periappendiceal loculated collection. Moderate amount of stool  throughout the colon. No significant free fluid in the abdomen or  pelvis. No free peritoneal or portal venous gas. Prominent left pelvic  vasculature, nonspecific, can be seen with pelvic congestion syndrome.    Bones and soft tissues: Multilevel degenerative changes of the spine.      Impression    IMPRESSION:  1. The appendix is dilated and fluid-filled measuring 14 mm in  diameter, however with no significant periappendiceal fat stranding,  findings could represent early acute appendicitis. No evidence of  appendiceal perforation or periappendiceal loculated collection.  2. Subcentimeter hypoattenuating focus in the pancreatic body, too  small to characterize, could represent side branch intraductal  papillary mucinous neoplasm versus other pancreatic cystic lesions.  Recommend follow-up exam in one year with contrast-enhanced MRI/MRCP  to assess for interval change.  3. Prominent left pelvic vasculature, nonspecific, can be seen  with  pelvic congestion syndrome.    CHANO HORAN MD       Recent vital signs:   /71   Pulse 83   Temp 98.5  F (36.9  C) (Oral)   Resp 16   LMP 04/06/2010 (Exact Date)   SpO2 99%             Cardiac Rhythm: Other  Pt needs tele? No  Skin/wound Issues: None    Code Status: Full Code    Pain control: good    Nausea control: good    Abnormal labs/tests/findings requiring intervention: see EMR    Family present during ED course? No   Family Comments/Social Situation comments: n/a    Tasks needing completion: None    Nieves Ortiz, RN  [phone] [place] [facility]  [phone] [facility] ED

## 2021-02-03 NOTE — H&P
[facility] Surgery H&P    Beronica Azevedo MRN# [id number]   Age: 60 year old YOB: 1960     Date of Admission:  2/3/2021                              Assessment and Plan:   Assessment:   Beronica Azevedo is a 60 year old female with h/o frequent UTIs, hypotension, migraines, IBS, Lyme disease, and anxiety, who presented to [place] ED with RLQ abdominal and left lower back pain associated with n/v, no leukocytosis and CT showing 14 mm appendix without surrounding fat stranding. UA negative.        Plan:   - NPO  - mIVF  - Appendectomy likely tomorrow  - abx, zosyn q6h  - PTA meds (anxiety, migraine ppx)      Discussed with Chief and staff, Dr. Duane Ochoa DO  Surgery PGY1            Chief Complaint:   Fatigue          History of Present Illness:   Beronica Azevedo is a 60 year old female who presents here to the Emergency Department due to fatigue. Past medical history significant for frequent UTIs, hypotension, migraines, IBS, Lyme disease, and anxiety. Patient reports over the weekend she noticed she felt fatigued, and shortly. Beginning two days ago had loss of appetite, feels dehydrated and nauseated, no recent emesis. Continued lower abdominal pain which is worse in RLQ, and left low back/flank pain that wraps around to the LLQ . No recent fevers, chills, cough, dysuria nor urinary urgency. No personal or family history history of inflammatory bowel disease, colon cancer, or issues with anesthesia. No previous abdominal surgery.              Past Medical History:     Past Medical History:   Diagnosis Date     Anxiety      Arthritis 2010     Cervical high risk HPV (human papillomavirus) test positive 03/04/2016, 09/12/19    See problem list.      BEVERLY II (cervical intraepithelial neoplasia II) 2009    LEEP     Depression      Depressive disorder Adolescence    Managed with medication and therapy     Dry eye syndrome      Frequent UTI 2009     Headache(784.0)      IBS (irritable bowel  syndrome)      Insomnia      Lattice degeneration of retina      Lyme disease      Myopic astigmatism      Nonsenile cataract      Osteopenia 2001,2012    Fosamax     Pneumothorax on right     at 17 y/o     PVD (posterior vitreous detachment), both eyes 7/19/10     Rosacea      Vasomotor rhinitis              Past Surgical History:     Past Surgical History:   Procedure Laterality Date     BIOPSY  Moles skin tags     CHEST TUBE INSERTION       COLONOSCOPY  Normal     DILATION AND CURETTAGE, OPERATIVE HYSTEROSCOPY WITH MORCELLATOR, COMBINED  1/2011    endometrial polyp     LEEP TX, CERVICAL  8/2009    BEVERLY II, clear margins     THORACIC SURGERY  1976    Collapsed lung             Social History:     Social History     Tobacco Use     Smoking status: Never Smoker     Smokeless tobacco: Never Used   Substance Use Topics     Alcohol use: Yes     Comment: 1 8oz mixed drink or beer, minimal 1-2 per week             Family History:     Family History   Problem Relation Age of Onset     Depression Sister      Uterine Cancer Sister      Tumor Sister      Neurologic Disorder Sister      Neurologic Disorder Brother      Neurologic Disorder Father      Lipids Father         high cholesterol     Gastrointestinal Disease Father         stomach ulcers     Cancer Father         melanoma     Skin Cancer Father      Hypertension Father      Hyperlipidemia Father      Neurologic Disorder Mother      Allergies Mother      Arthritis Mother      Depression Mother      Eye Disorder Mother      Lipids Mother         high cholesterol     Osteoporosis Mother      Hypertension Mother      Hyperlipidemia Mother      Thyroid Disease Mother      Neurologic Disorder Sister         epilepsy     Alcohol/Drug Sister         alcohol and drug dependency     Alcohol/Drug Brother         recovery alcohol and drug addit     Asthma Son         excercise and allergy induced     Asthma Daughter         excercise and allergy induced     Lipids Sister          high cholesterol     Gastrointestinal Disease Sister         hepatitis ??     Osteoporosis Sister      Breast Cancer Maternal Aunt      Asthma Daughter      Asthma Son      Melanoma No family hx of                 Allergies:     Allergies   Allergen Reactions     Perfume Other (See Comments)     Scents/smells     Tape [Adhesive Tape] Rash     Compazine Other (See Comments)     Hyperactivity       Paxil [Paroxetine] Other (See Comments)     Ropinirole Hcl Unknown     TABS     Depakote [Valproic Acid] Rash     Rash       Food Itching and Rash     mushrooms     Mushroom Itching and Rash             Medications:     Current Facility-Administered Medications   Medication     sodium chloride 0.9 % bag 500mL for CT scan flush use     Current Outpatient Medications   Medication Sig     BENADRYL ALLERGY PO prn     biotin 2.5 mg/mL Take by mouth daily     butalbital-acetaminophen-caffeine (FIORICET/ESGIC) -40 MG per tablet TAKE 1 TABLET BY MOUTH EVERY 4 HOURS AS NEEDED     Coenzyme Q10 (CO Q10) 100 MG CAPS Take 100 mg by mouth 2 times daily     eletriptan (RELPAX) 40 MG tablet Take 40 mg by mouth as needed for headaches     fluticasone (FLONASE) 50 MCG/ACT nasal spray USE 2 SPRAYS IN EACH NOSTRIL DAILY     magnesium 100 MG CAPS Take 400 mg by mouth     nortriptyline (PAMELOR) 50 MG capsule TAKE 1 CAPSULE(50 MG) BY MOUTH AT BEDTIME     Riboflavin (VITAMIN B-2 PO) Take 400 mg by mouth     topiramate (TOPAMAX) 25 MG tablet Take 25 mg by mouth 2 times daily     traZODone (DESYREL) 50 MG tablet 1/2 TABLET AT BEDTIME AS NEEDED FOR SLEEP     azelastine (ASTELIN) 0.1 % nasal spray INSTILL 1 SPRAY INTO THE NOSTRIL TWICE DAILY     diclofenac (VOLTAREN) 75 MG EC tablet Take 1 tablet (75 mg) by mouth 2 times daily     famciclovir (FAMVIR) 500 MG tablet Take 1 tablet (500 mg) by mouth 3 times daily for 7 days     gabapentin (NEURONTIN) 100 MG capsule Take 1-2 capsules (100-200 mg) by mouth At Bedtime     ketoconazole (NIZORAL) 2 %  external shampoo Apply topically daily as needed for itching or irritation     meloxicam (MOBIC) 15 MG tablet TAKE 1/2 TABLET(7.5 MG) BY MOUTH TWICE DAILY     predniSONE (DELTASONE) 20 MG tablet Take 2 tablets (40 mg) by mouth daily     tiZANidine (ZANAFLEX) 4 MG tablet Take 1-2 tablets (4-8 mg) by mouth nightly as needed               Review of Systems:   ROS otherwise negative          Physical Exam:   All vitals have been reviewed  Temp:  [97.1  F (36.2  C)-98.5  F (36.9  C)] 98.5  F (36.9  C)  Pulse:  [] 83  Resp:  [16-20] 16  BP: (104-113)/(60-71) 104/71  SpO2:  [99 %-100 %] 99 %  No intake or output data in the 24 hours ending 02/03/21 7421  Physical Exam:  General: Appears thin, NAD, resting comfortably in bed  HEENT: Anicteric, EOM intact  CV: No cyanosis, RRR, Peripheral pulses intact and strong bilaterally  Pulm: NLB on RA, no accessory mm use, no cough or wheeze  Abdomen: soft, nondistended, tender to palpation in RLQ, no rebound or guarding  Extremities: WWP, no edema  Psych: calm and cooperative          Data:   All laboratory data reviewed    Results:  BMP  Recent Labs   Lab 02/03/21  1402      POTASSIUM 3.7   CHLORIDE 105   CO2 26   BUN 16   CR 1.07*   GLC 94     CBC  Recent Labs   Lab 02/03/21  1402   WBC 7.9   HGB 14.5        LFT  Recent Labs   Lab 02/03/21  1402   AST 13   ALT 12   ALKPHOS 81   BILITOTAL 0.4   ALBUMIN 3.6     Recent Labs   Lab 02/03/21  1402   GLC 94       Imaging:  Ct Abdomen Pelvis W Contrast    Result Date: 2/3/2021  CT ABDOMEN AND PELVIS WITH CONTRAST   2/3/2021 3:08 PM HISTORY: Right lower quadrant abdominal pain, appendicitis suspected (Age >= 14y). TECHNIQUE:  CT abdomen and pelvis with 69mL of Isovue 370 IV. Radiation dose for this scan was reduced using automated exposure control, adjustment of the mA and/or kV according to patient size, or iterative reconstruction technique. COMPARISON: None available FINDINGS: Lower chest: Bibasilar pulmonary  opacities, likely atelectasis. Abdomen/pelvis: Scattered hypoattenuating foci in the liver, some can be characterized as liver cysts, including 2.1 cm cyst in the right hepatic lobe, while few others are subcentimeter and too small to characterize. Hypoattenuating area along the fissure of falciform ligament, likely represents area of focal fatty infiltration. Mild dilatation of the common bile duct measure up to 7 mm in diameter. No ductal stone visualized. No CT evidence of acute cholecystitis. Subcentimeter hypoattenuating focus in the pancreatic body (series 4 image 105), too small to characterize, could represent sidebranch intraductal papillary mucinous neoplasm versus other pancreatic cystic lesions. No splenomegaly. No adrenal nodules. No radiodense kidney stones or hydronephrosis in either kidney. No abnormally dilated bowel loops. The appendix is dilated measuring 14 mm in diameter and fluid-filled, however with no significant periappendiceal fat stranding. Findings could represent early acute appendicitis. No evidence of appendiceal perforation or periappendiceal loculated collection. Moderate amount of stool throughout the colon. No significant free fluid in the abdomen or pelvis. No free peritoneal or portal venous gas. Prominent left pelvic vasculature, nonspecific, can be seen with pelvic congestion syndrome. Bones and soft tissues: Multilevel degenerative changes of the spine.     IMPRESSION: 1. The appendix is dilated and fluid-filled measuring 14 mm in diameter, however with no significant periappendiceal fat stranding, findings could represent early acute appendicitis. No evidence of appendiceal perforation or periappendiceal loculated collection. 2. Subcentimeter hypoattenuating focus in the pancreatic body, too small to characterize, could represent side branch intraductal papillary mucinous neoplasm versus other pancreatic cystic lesions. Recommend follow-up exam in one year with  contrast-enhanced MRI/MRCP to assess for interval change. 3. Prominent left pelvic vasculature, nonspecific, can be seen with pelvic congestion syndrome. CHANO HORAN MD

## 2021-02-03 NOTE — TELEPHONE ENCOUNTER
"Patient calling today she says she didn't have ride so didn't report to emergency - she is feeling slightly better so is wondering if she should still go or if she can come in for assessment    Patient feels she is not as 'fatigued' - slept 9:30 pm - 10:30 AM, \"and all day yesterday\" - has a little more energy and appetite    Continues with back pain - radiating into legs \"a little bit\" - unsure if she hurt her back - hx bulging disc, may be constipated - last bowel movement - small - 2/2 - \"a couple\" - soft - no pain with passing bowels - had some bloating but has improved, reporting 'sharp pain in abdomen, came on sudden and then went away - I hadn't had that in the past'    Denies abdominal pain at this time - but has - says has low BP and when she sits up feels \"real faint\" - hasn't drank or eat much lately - had breakfast this morning    Denies loss of bowel or bladder control, inability to urinate, severe weakness, falls, numbness in groin/buttocks/radiating down legs,       She continues with abdominal pain more intermittent, hx UTI, headache, fatigue, feeling faint, concern for dehydration, back pain and was encouraged to f/u in ER for assessment - her daughter can drive her now - she verbalized understanding and agrees  "

## 2021-02-04 ENCOUNTER — PATIENT OUTREACH (OUTPATIENT)
Dept: CARE COORDINATION | Facility: CLINIC | Age: 61
End: 2021-02-04

## 2021-02-04 ENCOUNTER — ANESTHESIA (OUTPATIENT)
Dept: SURGERY | Facility: CLINIC | Age: 61
DRG: 343 | End: 2021-02-04
Payer: COMMERCIAL

## 2021-02-04 VITALS
HEART RATE: 87 BPM | RESPIRATION RATE: 16 BRPM | DIASTOLIC BLOOD PRESSURE: 55 MMHG | TEMPERATURE: 97 F | OXYGEN SATURATION: 99 % | HEIGHT: 68 IN | WEIGHT: 142.2 LBS | BODY MASS INDEX: 21.55 KG/M2 | SYSTOLIC BLOOD PRESSURE: 122 MMHG

## 2021-02-04 LAB
ANION GAP SERPL CALCULATED.3IONS-SCNC: 10 MMOL/L (ref 3–14)
BUN SERPL-MCNC: 14 MG/DL (ref 7–30)
CALCIUM SERPL-MCNC: 8.7 MG/DL (ref 8.5–10.1)
CHLORIDE SERPL-SCNC: 114 MMOL/L (ref 94–109)
CO2 SERPL-SCNC: 18 MMOL/L (ref 20–32)
CREAT SERPL-MCNC: 0.88 MG/DL (ref 0.52–1.04)
ERYTHROCYTE [DISTWIDTH] IN BLOOD BY AUTOMATED COUNT: 11.6 % (ref 10–15)
GFR SERPL CREATININE-BSD FRML MDRD: 71 ML/MIN/{1.73_M2}
GLUCOSE SERPL-MCNC: 61 MG/DL (ref 70–99)
HCT VFR BLD AUTO: 35.4 % (ref 35–47)
HGB BLD-MCNC: 11.9 G/DL (ref 11.7–15.7)
MAGNESIUM SERPL-MCNC: 2.1 MG/DL (ref 1.6–2.3)
MCH RBC QN AUTO: 30.7 PG (ref 26.5–33)
MCHC RBC AUTO-ENTMCNC: 33.6 G/DL (ref 31.5–36.5)
MCV RBC AUTO: 92 FL (ref 78–100)
PHOSPHATE SERPL-MCNC: 3.2 MG/DL (ref 2.5–4.5)
PLATELET # BLD AUTO: 276 10E9/L (ref 150–450)
POTASSIUM SERPL-SCNC: 3.5 MMOL/L (ref 3.4–5.3)
RBC # BLD AUTO: 3.87 10E12/L (ref 3.8–5.2)
SODIUM SERPL-SCNC: 143 MMOL/L (ref 133–144)
WBC # BLD AUTO: 7.1 10E9/L (ref 4–11)

## 2021-02-04 PROCEDURE — 258N000003 HC RX IP 258 OP 636: Performed by: STUDENT IN AN ORGANIZED HEALTH CARE EDUCATION/TRAINING PROGRAM

## 2021-02-04 PROCEDURE — 258N000003 HC RX IP 258 OP 636: Performed by: ANESTHESIOLOGY

## 2021-02-04 PROCEDURE — 360N000076 HC SURGERY LEVEL 3, PER MIN: Performed by: SURGERY

## 2021-02-04 PROCEDURE — 250N000011 HC RX IP 250 OP 636: Performed by: STUDENT IN AN ORGANIZED HEALTH CARE EDUCATION/TRAINING PROGRAM

## 2021-02-04 PROCEDURE — 83735 ASSAY OF MAGNESIUM: CPT

## 2021-02-04 PROCEDURE — 88304 TISSUE EXAM BY PATHOLOGIST: CPT | Mod: 26 | Performed by: PATHOLOGY

## 2021-02-04 PROCEDURE — 250N000011 HC RX IP 250 OP 636

## 2021-02-04 PROCEDURE — 999N000141 HC STATISTIC PRE-PROCEDURE NURSING ASSESSMENT: Performed by: SURGERY

## 2021-02-04 PROCEDURE — 36415 COLL VENOUS BLD VENIPUNCTURE: CPT

## 2021-02-04 PROCEDURE — 370N000017 HC ANESTHESIA TECHNICAL FEE, PER MIN: Performed by: SURGERY

## 2021-02-04 PROCEDURE — 88305 TISSUE EXAM BY PATHOLOGIST: CPT | Mod: 26 | Performed by: PATHOLOGY

## 2021-02-04 PROCEDURE — 272N000001 HC OR GENERAL SUPPLY STERILE: Performed by: SURGERY

## 2021-02-04 PROCEDURE — 0FD04ZX EXTRACTION OF LIVER, PERCUTANEOUS ENDOSCOPIC APPROACH, DIAGNOSTIC: ICD-10-PCS | Performed by: SURGERY

## 2021-02-04 PROCEDURE — 88304 TISSUE EXAM BY PATHOLOGIST: CPT | Mod: TC | Performed by: SURGERY

## 2021-02-04 PROCEDURE — 250N000025 HC SEVOFLURANE, PER MIN: Performed by: SURGERY

## 2021-02-04 PROCEDURE — 80048 BASIC METABOLIC PNL TOTAL CA: CPT

## 2021-02-04 PROCEDURE — 84100 ASSAY OF PHOSPHORUS: CPT

## 2021-02-04 PROCEDURE — 88305 TISSUE EXAM BY PATHOLOGIST: CPT | Mod: TC | Performed by: SURGERY

## 2021-02-04 PROCEDURE — 250N000009 HC RX 250: Performed by: STUDENT IN AN ORGANIZED HEALTH CARE EDUCATION/TRAINING PROGRAM

## 2021-02-04 PROCEDURE — 85027 COMPLETE CBC AUTOMATED: CPT

## 2021-02-04 PROCEDURE — 250N000013 HC RX MED GY IP 250 OP 250 PS 637: Performed by: STUDENT IN AN ORGANIZED HEALTH CARE EDUCATION/TRAINING PROGRAM

## 2021-02-04 PROCEDURE — 250N000011 HC RX IP 250 OP 636: Performed by: ANESTHESIOLOGY

## 2021-02-04 PROCEDURE — 250N000013 HC RX MED GY IP 250 OP 250 PS 637

## 2021-02-04 PROCEDURE — 250N000009 HC RX 250: Performed by: SURGERY

## 2021-02-04 PROCEDURE — 710N000010 HC RECOVERY PHASE 1, LEVEL 2, PER MIN: Performed by: SURGERY

## 2021-02-04 PROCEDURE — 0DTJ4ZZ RESECTION OF APPENDIX, PERCUTANEOUS ENDOSCOPIC APPROACH: ICD-10-PCS | Performed by: SURGERY

## 2021-02-04 RX ORDER — ONDANSETRON 4 MG/1
4 TABLET, ORALLY DISINTEGRATING ORAL EVERY 6 HOURS PRN
Qty: 30 TABLET | Refills: 0 | Status: SHIPPED | OUTPATIENT
Start: 2021-02-04 | End: 2021-02-04

## 2021-02-04 RX ORDER — OXYCODONE HYDROCHLORIDE 5 MG/1
5 TABLET ORAL EVERY 4 HOURS PRN
Qty: 12 TABLET | Refills: 0 | Status: SHIPPED | OUTPATIENT
Start: 2021-02-04 | End: 2021-02-04

## 2021-02-04 RX ORDER — HYDRALAZINE HYDROCHLORIDE 20 MG/ML
2.5-5 INJECTION INTRAMUSCULAR; INTRAVENOUS EVERY 10 MIN PRN
Status: DISCONTINUED | OUTPATIENT
Start: 2021-02-04 | End: 2021-02-04 | Stop reason: HOSPADM

## 2021-02-04 RX ORDER — ONDANSETRON 2 MG/ML
4 INJECTION INTRAMUSCULAR; INTRAVENOUS EVERY 30 MIN PRN
Status: DISCONTINUED | OUTPATIENT
Start: 2021-02-04 | End: 2021-02-04 | Stop reason: HOSPADM

## 2021-02-04 RX ORDER — PROPOFOL 10 MG/ML
INJECTION, EMULSION INTRAVENOUS PRN
Status: DISCONTINUED | OUTPATIENT
Start: 2021-02-04 | End: 2021-02-04

## 2021-02-04 RX ORDER — FENTANYL CITRATE 50 UG/ML
25-50 INJECTION, SOLUTION INTRAMUSCULAR; INTRAVENOUS
Status: DISCONTINUED | OUTPATIENT
Start: 2021-02-04 | End: 2021-02-04 | Stop reason: HOSPADM

## 2021-02-04 RX ORDER — DEXAMETHASONE SODIUM PHOSPHATE 10 MG/ML
4 INJECTION, SOLUTION INTRAMUSCULAR; INTRAVENOUS ONCE
Status: DISCONTINUED | OUTPATIENT
Start: 2021-02-04 | End: 2021-02-04 | Stop reason: HOSPADM

## 2021-02-04 RX ORDER — ACETAMINOPHEN 325 MG/1
650 TABLET ORAL EVERY 6 HOURS PRN
Qty: 60 TABLET | Refills: 0 | Status: SHIPPED | OUTPATIENT
Start: 2021-02-04

## 2021-02-04 RX ORDER — NALOXONE HYDROCHLORIDE 0.4 MG/ML
0.2 INJECTION, SOLUTION INTRAMUSCULAR; INTRAVENOUS; SUBCUTANEOUS
Status: DISCONTINUED | OUTPATIENT
Start: 2021-02-04 | End: 2021-02-04 | Stop reason: HOSPADM

## 2021-02-04 RX ORDER — ACETAMINOPHEN 325 MG/1
650 TABLET ORAL EVERY 6 HOURS PRN
Qty: 60 TABLET | Refills: 0 | Status: SHIPPED | OUTPATIENT
Start: 2021-02-04 | End: 2021-02-04

## 2021-02-04 RX ORDER — FENTANYL CITRATE 50 UG/ML
INJECTION, SOLUTION INTRAMUSCULAR; INTRAVENOUS PRN
Status: DISCONTINUED | OUTPATIENT
Start: 2021-02-04 | End: 2021-02-04

## 2021-02-04 RX ORDER — ACETAMINOPHEN 325 MG/1
650 TABLET ORAL EVERY 6 HOURS PRN
Status: DISCONTINUED | OUTPATIENT
Start: 2021-02-04 | End: 2021-02-04

## 2021-02-04 RX ORDER — SODIUM CHLORIDE, SODIUM LACTATE, POTASSIUM CHLORIDE, CALCIUM CHLORIDE 600; 310; 30; 20 MG/100ML; MG/100ML; MG/100ML; MG/100ML
INJECTION, SOLUTION INTRAVENOUS CONTINUOUS
Status: DISCONTINUED | OUTPATIENT
Start: 2021-02-04 | End: 2021-02-04 | Stop reason: HOSPADM

## 2021-02-04 RX ORDER — AMOXICILLIN 250 MG
1-2 CAPSULE ORAL 2 TIMES DAILY PRN
Qty: 12 TABLET | Refills: 0 | Status: SHIPPED | OUTPATIENT
Start: 2021-02-04 | End: 2021-07-15

## 2021-02-04 RX ORDER — NALOXONE HYDROCHLORIDE 0.4 MG/ML
0.4 INJECTION, SOLUTION INTRAMUSCULAR; INTRAVENOUS; SUBCUTANEOUS
Status: DISCONTINUED | OUTPATIENT
Start: 2021-02-04 | End: 2021-02-04 | Stop reason: HOSPADM

## 2021-02-04 RX ORDER — DEXAMETHASONE SODIUM PHOSPHATE 4 MG/ML
INJECTION, SOLUTION INTRA-ARTICULAR; INTRALESIONAL; INTRAMUSCULAR; INTRAVENOUS; SOFT TISSUE PRN
Status: DISCONTINUED | OUTPATIENT
Start: 2021-02-04 | End: 2021-02-04

## 2021-02-04 RX ORDER — ONDANSETRON 4 MG/1
4 TABLET, ORALLY DISINTEGRATING ORAL EVERY 30 MIN PRN
Status: DISCONTINUED | OUTPATIENT
Start: 2021-02-04 | End: 2021-02-04 | Stop reason: HOSPADM

## 2021-02-04 RX ORDER — AMOXICILLIN 250 MG
1-2 CAPSULE ORAL 2 TIMES DAILY PRN
Qty: 12 TABLET | Refills: 0 | Status: SHIPPED | OUTPATIENT
Start: 2021-02-04 | End: 2021-02-04

## 2021-02-04 RX ORDER — LIDOCAINE 40 MG/G
CREAM TOPICAL
Status: DISCONTINUED | OUTPATIENT
Start: 2021-02-04 | End: 2021-02-04 | Stop reason: HOSPADM

## 2021-02-04 RX ORDER — HYDROMORPHONE HYDROCHLORIDE 1 MG/ML
.3-.5 INJECTION, SOLUTION INTRAMUSCULAR; INTRAVENOUS; SUBCUTANEOUS EVERY 10 MIN PRN
Status: DISCONTINUED | OUTPATIENT
Start: 2021-02-04 | End: 2021-02-04 | Stop reason: HOSPADM

## 2021-02-04 RX ORDER — LABETALOL HYDROCHLORIDE 5 MG/ML
10 INJECTION, SOLUTION INTRAVENOUS
Status: DISCONTINUED | OUTPATIENT
Start: 2021-02-04 | End: 2021-02-04 | Stop reason: HOSPADM

## 2021-02-04 RX ORDER — LIDOCAINE HYDROCHLORIDE 20 MG/ML
INJECTION, SOLUTION INFILTRATION; PERINEURAL PRN
Status: DISCONTINUED | OUTPATIENT
Start: 2021-02-04 | End: 2021-02-04

## 2021-02-04 RX ORDER — ONDANSETRON 4 MG/1
4 TABLET, ORALLY DISINTEGRATING ORAL EVERY 6 HOURS PRN
Qty: 30 TABLET | Refills: 0 | Status: SHIPPED | OUTPATIENT
Start: 2021-02-04 | End: 2021-07-15

## 2021-02-04 RX ORDER — MEPERIDINE HYDROCHLORIDE 25 MG/ML
12.5 INJECTION INTRAMUSCULAR; INTRAVENOUS; SUBCUTANEOUS
Status: DISCONTINUED | OUTPATIENT
Start: 2021-02-04 | End: 2021-02-04 | Stop reason: HOSPADM

## 2021-02-04 RX ORDER — OXYCODONE HYDROCHLORIDE 5 MG/1
5 TABLET ORAL EVERY 4 HOURS PRN
Qty: 12 TABLET | Refills: 0 | Status: SHIPPED | OUTPATIENT
Start: 2021-02-04 | End: 2021-07-15

## 2021-02-04 RX ADMIN — PIPERACILLIN AND TAZOBACTAM 3.38 G: 3; .375 INJECTION, POWDER, LYOPHILIZED, FOR SOLUTION INTRAVENOUS at 06:02

## 2021-02-04 RX ADMIN — SODIUM CHLORIDE, POTASSIUM CHLORIDE, SODIUM LACTATE AND CALCIUM CHLORIDE: 600; 310; 30; 20 INJECTION, SOLUTION INTRAVENOUS at 10:40

## 2021-02-04 RX ADMIN — OXYCODONE HYDROCHLORIDE 5 MG: 5 TABLET ORAL at 06:58

## 2021-02-04 RX ADMIN — FENTANYL CITRATE 50 MCG: 50 INJECTION, SOLUTION INTRAMUSCULAR; INTRAVENOUS at 10:30

## 2021-02-04 RX ADMIN — ONDANSETRON 4 MG: 2 INJECTION INTRAMUSCULAR; INTRAVENOUS at 09:25

## 2021-02-04 RX ADMIN — FENTANYL CITRATE 50 MCG: 50 INJECTION, SOLUTION INTRAMUSCULAR; INTRAVENOUS at 09:05

## 2021-02-04 RX ADMIN — ACETAMINOPHEN 650 MG: 325 TABLET, FILM COATED ORAL at 06:57

## 2021-02-04 RX ADMIN — LIDOCAINE HYDROCHLORIDE 80 MG: 20 INJECTION, SOLUTION INFILTRATION; PERINEURAL at 08:17

## 2021-02-04 RX ADMIN — FENTANYL CITRATE 50 MCG: 50 INJECTION, SOLUTION INTRAMUSCULAR; INTRAVENOUS at 10:45

## 2021-02-04 RX ADMIN — SUGAMMADEX 200 MG: 100 INJECTION, SOLUTION INTRAVENOUS at 09:45

## 2021-02-04 RX ADMIN — ROCURONIUM BROMIDE 10 MG: 10 INJECTION INTRAVENOUS at 09:21

## 2021-02-04 RX ADMIN — PROPOFOL 10 MG: 10 INJECTION, EMULSION INTRAVENOUS at 09:21

## 2021-02-04 RX ADMIN — HYDROMORPHONE HYDROCHLORIDE 0.5 MG: 1 INJECTION, SOLUTION INTRAMUSCULAR; INTRAVENOUS; SUBCUTANEOUS at 10:56

## 2021-02-04 RX ADMIN — PHENYLEPHRINE HYDROCHLORIDE 100 MCG: 10 INJECTION INTRAVENOUS at 08:38

## 2021-02-04 RX ADMIN — OXYCODONE HYDROCHLORIDE 5 MG: 5 TABLET ORAL at 15:01

## 2021-02-04 RX ADMIN — PHENYLEPHRINE HYDROCHLORIDE 100 MCG: 10 INJECTION INTRAVENOUS at 08:24

## 2021-02-04 RX ADMIN — PIPERACILLIN AND TAZOBACTAM 3.38 G: 3; .375 INJECTION, POWDER, LYOPHILIZED, FOR SOLUTION INTRAVENOUS at 00:09

## 2021-02-04 RX ADMIN — ROCURONIUM BROMIDE 40 MG: 10 INJECTION INTRAVENOUS at 08:17

## 2021-02-04 RX ADMIN — SODIUM CHLORIDE, POTASSIUM CHLORIDE, SODIUM LACTATE AND CALCIUM CHLORIDE: 600; 310; 30; 20 INJECTION, SOLUTION INTRAVENOUS at 08:15

## 2021-02-04 RX ADMIN — OXYCODONE HYDROCHLORIDE 5 MG: 5 TABLET ORAL at 18:49

## 2021-02-04 RX ADMIN — PROPOFOL 150 MG: 10 INJECTION, EMULSION INTRAVENOUS at 08:17

## 2021-02-04 RX ADMIN — PHENYLEPHRINE HYDROCHLORIDE 100 MCG: 10 INJECTION INTRAVENOUS at 09:31

## 2021-02-04 RX ADMIN — DEXAMETHASONE SODIUM PHOSPHATE 4 MG: 4 INJECTION, SOLUTION INTRA-ARTICULAR; INTRALESIONAL; INTRAMUSCULAR; INTRAVENOUS; SOFT TISSUE at 08:33

## 2021-02-04 RX ADMIN — FENTANYL CITRATE 100 MCG: 50 INJECTION, SOLUTION INTRAMUSCULAR; INTRAVENOUS at 08:17

## 2021-02-04 RX ADMIN — SODIUM CHLORIDE, POTASSIUM CHLORIDE, SODIUM LACTATE AND CALCIUM CHLORIDE: 600; 310; 30; 20 INJECTION, SOLUTION INTRAVENOUS at 14:57

## 2021-02-04 ASSESSMENT — ACTIVITIES OF DAILY LIVING (ADL)
ADLS_ACUITY_SCORE: 12

## 2021-02-04 NOTE — ANESTHESIA CARE TRANSFER NOTE
Patient: Beronica Azevedo    Procedure(s):  APPENDECTOMY, LAPAROSCOPIC and laparoscopic liver biopsy    Diagnosis: Acute appendicitis [K35.80]  Diagnosis Additional Information: No value filed.    Anesthesia Type:   No value filed.     Note:    Oropharynx: oropharynx clear of all foreign objects  Level of Consciousness: awake  Oxygen Supplementation: face mask  Level of Supplemental Oxygen (L/min / FiO2): 2  Independent Airway: airway patency satisfactory and stable  Dentition: dentition unchanged  Vital Signs Stable: post-procedure vital signs reviewed and stable  Report to RN Given: handoff report given  Patient transferred to: PACU  Comments: VSS. Denies n/v, pain. All questions answered to RN.   Handoff Report: Identifed the Patient, Identified the Reponsible Provider, Reviewed the pertinent medical history, Discussed the surgical course, Reviewed Intra-OP anesthesia mangement and issues during anesthesia, Set expectations for post-procedure period and Allowed opportunity for questions and acknowledgement of understanding      Vitals: (Last set prior to Anesthesia Care Transfer)  CRNA VITALS  2/4/2021 0924 - 2/4/2021 1003      2/4/2021             Resp Rate (observed):  (!) 1        Electronically Signed By: Anthony Salvador MD  February 4, 2021  10:03 AM

## 2021-02-04 NOTE — PROGRESS NOTES
Admitted/transferred from: [facility]   2 RN full skin assessment completed by Luana Viera, RADHA and Elena Conklin RN.  Skin assessment finding: skin intact, no problems.  Interventions/actions: Educated patient on frequent weight shifting to prevent pressure wounds.    Will continue to monitor.

## 2021-02-04 NOTE — OP NOTE
[facility]     Operative Note    Pre-operative diagnosis: Acute appendicitis [K35.80]   Post-operative diagnosis Acute appendicitis   Segment IV liver lesion (exophytic)   Procedure: 1. Laparoscopic appendectomy  2. Laparoscopic liver biopsy   Surgeon: Surgeon(s) and Role:     * Solis Zepeda MD - Primary     * Aneta Husain MD - Resident - Assisting     * Brigid Dumont MD - Resident - Assisting   Anesthesia: General    Estimated blood loss:  5 cc   Drains: None   Specimens: ID Type Source Tests Collected by Time Destination   A : Appendix Tissue Appendix SURGICAL PATHOLOGY EXAM Solis Zepeda MD 2/4/2021  9:16 AM    B : Segment 4 lesion Tissue Other SURGICAL PATHOLOGY EXAM Solis Zepeda MD 2/4/2021  9:24 AM       Findings: Acute appendicitis .   The appendix was  inflamed.  It was notperforated.  There was no fluid or mucus near the appendix. There was no evidence of mucin in abdomen and no evidence of peritoneal abnormalities. Exophytic segment IV lesion excised with Ligasure ( at liver edge)   Complications: None.   Implants: None.         OPERATIVE INDICATIONS:  Beronica Azevedo is a 60 year old-year-old female with a history of right lower quadrant abdominal pain and with diagnosis of acute appendicitis.   After understanding the risks and benefits of proceeding with surgery, the patient has an indication for laparoscopic appendectomy and consented to undergo surgery.      OPERATIVE DETAILS:  The patient was brought to the operating room and prepared in a routine fashion.  A timeout was performed prior to surgery and documented by the nursing team.    Under the benefits of general anesthesia, a  Vertical supraumbilical incision was made and fascia was sharply incised and stay sutures with 0-vicryl placed. A 12 mm Sandrita port was placed  and pneumoperitoneum was established using carbon dioxide gas to a maximum  pressure of 15 mmHg.  A total of 3 ports were placed and the 5 mm laparoscope was utilized.    The patient was moved into a position with the right side up.    The cecum was identified and the appendix was identified at the confluence of the tenia coli.    The appendix was elevated.  Lysis of adhesions was performed to mobilize the appendix. The ligasure was used to transect the mesoappendix staying close to appendiceal wall.     A blue load endo-NERI stapler was then used to divide the appendix at its base flush with cecum. There was no appreciable mass at base of appendix. We evaluated the liver and the large cystic lesion in the liver seen on CT scan was noted. There was an exophytic lesion at edge of liver left of gallbladder which we decided to excise with Ligasure. The appendix and the liver lesion were removed in separate bags and no spillage of contents occurred.     Hemostasis was confirmed.    The 12mm incision was closed using a single 0 Vicryl suture and tying the previously placed 0-vicryl sutures.     All skin incisions were closed using 4-0 monocryl suture and dermabond was applied.    Dr. Zepeda  was present for all critical components of the operation, and all needle and sponge counts were correct x2 at the end of the procedure.    Aneta Husain MD  Chief Resident (General Surgery)  PGY 5        Attending addendum:  I was present for entirety of procedure up to and including closure of fascia.

## 2021-02-04 NOTE — PLAN OF CARE
NEURO: A&O x4, calm/cooperative. Calls appropriately   RESPIRATORY: LS clear and equal bilaterally. Denies SOB, satting well on RA   CARDIAC: WDL, denies chest pain   GI/: Voiding without issues. +BS, LBM 2/2 per patient.   DIET: NPO except meds   PAIN/NAUSEA: C/o headache and abdominal discomfort, declined PRN pain meds. Denies nausea   SKIN/INCISION/DRAINS: No skin issues noted. 2-person skin check completed   IV ACCESS: R PIV SL   ACTIVITY: Up ad abril   LAB: Reviewed   PLAN: Surgery tomorrow, pain management, continue with POC

## 2021-02-04 NOTE — OR NURSING
Pt is vitally stable,nausea free and pain under control. 3 Abdominal port site dressings are clean and dry. See Epic flow sheet for more details. Pt  Attempted to void but unable at this time. Staff will continue to monitor.

## 2021-02-04 NOTE — PRE-PROCEDURE
Patient NPO since 00.  Patient states she is highly sensitive to fragrances-with migraines resulting.   Used our hand soap in shower which has chlorhexidine gluconate 2%.    Patient denies pain at this time, just very fatiqued.    Patient states she is hypotensive and gets dizzy, lightheaded with quick position changes.  Voided.  Ready for OR

## 2021-02-04 NOTE — PROGRESS NOTES
Attending note:   please see resident note for full details.  Patient with atypical history.  Though she is having RLQ pain, she is also having pain in lower back, as well as in her legs.  On exam, she has mild to moderate tenderness in RLQ.  CT scan reviewed.  Wbc normal.  A/p - possible appendicitis.  Risks/benefits of surgery discussed including bleeding, infection, damage to surrounding organs.  Risks of no surgery.  With CT findings, I have recommended surgery in the morning.  Questions answered.  She is understanding of discussion and agreeable to proceed.

## 2021-02-04 NOTE — ANESTHESIA POSTPROCEDURE EVALUATION
Patient: Beronica Azevedo    Procedure(s):  APPENDECTOMY, LAPAROSCOPIC and laparoscopic liver biopsy    Diagnosis:Acute appendicitis [K35.80]  Diagnosis Additional Information: No value filed.    Anesthesia Type:  General    Note:  Disposition: Admission   Postop Pain Control: Uneventful            Sign Out: Well controlled pain   PONV: No   Neuro/Psych: Uneventful            Sign Out: Acceptable/Baseline neuro status   Airway/Respiratory: Uneventful            Sign Out: Acceptable/Baseline resp. status   CV/Hemodynamics: Uneventful            Sign Out: Acceptable CV status   Other NRE: NONE   DID A NON-ROUTINE EVENT OCCUR? No         Last vitals:  CRNA VITALS  2/4/2021 0924 - 2/4/2021 1024      2/4/2021             Resp Rate (observed):  (!) 1          Electronically Signed By: Gibran Jennings MD  February 4, 2021  12:18 PM

## 2021-02-04 NOTE — ED NOTES
Emergency Department Patient Sign-out       Brief HPI:  This is a 60 year old female signed out to me by Dr. Holden .  See initial ED Provider note for details of the presentation.            Significant Events prior to my assuming care: labs and CT abd pos for possible early appendicitis, given zosyn and arranged to be admitted to Surgery.      Exam:   Patient Vitals for the past 24 hrs:   BP Temp Temp src Pulse Resp SpO2   02/03/21 1927 -- 98.9  F (37.2  C) Oral -- 16 98 %   02/03/21 1926 110/59 -- -- 89 -- --   02/03/21 1550 104/71 98.5  F (36.9  C) Oral 83 16 99 %   02/03/21 1433 -- -- -- -- 20 --   02/03/21 1320 113/60 97.1  F (36.2  C) Oral 119 -- 100 %           ED RESULTS:   Results for orders placed or performed during the hospital encounter of 02/03/21 (from the past 24 hour(s))   CBC with platelets differential     Status: None    Collection Time: 02/03/21  2:02 PM   Result Value Ref Range    WBC 7.9 4.0 - 11.0 10e9/L    RBC Count 4.76 3.8 - 5.2 10e12/L    Hemoglobin 14.5 11.7 - 15.7 g/dL    Hematocrit 43.3 35.0 - 47.0 %    MCV 91 78 - 100 fl    MCH 30.5 26.5 - 33.0 pg    MCHC 33.5 31.5 - 36.5 g/dL    RDW 11.4 10.0 - 15.0 %    Platelet Count 338 150 - 450 10e9/L    Diff Method Automated Method     % Neutrophils 69.7 %    % Lymphocytes 18.9 %    % Monocytes 9.0 %    % Eosinophils 1.4 %    % Basophils 0.6 %    % Immature Granulocytes 0.4 %    Nucleated RBCs 0 0 /100    Absolute Neutrophil 5.5 1.6 - 8.3 10e9/L    Absolute Lymphocytes 1.5 0.8 - 5.3 10e9/L    Absolute Monocytes 0.7 0.0 - 1.3 10e9/L    Absolute Eosinophils 0.1 0.0 - 0.7 10e9/L    Absolute Basophils 0.1 0.0 - 0.2 10e9/L    Abs Immature Granulocytes 0.0 0 - 0.4 10e9/L    Absolute Nucleated RBC 0.0    Comprehensive metabolic panel     Status: Abnormal    Collection Time: 02/03/21  2:02 PM   Result Value Ref Range    Sodium 137 133 - 144 mmol/L    Potassium 3.7 3.4 - 5.3 mmol/L    Chloride 105 94 - 109 mmol/L    Carbon Dioxide 26 20 - 32 mmol/L     Anion Gap 6 3 - 14 mmol/L    Glucose 94 70 - 99 mg/dL    Urea Nitrogen 16 7 - 30 mg/dL    Creatinine 1.07 (H) 0.52 - 1.04 mg/dL    GFR Estimate 56 (L) >60 mL/min/[1.73_m2]    GFR Estimate If Black 65 >60 mL/min/[1.73_m2]    Calcium 9.1 8.5 - 10.1 mg/dL    Bilirubin Total 0.4 0.2 - 1.3 mg/dL    Albumin 3.6 3.4 - 5.0 g/dL    Protein Total 7.3 6.8 - 8.8 g/dL    Alkaline Phosphatase 81 40 - 150 U/L    ALT 12 0 - 50 U/L    AST 13 0 - 45 U/L   Lipase     Status: None    Collection Time: 02/03/21  2:02 PM   Result Value Ref Range    Lipase 84 73 - 393 U/L   TSH with free T4 reflex     Status: None    Collection Time: 02/03/21  2:02 PM   Result Value Ref Range    TSH 1.14 0.40 - 4.00 mU/L   Symptomatic Influenza A/B & SARS-CoV2 (COVID-19) Virus PCR Multiplex     Status: None    Collection Time: 02/03/21  2:18 PM    Specimen: Nasopharyngeal   Result Value Ref Range    Flu A/B & SARS-COV-2 PCR Source Nasopharyngeal     SARS-CoV-2 PCR Result NEGATIVE     Influenza A PCR Negative NEG^Negative    Influenza B PCR Negative NEG^Negative    Respiratory Syncytial Virus PCR (Note)     Flu A/B & SARS-CoV-2 PCR Comment (Note)    CT Abdomen Pelvis w Contrast     Status: None    Collection Time: 02/03/21  3:08 PM    Narrative    CT ABDOMEN AND PELVIS WITH CONTRAST   2/3/2021 3:08 PM     HISTORY: Right lower quadrant abdominal pain, appendicitis suspected  (Age >= 14y).    TECHNIQUE:  CT abdomen and pelvis with 69mL of Isovue 370 IV.  Radiation dose for this scan was reduced using automated exposure  control, adjustment of the mA and/or kV according to patient size, or  iterative reconstruction technique.    COMPARISON: None available    FINDINGS:  Lower chest: Bibasilar pulmonary opacities, likely atelectasis.    Abdomen/pelvis: Scattered hypoattenuating foci in the liver, some can  be characterized as liver cysts, including 2.1 cm cyst in the right  hepatic lobe, while few others are subcentimeter and too small to  characterize.  Hypoattenuating area along the fissure of falciform  ligament, likely represents area of focal fatty infiltration. Mild  dilatation of the common bile duct measure up to 7 mm in diameter. No  ductal stone visualized. No CT evidence of acute cholecystitis.  Subcentimeter hypoattenuating focus in the pancreatic body (series 4  image 105), too small to characterize, could represent sidebranch  intraductal papillary mucinous neoplasm versus other pancreatic cystic  lesions. No splenomegaly. No adrenal nodules. No radiodense kidney  stones or hydronephrosis in either kidney.    No abnormally dilated bowel loops. The appendix is dilated measuring  14 mm in diameter and fluid-filled, however with no significant  periappendiceal fat stranding. Findings could represent early acute  appendicitis. No evidence of appendiceal perforation or  periappendiceal loculated collection. Moderate amount of stool  throughout the colon. No significant free fluid in the abdomen or  pelvis. No free peritoneal or portal venous gas. Prominent left pelvic  vasculature, nonspecific, can be seen with pelvic congestion syndrome.    Bones and soft tissues: Multilevel degenerative changes of the spine.      Impression    IMPRESSION:  1. The appendix is dilated and fluid-filled measuring 14 mm in  diameter, however with no significant periappendiceal fat stranding,  findings could represent early acute appendicitis. No evidence of  appendiceal perforation or periappendiceal loculated collection.  2. Subcentimeter hypoattenuating focus in the pancreatic body, too  small to characterize, could represent side branch intraductal  papillary mucinous neoplasm versus other pancreatic cystic lesions.  Recommend follow-up exam in one year with contrast-enhanced MRI/MRCP  to assess for interval change.  3. Prominent left pelvic vasculature, nonspecific, can be seen with  pelvic congestion syndrome.    CHANO HORAN MD   UA with Microscopic reflex to  Culture     Status: Abnormal    Collection Time: 02/03/21  3:45 PM    Specimen: Urine clean catch; Midstream Urine   Result Value Ref Range    Color Urine Light Yellow     Appearance Urine Clear     Glucose Urine Negative NEG^Negative mg/dL    Bilirubin Urine Negative NEG^Negative    Ketones Urine Negative NEG^Negative mg/dL    Specific Gravity Urine 1.007 1.003 - 1.035    Blood Urine Negative NEG^Negative    pH Urine 5.5 5.0 - 7.0 pH    Protein Albumin Urine Negative NEG^Negative mg/dL    Urobilinogen mg/dL Normal 0.0 - 2.0 mg/dL    Nitrite Urine Negative NEG^Negative    Leukocyte Esterase Urine Negative NEG^Negative    Source Midstream Urine     WBC Urine 3 0 - 5 /HPF    RBC Urine 0 0 - 2 /HPF    Bacteria Urine Few (A) NEG^Negative /HPF    Squamous Epithelial /HPF Urine 1 0 - 1 /HPF    Mucous Urine Present (A) NEG^Negative /LPF       ED MEDICATIONS:   Medications   sodium chloride 0.9 % bag 500mL for CT scan flush use (21 mLs Intravenous Given 2/3/21 1503)   0.9% sodium chloride BOLUS (0 mLs Intravenous Stopped 2/3/21 1539)   iopamidol (ISOVUE-370) solution 100 mL (69 mLs Intravenous Given 2/3/21 1502)   piperacillin-tazobactam (ZOSYN) 3.375 g vial to attach to  mL bag (0 g Intravenous Stopped 2/3/21 1910)   sodium chloride 0.9% infusion ( Intravenous ED Infusing on Admission/transfer 2/3/21 1954)   ketorolac (TORADOL) injection 15 mg (15 mg Intravenous Given 2/3/21 1858)         Impression:    ICD-10-CM    1. Acute appendicitis with localized peritonitis, without perforation, abscess, or gangrene  K35.30        Plan:    Pending studies include awaiting Surgery inpatient bed, kept NPO, given toradol 15 iv for pain.        Mark Mast MD, Mark Ontiveros MD  02/03/21 2017

## 2021-02-04 NOTE — ANESTHESIA PROCEDURE NOTES
Airway   Date/Time: 2/4/2021 8:23 AM   Patient location during procedure: OR  Staff -   Anesthesiologist:  Gibran Jennings MD  Resident/Fellow: Anthony Salvador MD  Performed By: resident    Indications and Patient Condition  Indications for airway management: airway protection and clarence-procedural  Induction type:intravenousMask difficulty assessment: 1 - vent by mask    Final Airway Details  Final airway type: endotracheal airway  Successful airway:ETT - single  Endotracheal Airway Details   ETT size (mm): 7.0  Cuffed: yes  Successful intubation technique: direct laryngoscopy  Grade View of Cords: 3  Adjucts: stylet  Measured from: gums/teeth  Secured at (cm): 21  Secured with: commercial tube lombardo  Bite block used: None    Post intubation assessment   Number of attempts at approach: 1  Number of other approaches attempted: 0  Secured with:commercial tube lombardo  Ease of procedure: easy  Dentition: Intact

## 2021-02-04 NOTE — ANESTHESIA PREPROCEDURE EVALUATION
Anesthesia Pre-Procedure Evaluation    Patient: Beronica Azevedo   MRN: [id number] : 1960        Preoperative Diagnosis: Acute appendicitis [K35.80]   Procedure : Procedure(s):  APPENDECTOMY, LAPAROSCOPIC     Past Medical History:   Diagnosis Date     Anxiety      Arthritis      Cervical high risk HPV (human papillomavirus) test positive 2016, 19    See problem list.      BEVERLY II (cervical intraepithelial neoplasia II)     LEEP     Depression      Depressive disorder Adolescence    Managed with medication and therapy     Dry eye syndrome      Frequent UTI      Headache(784.0)      IBS (irritable bowel syndrome)      Insomnia      Lattice degeneration of retina      Lyme disease      Myopic astigmatism      Nonsenile cataract      Osteopenia ,    Fosamax     Pneumothorax on right     at 17 y/o     PVD (posterior vitreous detachment), both eyes 7/19/10     Rosacea      Vasomotor rhinitis       Past Surgical History:   Procedure Laterality Date     BIOPSY  Moles skin tags     CHEST TUBE INSERTION       COLONOSCOPY  Normal     DILATION AND CURETTAGE, OPERATIVE HYSTEROSCOPY WITH MORCELLATOR, COMBINED  2011    endometrial polyp     LEEP TX, CERVICAL  2009    BEVERLY II, clear margins     THORACIC SURGERY      Collapsed lung      Allergies   Allergen Reactions     Perfume Other (See Comments)     Scents/smells     Tape [Adhesive Tape] Rash     Compazine Other (See Comments)     Hyperactivity       Paxil [Paroxetine] Other (See Comments)     Ropinirole Hcl Unknown     TABS     Depakote [Valproic Acid] Rash     Rash       Food Itching and Rash     mushrooms     Mushroom Itching and Rash      Social History     Tobacco Use     Smoking status: Never Smoker     Smokeless tobacco: Never Used   Substance Use Topics     Alcohol use: Yes     Comment: 1 8oz mixed drink or beer, minimal 1-2 per week      Wt Readings from Last 1 Encounters:   21 64.5 kg (142 lb 3.2 oz)        Anesthesia  Evaluation   Pt has had prior anesthetic. Type: MAC.        ROS/MED HX  ENT/Pulmonary:       Neurologic:     (+) migraines,     Cardiovascular:  - neg cardiovascular ROS   (+) -----Previous cardiac testing   Echo: Date: Results:    Stress Test: Date: 2017 Results:  Normal exercise echocardiogram without evidence of coronary artery disease or  stress-induced ischemia. Target heart rate was achieved. Heart rate and blood  pressure response to exercise were normal. With stress, the left ventricular  ejection fraction increased from 55-60% to greater than 65% and the left  ventricular size decreased appropriately. There was no ECG evidence of  ischemia. Grossly normal baseline echocardiogram (see comments below).  ECG Reviewed: Date: Results:    Cath: Date: Results:      METS/Exercise Tolerance:     Hematologic:       Musculoskeletal:       GI/Hepatic:     (+) Inflammatory bowel disease,     Renal/Genitourinary:       Endo:       Psychiatric/Substance Use:     (+) psychiatric history anxiety     Infectious Disease: Comment: History of lymes dx.       Malignancy:       Other:               OUTSIDE LABS:  CBC:   Lab Results   Component Value Date    WBC 7.9 02/03/2021    WBC 5.5 05/22/2018    HGB 14.5 02/03/2021    HGB 13.6 05/22/2018    HCT 43.3 02/03/2021    HCT 41.2 05/22/2018     02/03/2021     05/22/2018     BMP:   Lab Results   Component Value Date     02/03/2021     07/24/2020    POTASSIUM 3.7 02/03/2021    POTASSIUM 4.2 07/24/2020    CHLORIDE 105 02/03/2021    CHLORIDE 111 (H) 07/24/2020    CO2 26 02/03/2021    CO2 24 07/24/2020    BUN 16 02/03/2021    BUN 13 07/24/2020    CR 1.07 (H) 02/03/2021    CR 0.89 07/24/2020    GLC 94 02/03/2021    GLC 70 07/24/2020     COAGS: No results found for: PTT, INR, FIBR  POC: No results found for: BGM, HCG, HCGS  HEPATIC:   Lab Results   Component Value Date    ALBUMIN 3.6 02/03/2021    PROTTOTAL 7.3 02/03/2021    ALT 12 02/03/2021    AST 13 02/03/2021     ALKPHOS 81 02/03/2021    BILITOTAL 0.4 02/03/2021     OTHER:   Lab Results   Component Value Date    A1C 5.3 07/12/2019    KATHIA 9.1 02/03/2021    MAG 2.0 05/17/2015    LIPASE 84 02/03/2021    AMYLASE 79 09/14/2012    TSH 1.14 02/03/2021    T4 0.95 09/14/2012    CRP <2.9 05/22/2018    SED 5 09/14/2012       Anesthesia Plan    ASA Status:  2   NPO Status:  NPO Appropriate    Anesthesia Type: General              Consents    Anesthesia Plan(s) and associated risks, benefits, and realistic alternatives discussed. Questions answered and patient/representative(s) expressed understanding.     - Discussed with:  Patient         Postoperative Care            Comments:                Anthony Salvador MD

## 2021-02-05 NOTE — DISCHARGE SUMMARY
"[facility]   MIS Discharge Summary    Date of Admission: 2/3/2021  Date of Discharge: 2/4/2021    Admission Diagnosis:  1. Acute appendicitis    Discharge Diagnosis:  1. Same as above    Consultations:  none    Procedures:  1. Lap appendectomy  by Dr Zepeda     Brief HPI:  Beronica Azevedo is a 60 year old female with h/o frequent UTIs, hypotension, migraines, IBS, Lyme disease, and anxiety, who presented to [facility] ED with RLQ abdominal and left lower back pain associated with n/v, no leukocytosis and CT showing 14 mm appendix without surrounding fat stranding.    Hospital Course:  The patient was admitted and underwent the above procedure. The patient tolerated the procedure well. There were no complications. The patient's diet was slowly advanced as bowel function returned. Pain was controlled with oral pain medication and the patient was able to ambulate and void without difficulty. The patient received appropriate education post operatively. On POD #0 the patient was discharged to home.    Discharge Physical Exam:  /55 (BP Location: Left arm)   Pulse 87   Temp 97  F (36.1  C) (Oral)   Resp 16   Ht 1.727 m (5' 8\")   Wt 64.5 kg (142 lb 3.2 oz)   LMP 04/06/2010 (Exact Date)   SpO2 99%   BMI 21.62 kg/m      Gen: NAD  Lungs: non-labored breathing  CV: regular rhythm, normal rate   Abd: obese, soft, nondistended, appropriately tender, incisions are c/d/i  Ext: no peripheral edema  Neuro: AOx3    Meds:       Review of your medicines      START taking      Dose / Directions   acetaminophen 325 MG tablet  Commonly known as: TYLENOL      Dose: 650 mg  Take 2 tablets (650 mg) by mouth every 6 hours as needed for mild pain or fever  Quantity: 60 tablet  Refills: 0     ondansetron 4 MG ODT tab  Commonly known as: ZOFRAN-ODT      Dose: 4 mg  Take 1 tablet (4 mg) by mouth every 6 hours as needed for nausea or vomiting  Quantity: 30 tablet  Refills: 0     oxyCODONE 5 MG " tablet  Commonly known as: ROXICODONE      Dose: 5 mg  Take 1 tablet (5 mg) by mouth every 4 hours as needed for severe pain  Quantity: 12 tablet  Refills: 0     senna-docusate 8.6-50 MG tablet  Commonly known as: SENOKOT-S/PERICOLACE      Dose: 1-2 tablet  Take 1-2 tablets by mouth 2 times daily as needed for constipation  Quantity: 12 tablet  Refills: 0        CONTINUE these medicines which have NOT CHANGED      Dose / Directions   azelastine 0.1 % nasal spray  Commonly known as: ASTELIN  Used for: Chronic rhinitis      INSTILL 1 SPRAY INTO THE NOSTRIL TWICE DAILY  Quantity: 30 mL  Refills: 1     BENADRYL ALLERGY PO      prn  Refills: 0     biotin 2.5 mg/mL Susp      Take by mouth daily  Refills: 0     butalbital-acetaminophen-caffeine -40 MG tablet  Commonly known as: ESGIC  Used for: Migraine with aura and with status migrainosus, not intractable      TAKE 1 TABLET BY MOUTH EVERY 4 HOURS AS NEEDED  Quantity: 28 tablet  Refills: 0     Co Q10 100 MG Caps  Indication: migraine      Dose: 100 mg  Take 100 mg by mouth 2 times daily  Refills: 0     diclofenac 75 MG EC tablet  Commonly known as: VOLTAREN  Used for: Herniated lumbar intervertebral disc      Dose: 75 mg  Take 1 tablet (75 mg) by mouth 2 times daily  Quantity: 40 tablet  Refills: 1     eletriptan 40 MG tablet  Commonly known as: RELPAX      Dose: 40 mg  Take 40 mg by mouth as needed for headaches  Refills: 10     famciclovir 500 MG tablet  Commonly known as: FAMVIR  Used for: Infective otitis externa, right      Dose: 500 mg  Take 1 tablet (500 mg) by mouth 3 times daily for 7 days  Quantity: 21 tablet  Refills: 0     fluticasone 50 MCG/ACT nasal spray  Commonly known as: FLONASE  Used for: Seasonal allergic rhinitis, unspecified trigger      USE 2 SPRAYS IN EACH NOSTRIL DAILY  Quantity: 48 g  Refills: 3     gabapentin 100 MG capsule  Commonly known as: NEURONTIN  Used for: Lumbar radicular pain      Dose: 100-200 mg  Take 1-2 capsules (100-200 mg)  by mouth At Bedtime  Quantity: 60 capsule  Refills: 1     ketoconazole 2 % external shampoo  Commonly known as: NIZORAL  Used for: Dermatitis, seborrheic      Apply topically daily as needed for itching or irritation  Quantity: 120 mL  Refills: 11     magnesium 100 MG Caps      Dose: 400 mg  Take 400 mg by mouth  Refills: 0     meloxicam 15 MG tablet  Commonly known as: MOBIC  Used for: Lumbar radiculopathy, Lumbar disc herniation with radiculopathy      TAKE 1/2 TABLET(7.5 MG) BY MOUTH TWICE DAILY  Quantity: 90 tablet  Refills: 1     nortriptyline 50 MG capsule  Commonly known as: PAMELOR  Used for: Mild major depression (H), Primary insomnia      TAKE 1 CAPSULE(50 MG) BY MOUTH AT BEDTIME  Quantity: 90 capsule  Refills: 1     predniSONE 20 MG tablet  Commonly known as: DELTASONE  Used for: Lumbar radicular pain      Dose: 40 mg  Take 2 tablets (40 mg) by mouth daily  Quantity: 10 tablet  Refills: 0     tiZANidine 4 MG tablet  Commonly known as: Zanaflex  Used for: Lumbar radicular pain      Dose: 4-8 mg  Take 1-2 tablets (4-8 mg) by mouth nightly as needed  Quantity: 30 tablet  Refills: 1     topiramate 25 MG tablet  Commonly known as: TOPAMAX      Dose: 25 mg  Take 25 mg by mouth 2 times daily  Refills: 0     traZODone 50 MG tablet  Commonly known as: DESYREL  Used for: Primary insomnia, FREDIS (generalized anxiety disorder)      1/2 TABLET AT BEDTIME AS NEEDED FOR SLEEP  Quantity: 45 tablet  Refills: 1     VITAMIN B-2 PO      Dose: 400 mg  Take 400 mg by mouth  Refills: 0           Where to get your medicines      Some of these will need a paper prescription and others can be bought over the counter. Ask your nurse if you have questions.    Bring a paper prescription for each of these medications    acetaminophen 325 MG tablet    ondansetron 4 MG ODT tab    oxyCODONE 5 MG tablet    senna-docusate 8.6-50 MG tablet         Additional instructions:  After Care     Future Labs/Procedures    Activity     Comments:     Your activity upon discharge: activity as tolerated. No lifting >10 lbs for 4-6 weeks    Diet     Comments:    Follow this diet upon discharge: Orders Placed This Encounter      Regular Diet Adult    Discharge Instructions     Comments:    DIET  -Regular diet  -We recommend eating slowly, chewing thoroughly, eating small frequent meals throughout the day  -Stay well hydrated.      ACTIVITY  -No lifting, pushing, pulling greater than 10 lbs and no strenuous exercise for 6 weeks   -No driving while on narcotic analgesics (i.e. Percocet, oxycodone, Vicodin)  -No driving until you are able to fully twist to both sides or slam on brakes quickly and without any pain    WOUND CARE  -Inspect your wounds daily for signs of infection (increased redness, drainage, pain)  -Keep your wound clean and dry  -You may shower, but do not soak in tub or pool      MEDICATIONS  - Do not take any additional Tylenol (acetaminophen) while using a narcotic pain medication which includes acetaminophen  - Do not take more than 4,000mg of acetaminophen in any 24 hour period, as this can cause liver damage  - Take stool softeners such as Senna or Miralax while you are using narcotics, but stop if you develop diarrhea  - Wean yourself off of narcotic pain medications    NOTIFY  Please contact us for problems after discharge such as:  -Temperature > 101F, chills, rigors, dizziness  -Redness around or purulent drainage from wound  -Inability to tolerate diet, nausea or vomiting  -You stop passing gas, develop significant bloating, abdominal pain  -Have blood in stools/vomit  -Have severe diarrhea/constipation  -Any other questions or concerns.  - At nights (after 4:30pm), on weekends, or if urgent, call [phone] and ask the  to speak with the on-call Surgery resident    FOLLOW-UP:  - Call your primary care provider to touch base regarding your recent admission.    - Call or return sooner than your regularly scheduled visit if you  develop any of the following: fever >101.5, uncontrolled pain, uncontrolled nausea or vomiting, as well as increased redness, swelling, or drainage from your wound.   -  A nurse from the General Surgery Clinic will contact you within 24 hours, or the next business day, after your discharge from the hospital.  If you have questions or to schedule a follow up appointment please call the General Surgery Clinic at [phone].  Call [phone] and ask to speak with the Surgery resident on call if you are having troubles in the evenings, at night, or on the weekend.  -  If you had surgery with Dr Craft or Dr Villeda please call [phone] to schedule your follow up appointment or with any questions or concerns.  -  If you are receiving home care please inform your home care nurse of our contact number.              Follow Up:  -Follow up with Dr Zepeda in clinic in 2 week(s) after discharge.         GENERAL SURGERY PATIENTS: Call [phone] for scheduling needs or to reach your care team      Della Perez MD  General Surgery PGY-1

## 2021-02-05 NOTE — PROGRESS NOTES
"/55 (BP Location: Left arm)   Pulse 87   Temp 97  F (36.1  C) (Oral)   Resp 16   Ht 1.727 m (5' 8\")   Wt 64.5 kg (142 lb 3.2 oz)   LMP 04/06/2010 (Exact Date)   SpO2 99%   BMI 21.62 kg/m       Neuro: A&Ox4.   Cardiac: Afebrile, VSS.   Respiratory: RA   GI/: Voiding spontaneously. No BM this shift.   Diet/appetite: Tolerating diet. Denies nausea   Activity:Independent  Pain: prn oxy  Skin: No new deficits noted.  Lines:PIV  Drains: None  Will discharge later tonight.No new complaints.Will continue to monitor and follow plan of care.       "

## 2021-02-05 NOTE — PLAN OF CARE
Patient discharging. Discharge paperwork was reviewed with patient. Patient expressed understanding. A copy was given to patient. Pt discharging home. Daughter will be transport. Discharge medication prescriptions were given to patient to take to outside pharmacy. All patient belongings were taken with patient.     Patient was taken down to main lobby by NA via wheel chair.

## 2021-02-08 ENCOUNTER — PATIENT OUTREACH (OUTPATIENT)
Dept: SURGERY | Facility: CLINIC | Age: 61
End: 2021-02-08

## 2021-02-08 NOTE — PROGRESS NOTES
RN Post-Op/Post-Discharge Care Coordination Note    Ms. Beronica Azevedo is a 60 year old female who underwent laparoscopic appendectomy and liver bx on 2/4 with  Dr. Solis Zepeda.  Spoke with Patient.    Support  Patient able to care for self independently     Health Status  Fevers/chills: Patient denies any fever or chills.  Nausea/Vomiting: Patient denies nausea/vomiting. Never picked up Zofran.  Eating/drinking: Patient is able to eat and drink without any complaints. Reports decreased appetite.  Bowel habits: Patient reports having a normal bowel movement.  Drains (MARC): N/A  Incisions: Patient denies any signs and symptoms of infection..  Wound closure:  Steri-strips  Pain: Improved. Discussed weaning off of narcotics and transitioning to OTC Tylenol.  Reports right shoulder strap pain that is improving.  Patient may use local heat protecting skin from injury.  New Medications:  Senna, Zofran, Tylenol, Oxycodone    Activity/Restrictions  No lifting in excess of 15-20 pounds for 3-4 weeks    Equipment  None    Pathology reviewed with patient:  No, not yet available    Forms/Letters  Yes- email letter    All of her questions were answered including reviewing restrictions and wound care.  She will call this office if she has any further questions and/or concerns.      In lieu of a post-op clinic patient that patient would like to be contacted in approximately 7-10 days via telephone.    A copy of this note was routed to the primary surgeon.      Whom and When to Call  Patient acknowledges understanding of how to manage any medication changes and   when to seek medical care.     Patient advised that if after hour medical concerns arise to please call [phone] and choose option 4 to speak to the physician on call.

## 2021-02-08 NOTE — LETTER
February 8, 2021      TO: Beronica Azevedo  [address] [place] [id number]         To Whom It May Concern:    Beronica Azevedo is under our professional care for an acute medical condition.  Ms. Azevedo underwent an unplanned surgical procedure on February 4, 2021, and requires a period of recovery.  She is unable to work February 4 - February 14, 2021, and may return to work with restrictions on February 15, 2021 as follows:     February 15 -18, 2021:  She may work 8 hours/day   February 19, 2021:  She may resume her normal work schedule/hours   February 4 - 25, 2021:  No lifting in excess of 15-20 pounds   February 26, 2021:  No restrictions    Please contact our office with questions.  If any forms need to be completed, please fax them to [phone].    Sincerely,  Jennifer Avila RN, BSN, CNOR, RNFA, CBCN on behalf of [facility]

## 2021-02-10 ENCOUNTER — TELEPHONE (OUTPATIENT)
Dept: FAMILY MEDICINE | Facility: CLINIC | Age: 61
End: 2021-02-10

## 2021-02-10 LAB — COPATH REPORT: NORMAL

## 2021-02-10 NOTE — TELEPHONE ENCOUNTER
Dr. Rowe,       Pt calling to see if Dr. Rowe needs to see her after her hospitalization 2/3: Acute appendicitis with localized peritonitis, without perforation, abscess, or gangrene    Imaging also had incidental findings.   Pt will be seeing gen surgeon 14 days from 2/3.         Elin Taylor RN   [facility]

## 2021-02-11 ENCOUNTER — TELEPHONE (OUTPATIENT)
Dept: SURGERY | Facility: CLINIC | Age: 61
End: 2021-02-11

## 2021-02-11 NOTE — TELEPHONE ENCOUNTER
I called patient to discuss pathology including liver biopsy.  No further f/u needed  For that.  She is doing well overall.  Continue current standard f/u with telephone contact with our clinic RN, Jennifer Avila.    With respect to the CT finding of the pancreas cyst, we did discuss that and need for f/u MRI in a year.  Questions answered.  She was understanding of discussion and agreeable with plan.

## 2021-02-15 ENCOUNTER — PATIENT OUTREACH (OUTPATIENT)
Dept: SURGERY | Facility: CLINIC | Age: 61
End: 2021-02-15

## 2021-02-15 NOTE — PROGRESS NOTES
Beronica Azevedo is a patient of Dr. Rachael Cunha that recently underwent a surgical procedure.  The patient was contacted via telephone approximately 7-10 days ago for a status update and post-op teaching.  In lieu of a clinic visit, the patient requested to be contacted at a later date by an RN for an assessment.    Attempted to contact patient via telephone for a status update.  LM on VM to call office.    Pathology (already reviewed with patient by Dr. Cunha):  Copath Report Patient Name: BERONICA AZEVEDO   MR#: [id number]   Specimen #: [id number]   Collected: 2/4/2021   Received: 2/4/2021   Reported: 2/10/2021 11:51   Ordering Phy(s): RACHAEL CUNHA     For improved result formatting, select 'View Enhanced Report Format' under    Linked Documents section.     SPECIMEN(S):   A: Appendix   B: Segment 4 lesion     FINAL DIAGNOSIS:   A. 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 LIVER, SEGMENT 4 LESION, BIOPSY:   - Small regenerative liver nodule, encapsulated with mild steatosis and   cautery effect   - No evidence of inflammation or malignancy     ADDENDUM  02/15/21  1:35 PM  Beronica Azevedo was contacted several days post procedure via telephone for a status update and elected to have a telephone follow -up call approximately 7-10 days after original contact in lieu of a clinic visit with Dr. Rachael Cunha.  She continues to do well and the steri-strips are starting to peel off.  The patients wounds are healed and the area is C/D/I.  She is afebrile, pain free, and raúl po; the patient is slowly resuming her normal activity.   Discussed restrictions including no lifting in excess of 15-20 pounds for 3 weeks.    Pathology was reviewed with the patient: Done by provider. See telephone note    All of Beronica Azevedo question's were answered and  she would like to return to the clinic on a PRN basis.  The patient is aware that   she may schedule a post op appointment at any time.    A copy of this note was routed to the patients surgeon.

## 2021-03-02 ENCOUNTER — PATIENT OUTREACH (OUTPATIENT)
Dept: SURGERY | Facility: CLINIC | Age: 61
End: 2021-03-02

## 2021-03-02 NOTE — PROGRESS NOTES
Patient inquiring if she is able to get the COVID vaccine after undergoing surgery.  Surgery was greater than 4 weeks ago- no contraindication for vaccine.

## 2021-04-02 ENCOUNTER — NURSE TRIAGE (OUTPATIENT)
Dept: NURSING | Facility: CLINIC | Age: 61
End: 2021-04-02

## 2021-04-03 NOTE — TELEPHONE ENCOUNTER
Pt called in states she had covid-19 vaccine today.  The vaccine is Moderna.  It is her second shot.  There is pain on the injection site.  The pain started 6:30 PM today.  The pain is 5/10 on the scale.  The pain is when she move the arm.  It is the left arm.  No sure if she has fever.  No past immunization.  The disposition is to be seen.  Care advice given per protocol.  Patient agrees with care advice given.   Agreed to call back if he has additional symptoms or questions.      Rusty Leonard [place] Nurse Advisor 4/2/2021 8:57 PM        Reason for Disposition    COVID-19 vaccine, injection site reaction (e.g., pain, redness, swelling), question about    Additional Information    Negative: [1] Difficulty breathing or swallowing AND [2] starts within 2 hours after injection    Negative: Sounds like a life-threatening emergency to the triager    Negative: [1] COVID-19 exposure AND [2] no symptoms    Negative: [1] Typical COVID-19 symptoms AND [2] symptoms that are NOT expected from vaccine (e.g., cough, difficulty breathing, loss of taste or smell, runny nose, sore throat)    Negative: [1] Typical COVID-19 symptoms AND [2] started > 3 days after getting vaccine    Negative: Fever > 104 F (40 C)    Negative: Sounds like a severe, unusual reaction to the triager    Negative: [1] Redness or red streak around the injection site AND [2] started > 48 hours after getting vaccine AND [3] fever    Negative: [1] Fever > 101 F (38.3 C) AND [2] age > 60 AND [3] started > 48 hours after getting vaccine    Negative: [1] Fever > 100.0 F (37.8 C) AND [2] bedridden (e.g., nursing home patient, CVA, chronic illness, recovering from surgery) AND [3] started > 48 hours after getting vaccine    Negative: [1] Fever > 100.0 F (37.8 C) AND [2] diabetes mellitus or weak immune system (e.g., HIV positive, cancer chemo, splenectomy, organ transplant, chronic steroids) AND [3] started > 48 hours after getting vaccine    Negative: [1] Redness  or red streak around the injection site AND [2] started > 48 hours after getting vaccine AND [3] no fever  (Exception: red area < 1 inch or 2.5 cm wide)    Negative: [1] Pain, tenderness, or swelling at the injection site AND [2] over 3 days (72 hours) since vaccine AND [3] getting worse    Negative: Fever > 100.0 F (37.8 C) present > 3 days (72 hours)    Negative: [1] Fever > 100.0 F (37.8 C) AND [2] healthcare worker    Negative: [1] Pain, tenderness, or swelling at the injection site AND [2] lasts > 7 days    Negative: [1] Requesting COVID-19 vaccine AND [2] healthcare worker (e.g., EMS first responders, doctors, nurses)    Negative: [1] Requesting COVID-19 vaccine AND [2] resident of a long-term care facility (e.g., nursing home)    Negative: [1] Requesting COVID-19 vaccine AND [2] vaccine available in the community for this patient group    Protocols used: CORONAVIRUS (COVID-19) VACCINE QUESTIONS AND EEBPYPXAL-N-PT 1.3

## 2021-06-15 ENCOUNTER — NURSE TRIAGE (OUTPATIENT)
Dept: FAMILY MEDICINE | Facility: CLINIC | Age: 61
End: 2021-06-15

## 2021-06-15 NOTE — TELEPHONE ENCOUNTER
Reason for Disposition    MILD swelling of both ankles (i.e., pedal edema) and caused by hot weather    Additional Information    Negative: Chest pain    Negative: Followed an ankle injury    Negative: Ankle pain is main symptom    Negative: Swelling of both ankles (i.e., pedal edema)    Negative: Swelling of calf or leg    Negative: Difficulty breathing    Negative: Entire foot is cool or blue in comparison to other side    Negative: Fever    Negative: Patient sounds very sick or weak to the triager    Negative: [1] SEVERE pain (e.g., excruciating, unable to walk) AND [2] not improved after 2 hours of pain medicine    Negative: [1] Can't move swollen joint at all AND [2] no fever    Negative: [1] Redness AND [2] painful when touched AND [3] no fever    Negative: [1] Red area or streak [2] large (> 2 in. or 5 cm)    Negative: [1] Thigh or calf pain AND [2] only 1 side AND [3] present > 1 hour    Negative: [1] Thigh, calf, or ankle swelling AND [2] only 1 side    Negative: [1] Thigh, calf, or ankle swelling AND [2] bilateral AND [3] 1 side is more swollen    Negative: [1] Very swollen joint AND [2] no fever    Negative: Looks like a boil, infected sore, deep ulcer or other infected rash (spreading redness, pus)    Negative: [1] MODERATE pain (e.g., interferes with normal activities, limping) AND [2] present > 3 days    Negative: Swollen ankle joint  (Exception: area of localized swelling which is itchy)    Negative: [1] Area of swelling AND [2] itchy    Negative: Sounds like a life-threatening emergency to the triager    Negative: Chest pain    Negative: Small area of swelling and followed an insect bite to the area    Negative: Followed a knee injury    Negative: Ankle or foot injury    Negative: Pregnant with leg swelling or edema    Negative: Difficulty breathing at rest    Negative: Entire foot is cool or blue in comparison to other side    Negative: SEVERE swelling (e.g., swelling extends above knee, entire  "leg is swollen, weeping fluid)    Negative: Thigh or calf pain and only 1 side and present > 1 hour    Negative: Thigh, calf, or ankle swelling in only one leg    Negative: Thigh, calf, or ankle swelling in both legs, but one side is definitely more swollen (Exception: longstanding difference between legs)    Negative: Cast on leg or ankle and has increasing pain    Negative: Can't walk or can barely stand (new onset)    Negative: Fever and red area (or area very tender to touch)    Negative: Patient sounds very sick or weak to the triager    Negative: Swelling of face, arm or hands (Exception: slight puffiness of fingers during hot weather)    Negative: Pregnant > 20 weeks and sudden weight gain (i.e., > 2 lbs, 1 kg in one week)    Negative: MODERATE swelling of both ankles (e.g., swelling extends up to the knees) AND new onset or worsening    Negative: Difficulty breathing with exertion AND worsening or new onset    Negative: Looks like a boil, infected sore, deep ulcer, or other infected rash (spreading redness, pus)    Negative: Patient wants to be seen    Negative: MILD swelling of both ankles (i.e., pedal edema) AND new onset or worsening    Answer Assessment - Initial Assessment Questions  1. LOCATION: \"Which joint is swollen?\"      Both ankles, but mostly the left side  2. ONSET: \"When did the swelling start?\"      Last week  3. SIZE: \"How large is the swelling?\"      Has some pitting but very little, notices at night the most  4. PAIN: \"Is there any pain?\" If so, ask: \"How bad is it?\" (Scale 1-10; or mild, moderate, severe)      No pain  5. CAUSE: \"What do you think caused the swollen joint?\"      Not sure  6. OTHER SYMPTOMS: \"Do you have any other symptoms?\" (e.g., fever, chest pain, difficulty breathing, calf pain)      no  7. PREGNANCY: \"Is there any chance you are pregnant?\" \"When was your last menstrual period?\"      no    Answer Assessment - Initial Assessment Questions  1. ONSET: \"When did the " "swelling start?\" (e.g., minutes, hours, days)      Last week  2. LOCATION: \"What part of the leg is swollen?\"  \"Are both legs swollen or just one leg?\"      ankles  3. SEVERITY: \"How bad is the swelling?\" (e.g., localized; mild, moderate, severe)   - Localized - small area of swelling localized to one leg   - MILD pedal edema - swelling limited to foot and ankle, pitting edema < 1/4 inch (6 mm) deep, rest and elevation eliminate most or all swelling   - MODERATE edema - swelling of lower leg to knee, pitting edema > 1/4 inch (6 mm) deep, rest and elevation only partially reduce swelling   - SEVERE edema - swelling extends above knee, facial or hand swelling present       mild  4. REDNESS: \"Does the swelling look red or infected?\"      no  5. PAIN: \"Is the swelling painful to touch?\" If so, ask: \"How painful is it?\"   (Scale 1-10; mild, moderate or severe)      no  6. FEVER: \"Do you have a fever?\" If so, ask: \"What is it, how was it measured, and when did it start?\"       no  7. CAUSE: \"What do you think is causing the leg swelling?\"      Not sure  8. MEDICAL HISTORY: \"Do you have a history of heart failure, kidney disease, liver failure, or cancer?\"      no  9. RECURRENT SYMPTOM: \"Have you had leg swelling before?\" If so, ask: \"When was the last time?\" \"What happened that time?\"      When preg. 30 years ago  10. OTHER SYMPTOMS: \"Do you have any other symptoms?\" (e.g., chest pain, difficulty breathing)        no  11. PREGNANCY: \"Is there any chance you are pregnant?\" \"When was your last menstrual period?\"        no    Protocols used: LEG SWELLING AND EDEMA-A-OH, ANKLE SWELLING-A-AH  Delmis Fallon RN   [facility]        "

## 2021-06-18 ENCOUNTER — OFFICE VISIT (OUTPATIENT)
Dept: FAMILY MEDICINE | Facility: CLINIC | Age: 61
End: 2021-06-18
Payer: COMMERCIAL

## 2021-06-18 VITALS
OXYGEN SATURATION: 99 % | RESPIRATION RATE: 16 BRPM | DIASTOLIC BLOOD PRESSURE: 76 MMHG | HEART RATE: 90 BPM | BODY MASS INDEX: 21.11 KG/M2 | HEIGHT: 68 IN | SYSTOLIC BLOOD PRESSURE: 108 MMHG | TEMPERATURE: 97 F | WEIGHT: 139.31 LBS

## 2021-06-18 DIAGNOSIS — I87.2 EDEMA OF BOTH LOWER EXTREMITIES DUE TO PERIPHERAL VENOUS INSUFFICIENCY: Primary | ICD-10-CM

## 2021-06-18 DIAGNOSIS — I87.2 EDEMA OF BOTH LOWER EXTREMITIES DUE TO PERIPHERAL VENOUS INSUFFICIENCY: ICD-10-CM

## 2021-06-18 LAB
ALBUMIN SERPL-MCNC: 3.7 G/DL (ref 3.4–5)
ALP SERPL-CCNC: 88 U/L (ref 40–150)
ALT SERPL W P-5'-P-CCNC: 18 U/L (ref 0–50)
ANION GAP SERPL CALCULATED.3IONS-SCNC: 6 MMOL/L (ref 3–14)
AST SERPL W P-5'-P-CCNC: 14 U/L (ref 0–45)
BILIRUB SERPL-MCNC: 0.3 MG/DL (ref 0.2–1.3)
BUN SERPL-MCNC: 7 MG/DL (ref 7–30)
CALCIUM SERPL-MCNC: 9 MG/DL (ref 8.5–10.1)
CHLORIDE SERPL-SCNC: 109 MMOL/L (ref 94–109)
CO2 SERPL-SCNC: 25 MMOL/L (ref 20–32)
CREAT SERPL-MCNC: 0.96 MG/DL (ref 0.52–1.04)
GFR SERPL CREATININE-BSD FRML MDRD: 63 ML/MIN/{1.73_M2}
GLUCOSE SERPL-MCNC: 72 MG/DL (ref 70–99)
POTASSIUM SERPL-SCNC: 3.8 MMOL/L (ref 3.4–5.3)
PROT SERPL-MCNC: 6.9 G/DL (ref 6.8–8.8)
SODIUM SERPL-SCNC: 140 MMOL/L (ref 133–144)
TSH SERPL DL<=0.005 MIU/L-ACNC: 1.77 MU/L (ref 0.4–4)

## 2021-06-18 PROCEDURE — 99214 OFFICE O/P EST MOD 30 MIN: CPT | Performed by: FAMILY MEDICINE

## 2021-06-18 PROCEDURE — 80053 COMPREHEN METABOLIC PANEL: CPT | Performed by: FAMILY MEDICINE

## 2021-06-18 PROCEDURE — 84443 ASSAY THYROID STIM HORMONE: CPT | Performed by: FAMILY MEDICINE

## 2021-06-18 PROCEDURE — 36415 COLL VENOUS BLD VENIPUNCTURE: CPT | Performed by: FAMILY MEDICINE

## 2021-06-18 ASSESSMENT — PATIENT HEALTH QUESTIONNAIRE - PHQ9: SUM OF ALL RESPONSES TO PHQ QUESTIONS 1-9: 1

## 2021-06-18 ASSESSMENT — MIFFLIN-ST. JEOR: SCORE: 1240.29

## 2021-06-18 NOTE — PROGRESS NOTES
"    Assessment & Plan     Edema of both lower extremities due to peripheral venous insufficiency  betetr today, likely heat related  - **Comprehensive metabolic panel FUTURE anytime; Future  - **TSH with free T4 reflex FUTURE anytime; Future             Regular exercise  See Patient Instructions    No follow-ups on file.    Joaquin Rowe MD  [facility] SOFIA Loco is a 61 year old who presents for the following health issues     HPI     Concern - Swelling in ankles   Onset: Last week while working .  Description: swelling in both ankles and she was having pain in the left ankle .  Intensity: mild  Progression of Symptoms:  improving  Accompanying Signs & Symptoms: None   Previous history of similar problem: No   Precipitating factors:        Worsened by: may be related to the heat         Review of Systems   Constitutional, HEENT, cardiovascular, pulmonary, gi and gu systems are negative, except as otherwise noted.      Objective    /76   Pulse 90   Temp 97  F (36.1  C) (Temporal)   Resp 16   Ht 1.727 m (5' 7.99\")   Wt 63.2 kg (139 lb 5 oz)   LMP 04/06/2010 (Exact Date)   SpO2 99%   BMI 21.19 kg/m    Body mass index is 21.19 kg/m .  Physical Exam   GENERAL: healthy, alert and no distress  NECK: no adenopathy, no asymmetry, masses, or scars and thyroid normal to palpation  RESP: lungs clear to auscultation - no rales, rhonchi or wheezes  CV: regular rate and rhythm, normal S1 S2, no S3 or S4, no murmur, click or rub, no peripheral edema and peripheral pulses strong  ABDOMEN: soft, nontender, no hepatosplenomegaly, no masses and bowel sounds normal  MS: no gross musculoskeletal defects noted, no edema  SKIN: no suspicious lesions or rashes  NEURO: Normal strength and tone, mentation intact and speech normal    Admission on 02/03/2021, Discharged on 02/04/2021   Component Date Value Ref Range Status     WBC 02/03/2021 7.9  4.0 - 11.0 10e9/L Final     RBC Count " 02/03/2021 4.76  3.8 - 5.2 10e12/L Final     Hemoglobin 02/03/2021 14.5  11.7 - 15.7 g/dL Final     Hematocrit 02/03/2021 43.3  35.0 - 47.0 % Final     MCV 02/03/2021 91  78 - 100 fl Final     MCH 02/03/2021 30.5  26.5 - 33.0 pg Final     MCHC 02/03/2021 33.5  31.5 - 36.5 g/dL Final     RDW 02/03/2021 11.4  10.0 - 15.0 % Final     Platelet Count 02/03/2021 338  150 - 450 10e9/L Final     Diff Method 02/03/2021 Automated Method   Final     % Neutrophils 02/03/2021 69.7  % Final     % Lymphocytes 02/03/2021 18.9  % Final     % Monocytes 02/03/2021 9.0  % Final     % Eosinophils 02/03/2021 1.4  % Final     % Basophils 02/03/2021 0.6  % Final     % Immature Granulocytes 02/03/2021 0.4  % Final     Nucleated RBCs 02/03/2021 0  0 /100 Final     Absolute Neutrophil 02/03/2021 5.5  1.6 - 8.3 10e9/L Final     Absolute Lymphocytes 02/03/2021 1.5  0.8 - 5.3 10e9/L Final     Absolute Monocytes 02/03/2021 0.7  0.0 - 1.3 10e9/L Final     Absolute Eosinophils 02/03/2021 0.1  0.0 - 0.7 10e9/L Final     Absolute Basophils 02/03/2021 0.1  0.0 - 0.2 10e9/L Final     Abs Immature Granulocytes 02/03/2021 0.0  0 - 0.4 10e9/L Final     Absolute Nucleated RBC 02/03/2021 0.0   Final     Sodium 02/03/2021 137  133 - 144 mmol/L Final     Potassium 02/03/2021 3.7  3.4 - 5.3 mmol/L Final     Chloride 02/03/2021 105  94 - 109 mmol/L Final     Carbon Dioxide 02/03/2021 26  20 - 32 mmol/L Final     Anion Gap 02/03/2021 6  3 - 14 mmol/L Final     Glucose 02/03/2021 94  70 - 99 mg/dL Final     Urea Nitrogen 02/03/2021 16  7 - 30 mg/dL Final     Creatinine 02/03/2021 1.07* 0.52 - 1.04 mg/dL Final     GFR Estimate 02/03/2021 56* >60 mL/min/[1.73_m2] Final    Comment: Non  GFR Calc  Starting 12/18/2018, serum creatinine based estimated GFR (eGFR) will be   calculated using the Chronic Kidney Disease Epidemiology Collaboration   (CKD-EPI) equation.       GFR Estimate If Black 02/03/2021 65  >60 mL/min/[1.73_m2] Final    Comment:   American GFR Calc  Starting 12/18/2018, serum creatinine based estimated GFR (eGFR) will be   calculated using the Chronic Kidney Disease Epidemiology Collaboration   (CKD-EPI) equation.       Calcium 02/03/2021 9.1  8.5 - 10.1 mg/dL Final     Bilirubin Total 02/03/2021 0.4  0.2 - 1.3 mg/dL Final     Albumin 02/03/2021 3.6  3.4 - 5.0 g/dL Final     Protein Total 02/03/2021 7.3  6.8 - 8.8 g/dL Final     Alkaline Phosphatase 02/03/2021 81  40 - 150 U/L Final     ALT 02/03/2021 12  0 - 50 U/L Final     AST 02/03/2021 13  0 - 45 U/L Final     Lipase 02/03/2021 84  73 - 393 U/L Final     Color Urine 02/03/2021 Light Yellow   Final     Appearance Urine 02/03/2021 Clear   Final     Glucose Urine 02/03/2021 Negative  NEG^Negative mg/dL Final     Bilirubin Urine 02/03/2021 Negative  NEG^Negative Final     Ketones Urine 02/03/2021 Negative  NEG^Negative mg/dL Final     Specific Gravity Urine 02/03/2021 1.007  1.003 - 1.035 Final     Blood Urine 02/03/2021 Negative  NEG^Negative Final     pH Urine 02/03/2021 5.5  5.0 - 7.0 pH Final     Protein Albumin Urine 02/03/2021 Negative  NEG^Negative mg/dL Final     Urobilinogen mg/dL 02/03/2021 Normal  0.0 - 2.0 mg/dL Final     Nitrite Urine 02/03/2021 Negative  NEG^Negative Final     Leukocyte Esterase Urine 02/03/2021 Negative  NEG^Negative Final     Source 02/03/2021 Midstream Urine   Final     WBC Urine 02/03/2021 3  0 - 5 /HPF Final     RBC Urine 02/03/2021 0  0 - 2 /HPF Final     Bacteria Urine 02/03/2021 Few* NEG^Negative /HPF Final     Squamous Epithelial /HPF Urine 02/03/2021 1  0 - 1 /HPF Final     Mucous Urine 02/03/2021 Present* NEG^Negative /LPF Final     TSH 02/03/2021 1.14  0.40 - 4.00 mU/L Final     Flu A/B & SARS-COV-2 PCR Source 02/03/2021 Nasopharyngeal   Final     SARS-CoV-2 PCR Result 02/03/2021 NEGATIVE   Final    SARS-CoV2 (COVID-19) RNA not detected, presumed negative.     Influenza A PCR 02/03/2021 Negative  NEG^Negative Final    Influenza A RNA not detected,  presumed negative.     Influenza B PCR 02/03/2021 Negative  NEG^Negative Final    Influenza B RNA not detected, presumed negative.     Respiratory Syncytial Virus PCR 02/03/2021 (Note)   Final    Test not performed with this methodology.     Flu A/B & SARS-CoV-2 PCR Comment 02/03/2021 (Note)   Final    Comment: Testing was performed using the edwin SARS-CoV-2 & Influenza A/B Assay on the   edwin Lis System.  This test should be ordered for the detection of SARS-CoV-2 and influenza   viruses in individuals who meet clinical and/or epidemiological criteria. Test   performance is unknown in asymptomatic patients.  This test is for in vitro diagnostic use under the FDA EUA for laboratories   certified under CLIA to perform moderate and/or high complexity testing. This   test has not been FDA cleared or approved.  A negative result does not rule out the presence of PCR inhibitors in the   specimen or target RNA in concentration below the limit of detection for the   assay.  If only one viral target is positive but coinfection with multiple targets is   suspected, the sample should be re-tested with another FDA cleared, approved   or authorized test, if coinfection would change clinical management.  [facility] are certified under the Clinical Laboratory   Improvement Amendments                            of 1988 (CLIA-88) as qualified to perform moderate   and/or high complexity laboratory testing.       Sodium 02/04/2021 143  133 - 144 mmol/L Final     Potassium 02/04/2021 3.5  3.4 - 5.3 mmol/L Final     Chloride 02/04/2021 114* 94 - 109 mmol/L Final     Carbon Dioxide 02/04/2021 18* 20 - 32 mmol/L Final     Anion Gap 02/04/2021 10  3 - 14 mmol/L Final     Glucose 02/04/2021 61* 70 - 99 mg/dL Final     Urea Nitrogen 02/04/2021 14  7 - 30 mg/dL Final     Creatinine 02/04/2021 0.88  0.52 - 1.04 mg/dL Final     GFR Estimate 02/04/2021 71  >60 mL/min/[1.73_m2] Final    Comment: Non  GFR  Calc  Starting 12/18/2018, serum creatinine based estimated GFR (eGFR) will be   calculated using the Chronic Kidney Disease Epidemiology Collaboration   (CKD-EPI) equation.       GFR Estimate If Black 02/04/2021 82  >60 mL/min/[1.73_m2] Final    Comment:  GFR Calc  Starting 12/18/2018, serum creatinine based estimated GFR (eGFR) will be   calculated using the Chronic Kidney Disease Epidemiology Collaboration   (CKD-EPI) equation.       Calcium 02/04/2021 8.7  8.5 - 10.1 mg/dL Final     WBC 02/04/2021 7.1  4.0 - 11.0 10e9/L Final     RBC Count 02/04/2021 3.87  3.8 - 5.2 10e12/L Final     Hemoglobin 02/04/2021 11.9  11.7 - 15.7 g/dL Final     Hematocrit 02/04/2021 35.4  35.0 - 47.0 % Final     MCV 02/04/2021 92  78 - 100 fl Final     MCH 02/04/2021 30.7  26.5 - 33.0 pg Final     MCHC 02/04/2021 33.6  31.5 - 36.5 g/dL Final     RDW 02/04/2021 11.6  10.0 - 15.0 % Final     Platelet Count 02/04/2021 276  150 - 450 10e9/L Final     Magnesium 02/04/2021 2.1  1.6 - 2.3 mg/dL Final     Phosphorus 02/04/2021 3.2  2.5 - 4.5 mg/dL Final     Copath Report 02/04/2021    Final                    Value:Patient Name: BELLA CRAMER  MR#: [id number]  Specimen #: [id number]  Collected: 2/4/2021  Received: 2/4/2021  Reported: 2/10/2021 11:51  Ordering Phy(s): RACHAEL CUNHA    For improved result formatting, select 'View Enhanced Report Format' under   Linked Documents section.    SPECIMEN(S):  A: Appendix  B: Segment 4 lesion    FINAL DIAGNOSIS:  A. 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 LIVER, SEGMENT 4 LESION, BIOPSY:  - Small regenerative liver nodule, encapsulated with mild steatosis and   cautery effect  - No evidence of inflammation or malignancy    I have personally reviewed all specimens and/or slides, including the   listed special stains, and used them  with my medical judgement to determine or confirm the final  "diagnosis.    Electronically signed out by:    Etta Lim M.D., [facility]    CLINICAL HISTORY:  Acute appendicitis.    GROSS:  A:  The specimen is received in formalin with proper                           patient   identification, labeled \"appendix\".  The specimen  consists of a 5.5 cm in length by 0.6-1.1 cm in diameter appendix with a   stapled resection margin (inked  blue). The serosa is tan-pink to tan-red and is intact. Sectioning reveals   tan-red, erythematous mucosa and a  lumen diameter ranging from 0.1-0.7 cm in diameter. The lumen contains   yellow tan purulent material. No masses  or fecaliths are identified. Representative cross-sections (including the   resection margin) and one half of  the bisected tip are submitted in A1    B:  The specimen is received in formalin with proper patient   identification, labeled \"segment 4 lesion\".  The  specimen consists of a 0.7 x 0.6 x 0.4 cm tan-purple, ovoid tissue   fragment. The specimen is entirely  submitted in B1. (Dictated by: ELLIE Dalal 2/4/2021 12:01 PM)    MICROSCOPIC:  Microscopic examination was performed.    The technical component of this testing was completed at the [facility], with the professional component performed   at the [address] [address] ([phone])    CPT Codes:  A: 00354-QS3  B: 91801-CA7    COLLECTION SITE:  Client: [facility]  Location: [facility] ([place])    Resident  DXA                   "

## 2021-07-15 ENCOUNTER — OFFICE VISIT (OUTPATIENT)
Dept: URGENT CARE | Facility: URGENT CARE | Age: 61
End: 2021-07-15
Payer: COMMERCIAL

## 2021-07-15 ENCOUNTER — NURSE TRIAGE (OUTPATIENT)
Dept: FAMILY MEDICINE | Facility: CLINIC | Age: 61
End: 2021-07-15

## 2021-07-15 VITALS
HEIGHT: 68 IN | OXYGEN SATURATION: 99 % | DIASTOLIC BLOOD PRESSURE: 64 MMHG | BODY MASS INDEX: 21.07 KG/M2 | HEART RATE: 99 BPM | TEMPERATURE: 98.4 F | WEIGHT: 139 LBS | RESPIRATION RATE: 12 BRPM | SYSTOLIC BLOOD PRESSURE: 102 MMHG

## 2021-07-15 DIAGNOSIS — R30.0 DYSURIA: Primary | ICD-10-CM

## 2021-07-15 DIAGNOSIS — N30.00 ACUTE CYSTITIS WITHOUT HEMATURIA: ICD-10-CM

## 2021-07-15 LAB
ALBUMIN UR-MCNC: NEGATIVE MG/DL
APPEARANCE UR: CLEAR
BILIRUB UR QL STRIP: ABNORMAL
CAOX CRY #/AREA URNS HPF: ABNORMAL /HPF
COLOR UR AUTO: YELLOW
GLUCOSE UR STRIP-MCNC: NEGATIVE MG/DL
HGB UR QL STRIP: ABNORMAL
KETONES UR STRIP-MCNC: ABNORMAL MG/DL
LEUKOCYTE ESTERASE UR QL STRIP: ABNORMAL
NITRATE UR QL: NEGATIVE
PH UR STRIP: 5 [PH] (ref 5–7)
RBC #/AREA URNS AUTO: ABNORMAL /HPF
SP GR UR STRIP: >=1.03 (ref 1–1.03)
UROBILINOGEN UR STRIP-ACNC: 0.2 E.U./DL
WBC #/AREA URNS AUTO: ABNORMAL /HPF

## 2021-07-15 PROCEDURE — 99213 OFFICE O/P EST LOW 20 MIN: CPT | Performed by: PHYSICIAN ASSISTANT

## 2021-07-15 PROCEDURE — 81001 URINALYSIS AUTO W/SCOPE: CPT

## 2021-07-15 PROCEDURE — 87086 URINE CULTURE/COLONY COUNT: CPT | Performed by: PHYSICIAN ASSISTANT

## 2021-07-15 RX ORDER — CEPHALEXIN 500 MG/1
500 CAPSULE ORAL 4 TIMES DAILY
Qty: 28 CAPSULE | Refills: 0 | Status: SHIPPED | OUTPATIENT
Start: 2021-07-15 | End: 2021-07-22

## 2021-07-15 ASSESSMENT — MIFFLIN-ST. JEOR: SCORE: 1239

## 2021-07-15 NOTE — TELEPHONE ENCOUNTER
Patient calling for lab orders for bladder infection. Recommended scheduling a visit and patient stated she gets frequent bladder infections and has always been able to come in for just the lab.    Please call back to follow up

## 2021-07-15 NOTE — TELEPHONE ENCOUNTER
2 days ago started having symptoms: cramping in pelvic area at end of voiding, urinary frequency, lower back painright side-now gone,   Nausea.     Today only some cramping on lower right quadrant and right flank pain, but symptoms are no longer present during time of call.     Having trouble with constipation the last few days, hard to tell if cramping is from that or urinary issue.     Denies fever, vomiting, and hematuria (but azo makes urine red).     Taking AZO, drinking lots of water, drink for bladder infections (did not recall name)     Reason for Disposition    Side (flank) or lower back pain present    Protocols used: URINARY SYMPTOMS-A-OH    appt scheduled. Pt to be seen in UC with any new/worsening symptoms. ED with fever, vomiting, severe pain.     Future Appointments   Date Time Provider Department Center   7/16/2021  9:30 AM Janet Corey PA-C [facility] Taylor RN   [facility]

## 2021-07-15 NOTE — TELEPHONE ENCOUNTER
LM for Pt to call back to discuss symptoms with nurse.         Elin Taylor RN   [facility]

## 2021-07-16 DIAGNOSIS — L21.9 DERMATITIS, SEBORRHEIC: ICD-10-CM

## 2021-07-16 NOTE — PATIENT INSTRUCTIONS

## 2021-07-16 NOTE — PROGRESS NOTES
Dysuria  - UA Macro with Reflex to Micro and Culture - lab collect; Future  - UA Macro with Reflex to Micro and Culture - lab collect  - Urine Microscopic  - Urine Culture  - cephALEXin (KEFLEX) 500 MG capsule; Take 1 capsule (500 mg) by mouth 4 times daily for 7 days    Acute cystitis without hematuria  - cephALEXin (KEFLEX) 500 MG capsule; Take 1 capsule (500 mg) by mouth 4 times daily for 7 days    20 minutes spent on the date of the encounter doing chart review, history and exam, documentation and further activities per the note     See Patient Instructions  Patient Instructions     Patient Education     Bladder Infection, Female (Adult)     Urine normally doesn't have any germs (bacteria) in it. But bacteria can get into the urinary tract from the skin around the rectum. Or they can travel in the blood from other parts of the body. Once they are in your urinary tract, they can cause infection in these areas:    The urethra (urethritis)    The bladder (cystitis)    The kidneys (pyelonephritis)  The most common place for an infection is in the bladder. This is called a bladder infection. This is one of the most common infections in women. Most bladder infections are easily treated. They are not serious unless the infection spreads to the kidney.  The terms bladder infection, UTI, and cystitis are often used to describe the same thing. But they are not always the same. Cystitis is an inflammation of the bladder. The most common cause of cystitis is an infection.  Symptoms  The infection causes inflammation in the urethra and bladder. This causes many of the symptoms. The most common symptoms of a bladder infection are:    Pain or burning when urinating    Having to urinate more often than normal    Urgent need to urinate    Only a small amount of urine comes out    Blood in urine    Belly (abdominal) discomfort. This is often in the lower belly above the pubic bone.    Cloudy urine    Strong- or bad-smelling  urine    Unable to urinate (urinary retention)    Unable to hold urine in (urinary incontinence)    Fever    Loss of appetite    Confusion (in older adults)  Causes  Bladder infections are not contagious. You can't get one from someone else, from a toilet seat, or from sharing a bath.  The most common cause of bladder infections is bacteria from the bowels. The bacteria get onto the skin around the opening of the urethra. From there, they can get into the urine. Then they travel up to the bladder, causing inflammation and infection. This often happens because of:    Wiping incorrectly after urinating. Always wipe from front to back.    Bowel incontinence    Pregnancy    Procedures such as having a catheter put in    Older age    Not emptying your bladder. This can give bacteria a chance to grow in your urine.    Fluid loss (dehydration)    Constipation    Having sex    Using a diaphragm for birth control   Treatment  Bladder infections are diagnosed by a urine test and urine culture. They are treated with antibiotics. They often clear up quickly without problems. Treatment helps prevent a more serious kidney infection.  Medicines  Medicines can help in the treatment of a bladder infection:    Take antibiotics until they are used up, even if you feel better. It's important to finish them to make sure the infection has cleared.    You can use acetaminophen or ibuprofen for pain, fever, or discomfort, unless another medicine was prescribed. If you have long-term (chronic) liver or kidney disease, talk with your healthcare provider before using these medicines. Also talk with your provider if you've ever had a stomach ulcer or GI (gastrointestinal) bleeding, or are taking blood-thinner medicines.    If you are given phenazopydridine to reduce burning with urination, it will make your urine a bright orange color. This can stain clothing.  Care and prevention  These self-care steps can help prevent future  infections:    Drink plenty of fluids. This helps to prevent dehydration and flush out your bladder. Do this unless you must restrict fluids for other health reasons, or your healthcare provider told you not to.    Clean yourself correctly after going to the bathroom. Wipe from front to back after using the toilet. This helps prevent the spread of bacteria.    Urinate more often. Don't try to hold urine in for a long time.    Wear loose-fitting clothes and cotton underwear. Don't wear tight-fitting pants.    Improve your diet and prevent constipation. Eat more fresh fruits and vegetables, and fiber. Eat less junk foods and fatty foods.    Don't have sex until your symptoms are gone.    Don't have caffeine, alcohol, and spicy foods. These can irritate your bladder.    Urinate right after you have sex to flush out your bladder.    If you use birth control pills and have frequent bladder infections, discuss it with your healthcare provider.  Follow-up care  Call your healthcare provider if all symptoms are not gone after 3 days of treatment. This is especially important if you have repeat infections.  If a culture was done, you will be told if your treatment needs to be changed. If directed, you can call to find out the results.  If X-rays were done, you will be told if the results will affect your treatment.  Call 911  Call 911 if any of the following occur:    Trouble breathing    Hard to wake up or confusion    Fainting (loss of consciousness)    Fast heart rate  When to get medical advice  Call your healthcare provider right away if any of these occur:    Fever of 100.4 F (38.0 C) or higher, or as directed by your healthcare provider    Symptoms are not better after 3 days of treatment    Back or belly pain that gets worse    Repeated vomiting, or unable to keep medicine down    Weakness or dizziness    Vaginal discharge    Pain, redness, or swelling in the outer vaginal area (labia)  Nely last reviewed this  educational content on 11/1/2019 2000-2021 [employer]. All rights reserved. This information is not intended as a substitute for professional medical care. Always follow your healthcare professional's instructions.               BERNIE Weaver [facility]    Subjective   61 year old who presents to clinic today for the following health issues:    Urgent Care and UTI       HPI     Genitourinary - Female  Onset/Duration: 2 month  Description:   Painful urination (Dysuria): YES           Frequency: YES  Blood in urine (Hematuria): no  Delay in urine (Hesitency): YES  Intensity: moderate  Progression of Symptoms:  worsening  Accompanying Signs & Symptoms:  Fever/chills: YES  Flank pain: no  Nausea and vomiting: no  Vaginal symptoms: none  Abdominal/Pelvic Pain: YES  History:   History of kidney stones: no  Precipitating or alleviating factors: None  Therapies tried and outcome: Increase fluid intake      Review of Systems   Review of Systems   See HPI     Objective    Temp: 98.4  F (36.9  C) Temp src: Oral BP: 102/64 Pulse: 99   Resp: 12 SpO2: 99 %       Physical Exam   Physical Exam  Constitutional:       General: She is not in acute distress.     Appearance: Normal appearance. She is normal weight. She is not ill-appearing, toxic-appearing or diaphoretic.   HENT:      Head: Normocephalic and atraumatic.   Cardiovascular:      Rate and Rhythm: Normal rate and regular rhythm.      Pulses: Normal pulses.      Heart sounds: Normal heart sounds.   Pulmonary:      Effort: Pulmonary effort is normal. No respiratory distress.      Breath sounds: Normal breath sounds.   Chest:      Chest wall: No tenderness.   Abdominal:      Tenderness: There is no right CVA tenderness or left CVA tenderness.   Neurological:      General: No focal deficit present.      Mental Status: She is alert and oriented to person, place, and time. Mental status is at baseline.      Gait: Gait normal.    Psychiatric:         Mood and Affect: Mood normal.         Behavior: Behavior normal.         Thought Content: Thought content normal.         Judgment: Judgment normal.          Results for orders placed or performed in visit on 07/15/21 (from the past 24 hour(s))   UA Macro with Reflex to Micro and Culture - lab collect    Specimen: Urine, Clean Catch   Result Value Ref Range    Color Urine Yellow Colorless, Straw, Light Yellow, Yellow    Appearance Urine Clear Clear    Glucose Urine Negative Negative mg/dL    Bilirubin Urine Small (A) Negative    Ketones Urine Trace (A) Negative mg/dL    Specific Gravity Urine >=1.030 1.003 - 1.035    Blood Urine Trace (A) Negative    pH Urine 5.0 5.0 - 7.0    Protein Albumin Urine Negative Negative mg/dL    Urobilinogen Urine 0.2 0.2, 1.0 E.U./dL    Nitrite Urine Negative Negative    Leukocyte Esterase Urine Small (A) Negative   Urine Microscopic   Result Value Ref Range    RBC Urine 0-2 0-2 /HPF /HPF    WBC Urine 25-50 (A) 0-5 /HPF /HPF    Calcium Oxalate Crystals Urine Few (A) None Seen /HPF    Narrative    Urine Culture ordered based on laboratory criteria

## 2021-07-16 NOTE — TELEPHONE ENCOUNTER
Requested Prescriptions   Pending Prescriptions Disp Refills     ketoconazole (NIZORAL) 2 % external shampoo 120 mL 11     Sig: Apply topically daily as needed for itching or irritation       There is no refill protocol information for this order         Patient has been waiting for this for a few days, working with derm initially, but they sent it to PCP's office. Patient has been taking this med for years for sensitive scalp. Okay to refill for her today? She is out and her scalp is itchy. Please call her back if unable to fill. Pharmacy T'd up.    Patient Cell: [phone]    Miranda Shelton RN

## 2021-07-17 LAB — BACTERIA UR CULT: NORMAL

## 2021-07-19 RX ORDER — KETOCONAZOLE 20 MG/ML
SHAMPOO TOPICAL DAILY PRN
Qty: 120 ML | Refills: 11 | Status: SHIPPED | OUTPATIENT
Start: 2021-07-19 | End: 2022-12-01

## 2021-07-28 ENCOUNTER — NURSE TRIAGE (OUTPATIENT)
Dept: NURSING | Facility: CLINIC | Age: 61
End: 2021-07-28

## 2021-07-29 NOTE — TELEPHONE ENCOUNTER
Beronica calls and says that she thinks she may have a yeast infection. Pt. Says that she has vaginal itching and slight burning in her vaginal area. Denies any discharge. Pt. Says that she will take a bath tonight and then will call  [facility] tomorrow. COVID 19 Nurse Triage Plan/Patient Instructions    Please be aware that novel coronavirus (COVID-19) may be circulating in the community. If you develop symptoms such as fever, cough, or SOB or if you have concerns about the presence of another infection including coronavirus (COVID-19), please contact your health care provider or visit [email or web address].     Disposition/Instructions    In-Person Visit with provider recommended. Reference Visit Selection Guide.    Thank you for taking steps to prevent the spread of this virus.  o Limit your contact with others.  o Wear a simple mask to cover your cough.  o Wash your hands well and often.    Resources    [facility]: About COVID-19: [email or web address]    CDC: What to Do If You're Sick: www.cdc.gov/coronavirus/2019-ncov/about/steps-when-sick.html    CDC: Ending Home Isolation: www.cdc.gov/coronavirus/2019-ncov/hcp/disposition-in-home-patients.html     CDC: Caring for Someone: www.cdc.gov/coronavirus/2019-ncov/if-you-are-sick/care-for-someone.html     [facility]: Interim Guidance for Hospital Discharge to Home: www.health.[facility].mn.us/diseases/coronavirus/hcp/hospdischarge.pdf    [facility] clinical trials (COVID-19 research studies): [email or web address]     Below are the COVID-19 hotlines at the [facility] ([facility]). Interpreters are available.   o For health questions: Call 324-759-8505 or 1-717.807.2468 (7 a.m. to 7 p.m.)  o For questions about schools and childcare: Call 414-895-6538 or 1-588.185.3880 (7 a.m. to 7 p.m.)                     Reason for Disposition    Itching or rash of external female genital area (vulva)    [1] Itching or  "dryness of genital area AND [2] nearing menopause or after menopause    Additional Information    Negative: Sounds like a life-threatening emergency to the triager    Negative: Followed a genital area injury    Negative: Foreign body in vagina (e.g., tampon)    Negative: Vaginal bleeding is main symptom    Negative: Vaginal discharge is main symptom    Negative: Pain or burning with passing urine (urination) is main symptom    Negative: Menstrual cramps is main symptom    Negative: Abdomen pain is main symptom    Negative: Pubic lice suspected    Negative: Followed a genital area injury    Negative: Symptoms could be from sexual assault    Negative: Pain or burning with passing urine (urination) is main symptom    Negative: Vaginal discharge is main symptom    Negative: Pubic lice suspected    Negative: Pregnant    Negative: Patient sounds very sick or weak to the triager    Negative: [1] SEVERE pain AND [2] not improved 2 hours after pain medicine    Negative: [1] Genital area looks infected (e.g., draining sore, spreading redness) AND [2] fever    Negative: MODERATE-SEVERE itching (i.e., interferes with school, work, or sleep)    Negative: Genital area looks infected (e.g., draining sore, spreading redness)    Negative: Rash with painful tiny water blisters    Negative: [1] Rash (e.g., redness, tiny bumps, sore) of genital area AND [2] present > 24 hours    Negative: Tender lump (swelling or \"ball\") at vaginal opening    Negative: [1] Symptoms of a \"yeast infection\" (i.e., itchy, white discharge, not bad smelling) AND [2] not improved > 3 days following CARE ADVICE    Negative: [1] Vulvar itching AND [2] not improved > 3 days following CARE ADVICE    Negative: Patient is worried about sexually transmitted disease (STD)    Negative: [1] Itching of genital area AND [2] severe urine incontinence (e.g., uses sanitary napkin, incontinence pad, or diaper)    Protocols used: VAGINAL SYMPTOMS-A-AH, VULVAR " SYMPTOMS-A-AH

## 2021-10-11 ENCOUNTER — PATIENT OUTREACH (OUTPATIENT)
Dept: FAMILY MEDICINE | Facility: CLINIC | Age: 61
End: 2021-10-11
Payer: COMMERCIAL

## 2021-10-24 ENCOUNTER — HEALTH MAINTENANCE LETTER (OUTPATIENT)
Age: 61
End: 2021-10-24

## 2021-11-23 ENCOUNTER — OFFICE VISIT (OUTPATIENT)
Dept: OBGYN | Facility: CLINIC | Age: 61
End: 2021-11-23
Payer: COMMERCIAL

## 2021-11-23 VITALS
WEIGHT: 138.7 LBS | TEMPERATURE: 98.9 F | DIASTOLIC BLOOD PRESSURE: 73 MMHG | HEIGHT: 67 IN | HEART RATE: 85 BPM | BODY MASS INDEX: 21.77 KG/M2 | SYSTOLIC BLOOD PRESSURE: 123 MMHG

## 2021-11-23 DIAGNOSIS — Z01.419 ENCOUNTER FOR GYNECOLOGICAL EXAMINATION WITHOUT ABNORMAL FINDING: Primary | ICD-10-CM

## 2021-11-23 DIAGNOSIS — N87.1 MODERATE DYSPLASIA OF CERVIX (CIN II): ICD-10-CM

## 2021-11-23 DIAGNOSIS — Z23 NEED FOR PROPHYLACTIC VACCINATION AND INOCULATION AGAINST INFLUENZA: ICD-10-CM

## 2021-11-23 DIAGNOSIS — N39.46 MIXED STRESS AND URGE URINARY INCONTINENCE: ICD-10-CM

## 2021-11-23 PROCEDURE — 88142 CYTOPATH C/V THIN LAYER: CPT | Performed by: OBSTETRICS & GYNECOLOGY

## 2021-11-23 PROCEDURE — 90471 IMMUNIZATION ADMIN: CPT | Performed by: OBSTETRICS & GYNECOLOGY

## 2021-11-23 PROCEDURE — 90682 RIV4 VACC RECOMBINANT DNA IM: CPT | Performed by: OBSTETRICS & GYNECOLOGY

## 2021-11-23 PROCEDURE — 87624 HPV HI-RISK TYP POOLED RSLT: CPT | Performed by: OBSTETRICS & GYNECOLOGY

## 2021-11-23 PROCEDURE — 99396 PREV VISIT EST AGE 40-64: CPT | Mod: 25 | Performed by: OBSTETRICS & GYNECOLOGY

## 2021-11-23 ASSESSMENT — MIFFLIN-ST. JEOR: SCORE: 1221.77

## 2021-11-23 NOTE — PROGRESS NOTES
Beronica is a 61 year old  who presents for annual exam.   Postmenopausal since 2010.  She is having no menopausal symptoms. No vaginal bleeding noted.     Besides routine health maintenance, she has no other health concerns today.  She does notice some leaking of urine with running or jumping, also coughing or laughing.  Also she has noticed that she will have to urinate and if she cannot make it to the bathroom quickly, she will leak and has at times needed to change her pants.  Overall it is manageable most of the time, tries to do kegels.  GYNECOLOGIC HISTORY:  Menarche: 12   Age at first intercourse: 16 Number of lifetime partners: >6  She is not sexually active with male partner(s) and she is not currently in monogamous relationship.    History sexually transmitted infections:Gonorrhea and HPV  STI testing offered?  Declined  Estrogen replacement therapy: No  HOLLIE exposure: Unknown    History of abnormal Pap smear: YES - updated in Problem List and Health Maintenance accordingly  Family history of breast CA: Yes (Please explain): mat aunt  Family history of uterine/ovarian CA: Yes (Please explain): sister  Family history of colon CA: No    HEALTH MAINTENANCE:  Cholesterol: (No components found for: CHOL2 ) History of abnormal lipids: No  Mammo: 10/22/2020 . History of abnormal Mammo: No  Regular Self Breast Exams: occ  Colonoscopy: 2010 History of abnormal Colonoscopy: Yes  Dexa: 2014 History of abnormal Dexa: No  Calcium/Vitamin D intake: source:  dairy, dietary supplement(s) Adequate? Yes  TSH: (No components found for: TSH1 )  Pap; (  Lab Results   Component Value Date    PAP NIL 10/30/2020    PAP NIL 2019    PAP ASC-US 2018    )    HISTORY:  OB History    Para Term  AB Living   2 2 0 0 0 2   SAB IAB Ectopic Multiple Live Births   0 0 0 0 2      # Outcome Date GA Lbr Deuce/2nd Weight Sex Delivery Anes PTL Lv   2 Para 89        JAVY   1 Para 85         JAVY     Past Medical History:   Diagnosis Date     Anxiety      Arthritis 2010     Cervical high risk HPV (human papillomavirus) test positive 03/04/2016, 09/12/19    See problem list.      BEVERLY II (cervical intraepithelial neoplasia II) 2009    LEEP     Depression      Depressive disorder Adolescence    Managed with medication and therapy     Dry eye syndrome      Frequent UTI 2009     Headache(784.0)      IBS (irritable bowel syndrome)      Insomnia      Lattice degeneration of retina      Lyme disease      Myopic astigmatism      Nonsenile cataract      Osteopenia 2001,2012    Fosamax     Pneumothorax on right     at 15 y/o     PVD (posterior vitreous detachment), both eyes 7/19/10     Rosacea      Vasomotor rhinitis      Past Surgical History:   Procedure Laterality Date     BIOPSY  Moles skin tags     CHEST TUBE INSERTION       COLONOSCOPY  Normal     DILATION AND CURETTAGE, OPERATIVE HYSTEROSCOPY WITH MORCELLATOR, COMBINED  1/2011    endometrial polyp     LAPAROSCOPIC APPENDECTOMY N/A 2/4/2021    Procedure: APPENDECTOMY, LAPAROSCOPIC and laparoscopic liver biopsy;  Surgeon: Solis Zepeda MD;  Location: [facility] OR     LEEP TX, CERVICAL  8/2009    BEVERLY II, clear margins     THORACIC SURGERY  1976    Collapsed lung     Family History   Problem Relation Age of Onset     Neurologic Disorder Mother      Allergies Mother      Arthritis Mother      Depression Mother      Eye Disorder Mother      Lipids Mother         high cholesterol     Osteoporosis Mother      Hypertension Mother      Hyperlipidemia Mother      Thyroid Disease Mother      Neurologic Disorder Father      Lipids Father         high cholesterol     Gastrointestinal Disease Father         stomach ulcers     Cancer Father         melanoma     Skin Cancer Father      Hypertension Father      Hyperlipidemia Father      Depression Sister      Uterine Cancer Sister      Tumor Sister         brain tumor     Neurologic Disorder Sister      Neurologic  Disorder Sister         epilepsy     Alcohol/Drug Sister         alcohol and drug dependency     Lipids Sister         high cholesterol     Gastrointestinal Disease Sister         hepatitis ??     Osteoporosis Sister      Neurologic Disorder Brother      Alcohol/Drug Brother         recovery alcohol and drug addit     Asthma Daughter         excercise and allergy induced     Asthma Daughter      Asthma Son         excercise and allergy induced     Asthma Son      Breast Cancer Maternal Aunt      Melanoma No family hx of      Social History     Socioeconomic History     Marital status:      Spouse name: Not on file     Number of children: Not on file     Years of education: Not on file     Highest education level: Not on file   Occupational History     Occupation: Psychologist   Tobacco Use     Smoking status: Never Smoker     Smokeless tobacco: Never Used   Substance and Sexual Activity     Alcohol use: Yes     Comment: 1 8oz mixed drink or beer, minimal 1-2 per week     Drug use: No     Sexual activity: Not Currently     Partners: Male     Birth control/protection: Abstinence   Other Topics Concern     Parent/sibling w/ CABG, MI or angioplasty before 65F 55M? No   Social History Narrative     Not on file     Social Determinants of Health     Financial Resource Strain: Not on file   Food Insecurity: Not on file   Transportation Needs: Not on file   Physical Activity: Not on file   Stress: Not on file   Social Connections: Not on file   Intimate Partner Violence: Not on file   Housing Stability: Not on file       Current Outpatient Medications:      acetaminophen (TYLENOL) 325 MG tablet, Take 2 tablets (650 mg) by mouth every 6 hours as needed for mild pain or fever, Disp: 60 tablet, Rfl: 0     azelastine (ASTELIN) 0.1 % nasal spray, INSTILL 1 SPRAY INTO THE NOSTRIL TWICE DAILY (Patient not taking: Reported on 11/23/2021), Disp: 30 mL, Rfl: 1     BENADRYL ALLERGY PO, prn, Disp: , Rfl:      biotin 2.5 mg/mL,  Take by mouth daily, Disp: , Rfl:      butalbital-acetaminophen-caffeine (FIORICET/ESGIC) -40 MG per tablet, TAKE 1 TABLET BY MOUTH EVERY 4 HOURS AS NEEDED, Disp: 28 tablet, Rfl: 0     Coenzyme Q10 (CO Q10) 100 MG CAPS, Take 100 mg by mouth 2 times daily, Disp: , Rfl:      eletriptan (RELPAX) 40 MG tablet, Take 40 mg by mouth as needed for headaches, Disp: , Rfl: 10     fluticasone (FLONASE) 50 MCG/ACT nasal spray, USE 2 SPRAYS IN EACH NOSTRIL DAILY, Disp: 48 g, Rfl: 3     ketoconazole (NIZORAL) 2 % external shampoo, Apply topically daily as needed for itching or irritation, Disp: 120 mL, Rfl: 11     magnesium 100 MG CAPS, Take 400 mg by mouth, Disp: , Rfl:      nortriptyline (PAMELOR) 50 MG capsule, TAKE 1 CAPSULE(50 MG) BY MOUTH AT BEDTIME, Disp: 90 capsule, Rfl: 1     Riboflavin (VITAMIN B-2 PO), Take 400 mg by mouth, Disp: , Rfl:      topiramate (TOPAMAX) 25 MG tablet, Take 25 mg by mouth 2 times daily, Disp: , Rfl:      traZODone (DESYREL) 50 MG tablet, 1/2 TABLET AT BEDTIME AS NEEDED FOR SLEEP, Disp: 45 tablet, Rfl: 1     Allergies   Allergen Reactions     Perfume Other (See Comments)     Scents/smells     Tape [Adhesive Tape] Rash     Compazine Other (See Comments)     Hyperactivity       Paxil [Paroxetine] Other (See Comments)     Ropinirole Hcl Unknown     TABS     Depakote [Valproic Acid] Rash     Rash       Food Itching and Rash     mushrooms     Mushroom Itching and Rash       Past medical, surgical, social and family history were reviewed and updated in EPIC.    ROS:   C:       NEGATIVE for fever, chills, change in weight  I:         NEGATIVE for worrisome rashes, moles or lesions  E:       NEGATIVE for vision changes or irritation  E/M:   NEGATIVE for ear, mouth and throat problems  R:       NEGATIVE for significant cough or SOB  CV:     NEGATIVE for chest pain, palpitations or peripheral edema  GI:      NEGATIVE for nausea, abdominal pain, heartburn, or change in bowel habits  :    NEGATIVE  "for frequency, dysuria, hematuria, vaginal discharge, or bleeding  M:       NEGATIVE for significant arthralgias or myalgia  N:       NEGATIVE for weakness, dizziness or paresthesias  E:       NEGATIVE for temperature intolerance, skin/hair changes  P:       NEGATIVE for changes in mood or affect    EXAM:  /73   Pulse 85   Temp 98.9  F (37.2  C) (Oral)   Ht 1.702 m (5' 7\")   Wt 62.9 kg (138 lb 11.2 oz)   LMP 2010 (Exact Date)   BMI 21.72 kg/m     BMI: Body mass index is 21.72 kg/m .  Constitutional: healthy, alert and no distress  Head: Normocephalic. No masses, lesions, tenderness or abnormalities  Neck: Neck supple. Trachea midline. No adenopathy. Thyroid symmetric, normal size.   Cardiovascular: RRR.   Respiratory: Negative.   Breast: No nodularity, asymmetry or nipple discharge bilaterally.  Gastrointestinal: Abdomen soft, non-tender, non-distended. No masses, organomegaly  :  Vulva:  No external lesions, normal female hair distribution, no inguinal adenopathy.    Urethra:  Midline, non-tender, well supported, no discharge  Vagina:  Atrophic, no abnormal discharge, no lesions  Uterus:  Normal size, non-tender, freely mobile  Ovaries:  No masses appreciated, non-tender, mobile  Rectal Exam: deferred  Musculoskeletal: extremities normal  Skin: no suspicious lesions or rashes  Psychiatric: Affect appropriate, cooperative,mentation appears normal.     COUNSELING:   Reviewed preventive health counseling, as reflected in patient instructions  Special attention given to:        Regular exercise       Healthy diet/nutrition       Osteoporosis prevention/bone health       Colon cancer screening       ([place])menopause management   reports that she has never smoked. She has never used smokeless tobacco.    Body mass index is 21.72 kg/m .      FRAX Risk Assessment  ASSESSMENT:  61 year old  with satisfactory annual exam.  Faith 2, H/O LEEP.  Mixed urinary incontinence  Plan: dx pap.  Labs with PCP.  " mammo due.  Colonoscopy done 2020.  Referral to urology.  RTC yearly or prn.    MELIA AQUINO MD

## 2021-11-24 ASSESSMENT — PATIENT HEALTH QUESTIONNAIRE - PHQ9: SUM OF ALL RESPONSES TO PHQ QUESTIONS 1-9: 6

## 2021-11-30 LAB
BKR LAB AP GYN ADEQUACY: NORMAL
BKR LAB AP GYN INTERPRETATION: NORMAL
PATH REPORT.COMMENTS IMP SPEC: NORMAL
PATH REPORT.COMMENTS IMP SPEC: NORMAL

## 2021-12-03 LAB
HUMAN PAPILLOMA VIRUS 16 DNA: NEGATIVE
HUMAN PAPILLOMA VIRUS 18 DNA: NEGATIVE
HUMAN PAPILLOMA VIRUS FINAL DIAGNOSIS: ABNORMAL
HUMAN PAPILLOMA VIRUS OTHER HR: POSITIVE

## 2021-12-06 ENCOUNTER — VIRTUAL VISIT (OUTPATIENT)
Dept: UROLOGY | Facility: CLINIC | Age: 61
End: 2021-12-06
Attending: OBSTETRICS & GYNECOLOGY
Payer: COMMERCIAL

## 2021-12-06 ENCOUNTER — PATIENT OUTREACH (OUTPATIENT)
Dept: OBGYN | Facility: CLINIC | Age: 61
End: 2021-12-06
Payer: COMMERCIAL

## 2021-12-06 DIAGNOSIS — R35.0 URINARY FREQUENCY: ICD-10-CM

## 2021-12-06 DIAGNOSIS — N39.46 MIXED STRESS AND URGE URINARY INCONTINENCE: ICD-10-CM

## 2021-12-06 DIAGNOSIS — R39.15 URGENCY OF URINATION: Primary | ICD-10-CM

## 2021-12-06 PROCEDURE — 99204 OFFICE O/P NEW MOD 45 MIN: CPT | Mod: 95 | Performed by: OBSTETRICS & GYNECOLOGY

## 2021-12-06 RX ORDER — ESTRADIOL 0.1 MG/G
1 CREAM VAGINAL
Qty: 42.5 G | Refills: 3 | Status: SHIPPED | OUTPATIENT
Start: 2021-12-06 | End: 2024-01-31

## 2021-12-06 NOTE — PROGRESS NOTES
Beronica Azevedo is a 61 year old year old who is being evaluated via a billable video visit.      How would you like to obtain your AVS? MyChart  If the video visit is dropped, the invitation should be resent by: phone [phone]  Will anyone else be joining your video visit? No      Video Start Time: 4:19 PM      Chief complaint: Mixed incontinence    Subjective     Beronica Azevedo is a 61 year old year old  who presents for a video visit today for incontinence.     Urinary symptoms  She sometimes leaks with stress triggers like coughing , laughing etc. She also has leakage with urgency as well. Used to wear a pad but says not recently since she starting using the bathroom more frequently and also doing her kegel exercises. In the mornings, she voids every hour or so, but less often as the day progresses. She drinks a cup of tea almost every morning. She drinks about 1200 mls of water. She feels like she empties her bladder ok. No difficulties with voiding. No gross hematuria. Used to have recurrent utis but not recently.      Prolapse symptoms  Denies vaginal bulge or pressure sensation.      GI symptoms  Occasional constipation and diarrhea but not frequent. Usually diet dependent. Does not splint to defecate.     Sexual health/Pelvic floor  No partner currently. Used estrogen cream in the past but stopped using it because of the excess plastic packaging. Has a hx of Faith 2, H/O LEEP. Pap on 21 showing negative pap but positive HRHPV ( non 16, 18). For colpo per nursing records.     Relevant Medical History:    Diabetes? no  High Blood pressure? no     Recurrent UTIs? no  Sleep Apnea? no  Obesity? no  History of Blood clots? no  Other medical problems: none    Surgical History:      Past Surgical History:   Procedure Laterality Date     BIOPSY  Moles skin tags     CHEST TUBE INSERTION       COLONOSCOPY  Normal     DILATION AND CURETTAGE, OPERATIVE HYSTEROSCOPY WITH MORCELLATOR, COMBINED  2011     endometrial polyp     LAPAROSCOPIC APPENDECTOMY N/A 2021    Procedure: APPENDECTOMY, LAPAROSCOPIC and laparoscopic liver biopsy;  Surgeon: Solis Zepeda MD;  Location: [facility] OR     LEEP TX, CERVICAL  2009    BEVERLY II, clear margins     THORACIC SURGERY      Collapsed lung       OB/Gyn History:  OB History    Para Term  AB Living   2 2 0 0 0 2   SAB IAB Ectopic Multiple Live Births   0 0 0 0 2      # Outcome Date GA Lbr Deuce/2nd Weight Sex Delivery Anes PTL Lv   2 Para 89        JAVY   1 Para 85        JAVY       Medications/Vitamins/Supplements:   Current Outpatient Medications   Medication     estradiol (ESTRACE) 0.1 MG/GM vaginal cream     acetaminophen (TYLENOL) 325 MG tablet     azelastine (ASTELIN) 0.1 % nasal spray     BENADRYL ALLERGY PO     biotin 2.5 mg/mL     butalbital-acetaminophen-caffeine (FIORICET/ESGIC) -40 MG per tablet     Coenzyme Q10 (CO Q10) 100 MG CAPS     eletriptan (RELPAX) 40 MG tablet     fluticasone (FLONASE) 50 MCG/ACT nasal spray     ketoconazole (NIZORAL) 2 % external shampoo     magnesium 100 MG CAPS     nortriptyline (PAMELOR) 50 MG capsule     Riboflavin (VITAMIN B-2 PO)     topiramate (TOPAMAX) 25 MG tablet     traZODone (DESYREL) 50 MG tablet     No current facility-administered medications for this visit.         Medical History:      Past Medical History:   Diagnosis Date     Anxiety      Arthritis      Cervical high risk HPV (human papillomavirus) test positive 2016, 19, 21     BEVERLY II (cervical intraepithelial neoplasia II)     LEEP     Depression      Depressive disorder Adolescence    Managed with medication and therapy     Dry eye syndrome      Frequent UTI      Headache(784.0)      IBS (irritable bowel syndrome)      Insomnia      Lattice degeneration of retina      Lyme disease      Myopic astigmatism      Nonsenile cataract      Osteopenia ,    Fosamax      Pneumothorax on right     at 17 y/o     PVD (posterior vitreous detachment), both eyes 07/19/2010     Rosacea      Vasomotor rhinitis      ROS  Social History    Social History     Socioeconomic History     Marital status:      Spouse name: Not on file     Number of children: Not on file     Years of education: Not on file     Highest education level: Not on file   Occupational History     Occupation: Psychologist   Tobacco Use     Smoking status: Never Smoker     Smokeless tobacco: Never Used   Substance and Sexual Activity     Alcohol use: Yes     Comment: 1 8oz mixed drink or beer, minimal 1-2 per week     Drug use: No     Sexual activity: Not Currently     Partners: Male     Birth control/protection: Abstinence   Other Topics Concern     Parent/sibling w/ CABG, MI or angioplasty before 65F 55M? No   Social History Narrative     Not on file     Social Determinants of Health     Financial Resource Strain: Not on file   Food Insecurity: Not on file   Transportation Needs: Not on file   Physical Activity: Not on file   Stress: Not on file   Social Connections: Not on file   Intimate Partner Violence: Not on file   Housing Stability: Not on file       Family History  Family History   Problem Relation Age of Onset     Neurologic Disorder Mother      Allergies Mother      Arthritis Mother      Depression Mother      Eye Disorder Mother      Lipids Mother         high cholesterol     Osteoporosis Mother      Hypertension Mother      Hyperlipidemia Mother      Thyroid Disease Mother      Neurologic Disorder Father      Lipids Father         high cholesterol     Gastrointestinal Disease Father         stomach ulcers     Cancer Father         melanoma     Skin Cancer Father      Hypertension Father      Hyperlipidemia Father      Depression Sister      Uterine Cancer Sister      Tumor Sister         brain tumor     Neurologic Disorder Sister      Neurologic Disorder Sister         epilepsy     Alcohol/Drug Sister          alcohol and drug dependency     Lipids Sister         high cholesterol     Gastrointestinal Disease Sister         hepatitis ??     Osteoporosis Sister      Neurologic Disorder Brother      Alcohol/Drug Brother         recovery alcohol and drug addit     Asthma Daughter         excercise and allergy induced     Asthma Daughter      Asthma Son         excercise and allergy induced     Asthma Son      Breast Cancer Maternal Aunt      Melanoma No family hx of        Allergy    Allergies   Allergen Reactions     Perfume Other (See Comments)     Scents/smells     Tape [Adhesive Tape] Rash     Compazine Other (See Comments)     Hyperactivity       Paxil [Paroxetine] Other (See Comments)     Ropinirole Hcl Unknown     TABS     Depakote [Valproic Acid] Rash     Rash       Food Itching and Rash     mushrooms     Mushroom Itching and Rash       Current Outpatient Medications   Medication     acetaminophen (TYLENOL) 325 MG tablet     azelastine (ASTELIN) 0.1 % nasal spray     BENADRYL ALLERGY PO     biotin 2.5 mg/mL     butalbital-acetaminophen-caffeine (FIORICET/ESGIC) -40 MG per tablet     Coenzyme Q10 (CO Q10) 100 MG CAPS     eletriptan (RELPAX) 40 MG tablet     fluticasone (FLONASE) 50 MCG/ACT nasal spray     ketoconazole (NIZORAL) 2 % external shampoo     magnesium 100 MG CAPS     nortriptyline (PAMELOR) 50 MG capsule     Riboflavin (VITAMIN B-2 PO)     topiramate (TOPAMAX) 25 MG tablet     traZODone (DESYREL) 50 MG tablet     No current facility-administered medications for this visit.       Objective:   Deferred ( virtual visit)     Asssessment and plan  Diagnoses and all orders for this visit:    Urgency of urination    Mixed stress and urge urinary incontinence  -     Adult Urology Referral  -     estradiol (ESTRACE) 0.1 MG/GM vaginal cream; Place 1 g vaginally twice a week    Urinary frequency      Most bothered by her overactive bladder symptoms of urgency and frequency. Occasional mixed  incontinence  Estrace cream ordered  Will bring her in for pelvic exam, and check of her voiding function. May need a referral for pelvic PT depending.   Discussed bladder retraining and fluid management today.           Video-Visit Details    Type of service:  Video Visit    Video End Time:4:46 PM    Originating Location (pt. Location): Home    Distant Location (provider location):  [facility]     Platform used for Video Visit: Fluther        I spent a total of 45 minutes on  Video with  Beronica Azevedo on the date of the encounter in chart review, patient visit, review of tests, documentation and/or discussion with other providers about the issues documented above.

## 2021-12-06 NOTE — LETTER
2021       RE: Beronica Azevedo  [address]  [facility] [address]     Dear Colleague,    Thank you for referring your patient, Beronica Azevedo, to the [facility] [place] [facility]. Please see a copy of my visit note below.    Beronica Azevedo is a 61 year old year old who is being evaluated via a billable video visit.      How would you like to obtain your AVS? MyChart  If the video visit is dropped, the invitation should be resent by: phone [phone]  Will anyone else be joining your video visit? No      Video Start Time: 4:19 PM      Chief complaint: Mixed incontinence    Subjective     Beronica Azevedo is a 61 year old year old  who presents for a video visit today for incontinence.     Urinary symptoms  She sometimes leaks with stress triggers like coughing , laughing etc. She also has leakage with urgency as well. Used to wear a pad but says not recently since she starting using the bathroom more frequently and also doing her kegel exercises. In the mornings, she voids every hour or so, but less often as the day progresses. She drinks a cup of tea almost every morning. She drinks about 1200 mls of water. She feels like she empties her bladder ok. No difficulties with voiding. No gross hematuria. Used to have recurrent utis but not recently.      Prolapse symptoms  Denies vaginal bulge or pressure sensation.      GI symptoms  Occasional constipation and diarrhea but not frequent. Usually diet dependent. Does not splint to defecate.     Sexual health/Pelvic floor  No partner currently. Used estrogen cream in the past but stopped using it because of the excess plastic packaging. Has a hx of [place] 2, H/O LEEP. Pap on 21 showing negative pap but positive HRHPV ( non 16, 18). For colpo per nursing records.     Relevant Medical History:    Diabetes? no  High Blood pressure? no     Recurrent UTIs? no  Sleep Apnea?  no  Obesity? no  History of Blood clots? no  Other medical problems: none    Surgical History:      Past Surgical History:   Procedure Laterality Date     BIOPSY  Moles skin tags     CHEST TUBE INSERTION       COLONOSCOPY  Normal     DILATION AND CURETTAGE, OPERATIVE HYSTEROSCOPY WITH MORCELLATOR, COMBINED  2011    endometrial polyp     LAPAROSCOPIC APPENDECTOMY N/A 2021    Procedure: APPENDECTOMY, LAPAROSCOPIC and laparoscopic liver biopsy;  Surgeon: Solis Zepeda MD;  Location: [facility] OR     LEEP TX, CERVICAL  2009    BEVERLY II, clear margins     THORACIC SURGERY  1976    Collapsed lung       OB/Gyn History:  OB History    Para Term  AB Living   2 2 0 0 0 2   SAB IAB Ectopic Multiple Live Births   0 0 0 0 2      # Outcome Date GA Lbr Deuce/2nd Weight Sex Delivery Anes PTL Lv   2 Para 89        JAVY   1 Para 85        JAVY       Medications/Vitamins/Supplements:   Current Outpatient Medications   Medication     estradiol (ESTRACE) 0.1 MG/GM vaginal cream     acetaminophen (TYLENOL) 325 MG tablet     azelastine (ASTELIN) 0.1 % nasal spray     BENADRYL ALLERGY PO     biotin 2.5 mg/mL     butalbital-acetaminophen-caffeine (FIORICET/ESGIC) -40 MG per tablet     Coenzyme Q10 (CO Q10) 100 MG CAPS     eletriptan (RELPAX) 40 MG tablet     fluticasone (FLONASE) 50 MCG/ACT nasal spray     ketoconazole (NIZORAL) 2 % external shampoo     magnesium 100 MG CAPS     nortriptyline (PAMELOR) 50 MG capsule     Riboflavin (VITAMIN B-2 PO)     topiramate (TOPAMAX) 25 MG tablet     traZODone (DESYREL) 50 MG tablet     No current facility-administered medications for this visit.         Medical History:      Past Medical History:   Diagnosis Date     Anxiety      Arthritis      Cervical high risk HPV (human papillomavirus) test positive 2016, 19, 21     BEVERLY II (cervical intraepithelial neoplasia II) 2009    LEEP     Depression      Depressive  disorder Adolescence    Managed with medication and therapy     Dry eye syndrome      Frequent UTI 2009     Headache(784.0)      IBS (irritable bowel syndrome)      Insomnia      Lattice degeneration of retina      Lyme disease      Myopic astigmatism      Nonsenile cataract      Osteopenia 2001,2012    Fosamax     Pneumothorax on right     at 15 y/o     PVD (posterior vitreous detachment), both eyes 07/19/2010     Rosacea      Vasomotor rhinitis      ROS  Social History    Social History     Socioeconomic History     Marital status:      Spouse name: Not on file     Number of children: Not on file     Years of education: Not on file     Highest education level: Not on file   Occupational History     Occupation: Psychologist   Tobacco Use     Smoking status: Never Smoker     Smokeless tobacco: Never Used   Substance and Sexual Activity     Alcohol use: Yes     Comment: 1 8oz mixed drink or beer, minimal 1-2 per week     Drug use: No     Sexual activity: Not Currently     Partners: Male     Birth control/protection: Abstinence   Other Topics Concern     Parent/sibling w/ CABG, MI or angioplasty before 65F 55M? No   Social History Narrative     Not on file     Social Determinants of Health     Financial Resource Strain: Not on file   Food Insecurity: Not on file   Transportation Needs: Not on file   Physical Activity: Not on file   Stress: Not on file   Social Connections: Not on file   Intimate Partner Violence: Not on file   Housing Stability: Not on file       Family History  Family History   Problem Relation Age of Onset     Neurologic Disorder Mother      Allergies Mother      Arthritis Mother      Depression Mother      Eye Disorder Mother      Lipids Mother         high cholesterol     Osteoporosis Mother      Hypertension Mother      Hyperlipidemia Mother      Thyroid Disease Mother      Neurologic Disorder Father      Lipids Father         high cholesterol     Gastrointestinal Disease Father          stomach ulcers     Cancer Father         melanoma     Skin Cancer Father      Hypertension Father      Hyperlipidemia Father      Depression Sister      Uterine Cancer Sister      Tumor Sister         brain tumor     Neurologic Disorder Sister      Neurologic Disorder Sister         epilepsy     Alcohol/Drug Sister         alcohol and drug dependency     Lipids Sister         high cholesterol     Gastrointestinal Disease Sister         hepatitis ??     Osteoporosis Sister      Neurologic Disorder Brother      Alcohol/Drug Brother         recovery alcohol and drug addit     Asthma Daughter         excercise and allergy induced     Asthma Daughter      Asthma Son         excercise and allergy induced     Asthma Son      Breast Cancer Maternal Aunt      Melanoma No family hx of        Allergy    Allergies   Allergen Reactions     Perfume Other (See Comments)     Scents/smells     Tape [Adhesive Tape] Rash     Compazine Other (See Comments)     Hyperactivity       Paxil [Paroxetine] Other (See Comments)     Ropinirole Hcl Unknown     TABS     Depakote [Valproic Acid] Rash     Rash       Food Itching and Rash     mushrooms     Mushroom Itching and Rash       Current Outpatient Medications   Medication     acetaminophen (TYLENOL) 325 MG tablet     azelastine (ASTELIN) 0.1 % nasal spray     BENADRYL ALLERGY PO     biotin 2.5 mg/mL     butalbital-acetaminophen-caffeine (FIORICET/ESGIC) -40 MG per tablet     Coenzyme Q10 (CO Q10) 100 MG CAPS     eletriptan (RELPAX) 40 MG tablet     fluticasone (FLONASE) 50 MCG/ACT nasal spray     ketoconazole (NIZORAL) 2 % external shampoo     magnesium 100 MG CAPS     nortriptyline (PAMELOR) 50 MG capsule     Riboflavin (VITAMIN B-2 PO)     topiramate (TOPAMAX) 25 MG tablet     traZODone (DESYREL) 50 MG tablet     No current facility-administered medications for this visit.       Objective:   Deferred ( virtual visit)     Asssessment and plan  Diagnoses and all orders for this  visit:    Urgency of urination    Mixed stress and urge urinary incontinence  -     Adult Urology Referral  -     estradiol (ESTRACE) 0.1 MG/GM vaginal cream; Place 1 g vaginally twice a week    Urinary frequency      Most bothered by her overactive bladder symptoms of urgency and frequency. Occasional mixed incontinence  Estrace cream ordered  Will bring her in for pelvic exam, and check of her voiding function. May need a referral for pelvic PT depending.   Discussed bladder retraining and fluid management today.           Video-Visit Details    Type of service:  Video Visit    Video End Time:4:46 PM    Originating Location (pt. Location): [place]    Distant Location (provider location):  [facility]     Platform used for Video Visit: [employer]        I spent a total of 45 minutes on  Video with  Beronica Azevedo on the date of the encounter in chart review, patient visit, review of tests, documentation and/or discussion with other providers about the issues documented above.     Again, thank you for allowing me to participate in the care of your patient.      Sincerely,    Kellee Hawkins MD

## 2022-01-05 ENCOUNTER — E-VISIT (OUTPATIENT)
Dept: URGENT CARE | Facility: URGENT CARE | Age: 62
End: 2022-01-05
Payer: COMMERCIAL

## 2022-01-05 DIAGNOSIS — Z20.822 CLOSE EXPOSURE TO 2019 NOVEL CORONAVIRUS: Primary | ICD-10-CM

## 2022-01-05 PROCEDURE — 99421 OL DIG E/M SVC 5-10 MIN: CPT | Performed by: PHYSICIAN ASSISTANT

## 2022-01-05 NOTE — PATIENT INSTRUCTIONS
Dear Beronica,    Based on your exposure to COVID-19 (coronavirus), we would like to test you for this virus. I have placed an order for this test. The best time for testing is 5-7 days after the exposure.    How to schedule:  Go to your [employer] home page and scroll down to the section that says  You have an appointment that needs to be scheduled  and click the large green button that says  Schedule Now  and follow the steps to find the next available opening.     If you are unable to complete these [employer] scheduling steps, please call [phone] to schedule your testing.     Monoclonal antibody treatment after exposure:  Because you have been exposed to COVID-19, you may be able to receive a treatment with monoclonal antibodies. This treatment can lower your risk of severe illness and going to the hospital. It is given through an IV or under your skin (subcutaneous) and must be given at an infusion center.   To be eligible, you must be 12 years or older, at least 88 pounds and:    Are not fully vaccinated against COVID-19, OR    Are immunocompromised     If you would like to sign up to be considered to receive the monoclonal antibody medicine, please complete a participation form through the [facility] here:  MNRAP ([email or web address]/[email or web address]). You may also call the [facility] COVID-19 Public Hotline at 1-100.849.7478 (open Mon-Fri: 9am-7pm and Sat: 10am-6pm).     Not all people who are eligible will receive the medicine since supply is limited. You will be contacted in the next 1 to 2 business days only if you are selected. If you do not receive a call, you have not been selected to receive the medicine. If you have any questions about this medication, please contact your primary care provider. For more information, see [email or web address]/[email or web address]    Return to work/school/ guidance:   Please let your workplace manager and  staffing office know when your quarantine ends. We cannot give you an exact date as it depends on the information below. You can calculate this on your own or work with your manager/staffing office to calculate this.    Quarantine Guidelines:  You are considered exposed if you have been within 6 feet of an infected person(s) for 15 minutes or more over a 24-hour period. Quarantine will start after the LAST time you had contact with the infected person while they were contagious (for example, if you saw someone on Monday and Wednesday, your quarantine would start after Wednesday).     If you have NO symptoms (asymptomatic):    Stay home for 14 days (quarantine) after your LAST contact with a person who has COVID-19 (this remains the CDC recommendation for greatest protection against spread of COVID-19), OR    10-day quarantine with no test, OR    Minimum 7-day quarantine with negative RT-PCR test collected on day 5 or later    Quarantine Guideline exceptions:    People who have been fully vaccinated do not need to quarantine unless they have symptoms. You are considered fully vaccinated 2 weeks after your 2nd dose in a 2-dose series or 2 weeks after a single-dose series. This includes vaccinated health care workers.  o Fully vaccinated people should still get tested 5-7 days after exposure, even if they don t have symptoms.   Note: If you have ongoing exposure to the COVID-positive person, this quarantine period may be more than 14 days. For example, if you continue to be exposed to your child who tested positive and cannot isolate from them, then the quarantine of 7-14 days can't start until your child is no longer contagious. This is typically 10 days from onset of the child's symptoms. So, the total duration may be 17-24 days in this case.   Please contact your doctor if you have questions or call the [facility] Public Hotline: 1-997.574.5759 (Mon-Fri: 9am-7pm and Sat: 10am-6pm).     How to Quarantine:     Monitor your  symptoms until 14 days after your last exposure. If you develop signs or symptoms, isolate and get tested (even if you have been tested already).    Stay home and away from others. Don't go to school or anywhere else. Generally, quarantine means staying home from work but there are some exceptions to this. Please contact your workplace. Cover your mouth and nose with a face covering if you must be around other people.     Wash your hands and face often. Use soap and water.    What are the symptoms of COVID-19?  The most common symptoms are cough, fever and trouble breathing. Less common symptoms include headache, body aches, fatigue (feeling very tired), chills, sore throat, stuffy or runny nose, diarrhea (loose poop), loss of taste or smell, belly pain, and nausea or vomiting (feeling sick to your stomach or throwing up).  If you develop symptoms, follow these guidelines:    If you're normally healthy: Please start another eVisit.    If you have a serious health problem (like cancer, heart failure, an organ transplant or kidney disease): Call your specialty clinic. Let them know that you might have COVID-19.    Where can I get more information?    [facility] - About COVID-19: [email or web address]     CDC - What to Do If You're Sick:     www.cdc.gov/coronavirus/2019-ncov/about/steps-when-sick.html    CDC - Ending Home Isolation:  https://www.cdc.gov/coronavirus/2019-ncov/your-health/quarantine-isolation.html    CDC - Caring for Someone:  www.cdc.gov/coronavirus/2019-ncov/if-you-are-sick/care-for-someone.html    [facility] clinical trials (COVID-19 research studies): clinicalaffairs.[facility]/umn-clinical-trials    Below are the COVID-19 hotlines at the [facility] ([facility]). Interpreters are available.  o For health questions: Call 181-176-6490 or 1-200.100.8248 (7 am to 7 pm)  o For questions about schools and childcare: Call 617-281-0887 or 1-740.107.5682 (7 a.m. to 7  "p.m.)    January 5, 2022  RE:  Beronica Azevedo                                                                                                                   [address] [facility] [address]      To whom it may concern:    I evaluated Beronica Azevedo on January 5, 2022. Beronica Azevedo should be excused from work/school.    They should let their workplace manager and staffing office know when their quarantine ends.    We can not give an exact date as it depends on the information below. They can calculate this on their own or work with their manager/staffing office to calculate this. (For example if they were exposed on 10/04, they would have to quarantine for 14 full days. That would be through 10/18. They could return on 10/19.)    Quarantine Guidelines:    Patients (\"contacts\") who have been in close prolonged contact of an infected person(s) (within six feet for at least 15 minutes within a 24 hour period) and remain asymptomatic should enter quarantine based on the following options:      14-day quarantine period (this remains the CDC recommendation for the greatest protection against spread of COVID-19) OR    Minimum 7-day quarantine with negative RT-PCR test collected on day 5 or later OR    10-day quarantine with no test   Quarantine Guideline exceptions are as follows:    People who have been fully vaccinated do not need to quarantine if the exposure was at least 2 weeks after the last vaccination. This includes vaccinated health care workers.    Not fully vaccinated and unvaccinated Individuals who work in health care, congregate care, or congregate living should be off work for 14 days from their last date of exposure. Community activities for this group can be resumed based on options above. Fully vaccinated individuals in this group do not need to quarantine from work after exposure.    Not fully vaccinated and unvaccinated people whose high-risk exposure was a household member should always " quarantine for 14 days from their last date of exposure. Fully vaccinated people in this category do not need to quarantine.    Not fully vaccinated or unvaccinated residents of congregate care and congregate living settings should always quarantine for 14 days from their last date of exposure. Fully vaccinated residents do not need to quarantine.    Note: If there is ongoing exposure to the covid positive person, this quarantine period may be longer than 14 days. (For example, if they are continually exposed to their child, who tested positive and cannot isolate from them, then the quarantine of 7-14 days can't start until their child is no longer contagious. This is typically 10 days from onset to the child's symptoms. So the total duration may be 17-24 days in this case.)    Beronica Azevedo should continue symptom monitoring until day 14 post-exposure. If they develop signs or symptoms of COVID-19, they should isolate and get tested (even if they have been tested already).    Sincerely,  Hilaria Will PA-C

## 2022-01-09 ENCOUNTER — LAB (OUTPATIENT)
Dept: URGENT CARE | Facility: URGENT CARE | Age: 62
End: 2022-01-09
Attending: PHYSICIAN ASSISTANT
Payer: COMMERCIAL

## 2022-01-09 DIAGNOSIS — Z20.822 CLOSE EXPOSURE TO 2019 NOVEL CORONAVIRUS: ICD-10-CM

## 2022-01-09 PROCEDURE — U0003 INFECTIOUS AGENT DETECTION BY NUCLEIC ACID (DNA OR RNA); SEVERE ACUTE RESPIRATORY SYNDROME CORONAVIRUS 2 (SARS-COV-2) (CORONAVIRUS DISEASE [COVID-19]), AMPLIFIED PROBE TECHNIQUE, MAKING USE OF HIGH THROUGHPUT TECHNOLOGIES AS DESCRIBED BY CMS-2020-01-R: HCPCS

## 2022-01-09 PROCEDURE — U0005 INFEC AGEN DETEC AMPLI PROBE: HCPCS

## 2022-01-10 ENCOUNTER — OFFICE VISIT (OUTPATIENT)
Dept: OBGYN | Facility: CLINIC | Age: 62
End: 2022-01-10
Payer: COMMERCIAL

## 2022-01-10 VITALS
DIASTOLIC BLOOD PRESSURE: 76 MMHG | SYSTOLIC BLOOD PRESSURE: 129 MMHG | WEIGHT: 142.5 LBS | OXYGEN SATURATION: 99 % | HEART RATE: 83 BPM | BODY MASS INDEX: 22.32 KG/M2

## 2022-01-10 DIAGNOSIS — R87.810 CERVICAL HIGH RISK HPV (HUMAN PAPILLOMAVIRUS) TEST POSITIVE: Primary | ICD-10-CM

## 2022-01-10 LAB — SARS-COV-2 RNA RESP QL NAA+PROBE: NEGATIVE

## 2022-01-10 PROCEDURE — 88305 TISSUE EXAM BY PATHOLOGIST: CPT | Performed by: PATHOLOGY

## 2022-01-10 PROCEDURE — 57456 ENDOCERV CURETTAGE W/SCOPE: CPT | Performed by: OBSTETRICS & GYNECOLOGY

## 2022-01-10 NOTE — PROGRESS NOTES
Beronica Azevedo is a 61 year old female  who presents for repeat colposcopy, referred by Shwetha Ibrahim. Pap history:    : [facility] H,   : [place] - BEVERLY II  09: LEEP - BEVERLY II, clear margins  NIL paps: 2/10, 7/10, .  Plan pap in 1 yr.  3/4/16: Pap - NIL, + HR HPV.   3/18/16: [place] - ECC - negative. Plan cotest in 1 year.  17: NIL pap, Neg HR HPV result. Plan cotest in 1 year.  Pt has had a LEEP, needs 2 consecutive NIL/Neg cotest's then a 3 year cotest.   18 ASCUS pap/+ HR HPV (not 16 or 18) and endometrial cells.  Plan: colposcopy and endometrial cell follow up by 18.  18:[place] ECC Endocervical cells showed possible changes. Endometrial cells normal per provider Plan ECC in 3-6 months. Cotest in 1 year.   18: ECC Neg for dysplasia. Plan cotest due on 19.   19: NIL Pap, + HR HPV (not 16 or 18) result. Plan [place].   10/10/19: [place] Bx and ECC benign. Plan cotest in 1 year.  10/30/20 NIL pap, neg HPV. Patient will need three consecutive years of pap/HPV cotesting  (Hx LEEP: BEVERLY 2). Plan cotest in 1 year  21 NIL Pap, + HR HPV (not 16 or 18) Plan [place] due bef 22.       Patient's last menstrual period was 2010 (exact date).  UPT today is not done  Patient does not smoke  Type of contraception: none/postmenopausal  Age at first sexual intercourse: unknown  Number of sexual partners (lifetime): 6  Past GYN history: Gonorrhea and HPV  Prior cervical/vaginal disease: BEVERLY 2.  Prior cervical treatment: LEEP.      PROCEDURE:      Before the procedure, it was ensured that the patient was educated regarding the nature of her findings to date, the implications, and what was to be done. She has been made aware of the role of HPV, the natural history of infection, ways to minimize her future risk, the effect of HPV on the cervix, and treatment options available should they be indicated. The details of the colposcopic procedure were reviewed. All questions were  answered before proceeding, and informed consent was therefore obtained.      Speculum placed in vagina and excellent visualization of cervix acheived, cervix swabbed x 3 with acetic acid solution.  The cervix is flush with vaginal wall and was difficult to find at first, after application of acetic acid, os was more visible.      FINDINGS:  Cervix: no visible lesions  Please refer to images section for details.  SCJ seen?: no   ECC done?: Yes, but unable to get very deep into canal as cervix is flush and unable to safely place tenaculum  Satisfactory examination?: yes      ASSESSMENT: HPV related changes.  PLAN: specimens labelled and sent to Pathology, will base further treatment on Pathology findings, treatment options discussed with patient and post biopsy instructions given to patient.  Given her 13 yr history of nil pap and +HPV with normal colposcopy, we discussed options for mgmt moving forward (presuming this biopsy is negative).  First, continue as algorithm recommends understanding she will likely continue to test positive for HPV requiring colp.  Second, referral for consultation with gyn/onc to review alternative mgmt options.  Third, come up with mgmt plan such as yearly pap/HPV and colp every other year if nil/+HPV.  She will consider and we can discuss further if desired.      Shwetha Ibrahim MD

## 2022-01-10 NOTE — PATIENT INSTRUCTIONS

## 2022-01-12 LAB
PATH REPORT.COMMENTS IMP SPEC: NORMAL
PATH REPORT.COMMENTS IMP SPEC: NORMAL
PATH REPORT.FINAL DX SPEC: NORMAL
PATH REPORT.GROSS SPEC: NORMAL
PATH REPORT.MICROSCOPIC SPEC OTHER STN: NORMAL
PATH REPORT.RELEVANT HX SPEC: NORMAL
PHOTO IMAGE: NORMAL

## 2022-01-13 ENCOUNTER — PATIENT OUTREACH (OUTPATIENT)
Dept: OBGYN | Facility: CLINIC | Age: 62
End: 2022-01-13
Payer: COMMERCIAL

## 2022-01-22 ENCOUNTER — LAB (OUTPATIENT)
Dept: URGENT CARE | Facility: URGENT CARE | Age: 62
End: 2022-01-22
Attending: FAMILY MEDICINE
Payer: COMMERCIAL

## 2022-01-22 DIAGNOSIS — Z20.822 CLOSE EXPOSURE TO 2019 NOVEL CORONAVIRUS: ICD-10-CM

## 2022-01-22 PROCEDURE — U0005 INFEC AGEN DETEC AMPLI PROBE: HCPCS | Mod: 90

## 2022-01-22 PROCEDURE — 99000 SPECIMEN HANDLING OFFICE-LAB: CPT

## 2022-01-22 PROCEDURE — U0003 INFECTIOUS AGENT DETECTION BY NUCLEIC ACID (DNA OR RNA); SEVERE ACUTE RESPIRATORY SYNDROME CORONAVIRUS 2 (SARS-COV-2) (CORONAVIRUS DISEASE [COVID-19]), AMPLIFIED PROBE TECHNIQUE, MAKING USE OF HIGH THROUGHPUT TECHNOLOGIES AS DESCRIBED BY CMS-2020-01-R: HCPCS | Mod: 90

## 2022-01-24 ENCOUNTER — OFFICE VISIT (OUTPATIENT)
Dept: UROLOGY | Facility: CLINIC | Age: 62
End: 2022-01-24
Attending: OBSTETRICS & GYNECOLOGY
Payer: COMMERCIAL

## 2022-01-24 VITALS
DIASTOLIC BLOOD PRESSURE: 74 MMHG | HEART RATE: 93 BPM | BODY MASS INDEX: 21.26 KG/M2 | SYSTOLIC BLOOD PRESSURE: 108 MMHG | WEIGHT: 140.3 LBS | HEIGHT: 68 IN

## 2022-01-24 DIAGNOSIS — R35.0 URINARY FREQUENCY: ICD-10-CM

## 2022-01-24 DIAGNOSIS — N39.46 MIXED STRESS AND URGE URINARY INCONTINENCE: Primary | ICD-10-CM

## 2022-01-24 DIAGNOSIS — R39.15 URGENCY OF URINATION: ICD-10-CM

## 2022-01-24 LAB — SARS-COV-2 RNA RESP QL NAA+PROBE: NOT DETECTED

## 2022-01-24 PROCEDURE — G0463 HOSPITAL OUTPT CLINIC VISIT: HCPCS

## 2022-01-24 PROCEDURE — 99214 OFFICE O/P EST MOD 30 MIN: CPT | Performed by: OBSTETRICS & GYNECOLOGY

## 2022-01-24 ASSESSMENT — MIFFLIN-ST. JEOR: SCORE: 1241.65

## 2022-01-24 ASSESSMENT — PAIN SCALES - GENERAL: PAINLEVEL: MILD PAIN (2)

## 2022-01-24 NOTE — LETTER
2022       RE: Beronica Azevedo  [address]  [facility] [address]     Dear Colleague,    Thank you for referring your patient, Beronica Azevedo, to the [facility] [place] at [facility]. Please see a copy of my visit note below.    2022    Referring Provider: Referred Self, MD  No address on file    Primary Care Provider: Joaquin Rowe    HPI:  Beronica Azevedo is a 61 year old female who presents for in person evaluation of urge predominant mixed incontinence following virtual new patient intake on 21. Today she reports that her urge symptoms have slightly improved since she started using the estrace cream. She is also working on bladder training and prolonging time between urination, currently urinating every 1.5 hours. 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      This note was copied and pasted from virtual visit with Dr Hawkins on 21      Beronica Azevedo is a 61 year old year old  who presents for a video visit today for incontinence.     Urinary symptoms  She sometimes leaks with stress triggers like coughing , laughing etc. She also has leakage with urgency as well. Used to wear a pad but says not recently since she starting using the bathroom more frequently and also doing her kegel exercises. In the mornings, she voids every hour or so, but less often as the day progresses. She drinks a cup of tea almost every morning. She drinks about 1200 mls of water. She feels like she empties her bladder ok. No difficulties with voiding. No gross hematuria. Used to have recurrent utis but not recently.      Prolapse symptoms  Denies vaginal bulge or pressure sensation.      GI symptoms  Occasional constipation and diarrhea but not frequent. Usually diet dependent. Does not splint to defecate.      Sexual health/Pelvic floor  No partner currently. Used estrogen cream in the past but stopped using it because of the excess plastic packaging. Has a hx of Beverly 2, H/O LEEP. Pap on 21 showing negative pap but positive HRHPV ( non 16, 18). For colpo per nursing records.     Relevant Medical History:    Diabetes? no  High Blood pressure? no     Recurrent UTIs? no  Sleep Apnea? no  Obesity? no  History of Blood clots? no  Other medical problems: none    Asssessment and plan  Diagnoses and all orders for this visit:    Urgency of urination    Mixed stress and urge urinary incontinence  -     Adult Urology Referral  -     estradiol (ESTRACE) 0.1 MG/GM vaginal cream; Place 1 g vaginally twice a week    Urinary frequency      Most bothered by her overactive bladder symptoms of urgency and frequency. Occasional mixed incontinence  Estrace cream ordered  Will bring her in for pelvic exam, and check of her voiding function. May need a referral for pelvic PT depending.   Discussed bladder retraining and fluid management today.            Surgical History:      Past Surgical History:   Procedure Laterality Date     BIOPSY  Moles skin tags     CHEST TUBE INSERTION       COLONOSCOPY  Normal     DILATION AND CURETTAGE, OPERATIVE HYSTEROSCOPY WITH MORCELLATOR, COMBINED  2011    endometrial polyp     LAPAROSCOPIC APPENDECTOMY N/A 2021    Procedure: APPENDECTOMY, LAPAROSCOPIC and laparoscopic liver biopsy;  Surgeon: Solis Zepeda MD;  Location: [facility] OR     LEEP TX, CERVICAL  2009    BEVERLY II, clear margins     THORACIC SURGERY      Collapsed lung       OB/Gyn History:  OB History    Para Term  AB Living   2 2 0 0 0 2   SAB IAB Ectopic Multiple Live Births   0 0 0 0 2      # Outcome Date GA Lbr Deuce/2nd Weight Sex Delivery Anes PTL Lv   2 Para 89        JAVY   1 Para 85        JVAY       Medications/Vitamins/Supplements: reviewed     Medical History:      Past Medical  History:   Diagnosis Date     Anxiety      Arthritis 2010     Cervical high risk HPV (human papillomavirus) test positive 03/04/2016 03/04/2016, 09/12/19, 11/23/21     BEVERLY II (cervical intraepithelial neoplasia II) 2009    LEEP     Depression      Depressive disorder Adolescence    Managed with medication and therapy     Dry eye syndrome      Frequent UTI 2009     Headache(784.0)      IBS (irritable bowel syndrome)      Insomnia      Lattice degeneration of retina      Lyme disease      Myopic astigmatism      Nonsenile cataract      Osteopenia 2001,2012    Fosamax     Pneumothorax on right     at 17 y/o     PVD (posterior vitreous detachment), both eyes 07/19/2010     Rosacea      Vasomotor rhinitis      ROS  Social History    Social History     Socioeconomic History     Marital status:      Spouse name: Not on file     Number of children: Not on file     Years of education: Not on file     Highest education level: Not on file   Occupational History     Occupation: Psychologist   Tobacco Use     Smoking status: Never Smoker     Smokeless tobacco: Never Used   Substance and Sexual Activity     Alcohol use: Yes     Comment: 1 8oz mixed drink or beer, minimal 1-2 per week     Drug use: No     Sexual activity: Not Currently     Partners: Male     Birth control/protection: Abstinence   Other Topics Concern     Parent/sibling w/ CABG, MI or angioplasty before 65F 55M? No   Social History Narrative     Not on file     Social Determinants of Health     Financial Resource Strain: Not on file   Food Insecurity: Not on file   Transportation Needs: Not on file   Physical Activity: Not on file   Stress: Not on file   Social Connections: Not on file   Intimate Partner Violence: Not on file   Housing Stability: Not on file       Family History  Family History   Problem Relation Age of Onset     Neurologic Disorder Mother      Allergies Mother      Arthritis Mother      Depression Mother      Eye Disorder Mother       Lipids Mother         high cholesterol     Osteoporosis Mother      Hypertension Mother      Hyperlipidemia Mother      Thyroid Disease Mother      Neurologic Disorder Father      Lipids Father         high cholesterol     Gastrointestinal Disease Father         stomach ulcers     Cancer Father         melanoma     Skin Cancer Father      Hypertension Father      Hyperlipidemia Father      Depression Sister      Uterine Cancer Sister      Tumor Sister         brain tumor     Neurologic Disorder Sister      Neurologic Disorder Sister         epilepsy     Alcohol/Drug Sister         alcohol and drug dependency     Lipids Sister         high cholesterol     Gastrointestinal Disease Sister         hepatitis ??     Osteoporosis Sister      Neurologic Disorder Brother      Alcohol/Drug Brother         recovery alcohol and drug addit     Asthma Daughter         excercise and allergy induced     Asthma Daughter      Asthma Son         excercise and allergy induced     Asthma Son      Breast Cancer Maternal Aunt      Melanoma No family hx of        Allergy    Allergies   Allergen Reactions     Perfume Other (See Comments)     Scents/smells     Tape [Adhesive Tape] Rash     Compazine Other (See Comments)     Hyperactivity       Paxil [Paroxetine] Other (See Comments)     Ropinirole Hcl Unknown     TABS     Depakote [Valproic Acid] Rash     Rash       Food Itching and Rash     mushrooms     Mushroom Itching and Rash     Ropinirole Anxiety and Unknown     TABS         Current Outpatient Medications   Medication     acetaminophen (TYLENOL) 325 MG tablet     BENADRYL ALLERGY PO     biotin 2.5 mg/mL     butalbital-acetaminophen-caffeine (FIORICET/ESGIC) -40 MG per tablet     Coenzyme Q10 (CO Q10) 100 MG CAPS     eletriptan (RELPAX) 40 MG tablet     estradiol (ESTRACE) 0.1 MG/GM vaginal cream     fluticasone (FLONASE) 50 MCG/ACT nasal spray     ketoconazole (NIZORAL) 2 % external shampoo     magnesium 100 MG CAPS      nortriptyline (PAMELOR) 50 MG capsule     Riboflavin (VITAMIN B-2 PO)     topiramate (TOPAMAX) 25 MG tablet     traZODone (DESYREL) 50 MG tablet     No current facility-administered medications for this visit.       Physical Exam:   LMP 04/06/2010 (Exact Date)  Patient's last menstrual period was 04/06/2010 (exact date). There is no height or weight on file to calculate BMI.    Gen:  is alert, comfortable in no acute distress,   CV well perfused   Abdomen: Abdomen is soft, non-tender, non-distended  Lungs: non-labored breathing.      Pelvic Exam:   Normal external female genitalia. The urethra was normal.    Vagina: no prolapse, no abnormal discharge   Uterus: Normal size, no lesion on cervix  Ovaries: non palpable  Vulva: no lesions  Rectal: deferred     Pelvic floor strength: 3/5 kegels.    Pelvic floor muscles: No myalgia of pelvic floor muscles     Voiding trial:    VOID 50 ml  PVR 17 mL by Bladder ultrasound  Leak with Cough stress test : negative     Labs:   Color Urine (no units)   Date Value   07/15/2021 Yellow   02/03/2021 Light Yellow     Appearance Urine (no units)   Date Value   07/15/2021 Clear   02/03/2021 Clear     Glucose Urine (mg/dL)   Date Value   07/15/2021 Negative   02/03/2021 Negative     Bilirubin Urine (no units)   Date Value   07/15/2021 Small (A)   02/03/2021 Negative     Ketones Urine (mg/dL)   Date Value   07/15/2021 Trace (A)   02/03/2021 Negative     Specific Gravity Urine (no units)   Date Value   07/15/2021 >=1.030   02/03/2021 1.007     pH Urine   Date Value   07/15/2021 5.0   02/03/2021 5.5 pH     Protein Albumin Urine (mg/dL)   Date Value   07/15/2021 Negative   02/03/2021 Negative     Urobilinogen Urine   Date Value   07/15/2021 0.2 E.U./dL   11/06/2020 0.2 EU/dL     Nitrite Urine (no units)   Date Value   07/15/2021 Negative   02/03/2021 Negative     Leukocyte Esterase Urine (no units)   Date Value   07/15/2021 Small (A)   02/03/2021 Negative     CBC RESULTS:   Recent Labs   Lab  Test 21  0703   WBC 7.1   RBC 3.87   HGB 11.9   HCT 35.4   MCV 92   MCH 30.7   MCHC 33.6   RDW 11.6        @lastcmp@    A/P: Beronica Azevedo is a 61 year old F  (2x ) with PMH of abnormal pap smears s/p LEEP who presents for followup visit for mixed urinary incontinence.    Diagnoses and all orders for this visit:  Mixed stress and urge urinary incontinence    Urgency of urination    Urinary frequency    Most bothered by her overactive bladder symptoms of urgency and frequency. Occasional mixed incontinence.   Probably worsened by work enviroment where she is on a clock and doesn't get much time for bladder break. 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  Pt may need note for scheduled bathroom breaks from work.   Continue use of estrace cream, slight improvement in urge symptoms since starting.   Encouraged regular Kegel exercises at home which she is motivated to do. If symptoms do not improve or worsen, could consider formal PFPT which she is open to  Reviewed fluid management and bladder retraining.    Nadia Sparks, MS4         I spent a total of 30 minutes with  Beronica Azevedo  on the date of the encounter in chart review, face to face patient visit, review of tests, documentation and discussion with the medical student about the issues documented above.     Kellee Hawkins MD, [facility]  , Department of OBGYN  Female Pelvic Medicine and Reconstructive Surgery ( Urogynecology)  CC  Patient Care Team:  Joaquin Rowe MD as PCP - General (Family Practice)  Macey Hughes MD as MD (Dermatology)  John Antunez MD as MD (Dermapathology)  Rhett Perez MD as MD (Family Practice)  Joaquin Rowe MD as Assigned PCP  Marcelo Ochoa MD as Assigned Musculoskeletal Provider  Shwetha Ibrahim MD as Assigned OBGYN Provider  Kellee Hawkins MD as MD (OB/Gyn)  SELF, REFERRED

## 2022-01-24 NOTE — PROGRESS NOTES
2022    Referring Provider: Referred Self, MD  No address on file    Primary Care Provider: Joaquin Rowe    HPI:  Beronica Azevedo is a 61 year old female who presents for in person evaluation of urge predominant mixed incontinence following virtual new patient intake on 21. Today she reports that her urge symptoms have slightly improved since she started using the estrace cream. She is also working on bladder training and prolonging time between urination, currently urinating every 1.5 hours. 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      This note was copied and pasted from virtual visit with Dr Hawkins on 21      Beronica Azevedo is a 61 year old year old  who presents for a video visit today for incontinence.     Urinary symptoms  She sometimes leaks with stress triggers like coughing , laughing etc. She also has leakage with urgency as well. Used to wear a pad but says not recently since she starting using the bathroom more frequently and also doing her kegel exercises. In the mornings, she voids every hour or so, but less often as the day progresses. She drinks a cup of tea almost every morning. She drinks about 1200 mls of water. She feels like she empties her bladder ok. No difficulties with voiding. No gross hematuria. Used to have recurrent utis but not recently.      Prolapse symptoms  Denies vaginal bulge or pressure sensation.      GI symptoms  Occasional constipation and diarrhea but not frequent. Usually diet dependent. Does not splint to defecate.     Sexual health/Pelvic floor  No partner currently. Used estrogen cream in the past but stopped using it because of the excess plastic packaging. Has a hx of Faith 2, H/O LEEP. Pap on 21 showing negative pap but positive HRHPV ( non 16, 18). For colpo per nursing records.     Relevant Medical History:    Diabetes?  no  High Blood pressure? no     Recurrent UTIs? no  Sleep Apnea? no  Obesity? no  History of Blood clots? no  Other medical problems: none    Asssessment and plan  Diagnoses and all orders for this visit:    Urgency of urination    Mixed stress and urge urinary incontinence  -     Adult Urology Referral  -     estradiol (ESTRACE) 0.1 MG/GM vaginal cream; Place 1 g vaginally twice a week    Urinary frequency      Most bothered by her overactive bladder symptoms of urgency and frequency. Occasional mixed incontinence  Estrace cream ordered  Will bring her in for pelvic exam, and check of her voiding function. May need a referral for pelvic PT depending.   Discussed bladder retraining and fluid management today.            Surgical History:      Past Surgical History:   Procedure Laterality Date     BIOPSY  Moles skin tags     CHEST TUBE INSERTION       COLONOSCOPY  Normal     DILATION AND CURETTAGE, OPERATIVE HYSTEROSCOPY WITH MORCELLATOR, COMBINED  2011    endometrial polyp     LAPAROSCOPIC APPENDECTOMY N/A 2021    Procedure: APPENDECTOMY, LAPAROSCOPIC and laparoscopic liver biopsy;  Surgeon: Solis Zepeda MD;  Location: [facility] OR     LEEP TX, CERVICAL  2009    BEVERLY II, clear margins     THORACIC SURGERY      Collapsed lung       OB/Gyn History:  OB History    Para Term  AB Living   2 2 0 0 0 2   SAB IAB Ectopic Multiple Live Births   0 0 0 0 2      # Outcome Date GA Lbr Deuce/2nd Weight Sex Delivery Anes PTL Lv   2 Para 89        JAVY   1 Para 85        JAVY       Medications/Vitamins/Supplements: reviewed     Medical History:      Past Medical History:   Diagnosis Date     Anxiety      Arthritis      Cervical high risk HPV (human papillomavirus) test positive 2016, 19, 21     BEVERLY II (cervical intraepithelial neoplasia II)     LEEP     Depression      Depressive disorder Adolescence    Managed with medication and therapy      Dry eye syndrome      Frequent UTI 2009     Headache(784.0)      IBS (irritable bowel syndrome)      Insomnia      Lattice degeneration of retina      Lyme disease      Myopic astigmatism      Nonsenile cataract      Osteopenia 2001,2012    Fosamax     Pneumothorax on right     at 15 y/o     PVD (posterior vitreous detachment), both eyes 07/19/2010     Rosacea      Vasomotor rhinitis      ROS  Social History    Social History     Socioeconomic History     Marital status:      Spouse name: Not on file     Number of children: Not on file     Years of education: Not on file     Highest education level: Not on file   Occupational History     Occupation: Psychologist   Tobacco Use     Smoking status: Never Smoker     Smokeless tobacco: Never Used   Substance and Sexual Activity     Alcohol use: Yes     Comment: 1 8oz mixed drink or beer, minimal 1-2 per week     Drug use: No     Sexual activity: Not Currently     Partners: Male     Birth control/protection: Abstinence   Other Topics Concern     Parent/sibling w/ CABG, MI or angioplasty before 65F 55M? No   Social History Narrative     Not on file     Social Determinants of Health     Financial Resource Strain: Not on file   Food Insecurity: Not on file   Transportation Needs: Not on file   Physical Activity: Not on file   Stress: Not on file   Social Connections: Not on file   Intimate Partner Violence: Not on file   Housing Stability: Not on file       Family History  Family History   Problem Relation Age of Onset     Neurologic Disorder Mother      Allergies Mother      Arthritis Mother      Depression Mother      Eye Disorder Mother      Lipids Mother         high cholesterol     Osteoporosis Mother      Hypertension Mother      Hyperlipidemia Mother      Thyroid Disease Mother      Neurologic Disorder Father      Lipids Father         high cholesterol     Gastrointestinal Disease Father         stomach ulcers     Cancer Father         melanoma     Skin Cancer  Father      Hypertension Father      Hyperlipidemia Father      Depression Sister      Uterine Cancer Sister      Tumor Sister         brain tumor     Neurologic Disorder Sister      Neurologic Disorder Sister         epilepsy     Alcohol/Drug Sister         alcohol and drug dependency     Lipids Sister         high cholesterol     Gastrointestinal Disease Sister         hepatitis ??     Osteoporosis Sister      Neurologic Disorder Brother      Alcohol/Drug Brother         recovery alcohol and drug addit     Asthma Daughter         excercise and allergy induced     Asthma Daughter      Asthma Son         excercise and allergy induced     Asthma Son      Breast Cancer Maternal Aunt      Melanoma No family hx of        Allergy    Allergies   Allergen Reactions     Perfume Other (See Comments)     Scents/smells     Tape [Adhesive Tape] Rash     Compazine Other (See Comments)     Hyperactivity       Paxil [Paroxetine] Other (See Comments)     Ropinirole Hcl Unknown     TABS     Depakote [Valproic Acid] Rash     Rash       Food Itching and Rash     mushrooms     Mushroom Itching and Rash     Ropinirole Anxiety and Unknown     TABS         Current Outpatient Medications   Medication     acetaminophen (TYLENOL) 325 MG tablet     BENADRYL ALLERGY PO     biotin 2.5 mg/mL     butalbital-acetaminophen-caffeine (FIORICET/ESGIC) -40 MG per tablet     Coenzyme Q10 (CO Q10) 100 MG CAPS     eletriptan (RELPAX) 40 MG tablet     estradiol (ESTRACE) 0.1 MG/GM vaginal cream     fluticasone (FLONASE) 50 MCG/ACT nasal spray     ketoconazole (NIZORAL) 2 % external shampoo     magnesium 100 MG CAPS     nortriptyline (PAMELOR) 50 MG capsule     Riboflavin (VITAMIN B-2 PO)     topiramate (TOPAMAX) 25 MG tablet     traZODone (DESYREL) 50 MG tablet     No current facility-administered medications for this visit.       Physical Exam:   LMP 04/06/2010 (Exact Date)  Patient's last menstrual period was 04/06/2010 (exact date). There is no  height or weight on file to calculate BMI.    Gen:  is alert, comfortable in no acute distress,   CV well perfused   Abdomen: Abdomen is soft, non-tender, non-distended  Lungs: non-labored breathing.      Pelvic Exam:   Normal external female genitalia. The urethra was normal.    Vagina: no prolapse, no abnormal discharge   Uterus: Normal size, no lesion on cervix  Ovaries: non palpable  Vulva: no lesions  Rectal: deferred     Pelvic floor strength: 3/5 kegels.    Pelvic floor muscles: No myalgia of pelvic floor muscles     Voiding trial:    VOID 50 ml  PVR 17 mL by Bladder ultrasound  Leak with Cough stress test : negative     Labs:   Color Urine (no units)   Date Value   07/15/2021 Yellow   2021 Light Yellow     Appearance Urine (no units)   Date Value   07/15/2021 Clear   2021 Clear     Glucose Urine (mg/dL)   Date Value   07/15/2021 Negative   2021 Negative     Bilirubin Urine (no units)   Date Value   07/15/2021 Small (A)   2021 Negative     Ketones Urine (mg/dL)   Date Value   07/15/2021 Trace (A)   2021 Negative     Specific Gravity Urine (no units)   Date Value   07/15/2021 >=1.030   2021 1.007     pH Urine   Date Value   07/15/2021 5.0   2021 5.5 pH     Protein Albumin Urine (mg/dL)   Date Value   07/15/2021 Negative   2021 Negative     Urobilinogen Urine   Date Value   07/15/2021 0.2 E.U./dL   2020 0.2 EU/dL     Nitrite Urine (no units)   Date Value   07/15/2021 Negative   2021 Negative     Leukocyte Esterase Urine (no units)   Date Value   07/15/2021 Small (A)   2021 Negative     CBC RESULTS:   Recent Labs   Lab Test 21  0703   WBC 7.1   RBC 3.87   HGB 11.9   HCT 35.4   MCV 92   MCH 30.7   MCHC 33.6   RDW 11.6        @lastcmp@    A/P: Beronica Azevedo is a 61 year old F  (2x ) with PMH of abnormal pap smears s/p LEEP who presents for followup visit for mixed urinary incontinence.    Diagnoses and all orders for this  visit:  Mixed stress and urge urinary incontinence    Urgency of urination    Urinary frequency    Most bothered by her overactive bladder symptoms of urgency and frequency. Occasional mixed incontinence.   Probably worsened by work enviroment where she is on a clock and doesn't get much time for bladder break. 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  Pt may need note for scheduled bathroom breaks from work.   Continue use of estrace cream, slight improvement in urge symptoms since starting.   Encouraged regular Kegel exercises at home which she is motivated to do. If symptoms do not improve or worsen, could consider formal PFPT which she is open to  Reviewed fluid management and bladder retraining.    Nadiajake Sparks, MS4         I spent a total of 30 minutes with  Beronica Azevedo  on the date of the encounter in chart review, face to face patient visit, review of tests, documentation and discussion with the medical student about the issues documented above.     Kellee Hawkins MD, [facility]  , Department of OBGYN  Female Pelvic Medicine and Reconstructive Surgery ( Urogynecology)  CC  Patient Care Team:  Joaquin Rowe MD as PCP - General (Family Practice)  Macey Hughes MD as MD (Dermatology)  John Antunez MD as MD (Dermapathology)  Rhett Perez MD as MD (Family Practice)  Joaquin Rowe MD as Assigned PCP  Marcelo Ochoa MD as Assigned Musculoskeletal Provider  Shwetha Ibrahim MD as Assigned OBGYN Provider  Kellee Hawkins MD as MD (OB/Gyn)  SELF, REFERRED

## 2022-01-26 ENCOUNTER — TELEPHONE (OUTPATIENT)
Dept: FAMILY MEDICINE | Facility: CLINIC | Age: 62
End: 2022-01-26
Payer: COMMERCIAL

## 2022-01-26 NOTE — TELEPHONE ENCOUNTER
Pt called, requesting orders for MRI.   #1 had lap. appen 2/3/21 ED     2/3/21 CT abd/pelvis   IMPRESSION:  1. The appendix is dilated and fluid-filled measuring 14 mm in  diameter, however with no significant periappendiceal fat stranding,  findings could represent early acute appendicitis. No evidence of  appendiceal perforation or periappendiceal loculated collection.  2. Subcentimeter hypoattenuating focus in the pancreatic body, too  small to characterize, could represent side branch intraductal  papillary mucinous neoplasm versus other pancreatic cystic lesions.  Recommend follow-up exam in one year with contrast-enhanced MRI/MRCP  to assess for interval change.  3. Prominent left pelvic vasculature, nonspecific, can be seen with  pelvic congestion syndrome.

## 2022-01-26 NOTE — TELEPHONE ENCOUNTER
Reason for Call:  Other call back    Detailed comments: Patient reqeusting referral orders for an MRI scan, was told to do this last year. Patient wondering if it is actually needed or not. Patient says that last year her appendix fell out and that she needed to get an MRI scan for a one year f/u for pancreas. Needs an order put in and where she needs to get this done at.     Phone Number Patient can be reached at: Home number on file [phone] (home)    Best Time: any    Can we leave a detailed message on this number? YES    Call taken on 1/26/2022 at 1:29 PM by Arabella Lyle

## 2022-01-26 NOTE — TELEPHONE ENCOUNTER
Patient called to discuss Covid test results.  1/22/22 - non-detected.    Her daughter tested positive for Covid 5 days before she was tested. Denies any symptoms.    Asking if she needs to be tested again or was 5-6 days after expose reasonable amount of time.    Informed patient recommendation after exposure is to check  3-5 days post exposure. 5 days preferred    No need to recheck.  If develops any symptoms will call back.    Patient verbalizes understanding  Lisa Vargas RN

## 2022-02-07 ENCOUNTER — VIRTUAL VISIT (OUTPATIENT)
Dept: FAMILY MEDICINE | Facility: CLINIC | Age: 62
End: 2022-02-07
Payer: COMMERCIAL

## 2022-02-07 DIAGNOSIS — Z53.9 ERRONEOUS ENCOUNTER--DISREGARD: Primary | ICD-10-CM

## 2022-02-15 ENCOUNTER — VIRTUAL VISIT (OUTPATIENT)
Dept: FAMILY MEDICINE | Facility: CLINIC | Age: 62
End: 2022-02-15
Payer: COMMERCIAL

## 2022-02-15 DIAGNOSIS — L98.9 SKIN LESION: Primary | ICD-10-CM

## 2022-02-15 DIAGNOSIS — K86.9 LESION OF PANCREAS: ICD-10-CM

## 2022-02-15 PROCEDURE — 99214 OFFICE O/P EST MOD 30 MIN: CPT | Mod: 95 | Performed by: FAMILY MEDICINE

## 2022-02-15 NOTE — PROGRESS NOTES
.Beronica is a 61 year old who is being evaluated via a billable video visit.      How would you like to obtain your AVS? MyChart  If the video visit is dropped, the invitation should be resent by: Text to cell phone: done  Will anyone else be joining your video visit? No      Video Start Time: 457    Assessment & Plan     Skin lesion  Needs eval  - Adult Dermatology Referral    Lesion of pancreas  revewed CT, risks/ benefitsof MRI  - MR Abdomen MRCP w/o & w Contrast      Review of external notes as documented elsewhere in note  12 minutes spent on the date of the encounter doing review of outside records and review of test results            No follow-ups on file.    Joaquin Rowe MD  [facility]    Subjective   Beronica is a 61 year old who presents for the following health issues     HPI    Encounter Diagnoses   Name Primary?     Skin lesion Yes     Lesion of pancreas per CT 2021     Subcentimeter hypoattenuating focus in the pancreatic body, too  small to characterize, could represent side branch intraductal  papillary mucinous neoplasm versus other pancreatic cystic lesions.  Recommend follow-up exam in one year with contrast-enhanced MRI/MRCP  to assess for interval change.          Review of Systems   Constitutional, HEENT, cardiovascular, pulmonary, gi and gu systems are negative, except as otherwise noted.      Objective           Vitals:  No vitals were obtained today due to virtual visit.    Physical Exam   GENERAL: Healthy, alert and no distress  EYES: Eyes grossly normal to inspection.  No discharge or erythema, or obvious scleral/conjunctival abnormalities.  RESP: No audible wheeze, cough, or visible cyanosis.  No visible retractions or increased work of breathing.    SKIN: Visible skin clear. No significant rash, abnormal pigmentation or lesions.  NEURO: Cranial nerves grossly intact.  Mentation and speech appropriate for age.  PSYCH: Mentation appears normal, affect  normal/bright, judgement and insight intact, normal speech and appearance well-groomed.    Lab on 01/22/2022   Component Date Value Ref Range Status     COVID-19 Virus PCR - Result 01/22/2022 NOT DETECTED   Final    Comment: Not Detected    Collection of multiple specimens from the same patient may   be necessary to detect the virus. The possibility of a false   negative should be considered if the patient's recent   exposure or clinical presentation suggests 2019 nCOV   infection and diagnostic tests for other causes of illness   are negative. Repeat testing may be considered in this   setting.    Patient sample was heat inactivated and amplified using the   HDPCR(TM) SARS-CoV-2 assay ([employer] The HDPCRTM   SARS-CoV-2 assay is a reverse transcription real-time   polymerase chain reaction (qRT-PCR) test intended for the   qualitative detection of nucleic acid from SARS-CoV-2 in   human nasopharyngeal swabs, oropharyngeal swabs, anterior   nasal swabs, mid-turbinate nasal swabs as well as nasal   aspirate, nasal wash, and bronchoalveolar lavage (BAL)   specimens from individuals who are suspected of COVID-19 by   their healthcare provider.    A negative result does not rule out the presence of                              real-time PCR inhibitors in the specimen or COVID-19 RNA in   concentrations below the limit of detection of the assay.   The possibility of a false negative should be considered if   the patients recent exposure or clinical presentation   suggests COVID-19. Additional testing or repeat testing   requires consultation with the laboratory.    Nasopharyngeal specimen is the preferred choice for   swab-based SARS CoV2 testing. When collection of a   nasopharyngeal swab is not possible the following are   acceptable alternatives:  an oropharyngeal (OP) specimen collected by a healthcare   professional, or nasal mid-turbinate (NMT) swab collected by   a healthcare professional or by onsite  self-collection   (using a flocked tapered swab), or an anterior nares   specimen collected by a healthcare professional or by onsite   self-collection (using a round foam swab). ([facility])    Testing performed by [facility] at   the [employer] [facility]                            [facility] Laboratory [address] [facility]   [address].    The test performance characteristics were determined by   [facility]. It has not been cleared or approved by the FDA.    The laboratory is regulated under the Clinical Laboratory   Improvement Amendments of 1988 (CLIA-88) as qualified to   perform high-complexity testing. This test is used for   clinical purposes. It should not be regarded as   investigational or for research.               Video-Visit Details    Type of service:  Video Visit    Video End Time:5:18 PM    Originating Location (pt. Location): Home    Distant Location (provider location):  [facility]     Platform used for Video Visit: [employer]

## 2022-02-18 ENCOUNTER — ANCILLARY PROCEDURE (OUTPATIENT)
Dept: MAMMOGRAPHY | Facility: CLINIC | Age: 62
End: 2022-02-18
Attending: FAMILY MEDICINE
Payer: COMMERCIAL

## 2022-02-18 DIAGNOSIS — Z12.31 VISIT FOR SCREENING MAMMOGRAM: ICD-10-CM

## 2022-02-18 PROCEDURE — 77063 BREAST TOMOSYNTHESIS BI: CPT | Mod: GC | Performed by: STUDENT IN AN ORGANIZED HEALTH CARE EDUCATION/TRAINING PROGRAM

## 2022-02-18 PROCEDURE — 77067 SCR MAMMO BI INCL CAD: CPT | Mod: GC | Performed by: STUDENT IN AN ORGANIZED HEALTH CARE EDUCATION/TRAINING PROGRAM

## 2022-02-24 ENCOUNTER — OFFICE VISIT (OUTPATIENT)
Dept: DERMATOLOGY | Facility: CLINIC | Age: 62
End: 2022-02-24
Payer: COMMERCIAL

## 2022-02-24 DIAGNOSIS — L81.4 SOLAR LENTIGO: ICD-10-CM

## 2022-02-24 DIAGNOSIS — D22.9 MULTIPLE BENIGN NEVI: ICD-10-CM

## 2022-02-24 DIAGNOSIS — L85.3 XEROSIS OF SKIN: ICD-10-CM

## 2022-02-24 DIAGNOSIS — L57.8 PHOTOAGING OF SKIN: ICD-10-CM

## 2022-02-24 DIAGNOSIS — D18.01 CHERRY ANGIOMA: ICD-10-CM

## 2022-02-24 DIAGNOSIS — L81.4 LENTIGO: Primary | ICD-10-CM

## 2022-02-24 DIAGNOSIS — L98.9 SKIN LESION: ICD-10-CM

## 2022-02-24 DIAGNOSIS — L82.1 SEBORRHEIC KERATOSES: ICD-10-CM

## 2022-02-24 DIAGNOSIS — L85.3 XEROSIS CUTIS: ICD-10-CM

## 2022-02-24 PROCEDURE — 99213 OFFICE O/P EST LOW 20 MIN: CPT | Mod: GC

## 2022-02-24 RX ORDER — EMOLLIENT BASE
CREAM (GRAM) TOPICAL DAILY
Qty: 453 G | Refills: 11 | Status: SHIPPED | OUTPATIENT
Start: 2022-02-24 | End: 2024-01-25

## 2022-02-24 ASSESSMENT — PAIN SCALES - GENERAL: PAINLEVEL: NO PAIN (0)

## 2022-02-24 NOTE — LETTER
2/24/2022       RE: Beronica Azevedo  [address]  [place] [address]     Dear Colleague,    Thank you for referring your patient, Beronica Azevedo, to the [facility] [place] at [facility]. Please see a copy of my visit note below.    [facility] Dermatology Note  Encounter Date: Feb 24, 2022  Office Visit     Dermatology Problem List:  1. Seborrheic dermatitis, scalp  - Current tx: ketoconazole shampoo three times per week  2. Eczematous dermatitis  3. Erythematotelangiectatic rosacea  - Referred to cosmetic dermatology  4. Epidermal inclusion cyst, upper back  - s/p excisional bx of dilated pore of Nichole 8/1/2017  5. Scattered benign nevi  6. Seborrheic keratoses, non-irritated  7. 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 Lentigos  - Cosmetic dermatology referral to discuss laser   ____________________________________________    Assessment & Plan:   # Solar lentigos, face  - Discussed diligent sun protection  - Also discussed option of hydroquinone, but patient is interested in talking with cosmetic dermatology first about laser treatments     # Rosacea   Overall stable without current treatment, though patient bothered by some baseline erythema and wishes to discuss with cosmetic dermatology for possibly laser treatment.    # Benign skin findings  - Physical exam demonstrating benign skin findings as below.  - Seborrheic keratoses  - Cherry hemangiomas  - Solar lentingos   - Benign nevi  - Reassurance provided.  - ABCDEs of melanoma handout provided.  - Advised patient to return to clinic for any new or concerning lesions.    # Xerosis cutis  - Dry skin care handout provided  - Vanicream sent to pharmacy     Procedures Performed:   None    Follow-up: 1 year, prn for new or changing lesions    Staff and Resident:     Demetrius Cote MD  PGY-2  Dermatology  Pager: [phone]      Patient was seen and examined with the dermatology resident. I agree with the history, review of systems, physical examination, assessments and plan.    Lluvia Quiroz MD  Professor and  Chair  Department of Dermatology  [facility]  ____________________________________________    CC: Skin Check (pt states she is here for a full body skin check, itching skin after showers X 6 to 8 weeks. )    HPI:  Ms. Beronica Azevedo is a(n) 61 year old female who presents today as a return patient for skin check    - Patient reports she otherwise has been in her usual state of health.  No lesions of concern today, nothing bleeding, growing, or tender.  Notes that she is itchy 1 to 2 hours after she showers at night, tries to avoid hot showers.  No new soaps, detergents, or fragrance.  Has been applying a co-op lotion to her skin after she showers.  Showers a couple times per week sponge bathe was other nights.  -Cosmetically bothered by some brown spots and baseline redness of her face, interested in talking with cosmetic dermatology for this.  - The patient does have a history of frequent blistering sunburns as a child. There is no personal history of skin cancer. There is possible family history of skin cancer of her father. The patient denies being on immunosuppressive medications that could increase the risk of skin cancer. The patient does try to use sunscreen regularly when outside and does attempt to stay out of the sun whenever possible.  - Patient is otherwise feeling well, without additional skin concerns.    Labs Reviewed:  N/A    Physical Exam:  Vitals: LMP 04/06/2010 (Exact Date)   SKIN: Total skin excluding the undergarment areas was performed. The exam included the head/face, neck, both arms, chest, back, abdomen, both legs, digits and/or nails.   -Delgado skin type: I  -There are dome shaped bright red papules on the trunk.   Multiple regular brown pigmented macules  and papules are identified on the trunk and upper extremities.   There are fine lines and dyspigmentation on sun exposed areas of the face and chest.  Scattered brown macules on sun exposed areas and face  There are waxy stuck on tan to brown papules on the trunk, including the upper central back.  - At the central chest, there is a soft, somewhat mobile, roughly 4 mm nodule with a central punctum that appears non-irritated and is non-tender to palpation.  -No other lesions of concern on areas examined.   Medications:  Current Outpatient Medications   Medication     acetaminophen (TYLENOL) 325 MG tablet     BENADRYL ALLERGY PO     biotin 2.5 mg/mL     Coenzyme Q10 (CO Q10) 100 MG CAPS     eletriptan (RELPAX) 40 MG tablet     estradiol (ESTRACE) 0.1 MG/GM vaginal cream     ketoconazole (NIZORAL) 2 % external shampoo     magnesium 100 MG CAPS     nortriptyline (PAMELOR) 50 MG capsule     Riboflavin (VITAMIN B-2 PO)     topiramate (TOPAMAX) 25 MG tablet     traZODone (DESYREL) 50 MG tablet     butalbital-acetaminophen-caffeine (FIORICET/ESGIC) -40 MG per tablet     fluticasone (FLONASE) 50 MCG/ACT nasal spray     No current facility-administered medications for this visit.      Past Medical History:   Patient Active Problem List   Diagnosis     Headache     Insomnia     Moderate dysplasia of cervix (BEVERLY II)     CARDIOVASCULAR SCREENING; LDL GOAL LESS THAN 160     Endometrial polyp     Iron deficiency anemia due to chronic blood loss     Rosacea     Onychodystrophy     Dermatitis     Osteopenia     Post-menopausal     Mild major depression (H)     IBS (irritable bowel syndrome)     Myopia     Regular astigmatism     Presbyopia     Posterior subcapsular polar senile cataract     Senile nuclear sclerosis     Macular puckering of retina     Cervicalgia     Pain in thoracic spine     Digital mucous cyst     Seborrheic keratosis, inflamed     Chronic pain of right knee     Acute pain of right knee     FREDIS (generalized  anxiety disorder)     Neoplasm of uncertain behavior of skin     Dermatitis, seborrheic     Plantar fasciitis     Endometrial cells on cervical Pap smear inconsistent with last menstrual period     Migraine without status migrainosus, not intractable     Acute appendicitis with localized peritonitis, without perforation, abscess, or gangrene     Mixed stress and urge urinary incontinence     Urinary frequency     Urgency of urination     Past Medical History:   Diagnosis Date     Anxiety      Arthritis 2010     Cervical high risk HPV (human papillomavirus) test positive 03/04/2016 03/04/2016, 09/12/19, 11/23/21     BEVERLY II (cervical intraepithelial neoplasia II) 2009    LEEP     Depression      Depressive disorder Adolescence    Managed with medication and therapy     Dry eye syndrome      Frequent UTI 2009     Headache(784.0)      IBS (irritable bowel syndrome)      Insomnia      Lattice degeneration of retina      Lyme disease      Myopic astigmatism      Nonsenile cataract      Osteopenia 2001,2012    Fosamax     Pneumothorax on right     at 15 y/o     PVD (posterior vitreous detachment), both eyes 07/19/2010     Rosacea      Vasomotor rhinitis        CC Joaquin Rowe MD  [address]  [place] [address] on close of this encounter.

## 2022-02-24 NOTE — PATIENT INSTRUCTIONS
Vanicream (available OTC), also sent to your pharmacy       Patient Education     Checking for Skin Cancer  You can find cancer early by checking your skin each month. There are 3 kinds of skin cancer. They are melanoma, basal cell carcinoma, and squamous cell carcinoma. Doing monthly skin checks is the best way to find new marks or skin changes. Follow the instructions below for checking your skin.   The ABCDEs of checking moles for melanoma   Check your moles or growths for signs of melanoma using ABCDE:     Asymmetry: the sides of the mole or growth don t match    Border: the edges are ragged, notched, or blurred    Color: the color within the mole or growth varies    Diameter: the mole or growth is larger than 6 mm (size of a pencil eraser)    Evolving: the size, shape, or color of the mole or growth is changing (evolving is not shown in the images below)    Checking for other types of skin cancer  Basal cell carcinoma or squamous cell carcinoma have symptoms such as:       A spot or mole that looks different from all other marks on your skin    Changes in how an area feels, such as itching, tenderness, or pain    Changes in the skin's surface, such as oozing, bleeding, or scaliness    A sore that does not heal    New swelling or redness beyond the border of a mole    Who s at risk?  Anyone can get skin cancer. But you are at greater risk if you have:     Fair skin, light-colored hair, or light-colored eyes    Many moles or abnormal moles on your skin    A history of sunburns from sunlight or tanning beds    A family history of skin cancer    A history of exposure to radiation or chemicals    A weakened immune system  If you have had skin cancer in the past, you are at risk for recurring skin cancer.   How to check your skin  Do your monthly skin checkups in front of a full-length mirror. Check all parts of your body, including your:     Head (ears, face, neck, and scalp)    Torso (front, back, and  sides)    Arms (tops, undersides, upper, and lower armpits)    Hands (palms, backs, and fingers, including under the nails)    Buttocks and genitals    Legs (front, back, and sides)    Feet (tops, soles, toes, including under the nails, and between toes)  If you have a lot of moles, take digital photos of them each month. Make sure to take photos both up close and from a distance. These can help you see if any moles change over time.   Most skin changes are not cancer. But if you see any changes in your skin, call your doctor right away. Only he or she can diagnose a problem. If you have skin cancer, seeing your doctor can be the first step toward getting the treatment that could save your life.   [employer] last reviewed this educational content on 4/1/2019 2000-2020 [employer]. [address] [place] [address]. All rights reserved. This information is not intended as a substitute for professional medical care. Always follow your healthcare professional's instructions.       When should I call my doctor?    If you are worsening or not improving, please, contact us or seek urgent care as noted below.     Who should I call with questions (adults)?    [facility] (adult and pediatric): [phone]    [facility] (adult): [phone]    For urgent needs outside of business hours call the [facility] at [phone] and ask for the dermatology resident on call to be paged    If this is a medical emergency and you are unable to reach an ER, Call [phone]    Who should I call with questions (pediatric)?  [facility]- Pediatric Dermatology  Dr. Gilda Marcelino, Dr. Jose Raul Singh, Dr. Melissa Jennings, Janeth De Jesus, PA, Dr. Neida Earl, Dr. Lluvia Quiroz & Dr. Marcelo Hernandez  Non-urgent nurse triage line; [phone]- Marily and Bridgette GARCIA Care Coordinatorsekou Arora (/Complex )  [phone]    If you need a prescription refill, please contact your pharmacy. Refills are approved or denied by our Physicians during normal business hours, Monday through Fridays  Per office policy, refills will not be granted if you have not been seen within the past year (or sooner depending on your child's condition)    Scheduling Information:  Pediatric Appointment Scheduling and Call Center [phone]  Radiology Scheduling- [phone]  Sedation Unit Scheduling- [phone]  [place] Scheduling- General [phone]; Pediatric Dermatology [phone]  Main  Services: [phone]  Togolese: [phone]  Cypriot: [phone]  Hmong/Fijian/Pepe: [phone]  Preadmission Nursing Department Fax Number: [phone] (Fax all pre-operative paperwork to this number)    For urgent matters arising during evenings, weekends, or holidays that cannot wait for normal business hours please call [phone] and ask for the dermatology resident on call to be paged.  Dry Skin    What is dry skin?    Common skin problem    Can be worse during the winter     Affects all ages    Occurs in people with or without other skin problems    What does it look like?    Fine lines in the skin become more visible     Rough feeling skin     Flaky skin    Most common on the arms and legs    Skin can become cracked, especially on the hands and feet    What are some problems caused by dry skin?     Itching    Rubbing or scratching can cause thickened, rough skin patches    Cracks in skin can be painful    Red, itchy, scaly skin (called eczema) can occur    Yellow crusting or pus could be signs of an infection    What causes dry skin?    A lack of water in the top layer of the skin    Too much soapy water,  hot water, or harsh chemicals    Aging and sun damage    How do I treat dry skin?    Shower or bathe daily for under ten minutes with lukewarm water and mild soap.    Pat yourself dry with a towel gently and leave  your skin slightly damp.    Use moisturizing cream or ointment right away.  Avoid lotions.    What kind of mild soap should I be using?    Camay , Dove , Tone , Neutrogena , Purpose , or Oil of Olay     A non-detergent cleanser, like Cetaphil , can be used.    What should I stay away from?    Scented soaps     Bath oils    What moisturizers should I be using?    Cetaphil Cream,CeraVe Cream, Vanicream, Aquaphilic, Eucerin, Aquaphor, or Vaseline     Always apply after showering or bathing.    Reapply throughout the day, if possible.    If dry skin affects your hands, always reapply after handwashing.    What else should I know?    Using a humidifier during winter months may help.    If dry skin gets worse or if eczema develops, a steroid cream may be needed.

## 2022-02-24 NOTE — PROGRESS NOTES
[facility] Dermatology Note  Encounter Date: Feb 24, 2022  Office Visit     Dermatology Problem List:  1. Seborrheic dermatitis, scalp  - Current tx: ketoconazole shampoo three times per week  2. Eczematous dermatitis  3. Erythematotelangiectatic rosacea  - Referred to cosmetic dermatology  4. Epidermal inclusion cyst, upper back  - s/p excisional bx of dilated pore of Nichole 8/1/2017  5. Scattered benign nevi  6. Seborrheic keratoses, non-irritated  7. 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 Lentigos  - Cosmetic dermatology referral to discuss laser   ____________________________________________    Assessment & Plan:   # Solar lentigos, face  - Discussed diligent sun protection  - Also discussed option of hydroquinone, but patient is interested in talking with cosmetic dermatology first about laser treatments     # Rosacea   Overall stable without current treatment, though patient bothered by some baseline erythema and wishes to discuss with cosmetic dermatology for possibly laser treatment.    # Benign skin findings  - Physical exam demonstrating benign skin findings as below.  - Seborrheic keratoses  - Cherry hemangiomas  - Solar lentingos   - Benign nevi  - Reassurance provided.  - ABCDEs of melanoma handout provided.  - Advised patient to return to clinic for any new or concerning lesions.    # Xerosis cutis  - Dry skin care handout provided  - Vanicream sent to pharmacy     Procedures Performed:   None    Follow-up: 1 year, prn for new or changing lesions    Staff and Resident:     Demetrius Cote MD  PGY-2 Dermatology  Pager: [phone]      Patient was seen and examined with the dermatology resident. I agree with the history, review of systems, physical examination, assessments and plan.    Lluvia Quiroz MD  Professor and  Chair  Department of Dermatology  [facility]  [place]  ____________________________________________    CC: Skin Check (pt states she is here for a full body skin check, itching skin after showers X 6 to 8 weeks. )    HPI:  Ms. Beronica Azevedo is a(n) 61 year old female who presents today as a return patient for skin check    - Patient reports she otherwise has been in her usual state of health.  No lesions of concern today, nothing bleeding, growing, or tender.  Notes that she is itchy 1 to 2 hours after she showers at night, tries to avoid hot showers.  No new soaps, detergents, or fragrance.  Has been applying a co-op lotion to her skin after she showers.  Showers a couple times per week sponge bathe was other nights.  -Cosmetically bothered by some brown spots and baseline redness of her face, interested in talking with cosmetic dermatology for this.  - The patient does have a history of frequent blistering sunburns as a child. There is no personal history of skin cancer. There is possible family history of skin cancer of her father. The patient denies being on immunosuppressive medications that could increase the risk of skin cancer. The patient does try to use sunscreen regularly when outside and does attempt to stay out of the sun whenever possible.  - Patient is otherwise feeling well, without additional skin concerns.    Labs Reviewed:  N/A    Physical Exam:  Vitals: LMP 04/06/2010 (Exact Date)   SKIN: Total skin excluding the undergarment areas was performed. The exam included the head/face, neck, both arms, chest, back, abdomen, both legs, digits and/or nails.   -Delgado skin type: I  -There are dome shaped bright red papules on the trunk.   Multiple regular brown pigmented macules and papules are identified on the trunk and upper extremities.   There are fine lines and dyspigmentation on sun exposed areas of the face and chest.  Scattered brown macules on sun exposed areas and face  There are waxy stuck on tan to brown papules on the trunk,  including the upper central back.  - At the central chest, there is a soft, somewhat mobile, roughly 4 mm nodule with a central punctum that appears non-irritated and is non-tender to palpation.  -No other lesions of concern on areas examined.   Medications:  Current Outpatient Medications   Medication     acetaminophen (TYLENOL) 325 MG tablet     BENADRYL ALLERGY PO     biotin 2.5 mg/mL     Coenzyme Q10 (CO Q10) 100 MG CAPS     eletriptan (RELPAX) 40 MG tablet     estradiol (ESTRACE) 0.1 MG/GM vaginal cream     ketoconazole (NIZORAL) 2 % external shampoo     magnesium 100 MG CAPS     nortriptyline (PAMELOR) 50 MG capsule     Riboflavin (VITAMIN B-2 PO)     topiramate (TOPAMAX) 25 MG tablet     traZODone (DESYREL) 50 MG tablet     butalbital-acetaminophen-caffeine (FIORICET/ESGIC) -40 MG per tablet     fluticasone (FLONASE) 50 MCG/ACT nasal spray     No current facility-administered medications for this visit.      Past Medical History:   Patient Active Problem List   Diagnosis     Headache     Insomnia     Moderate dysplasia of cervix (BEVERLY II)     CARDIOVASCULAR SCREENING; LDL GOAL LESS THAN 160     Endometrial polyp     Iron deficiency anemia due to chronic blood loss     Rosacea     Onychodystrophy     Dermatitis     Osteopenia     Post-menopausal     Mild major depression (H)     IBS (irritable bowel syndrome)     Myopia     Regular astigmatism     Presbyopia     Posterior subcapsular polar senile cataract     Senile nuclear sclerosis     Macular puckering of retina     Cervicalgia     Pain in thoracic spine     Digital mucous cyst     Seborrheic keratosis, inflamed     Chronic pain of right knee     Acute pain of right knee     FREDIS (generalized anxiety disorder)     Neoplasm of uncertain behavior of skin     Dermatitis, seborrheic     Plantar fasciitis     Endometrial cells on cervical Pap smear inconsistent with last menstrual period     Migraine without status migrainosus, not intractable     Acute  appendicitis with localized peritonitis, without perforation, abscess, or gangrene     Mixed stress and urge urinary incontinence     Urinary frequency     Urgency of urination     Past Medical History:   Diagnosis Date     Anxiety      Arthritis 2010     Cervical high risk HPV (human papillomavirus) test positive 03/04/2016 03/04/2016, 09/12/19, 11/23/21     BEVERLY II (cervical intraepithelial neoplasia II) 2009    LEEP     Depression      Depressive disorder Adolescence    Managed with medication and therapy     Dry eye syndrome      Frequent UTI 2009     Headache(784.0)      IBS (irritable bowel syndrome)      Insomnia      Lattice degeneration of retina      Lyme disease      Myopic astigmatism      Nonsenile cataract      Osteopenia 2001,2012    Fosamax     Pneumothorax on right     at 17 y/o     PVD (posterior vitreous detachment), both eyes 07/19/2010     Rosacea      Vasomotor rhinitis        CC Joaquin Rowe MD  [address]  [place] [address] on close of this encounter.

## 2022-03-24 ENCOUNTER — OFFICE VISIT (OUTPATIENT)
Dept: FAMILY MEDICINE | Facility: CLINIC | Age: 62
End: 2022-03-24
Payer: COMMERCIAL

## 2022-03-24 ENCOUNTER — HOSPITAL ENCOUNTER (OUTPATIENT)
Dept: GENERAL RADIOLOGY | Facility: CLINIC | Age: 62
Discharge: HOME | End: 2022-03-24
Attending: FAMILY MEDICINE | Admitting: FAMILY MEDICINE
Payer: COMMERCIAL

## 2022-03-24 DIAGNOSIS — M70.61 TROCHANTERIC BURSITIS OF RIGHT HIP: ICD-10-CM

## 2022-03-24 DIAGNOSIS — M70.61 TROCHANTERIC BURSITIS OF RIGHT HIP: Primary | ICD-10-CM

## 2022-03-24 PROCEDURE — 73502 X-RAY EXAM HIP UNI 2-3 VIEWS: CPT | Mod: 26 | Performed by: RADIOLOGY

## 2022-03-24 PROCEDURE — 73502 X-RAY EXAM HIP UNI 2-3 VIEWS: CPT

## 2022-03-24 PROCEDURE — 99214 OFFICE O/P EST MOD 30 MIN: CPT | Performed by: FAMILY MEDICINE

## 2022-03-24 NOTE — PROGRESS NOTES
Assessment & Plan     Trochanteric bursitis of right hip  At least 3 months   try ice/ rest  Follow up with consultant as planned.   - XR Pelvis and Hip Right 1 View; Future  - Physical Therapy Referral; Future                 No follow-ups on file.    Joaquin Rowe MD  [facility] SOFIA Loco is a 61 year old who presents for the following health issues     HPI     Hip aching times 2-3 months, no falls or trauma  Pain at times to buttock/ groin   no weakness or numbness      Review of Systems   Constitutional, HEENT, cardiovascular, pulmonary, gi and gu systems are negative, except as otherwise noted.      Objective    LMP 04/06/2010 (Exact Date)   There is no height or weight on file to calculate BMI.  Physical Exam   GENERAL: alert, no distress and fit  ABDOMEN: soft, nontender, no hepatosplenomegaly, no masses and bowel sounds normal  MS: normal muscle tone and tenderness to palpation right greater trochnater   hip  ROM normal   SKIN: no suspicious lesions or rashes  NEURO: Normal strength and tone, mentation intact and speech normal  PSYCH: mentation appears normal, affect normal/bright    Lab on 01/22/2022   Component Date Value Ref Range Status     COVID-19 Virus PCR - Result 01/22/2022 NOT DETECTED   Final    Comment: Not Detected    Collection of multiple specimens from the same patient may   be necessary to detect the virus. The possibility of a false   negative should be considered if the patient's recent   exposure or clinical presentation suggests 2019 nCOV   infection and diagnostic tests for other causes of illness   are negative. Repeat testing may be considered in this   setting.    Patient sample was heat inactivated and amplified using the   HDPCR(TM) SARS-CoV-2 assay ([employer] The HDPCRTM   SARS-CoV-2 assay is a reverse transcription real-time   polymerase chain reaction (qRT-PCR) test intended for the   qualitative detection of nucleic acid  from SARS-CoV-2 in   human nasopharyngeal swabs, oropharyngeal swabs, anterior   nasal swabs, mid-turbinate nasal swabs as well as nasal   aspirate, nasal wash, and bronchoalveolar lavage (BAL)   specimens from individuals who are suspected of COVID-19 by   their healthcare provider.    A negative result does not rule out the presence of                              real-time PCR inhibitors in the specimen or COVID-19 RNA in   concentrations below the limit of detection of the assay.   The possibility of a false negative should be considered if   the patients recent exposure or clinical presentation   suggests COVID-19. Additional testing or repeat testing   requires consultation with the laboratory.    Nasopharyngeal specimen is the preferred choice for   swab-based SARS CoV2 testing. When collection of a   nasopharyngeal swab is not possible the following are   acceptable alternatives:  an oropharyngeal (OP) specimen collected by a healthcare   professional, or nasal mid-turbinate (NMT) swab collected by   a healthcare professional or by onsite self-collection   (using a flocked tapered swab), or an anterior nares   specimen collected by a healthcare professional or by onsite   self-collection (using a round foam swab). ([facility]   [facility])    Testing performed by [facility] at   the [employer] [facility]                            [facility] [address] [place] [address].    The test performance characteristics were determined by   [facility]. It has not been cleared or approved by the FDA.    The laboratory is regulated under the Clinical Laboratory   Improvement Amendments of 1988 (CLIA-88) as qualified to   perform high-complexity testing. This test is used for   clinical purposes. It should not be regarded as   investigational or for research.

## 2022-04-15 ENCOUNTER — OFFICE VISIT (OUTPATIENT)
Dept: DERMATOLOGY | Facility: CLINIC | Age: 62
End: 2022-04-15
Attending: DERMATOLOGY
Payer: COMMERCIAL

## 2022-04-15 DIAGNOSIS — L71.9 ROSACEA: ICD-10-CM

## 2022-04-15 DIAGNOSIS — Z29.9 PROPHYLACTIC MEASURE: ICD-10-CM

## 2022-04-15 DIAGNOSIS — L81.4 LENTIGO: Primary | ICD-10-CM

## 2022-04-15 PROCEDURE — 99213 OFFICE O/P EST LOW 20 MIN: CPT | Performed by: DERMATOLOGY

## 2022-04-15 RX ORDER — VALACYCLOVIR HYDROCHLORIDE 500 MG/1
500 TABLET, FILM COATED ORAL 2 TIMES DAILY
Qty: 14 TABLET | Refills: 2 | Status: SHIPPED | OUTPATIENT
Start: 2022-04-15 | End: 2023-02-24

## 2022-04-15 NOTE — NURSING NOTE
Dermatology Rooming Note    Beronica Azevedo's goals for this visit include:   Chief Complaint   Patient presents with     Derm Problem     Beronica is here today to discuss treatment for red and brown spots on face.

## 2022-04-15 NOTE — PROGRESS NOTES
[facility] Dermatology Note  Encounter Date: Apr 15, 2022  Office Visit     Dermatology Problem List:  Last skin check 2/24/22, recommended yearly  1. Seborrheic dermatitis, scalp  - Current tx: ketoconazole shampoo three times per week  2. Eczematous dermatitis  3. Erythematotelangiectatic rosacea  - Consider PDL in the future  4. Epidermal inclusion cyst, upper back  - s/p excisional bx of dilated pore of Nichole 8/1/2017  5. Scattered benign nevi  6. Seborrheic keratoses, non-irritated  7. 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 Lentigines  - Recommended clear and brilliant laser x3    Social history:   Family history:   ____________________________________________    Assessment & Plan:     # Solar lentigines: Chronic, stable.  - Today we discussed treatment options including Clear and Brilliant (less aggressive) or Fraxel (more aggressive). Reviewed Clear and Brilliant would entail 3-6 treatments spaced 1 month apart. Quoted ~$750 for 3 treatments. Reviewed Fraxel would entail 1-3 treatments spaced 1 month apart. Quoted ~$1050 for one treatment. Patient notified should not be tan for these procedures. For both lasers, we discussed risks including scar, blisters, bleeding, dyspigmentation, infection. We start all patients on valtrex 500mg BID 2 days prior to laser given risk of cold sore reactivation.  - patient opted for Clear and [place] laser. Will have nursing staff call to schedule.    # Rosacea, ET type: Chronic, stable.  - Today we discussed treatment with pulsed dye laser (PDL). Discussed risks in detail, including eye injury, swelling, bruising, pigmentary changes, hair loss, blistering/scar. Reviewed that on average 3-6 treatments are needed. Discussed out of pocket expense.   - Recommended treating solar lentigines first then considering PDL.    # Sebaceous hyperplasia: Chronic, stable.  - Discussed treatment with  hyfrecation, expectations, risks, and out of pocket cost.  - Patient will decide if she wants to do this in the future. Would not do at same time as lasers.    Procedures Performed:   None    Follow-up: PRN    Staff:     Yesika Loaiza MD    Department of Dermatology  [facility]: Phone: [phone], Fax:[phone]  [facility]: Phone: [phone], Fax: [phone]    ____________________________________________    CC: No chief complaint on file.    HPI:  Ms. Beronica Azevedo is a(n) 62 year old female who presents today as a return patient for cosmetic concerns.    She notes brown spots on the face, redness at glabella and nasolabial folds, and bumps on the forehead that she would like addressed today.    Patient is otherwise feeling well, without additional skin concerns.     Labs Reviewed:  N/A    Physical Exam:  Vitals: LMP 04/06/2010 (Exact Date)   SKIN: Focused exam of the face.  - Delgado Type I-II  - Scattered brown macules on sun exposed areas.  - Subtle pink erythema at medial cheeks and glabella.   - Sebaceous hyperplasia x2 on the forehead.  - No other lesions of concern on areas examined.       Medications:  Current Outpatient Medications   Medication     acetaminophen (TYLENOL) 325 MG tablet     BENADRYL ALLERGY PO     biotin 2.5 mg/mL     butalbital-acetaminophen-caffeine (FIORICET/ESGIC) -40 MG per tablet     Coenzyme Q10 (CO Q10) 100 MG CAPS     eletriptan (RELPAX) 40 MG tablet     emollient (VANICREAM) external cream     estradiol (ESTRACE) 0.1 MG/GM vaginal cream     fluticasone (FLONASE) 50 MCG/ACT nasal spray     ketoconazole (NIZORAL) 2 % external shampoo     magnesium 100 MG CAPS     nortriptyline (PAMELOR) 50 MG capsule     Riboflavin (VITAMIN B-2 PO)     topiramate (TOPAMAX) 25 MG tablet     traZODone (DESYREL) 50 MG tablet     No current  facility-administered medications for this visit.      Past Medical History:   Patient Active Problem List   Diagnosis     Headache     Insomnia     Moderate dysplasia of cervix (BEVERLY II)     CARDIOVASCULAR SCREENING; LDL GOAL LESS THAN 160     Endometrial polyp     Iron deficiency anemia due to chronic blood loss     Rosacea     Onychodystrophy     Dermatitis     Osteopenia     Post-menopausal     Mild major depression (H)     IBS (irritable bowel syndrome)     Myopia     Regular astigmatism     Presbyopia     Posterior subcapsular polar senile cataract     Senile nuclear sclerosis     Macular puckering of retina     Cervicalgia     Pain in thoracic spine     Digital mucous cyst     Seborrheic keratosis, inflamed     Chronic pain of right knee     Acute pain of right knee     FREDIS (generalized anxiety disorder)     Neoplasm of uncertain behavior of skin     Dermatitis, seborrheic     Plantar fasciitis     Endometrial cells on cervical Pap smear inconsistent with last menstrual period     Migraine without status migrainosus, not intractable     Acute appendicitis with localized peritonitis, without perforation, abscess, or gangrene     Mixed stress and urge urinary incontinence     Urinary frequency     Urgency of urination     Past Medical History:   Diagnosis Date     Anxiety      Arthritis 2010     Cervical high risk HPV (human papillomavirus) test positive 03/04/2016 03/04/2016, 09/12/19, 11/23/21     BEVERLY II (cervical intraepithelial neoplasia II) 2009    LEEP     Depression      Depressive disorder Adolescence    Managed with medication and therapy     Dry eye syndrome      Frequent UTI 2009     Headache(784.0)      IBS (irritable bowel syndrome)      Insomnia      Lattice degeneration of retina      Lyme disease      Myopic astigmatism      Nonsenile cataract      Osteopenia 2001,2012    Fosamax     Pneumothorax on right     at 17 y/o     PVD (posterior vitreous detachment), both eyes 07/19/2010     Rosacea       Vasomotor rhinitis        CC Joaquin Rowe MD  [address]  [place] [address] on close of this encounter.

## 2022-04-15 NOTE — LETTER
4/15/2022       RE: Beronica Azevedo  [address]  [facility] [address]     Dear Colleague,    Thank you for referring your patient, Beronica Azevedo, to the [facility] [place] [facility]. Please see a copy of my visit note below.    [facility] Dermatology Note  Encounter Date: Apr 15, 2022  Office Visit     Dermatology Problem List:  Last skin check 2/24/22, recommended yearly  1. Seborrheic dermatitis, scalp  - Current tx: ketoconazole shampoo three times per week  2. Eczematous dermatitis  3. Erythematotelangiectatic rosacea  - Consider PDL in the future  4. Epidermal inclusion cyst, upper back  - s/p excisional bx of dilated pore of Nichole 8/1/2017  5. Scattered benign nevi  6. Seborrheic keratoses, non-irritated  7. 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 Lentigines  - Recommended clear and brilliant laser x3    Social history:   Family history:   ____________________________________________    Assessment & Plan:     # Solar lentigines: Chronic, stable.  - Today we discussed treatment options including Clear and Brilliant (less aggressive) or Fraxel (more aggressive). Reviewed Clear and Brilliant would entail 3-6 treatments spaced 1 month apart. Quoted ~$750 for 3 treatments. Reviewed Fraxel would entail 1-3 treatments spaced 1 month apart. Quoted ~$1050 for one treatment. Patient notified should not be tan for these procedures. For both lasers, we discussed risks including scar, blisters, bleeding, dyspigmentation, infection. We start all patients on valtrex 500mg BID 2 days prior to laser given risk of cold sore reactivation.  - patient opted for Clear and [place] laser. Will have nursing staff call to schedule.    # Rosacea, ET type: Chronic, stable.  - Today we discussed treatment with pulsed dye laser (PDL). Discussed risks in detail,  including eye injury, swelling, bruising, pigmentary changes, hair loss, blistering/scar. Reviewed that on average 3-6 treatments are needed. Discussed out of pocket expense.   - Recommended treating solar lentigines first then considering PDL.    # Sebaceous hyperplasia: Chronic, stable.  - Discussed treatment with hyfrecation, expectations, risks, and out of pocket cost.  - Patient will decide if she wants to do this in the future. Would not do at same time as lasers.    Procedures Performed:   None    Follow-up: PRN    Staff:     Yesika Loaiza MD    Department of Dermatology  [facility]: Phone: [phone], Fax:[phone]  [facility]: Phone: [phone], Fax: [phone]    ____________________________________________    CC: No chief complaint on file.    HPI:  Ms. Beronica Azevedo is a(n) 62 year old female who presents today as a return patient for cosmetic concerns.    She notes brown spots on the face, redness at glabella and nasolabial folds, and bumps on the forehead that she would like addressed today.    Patient is otherwise feeling well, without additional skin concerns.     Labs Reviewed:  N/A    Physical Exam:  Vitals: LMP 04/06/2010 (Exact Date)   SKIN: Focused exam of the face.  - Delgado Type I-II  - Scattered brown macules on sun exposed areas.  - Subtle pink erythema at medial cheeks and glabella.   - Sebaceous hyperplasia x2 on the forehead.  - No other lesions of concern on areas examined.       Medications:  Current Outpatient Medications   Medication     acetaminophen (TYLENOL) 325 MG tablet     BENADRYL ALLERGY PO     biotin 2.5 mg/mL     butalbital-acetaminophen-caffeine (FIORICET/ESGIC) -40 MG per tablet     Coenzyme Q10 (CO Q10) 100 MG CAPS     eletriptan (RELPAX) 40 MG tablet     emollient (VANICREAM) external cream     estradiol (ESTRACE) 0.1 MG/GM  vaginal cream     fluticasone (FLONASE) 50 MCG/ACT nasal spray     ketoconazole (NIZORAL) 2 % external shampoo     magnesium 100 MG CAPS     nortriptyline (PAMELOR) 50 MG capsule     Riboflavin (VITAMIN B-2 PO)     topiramate (TOPAMAX) 25 MG tablet     traZODone (DESYREL) 50 MG tablet     No current facility-administered medications for this visit.      Past Medical History:   Patient Active Problem List   Diagnosis     Headache     Insomnia     Moderate dysplasia of cervix (BEVERLY II)     CARDIOVASCULAR SCREENING; LDL GOAL LESS THAN 160     Endometrial polyp     Iron deficiency anemia due to chronic blood loss     Rosacea     Onychodystrophy     Dermatitis     Osteopenia     Post-menopausal     Mild major depression (H)     IBS (irritable bowel syndrome)     Myopia     Regular astigmatism     Presbyopia     Posterior subcapsular polar senile cataract     Senile nuclear sclerosis     Macular puckering of retina     Cervicalgia     Pain in thoracic spine     Digital mucous cyst     Seborrheic keratosis, inflamed     Chronic pain of right knee     Acute pain of right knee     FREDIS (generalized anxiety disorder)     Neoplasm of uncertain behavior of skin     Dermatitis, seborrheic     Plantar fasciitis     Endometrial cells on cervical Pap smear inconsistent with last menstrual period     Migraine without status migrainosus, not intractable     Acute appendicitis with localized peritonitis, without perforation, abscess, or gangrene     Mixed stress and urge urinary incontinence     Urinary frequency     Urgency of urination     Past Medical History:   Diagnosis Date     Anxiety      Arthritis 2010     Cervical high risk HPV (human papillomavirus) test positive 03/04/2016 03/04/2016, 09/12/19, 11/23/21     BEVERLY II (cervical intraepithelial neoplasia II) 2009    LEEP     Depression      Depressive disorder Adolescence    Managed with medication and therapy     Dry eye syndrome      Frequent UTI 2009     Headache(784.0)       IBS (irritable bowel syndrome)      Insomnia      Lattice degeneration of retina      Lyme disease      Myopic astigmatism      Nonsenile cataract      Osteopenia 2001,2012    Fosamax     Pneumothorax on right     at 17 y/o     PVD (posterior vitreous detachment), both eyes 07/19/2010     Rosacea      Vasomotor rhinitis        CC Joaquin Rowe MD  [address]  [place] [address] on close of this encounter.

## 2022-04-15 NOTE — PATIENT INSTRUCTIONS
PRECAUTIONS TO CONSIDER BEFORE CLEAR AND BRILLIANT  TREATMENTS  SIX TO TWELVE MONTHS BEFORE TREATMENT  Stop use of Accutane    TWO WEEKS BEFORE TREATMENT  Stop use of all Retinols   Retin-A (tretinoin), Tazorac, Differin (adapalene),  anti-aging  products  Stop use of all glycolic acid treatments (longer if deeper peel)  Stop use of all salicylic acid products  Stop excessive sun exposure 2-4 weeks prior to treatment  Stop waxing  Stop abrasive scrubs  Stop microdermabrasion treatments  Talk to your doctor if you have ever had a cold sore    OTHER PRE-TREATMENT CONSIDERATIONS  Are you pregnant or breastfeeding?   If yes, you are not a candidate at this time.     Stay Hydrated!  Drink plenty of water before, during and after your treatment.  This will help improve your healing process.      Tendency to hyperpigment?   Talk to your doctor about starting a bleaching regiment four to six weeks before your treatment series    Be prepared!  Two to three weeks pre and post treatment, it is a good idea to avoid excessive sun exposure.  Broad spectrum (UVA/UVB) sunblock must be worn to protect from sun exposure.    Patients with history of autoimmune disease (such as lupus and rheumatoid arthritis) are not candidates for treatment.    Patients with history of keloid formation, active bacterial, viral or fungal infections are not candidates for treatment.        What to Expect After Clear and Brilliant Treatment:    Clear and [place] treatment produces side effects. Notify your physician if the severity of your side effects becomes a problem for you.    I will experience redness, swelling, peeling, pain, and heat sensation. I may experience bleeding, oozing, itching, or acne. Risks are blistering, permanent scarring, temporary or permanent skin lightening or darkening, infection, and eye injury. I understand my outcome could be no improvement, slight improvement. Multiple treatments may be required.    What you may feel and  look like:    Immediately after the treatment, you will experience redness, swelling and sometimes pinpoint bleeding. You will notice most of the swelling on the first morning after treatment, particularly under the eyes. Swelling usually lasts two to three days. To minimize swelling do the following:  Apply cold compresses to the treatment area for 10 minutes of every hour on the day of treatment, until you go to bed.  Sleep elevated the first night. Use as many pillows as you can tolerate.    Heat sensation can be intense for the following 2 - 3 hours. Occasionally oozing can occur in isolated areas for a few days as well.    Over the next few days, redness may worsen. Swelling may be significant and cause some discomfort. Call the clinic if swelling is more than mild.     Call the clinic for blisters or scabs    You may also notice that your skin appears bronzed or little dark dots will appear on the treated area. Your skin may feel dry, peel, or flake. You may notice a  sandpaper  texture a few days after treatment. This is the treated tissue working its way out of your body as new fresh skin is regenerated.    This dead skin is a normal result of laser treatment, and should start sloughing off 3 - 4 days after the treatment. Most patients complete this process 5 - 7 days after a treatment on the face.     Once the sloughing is complete, you may notice some pinkness over the next few weeks. Most redness resolves during the first week after treatment, but a soumya  glow  can remain for several weeks. If you wish, you can apply makeup to minimize the redness.    Some patients have also experienced itching.        How to Care for Your Skin After Treatment:    Immediately After Treatment. Use a bland moisturizer (i.e. Cetaphil  cream).  Use of icepacks helps alleviate the heat sensation. You may also cleanse your face with a mild cleanser.    First Few Days. Continue cleansing and moisturizing over the next few days.  Once the sloughing starts, please allow your skin to heal and DO NOT scrub, rub, or use exfoliants. Keep clothing away from treated body parts as much as possible to avoid irritation. Do not sleep flat. Sleep with head elevated/sitting up.     First Week of Healing. Keep treated area clean; avoid smoking, excessive alcohol consumption, excessive exercise, perspiring, swimming, or exposing skin to heat and sun.    Skin Care Products. All of your skin care products should be non-irritating and non-clogging for the first week or so after a Fraxel treatment. Examples of brands that offer very gentle and inexpensive products that are ideal to use: Vanicream,  CeraVe  or Cetaphil.     Scrubs, Toners, Glycolic Acid, and Retin A. Your skin will be sensitive for the first week or so after treatment. Do not use products that will cause irritation during this time. Do not use abrasive scrubs, toners, or products that contain glycolyic acids or Retin A. Read the product labels.     Normal Skin Care Regimen. Once the sloughing is complete, you may resume your routine skin care and make-up products, as long as they are tolerable to you.    Sunscreen. It is very important that you use sunscreen to prevent sun damage to the skin. Sunscreen should offer broadband protection (UVA and UVB) and have a sun protection factor (SPF) of 30 or more. Once sloughing is complete, use sunscreen daily for at least 3 months after your last treatment. Apply sunscreen 20 minutes before going outside, and again, immediately before. Reapply sunscreen every 2 hours. If direct sun exposure is necessary, wear a hat and clothing that covers the treated area. Your practice of diligent sunscreen use may lower the risk of laser-induced hyperpigmentation (darker color).     Moisturizer. Remember that peeling and/or flaking is normal during the healing process. Therefore, the moisturizer you use should be non-irritating and non-clogging, or else you could  develop breakouts. During the healing period, your normal moisturizer may be too occlusive, so consider products from the brands listed above. Instead of using 2 separate products, use moisturizers that contain SPF30+. Reapply whenever your skin feels dry.    Bleaching Creams (hydroquinone). Discontinue use of your bleaching cream while your skin is tender.    Resume your normal skin care regimen when your skin has fully healed.    Cold Sores. If you were prescribed Valtrex, complete the full course.    Abnormal Healing. If you notice any blisters, cuts, bruises, crusting/scabs, areas of raw skin, ulcerations, active bleeding, increased discomfort or pain, pigment changes (lighter or darker than usual complexion), or any other problems, please contact us as soon as possible.      Who should I call with questions?  [facility]: [phone]    [facility]: [phone]    For urgent needs outside of business hours call the [facility] at [phone] and ask for the dermatology resident on call    [employer] messaging response may be delayed

## 2022-04-19 ENCOUNTER — TELEPHONE (OUTPATIENT)
Dept: DERMATOLOGY | Facility: CLINIC | Age: 62
End: 2022-04-19
Payer: COMMERCIAL

## 2022-04-19 NOTE — TELEPHONE ENCOUNTER
I left a message for patient to call [facility].  Jayda Cunningham RN      PRECAUTIONS TO CONSIDER BEFORE CLEAR AND BRILLIANT  TREATMENTS  SIX TO TWELVE MONTHS BEFORE TREATMENT    Stop use of Accutane    TWO WEEKS BEFORE TREATMENT    Stop use of all Retinols   o Retin-A (tretinoin), Tazorac, Differin (adapalene),  anti-aging  products    Stop use of all glycolic acid treatments (longer if deeper peel)    Stop use of all salicylic acid products    Stop excessive sun exposure 2-4 weeks prior to treatment    Stop waxing    Stop abrasive scrubs    Stop microdermabrasion treatments    Talk to your doctor if you have ever had a cold sore    OTHER PRE-TREATMENT CONSIDERATIONS    Are you pregnant or breastfeeding?   o If yes, you are not a candidate at this time.       Stay Hydrated!  o Drink plenty of water before, during and after your treatment.  This will help improve your healing process.        Tendency to hyperpigment?   o Talk to your doctor about starting a bleaching regiment four to six weeks before your treatment series      Be prepared!  o Two to three weeks pre and post treatment, it is a good idea to avoid excessive sun exposure.  o Broad spectrum (UVA/UVB) sunblock must be worn to protect from sun exposure.      Patients with history of autoimmune disease (such as lupus and rheumatoid arthritis) are not candidates for treatment.      Patients with history of keloid formation, active bacterial, viral or fungal infections are not candidates for treatment.

## 2022-04-19 NOTE — TELEPHONE ENCOUNTER
----- Message from Yesika Loaiza MD sent at 4/15/2022  9:12 AM CDT -----  Hello,    [place] [facility] [place] do face Clear and Brilliant x3. Please call to schedule. Valtrex already sent.    Thanks,    Yesika Loaiza MD    Department of Dermatology  [facility]: Phone: [phone], Fax:[phone]  [facility]: Phone: [phone], Fax: [phone]

## 2022-04-22 ENCOUNTER — THERAPY VISIT (OUTPATIENT)
Dept: PHYSICAL THERAPY | Facility: CLINIC | Age: 62
End: 2022-04-22
Payer: COMMERCIAL

## 2022-04-22 DIAGNOSIS — M70.61 TROCHANTERIC BURSITIS OF RIGHT HIP: ICD-10-CM

## 2022-04-22 PROCEDURE — 97161 PT EVAL LOW COMPLEX 20 MIN: CPT | Mod: GP

## 2022-04-22 PROCEDURE — 97110 THERAPEUTIC EXERCISES: CPT | Mod: GP

## 2022-04-22 NOTE — PROGRESS NOTES
Physical Therapy Initial Evaluation  4/22/2022     Precautions/Restrictions/MD instructions: evaluate and treat    Therapist Assessment: Beronica Azevedo is a 62 year old female patient presenting to Physical Therapy with right hip pain. Patient demonstrates limited hip ROM, glute weakness and poor squat mechanics. 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 These impairments limit their ability to walk, sleep and participate in yoga. Skilled PT services are necessary in order to reduce impairments and improve independent function.    Subjective:   Chief Complaint: Right hip pain  Associated symptoms: no paresthesia, no mechanical symptoms  Onset date: 2 months ago  PARRISH: Insidious  Pain severity: 0/10 current, 7/10 worst  Location of pain: groin, lateral hip and lateral leg  Quality of Pain: sharp, localized pain    Better: ice, ibuprofen,   Worse:  Sleeping on the side, walking >3 blocks, yoga  Progression of Symptoms since onset:  Since onset, these symptoms are getting worse  Previous treatment: has included ice, rest, ibuprofen; effect was good  Previous Functional Status:  fully functional prior to pain onset/injury.       PMH: see epic   Surgical history/trauma:  None. She denies any significant current illness or recent hospital admissions. She denies any regional implanted devices.  Imaging: x-ray mild degenerative changes of hip  Medications: see epic    Occupation: Crisis responder Job duties: sitting, computer work, phone calls  Current exercise regimen/Recreational activities: PT exercises for low back, yoga, walking  Barriers to treatment: none    Red Flags: (Bold when present) - reviewed the following and denies  Malaise, unexplained weight loss, night pain, fever      Patient's Goal(s): walk 2 miles     Objective:  Gait: decreased right hip extension     SL Balance:   R: 15 sec; observations: pain in lateral hip, lateral trunk lean    L: 30 sec; observations: improved stability    Functional:   - Double limb squat: narrow base of support, excessive anterior knee translation, weight shifted to left side  - SL squat: Contralateral hip drop B      HIP: (* indicates patient's primary complaint)   MMT R MMT L PROM R PROM L   Flexion 5 5- wnl wnl   IR   Limited, painful    ER 4 4     abduction 3+ 4     Extension 4- 4       KNEE:   MMT R MMT L   Extension 5 5   Flexion 5 5       Special tests: blank if not tested; * indicates patient's primary complaint   R L   Michael's     Jalen +    SHER     FADIR + -   Scour - -   SLR     Hamstring 90-90     Log Roll     SIJ Compression       Palpation: R piriformis, glute med, greater trochanter     Other: External rotation derotation test: + right               ASSESSMENT/PLAN  Patient is a 62 year old female with right side hip complaints.    Patient has the following significant findings with corresponding treatment plan.                Diagnosis 1:  Right hip pain    Pain -  manual therapy, splint/taping/bracing/orthotics, self management, education and home program  Decreased ROM/flexibility - manual therapy, therapeutic exercise and home program  Decreased strength - therapeutic exercise, therapeutic activities and home program  Impaired gait - gait training and home program    Therapy Evaluation Codes:   Cumulative Therapy Evaluation is: Low complexity.    Previous and current functional limitations:  (See Goal Flow Sheet for this information)    Short term and Long term goals: (See Goal Flow Sheet for this information)     Communication ability:  Patient appears to be able to clearly communicate and understand verbal and written communication and follow directions correctly.  Treatment Explanation - The following has been discussed with the patient:   RX ordered/plan of care  Anticipated outcomes  Possible risks and side effects  This patient would benefit from PT intervention to resume normal activities.   Rehab potential is good.    Frequency:  1 X  week, once daily  Duration:  for 6 weeks  Discharge Plan:  Achieve all LTG.  Independent in home treatment program.  Reach maximal therapeutic benefit.    Please refer to the daily flowsheet for treatment today, total treatment time and time spent performing 1:1 timed codes.

## 2022-04-26 NOTE — TELEPHONE ENCOUNTER
Per chart review, patient read [employer] message.  Will await her call back.  Closing encounter.  Jayda Cunningham RN

## 2022-04-29 ENCOUNTER — THERAPY VISIT (OUTPATIENT)
Dept: PHYSICAL THERAPY | Facility: CLINIC | Age: 62
End: 2022-04-29
Attending: FAMILY MEDICINE
Payer: COMMERCIAL

## 2022-04-29 DIAGNOSIS — M70.61 TROCHANTERIC BURSITIS OF RIGHT HIP: Primary | ICD-10-CM

## 2022-04-29 PROCEDURE — 97110 THERAPEUTIC EXERCISES: CPT | Mod: GP

## 2022-05-06 ENCOUNTER — THERAPY VISIT (OUTPATIENT)
Dept: PHYSICAL THERAPY | Facility: CLINIC | Age: 62
End: 2022-05-06
Payer: COMMERCIAL

## 2022-05-06 DIAGNOSIS — M70.61 TROCHANTERIC BURSITIS OF RIGHT HIP: Primary | ICD-10-CM

## 2022-05-06 PROCEDURE — 97140 MANUAL THERAPY 1/> REGIONS: CPT | Mod: GP

## 2022-05-06 PROCEDURE — 97110 THERAPEUTIC EXERCISES: CPT | Mod: GP

## 2022-05-20 ENCOUNTER — THERAPY VISIT (OUTPATIENT)
Dept: PHYSICAL THERAPY | Facility: CLINIC | Age: 62
End: 2022-05-20
Payer: COMMERCIAL

## 2022-05-20 DIAGNOSIS — M70.61 TROCHANTERIC BURSITIS OF RIGHT HIP: Primary | ICD-10-CM

## 2022-05-20 PROCEDURE — 97110 THERAPEUTIC EXERCISES: CPT | Mod: GP

## 2022-05-20 PROCEDURE — 97140 MANUAL THERAPY 1/> REGIONS: CPT | Mod: GP

## 2022-05-20 ASSESSMENT — ACTIVITIES OF DAILY LIVING (ADL)
ROLLING_OVER_IN_BED: MODERATE DIFFICULTY
HOS_ADL_COUNT: 17
GOING_DOWN_1_FLIGHT_OF_STAIRS: NO DIFFICULTY AT ALL
SITTING_FOR_15_MINUTES: NO DIFFICULTY AT ALL
GETTING_INTO_AND_OUT_OF_AN_AVERAGE_CAR: NO DIFFICULTY AT ALL
RECREATIONAL_ACTIVITIES: SLIGHT DIFFICULTY
GETTING_INTO_AND_OUT_OF_A_BATHTUB: SLIGHT DIFFICULTY
HEAVY_WORK: MODERATE DIFFICULTY
LIGHT_TO_MODERATE_WORK: SLIGHT DIFFICULTY
WALKING_UP_STEEP_HILLS: SLIGHT DIFFICULTY
WALKING_INITIALLY: SLIGHT DIFFICULTY
WALKING_15_MINUTES_OR_GREATER: SLIGHT DIFFICULTY
HOW_WOULD_YOU_RATE_YOUR_CURRENT_LEVEL_OF_FUNCTION_DURING_YOUR_USUAL_ACTIVITIES_OF_DAILY_LIVING_FROM_0_TO_100_WITH_100_BEING_YOUR_LEVEL_OF_FUNCTION_PRIOR_TO_YOUR_HIP_PROBLEM_AND_0_BEING_THE_INABILITY_TO_PERFORM_ANY_OF_YOUR_USUAL_DAILY_ACTIVITIES?: 85
STEPPING_UP_AND_DOWN_CURBS: SLIGHT DIFFICULTY
TWISTING/PIVOTING_ON_INVOLVED_LEG: EXTREME DIFFICULTY
WALKING_APPROXIMATELY_10_MINUTES: SLIGHT DIFFICULTY
HOS_ADL_SCORE(%): 72.06
DEEP_SQUATTING: MODERATE DIFFICULTY
PUTTING_ON_SOCKS_AND_SHOES: NO DIFFICULTY AT ALL
STANDING_FOR_15_MINUTES: MODERATE DIFFICULTY
WALKING_DOWN_STEEP_HILLS: NO DIFFICULTY AT ALL
HOS_ADL_ITEM_SCORE_TOTAL: 49
HOS_ADL_HIGHEST_POTENTIAL_SCORE: 68
GOING_UP_1_FLIGHT_OF_STAIRS: NO DIFFICULTY AT ALL

## 2022-05-27 ENCOUNTER — THERAPY VISIT (OUTPATIENT)
Dept: PHYSICAL THERAPY | Facility: CLINIC | Age: 62
End: 2022-05-27
Payer: COMMERCIAL

## 2022-05-27 DIAGNOSIS — M70.61 TROCHANTERIC BURSITIS OF RIGHT HIP: Primary | ICD-10-CM

## 2022-05-27 PROCEDURE — 97140 MANUAL THERAPY 1/> REGIONS: CPT | Mod: GP

## 2022-05-27 PROCEDURE — 97110 THERAPEUTIC EXERCISES: CPT | Mod: GP

## 2022-06-17 ENCOUNTER — THERAPY VISIT (OUTPATIENT)
Dept: PHYSICAL THERAPY | Facility: CLINIC | Age: 62
End: 2022-06-17
Payer: COMMERCIAL

## 2022-06-17 DIAGNOSIS — M70.61 TROCHANTERIC BURSITIS OF RIGHT HIP: Primary | ICD-10-CM

## 2022-06-17 PROCEDURE — 97112 NEUROMUSCULAR REEDUCATION: CPT | Mod: GP

## 2022-06-17 NOTE — PROGRESS NOTES
PROGRESS  REPORT    Progress reporting period is from 4/22/22 to 6/17/22.       SUBJECTIVE  Subjective changes noted by patient:  Still having pain squatting low and walking fast.    Current pain level is 0/10  .     Changes in function:  Yes (See Goal flowsheet attached for changes in current functional level)  Adverse reaction to treatment or activity: None    OBJECTIVE  Changes noted in objective findings:  Yes  Objective: Poor form single leg squat with femoral add/IR. Improved with external cues.     ASSESSMENT/PLAN  Updated problem list and treatment plan: Diagnosis 1:  Right hip pain  Pain -  hot/cold therapy, manual therapy, self management, education and home program  Decreased ROM/flexibility - manual therapy, therapeutic exercise and home program  Decreased strength - therapeutic exercise, therapeutic activities and home program  Decreased proprioception - neuro re-education, therapeutic activities and home program  Impaired gait - gait training and home program  STG/LTGs have been met or progress has been made towards goals:  Yes (See Goal flow sheet completed today.)  Assessment of Progress: The patient's condition is improving.  Patient is meeting short term goals and is progressing towards long term goals.  Self Management Plans:  Patient has been instructed in a home treatment program.  Patient  has been instructed in self management of symptoms.  I have re-evaluated this patient and find that the nature, scope, duration and intensity of the therapy is appropriate for the medical condition of the patient.  Beronica continues to require the following intervention to meet STG and LTG's:  PT    Recommendations:  This patient would benefit from continued therapy.     Frequency:  2 X a month, once daily  Duration:  for 2 months        Please refer to the daily flowsheet for treatment today, total treatment time and time spent performing 1:1 timed codes.

## 2022-07-01 ENCOUNTER — THERAPY VISIT (OUTPATIENT)
Dept: PHYSICAL THERAPY | Facility: CLINIC | Age: 62
End: 2022-07-01
Payer: COMMERCIAL

## 2022-07-01 DIAGNOSIS — M70.61 TROCHANTERIC BURSITIS OF RIGHT HIP: Primary | ICD-10-CM

## 2022-07-01 PROCEDURE — 97112 NEUROMUSCULAR REEDUCATION: CPT | Mod: GP

## 2022-07-01 PROCEDURE — 97110 THERAPEUTIC EXERCISES: CPT | Mod: GP

## 2022-07-01 ASSESSMENT — ACTIVITIES OF DAILY LIVING (ADL)
GOING_DOWN_1_FLIGHT_OF_STAIRS: NO DIFFICULTY AT ALL
GETTING_INTO_AND_OUT_OF_A_BATHTUB: NO DIFFICULTY AT ALL
GETTING_INTO_AND_OUT_OF_AN_AVERAGE_CAR: SLIGHT DIFFICULTY
WALKING_APPROXIMATELY_10_MINUTES: NO DIFFICULTY AT ALL
HEAVY_WORK: EXTREME DIFFICULTY
TWISTING/PIVOTING_ON_INVOLVED_LEG: MODERATE DIFFICULTY
GOING_UP_1_FLIGHT_OF_STAIRS: NO DIFFICULTY AT ALL
STEPPING_UP_AND_DOWN_CURBS: NO DIFFICULTY AT ALL
LIGHT_TO_MODERATE_WORK: MODERATE DIFFICULTY
HOS_ADL_HIGHEST_POTENTIAL_SCORE: 68
HOS_ADL_COUNT: 17
WALKING_UP_STEEP_HILLS: NO DIFFICULTY AT ALL
RECREATIONAL_ACTIVITIES: MODERATE DIFFICULTY
SITTING_FOR_15_MINUTES: NO DIFFICULTY AT ALL
WALKING_DOWN_STEEP_HILLS: NO DIFFICULTY AT ALL
WALKING_INITIALLY: MODERATE DIFFICULTY
DEEP_SQUATTING: MODERATE DIFFICULTY
HOW_WOULD_YOU_RATE_YOUR_CURRENT_LEVEL_OF_FUNCTION_DURING_YOUR_USUAL_ACTIVITIES_OF_DAILY_LIVING_FROM_0_TO_100_WITH_100_BEING_YOUR_LEVEL_OF_FUNCTION_PRIOR_TO_YOUR_HIP_PROBLEM_AND_0_BEING_THE_INABILITY_TO_PERFORM_ANY_OF_YOUR_USUAL_DAILY_ACTIVITIES?: 80
PUTTING_ON_SOCKS_AND_SHOES: SLIGHT DIFFICULTY
WALKING_15_MINUTES_OR_GREATER: NO DIFFICULTY AT ALL
HOS_ADL_ITEM_SCORE_TOTAL: 51
ROLLING_OVER_IN_BED: MODERATE DIFFICULTY
HOS_ADL_SCORE(%): 75
STANDING_FOR_15_MINUTES: SLIGHT DIFFICULTY

## 2022-07-08 ENCOUNTER — OFFICE VISIT (OUTPATIENT)
Dept: FAMILY MEDICINE | Facility: CLINIC | Age: 62
End: 2022-07-08
Payer: COMMERCIAL

## 2022-07-08 VITALS
OXYGEN SATURATION: 98 % | SYSTOLIC BLOOD PRESSURE: 112 MMHG | BODY MASS INDEX: 21.19 KG/M2 | HEIGHT: 67 IN | HEART RATE: 95 BPM | RESPIRATION RATE: 16 BRPM | WEIGHT: 135 LBS | DIASTOLIC BLOOD PRESSURE: 72 MMHG | TEMPERATURE: 98.1 F

## 2022-07-08 DIAGNOSIS — F43.10 PTSD (POST-TRAUMATIC STRESS DISORDER): Primary | ICD-10-CM

## 2022-07-08 DIAGNOSIS — L98.9 SKIN LESION: ICD-10-CM

## 2022-07-08 DIAGNOSIS — G43.101 MIGRAINE WITH AURA AND WITH STATUS MIGRAINOSUS, NOT INTRACTABLE: ICD-10-CM

## 2022-07-08 DIAGNOSIS — R41.3 MEMORY LOSS: ICD-10-CM

## 2022-07-08 PROCEDURE — 99214 OFFICE O/P EST MOD 30 MIN: CPT | Performed by: FAMILY MEDICINE

## 2022-07-08 RX ORDER — BENZOCAINE/MENTHOL 6 MG-10 MG
LOZENGE MUCOUS MEMBRANE 2 TIMES DAILY
Qty: 15 G | Refills: 1 | Status: SHIPPED | OUTPATIENT
Start: 2022-07-08 | End: 2023-02-24

## 2022-07-08 ASSESSMENT — PATIENT HEALTH QUESTIONNAIRE - PHQ9
10. IF YOU CHECKED OFF ANY PROBLEMS, HOW DIFFICULT HAVE THESE PROBLEMS MADE IT FOR YOU TO DO YOUR WORK, TAKE CARE OF THINGS AT HOME, OR GET ALONG WITH OTHER PEOPLE: NOT DIFFICULT AT ALL
SUM OF ALL RESPONSES TO PHQ QUESTIONS 1-9: 4
SUM OF ALL RESPONSES TO PHQ QUESTIONS 1-9: 4

## 2022-07-08 ASSESSMENT — PAIN SCALES - GENERAL: PAINLEVEL: MODERATE PAIN (5)

## 2022-07-08 NOTE — PROGRESS NOTES
Assessment & Plan     PTSD (post-traumatic stress disorder)  Seeing therapist   wants to try Alpha-Stim (JOHNNIE)  - Miscellaneous Order for DME - ONLY FOR DME   order okd, we will see if her insurance cover sit    Migraine with aura and with status migrainosus, not intractable  Better   - Miscellaneous Order for DME - ONLY FOR DME    Skin lesion  Looks like an inflammed area of eczema but if not better with hydrocortisosne see derm  - Adult Dermatology Referral; Future  - hydrocortisone (CORTAID) 1 % external cream; Apply topically 2 times daily    Memory loss  normal exam today               Regular exercise  See Patient Instructions    No follow-ups on file.    Joaquin Rowe MD  [facility]    Shonna Loco is a 62 year old, presenting for the following health issues:  No chief complaint on file.      History of Present Illness       Reason for visit:  Discuss the need for an alternative to medicine; memory problems, and a left ear skin concern.    She eats 4 or more servings of fruits and vegetables daily.She consumes 1 sweetened beverage(s) daily.She exercises with enough effort to increase her heart rate 30 to 60 minutes per day.  She exercises with enough effort to increase her heart rate 6 days per week.   She is taking medications regularly.    Today's PHQ-9         PHQ-9 Total Score: 4    PHQ-9 Q9 Thoughts of better off dead/self-harm past 2 weeks :   Not at all    How difficult have these problems made it for you to do your work, take care of things at home, or get along with other people: Not difficult at all     Chief Complaint   Patient presents with     Discuss Device implantation     [employer]fa Stem Device       Memory Loss     Patient has concerns re: Memory issues     Ear Problem     Patient c/o dryness on left ear        Migraine     Since your last clinic visit, how have your headaches changed?  Improved          Review of Systems   Constitutional, HEENT,  cardiovascular, pulmonary, gi and gu systems are negative, except as otherwise noted.      Objective    LMP 04/06/2010 (Exact Date)   There is no height or weight on file to calculate BMI.  Physical Exam   GENERAL: healthy, alert and no distress  EYES: Eyes grossly normal to inspection, PERRL and conjunctivae and sclerae normal  NECK: no adenopathy, no asymmetry, masses, or scars and thyroid normal to palpation  RESP: lungs clear to auscultation - no rales, rhonchi or wheezes  CV: regular rate and rhythm, normal S1 S2, no S3 or S4, no murmur, click or rub, no peripheral edema and peripheral pulses strong  ABDOMEN: soft, nontender, no hepatosplenomegaly, no masses and bowel sounds normal  MS: no gross musculoskeletal defects noted, no edema  SKIN: excoriated dry patch left ear traagus- 5 by 8 mm  NEURO: Normal strength and tone, mentation intact and speech normal  PSYCH: mentation appears normal, affect normal/bright    Lab on 01/22/2022   Component Date Value Ref Range Status     COVID-19 Virus PCR - Result 01/22/2022 NOT DETECTED   Final    Comment: Not Detected    Collection of multiple specimens from the same patient may   be necessary to detect the virus. The possibility of a false   negative should be considered if the patient's recent   exposure or clinical presentation suggests 2019 nCOV   infection and diagnostic tests for other causes of illness   are negative. Repeat testing may be considered in this   setting.    Patient sample was heat inactivated and amplified using the   HDPCR(TM) SARS-CoV-2 assay ([employer] The HDPCRTM   SARS-CoV-2 assay is a reverse transcription real-time   polymerase chain reaction (qRT-PCR) test intended for the   qualitative detection of nucleic acid from SARS-CoV-2 in   human nasopharyngeal swabs, oropharyngeal swabs, anterior   nasal swabs, mid-turbinate nasal swabs as well as nasal   aspirate, nasal wash, and bronchoalveolar lavage (BAL)   specimens from individuals  who are suspected of COVID-19 by   their healthcare provider.    A negative result does not rule out the presence of                              real-time PCR inhibitors in the specimen or COVID-19 RNA in   concentrations below the limit of detection of the assay.   The possibility of a false negative should be considered if   the patients recent exposure or clinical presentation   suggests COVID-19. Additional testing or repeat testing   requires consultation with the laboratory.    Nasopharyngeal specimen is the preferred choice for   swab-based SARS CoV2 testing. When collection of a   nasopharyngeal swab is not possible the following are   acceptable alternatives:  an oropharyngeal (OP) specimen collected by a healthcare   professional, or nasal mid-turbinate (NMT) swab collected by   a healthcare professional or by onsite self-collection   (using a flocked tapered swab), or an anterior nares   specimen collected by a healthcare professional or by onsite   self-collection (using a round foam swab). ([facility])    Testing performed by [facility] at   the [facility] [address] [place] [address].    The test performance characteristics were determined by   [facility]. It has not been cleared or approved by the FDA.    The laboratory is regulated under the Clinical Laboratory   Improvement Amendments of 1988 (CLIA-88) as qualified to   perform high-complexity testing. This test is used for   clinical purposes. It should not be regarded as   investigational or for research.                   .  ..

## 2022-07-20 ENCOUNTER — TELEPHONE (OUTPATIENT)
Dept: FAMILY MEDICINE | Facility: CLINIC | Age: 62
End: 2022-07-20
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
## 2022-08-24 ENCOUNTER — TELEPHONE (OUTPATIENT)
Dept: SURGERY | Facility: CLINIC | Age: 62
End: 2022-08-24

## 2022-08-24 NOTE — TELEPHONE ENCOUNTER
I called patient to f/u on the recommendation of her undergoing an MRI/MRCP for the incidental finding of a cyst on her pancreas as that had been recommended for her a year after her CT scan.  It has been a year and a half and I do not see it in her chart.  There was no answer, so I left a message on her voicemail, and I did send an inbasket message to her PCP, Dr. Rowe.

## 2022-08-25 ENCOUNTER — TELEPHONE (OUTPATIENT)
Dept: FAMILY MEDICINE | Facility: CLINIC | Age: 62
End: 2022-08-25

## 2022-08-25 DIAGNOSIS — K86.2 PANCREATIC CYST: Primary | ICD-10-CM

## 2022-08-25 NOTE — TELEPHONE ENCOUNTER
Left detailed message on identified VM with recs and info for scheduling below. Also sent mychart.  Pamella HOLLAND RN

## 2022-08-25 NOTE — TELEPHONE ENCOUNTER
Please call patient  Needs   Orders Placed This Encounter     MR Abdomen MRCP w/o & w Contrast     Standing Status:   Future     Standing Expiration Date:   8/25/2023     Order Specific Question:   Clinical Info for the Interpreting Provider     Answer:   see CT 3/2021  pancreatic cyst     Order Specific Question:   Priority     Answer:   Routine     Order Specific Question:   Does the patient have a Pacemaker or ICD (Implanted Cardiac Defibrillator)?     Answer:   No     Order Specific Question:   Does the patient have a cochlear implant, tissue/breast expanders, or implanted stimulator?     Answer:   NO     Order Specific Question:   Does the patient have claustrophobia or have a condition (pain, Parkinson's, RLS, mental status, etc.) that inhibits them to hold still for an extended period of time?     Answer:   No     For follow up of   pancreatic cyst

## 2022-08-25 NOTE — TELEPHONE ENCOUNTER
----- Message from Solis Zepeda MD sent at 8/24/2022  3:13 PM CDT -----  Regarding: pancreas cyst followup  Hi,  I performed an appendectomy on this patient over a year ago.  She had an incidental finding of a cyst on the pancreas, with radiology recommending a MRI/MRCP in a year for follow-up.  This was discussed with her but it has been well over a year, and I just wanted to make sure this does not fall through the cracks.    Thank you,  Solis Zepeda

## 2022-09-14 ENCOUNTER — OFFICE VISIT (OUTPATIENT)
Dept: URGENT CARE | Facility: URGENT CARE | Age: 62
End: 2022-09-14
Payer: COMMERCIAL

## 2022-09-14 VITALS
DIASTOLIC BLOOD PRESSURE: 71 MMHG | TEMPERATURE: 98.1 F | WEIGHT: 135 LBS | OXYGEN SATURATION: 99 % | BODY MASS INDEX: 21.14 KG/M2 | HEART RATE: 83 BPM | SYSTOLIC BLOOD PRESSURE: 114 MMHG

## 2022-09-14 DIAGNOSIS — S46.811A STRAIN OF RIGHT TRAPEZIUS MUSCLE, INITIAL ENCOUNTER: Primary | ICD-10-CM

## 2022-09-14 PROCEDURE — 99213 OFFICE O/P EST LOW 20 MIN: CPT | Performed by: PHYSICIAN ASSISTANT

## 2022-09-14 RX ORDER — TIZANIDINE 2 MG/1
2 TABLET ORAL 3 TIMES DAILY
Qty: 21 TABLET | Refills: 0 | Status: SHIPPED | OUTPATIENT
Start: 2022-09-14 | End: 2023-02-24

## 2022-09-14 RX ORDER — NAPROXEN 500 MG/1
500 TABLET ORAL 2 TIMES DAILY WITH MEALS
Qty: 20 TABLET | Refills: 0 | Status: SHIPPED | OUTPATIENT
Start: 2022-09-14 | End: 2023-02-24

## 2022-09-14 NOTE — PROGRESS NOTES
Strain of right trapezius muscle, initial encounter  - naproxen (NAPROSYN) 500 MG tablet; Take 1 tablet (500 mg) by mouth 2 times daily (with meals)  - tiZANidine (ZANAFLEX) 2 MG tablet; Take 1 tablet (2 mg) by mouth 3 times daily  - Physical Therapy Referral; Future    Rest the affected area as much as possible.  Apply ice for 15-20 minutes intermittently as needed and especially after any offending activity. Hot packs are better for muscle spasms and cramping. Daily stretching as tolerated.  As pain recedes, begin normal activities slowly as tolerated.  Consider Physical Therapy after 6 weeks if symptoms not better with conservative care.      Okay to take acetaminophen 500 mg- 2 tabs (Total of 1000 mg) every 8 hrs   Okay to take ibuprofen 200 mg- 3 tabs (Total of 600 mg) every 6 hours      BERNIE Weaver [facility]    Subjective   62 year old who presents to clinic today for the following health issues:    Urgent Care and Shoulder Pain       HPI     Pain History:  When did you first notice your pain? - Acute Pain   Where in your body do you have pain? Musculoskeletal problem/pain  Onset/Duration: 2 weeks-No known injury. Patient states that she may have walking into a door frame at one point but does not think that it was hard enough to have caused damage.   Description  Location: Back of right shoulder   Joint Swelling: No  Redness: No  Pain: YES  Warmth: No  Intensity:  moderate  Progression of Symptoms:  waxing and waning  Accompanying signs and symptoms:   Fevers: No  Numbness/tingling/weakness: No  History  Trauma to the area: No  Recent illness:  No  Previous similar problem: No  Previous evaluation:  No  Precipitating or alleviating factors:  Aggravating factors include: none  Therapies tried and outcome: rest/inactivity, heat, ice, acetaminophen and Ibuprofen    Review of Systems   Review of Systems   See HPI     Objective    Temp: 98.1  F (36.7  C) Temp src: Tympanic BP:  114/71 Pulse: 83     SpO2: 99 %       Physical Exam   Physical Exam  Constitutional:       General: She is not in acute distress.     Appearance: Normal appearance. She is normal weight. She is not ill-appearing, toxic-appearing or diaphoretic.   HENT:      Head: Normocephalic and atraumatic.   Cardiovascular:      Rate and Rhythm: Normal rate.      Pulses: Normal pulses.   Pulmonary:      Effort: Pulmonary effort is normal. No respiratory distress.   Musculoskeletal:        Back:       Comments: Patient has tenderness in the area as shown above without any swelling or erythema.  No deformities noted.  Neer's and Mckeon test were both negative.   Neurological:      General: No focal deficit present.      Mental Status: She is alert and oriented to person, place, and time. Mental status is at baseline.      Gait: Gait normal.   Psychiatric:         Mood and Affect: Mood normal.         Behavior: Behavior normal.         Thought Content: Thought content normal.         Judgment: Judgment normal.          No results found for this or any previous visit (from the past 24 hour(s)).

## 2022-09-16 ENCOUNTER — DOCUMENTATION ONLY (OUTPATIENT)
Dept: LAB | Facility: CLINIC | Age: 62
End: 2022-09-16

## 2022-09-16 DIAGNOSIS — Z00.00 ROUTINE HISTORY AND PHYSICAL EXAMINATION OF ADULT: Primary | ICD-10-CM

## 2022-09-16 NOTE — PROGRESS NOTES
Beronica Azevedo has an upcoming lab appointment:    Future Appointments   Date Time Provider Department Center   9/23/2022  9:45 AM RD LAB [facility]   9/23/2022  5:00 PM [address]   9/30/2022  1:40 PM Joaquin Rowe MD [facility]     Patient is scheduled for the following lab(s):     There is no order available. Please review and place either future orders or HMPO (Review of Health Maintenance Protocol Orders), as appropriate.    Health Maintenance Due   Topic     ANNUAL REVIEW OF HM ORDERS      HIV SCREENING      Joaquin Rowe MD      Patient is requesting lab orders.

## 2022-09-23 ENCOUNTER — LAB (OUTPATIENT)
Dept: LAB | Facility: CLINIC | Age: 62
End: 2022-09-23
Payer: COMMERCIAL

## 2022-09-23 DIAGNOSIS — Z00.00 ROUTINE HISTORY AND PHYSICAL EXAMINATION OF ADULT: ICD-10-CM

## 2022-09-23 LAB
ALBUMIN SERPL-MCNC: 3.9 G/DL (ref 3.4–5)
ALP SERPL-CCNC: 74 U/L (ref 40–150)
ALT SERPL W P-5'-P-CCNC: 19 U/L (ref 0–50)
ANION GAP SERPL CALCULATED.3IONS-SCNC: 4 MMOL/L (ref 3–14)
AST SERPL W P-5'-P-CCNC: 18 U/L (ref 0–45)
BILIRUB SERPL-MCNC: 0.4 MG/DL (ref 0.2–1.3)
BUN SERPL-MCNC: 9 MG/DL (ref 7–30)
CALCIUM SERPL-MCNC: 8.8 MG/DL (ref 8.5–10.1)
CHLORIDE BLD-SCNC: 108 MMOL/L (ref 94–109)
CHOLEST SERPL-MCNC: 180 MG/DL
CO2 SERPL-SCNC: 25 MMOL/L (ref 20–32)
CREAT SERPL-MCNC: 0.77 MG/DL (ref 0.52–1.04)
FASTING STATUS PATIENT QL REPORTED: YES
GFR SERPL CREATININE-BSD FRML MDRD: 86 ML/MIN/1.73M2
GLUCOSE BLD-MCNC: 100 MG/DL (ref 70–99)
HDLC SERPL-MCNC: 68 MG/DL
LDLC SERPL CALC-MCNC: 101 MG/DL
NONHDLC SERPL-MCNC: 112 MG/DL
POTASSIUM BLD-SCNC: 3.7 MMOL/L (ref 3.4–5.3)
PROT SERPL-MCNC: 7.1 G/DL (ref 6.8–8.8)
SODIUM SERPL-SCNC: 137 MMOL/L (ref 133–144)
TRIGL SERPL-MCNC: 56 MG/DL
TSH SERPL DL<=0.005 MIU/L-ACNC: 1.78 MU/L (ref 0.4–4)

## 2022-09-23 PROCEDURE — 80053 COMPREHEN METABOLIC PANEL: CPT

## 2022-09-23 PROCEDURE — 80061 LIPID PANEL: CPT

## 2022-09-23 PROCEDURE — 84443 ASSAY THYROID STIM HORMONE: CPT

## 2022-09-23 PROCEDURE — 36415 COLL VENOUS BLD VENIPUNCTURE: CPT

## 2022-09-30 ENCOUNTER — OFFICE VISIT (OUTPATIENT)
Dept: FAMILY MEDICINE | Facility: CLINIC | Age: 62
End: 2022-09-30
Payer: COMMERCIAL

## 2022-09-30 ENCOUNTER — THERAPY VISIT (OUTPATIENT)
Dept: PHYSICAL THERAPY | Facility: CLINIC | Age: 62
End: 2022-09-30
Attending: PHYSICIAN ASSISTANT
Payer: COMMERCIAL

## 2022-09-30 VITALS
WEIGHT: 138.5 LBS | TEMPERATURE: 96.8 F | HEIGHT: 68 IN | SYSTOLIC BLOOD PRESSURE: 113 MMHG | HEART RATE: 87 BPM | OXYGEN SATURATION: 100 % | BODY MASS INDEX: 20.99 KG/M2 | DIASTOLIC BLOOD PRESSURE: 75 MMHG

## 2022-09-30 DIAGNOSIS — Z00.00 ROUTINE HISTORY AND PHYSICAL EXAMINATION OF ADULT: Primary | ICD-10-CM

## 2022-09-30 DIAGNOSIS — F32.A DEPRESSIVE DISORDER IN REMISSION: ICD-10-CM

## 2022-09-30 DIAGNOSIS — S46.811A STRAIN OF RIGHT TRAPEZIUS MUSCLE, INITIAL ENCOUNTER: ICD-10-CM

## 2022-09-30 PROCEDURE — 99396 PREV VISIT EST AGE 40-64: CPT | Mod: 25 | Performed by: FAMILY MEDICINE

## 2022-09-30 PROCEDURE — 0134A COVID-19,PF,MODERNA BIVALENT: CPT | Performed by: FAMILY MEDICINE

## 2022-09-30 PROCEDURE — 97161 PT EVAL LOW COMPLEX 20 MIN: CPT | Mod: GP

## 2022-09-30 PROCEDURE — 91313 COVID-19,PF,MODERNA BIVALENT: CPT | Performed by: FAMILY MEDICINE

## 2022-09-30 PROCEDURE — 90471 IMMUNIZATION ADMIN: CPT | Performed by: FAMILY MEDICINE

## 2022-09-30 PROCEDURE — 97110 THERAPEUTIC EXERCISES: CPT | Mod: GP

## 2022-09-30 PROCEDURE — 90682 RIV4 VACC RECOMBINANT DNA IM: CPT | Performed by: FAMILY MEDICINE

## 2022-09-30 ASSESSMENT — ENCOUNTER SYMPTOMS
SORE THROAT: 0
CHILLS: 0
HEMATOCHEZIA: 0
NAUSEA: 0
NERVOUS/ANXIOUS: 0
FEVER: 0
SHORTNESS OF BREATH: 0
CONSTIPATION: 0
FREQUENCY: 0
DYSURIA: 0
PARESTHESIAS: 0
PALPITATIONS: 0
HEMATURIA: 0
BREAST MASS: 0
HEARTBURN: 0
MYALGIAS: 1
COUGH: 0
HEADACHES: 1
JOINT SWELLING: 0
WEAKNESS: 0
EYE PAIN: 0
DIZZINESS: 0
ABDOMINAL PAIN: 0
DIARRHEA: 0
ARTHRALGIAS: 0

## 2022-09-30 NOTE — PROGRESS NOTES
SUBJECTIVE:   CC: Beronica is an 62 year old who presents for preventive health visit.       Patient has been advised of split billing requirements and indicates understanding: Yes  Healthy Habits:     Getting at least 3 servings of Calcium per day:  Yes    Bi-annual eye exam:  Yes    Dental care twice a year:  NO    Sleep apnea or symptoms of sleep apnea:  None    Diet:  Gluten-free/reduced and Other    Frequency of exercise:  2-3 days/week    Duration of exercise:  15-30 minutes    Taking medications regularly:  Yes    Medication side effects:  None    PHQ-2 Total Score: 0    Additional concerns today:  Yes    Would like to discuss a forms he brought with her as well as some blood tests she had drawn last week. Can't take the tazanodine that was prescribed for her shoulder because it makes her sleepy and she woke up in the night once and then fell and hit her head, and she it sensitive to it.     Depression and Anxiety Follow-Up    How are you doing with your depression since your last visit? Improved     How are you doing with your anxiety since your last visit?  Improved     Are you having other symptoms that might be associated with depression or anxiety? No    Have you had a significant life event? Health Concerns     Do you have any concerns with your use of alcohol or other drugs? No    Social History     Tobacco Use     Smoking status: Never Smoker     Smokeless tobacco: Never Used   Vaping Use     Vaping Use: Never used   Substance Use Topics     Alcohol use: Yes     Comment: 1 8oz mixed drink or beer, minimal 1-2 per week     Drug use: No     PHQ 6/18/2021 11/23/2021 7/8/2022   PHQ-9 Total Score 1 6 4   Q9: Thoughts of better off dead/self-harm past 2 weeks Not at all Not at all Not at all     FREIDS-7 SCORE 1/30/2018 11/2/2018 7/12/2019   Total Score 4 6 3         Suicide Assessment Five-step Evaluation and Treatment (SAFE-T)      Today's PHQ-2 Score:   PHQ-2 ( 1999 [employer]) 9/30/2022   Q1: Little interest  or pleasure in doing things 0   Q2: Feeling down, depressed or hopeless 0   PHQ-2 Score 0   PHQ-2 Total Score (12-17 Years)- Positive if 3 or more points; Administer PHQ-A if positive -   Q1: Little interest or pleasure in doing things Not at all   Q2: Feeling down, depressed or hopeless Not at all   PHQ-2 Score 0       Abuse: Current or Past (Physical, Sexual or Emotional) - No  Do you feel safe in your environment? Yes        Social History     Tobacco Use     Smoking status: Never Smoker     Smokeless tobacco: Never Used   Substance Use Topics     Alcohol use: Yes     Comment: 1 8oz mixed drink or beer, minimal 1-2 per week     If you drink alcohol do you typically have >3 drinks per day or >7 drinks per week? No    Alcohol Use 9/30/2022   Prescreen: >3 drinks/day or >7 drinks/week? No   Prescreen: >3 drinks/day or >7 drinks/week? -   No flowsheet data found.    Reviewed orders with patient.  Reviewed health maintenance and updated orders accordingly - Yes  Lab work is in process    Breast Cancer Screening:    FHS-7:   Breast CA Risk Assessment (FHS-7) 2/18/2022 9/30/2022   Did any of your first-degree relatives have breast or ovarian cancer? No No   Did any of your relatives have bilateral breast cancer? No No   Did any man in your family have breast cancer? No No   Did any woman in your family have breast and ovarian cancer? No Yes   Did any woman in your family have breast cancer before age 50 y? No No   Do you have 2 or more relatives with breast and/or ovarian cancer? No No   Do you have 2 or more relatives with breast and/or bowel cancer? No No       Mammogram Screening: Recommended mammography every 1-2 years with patient discussion and risk factor consideration  Pertinent mammograms are reviewed under the imaging tab.    History of abnormal Pap smear: YES - updated in Problem List and Health Maintenance accordingly  PAP / HPV Latest Ref Rng & Units 11/23/2021 10/30/2020 9/12/2019   PAP   Negative for  Intraepithelial Lesion or Malignancy (NILM) - -   PAP (Historical) - - NIL NIL   HPV16 Negative Negative Negative Negative   HPV18 Negative Negative Negative Negative   HRHPV Negative Positive(A) Negative Positive(A)     Reviewed and updated as needed this visit by clinical staff                    Reviewed and updated as needed this visit by Provider                   Past Medical History:   Diagnosis Date     Anxiety      Arthritis 2010     Cervical high risk HPV (human papillomavirus) test positive 03/04/2016 03/04/2016, 09/12/19, 11/23/21     BEVERLY II (cervical intraepithelial neoplasia II) 2009    LEEP     Depression      Depressive disorder Adolescence    Managed with medication and therapy     Dry eye syndrome      Frequent UTI 2009     Headache(784.0)      IBS (irritable bowel syndrome)      Insomnia      Lattice degeneration of retina      Lyme disease      Myopic astigmatism      Nonsenile cataract      Osteopenia 2001,2012    Fosamax     Pneumothorax on right     at 15 y/o     PVD (posterior vitreous detachment), both eyes 07/19/2010     Rosacea      Vasomotor rhinitis         Review of Systems   Constitutional: Negative for chills and fever.   HENT: Negative for congestion, ear pain, hearing loss and sore throat.    Eyes: Positive for visual disturbance. Negative for pain.   Respiratory: Negative for cough and shortness of breath.    Cardiovascular: Negative for chest pain, palpitations and peripheral edema.   Gastrointestinal: Negative for abdominal pain, constipation, diarrhea, heartburn, hematochezia and nausea.   Breasts:  Negative for tenderness, breast mass and discharge.   Genitourinary: Negative for dysuria, frequency, genital sores, hematuria, pelvic pain, urgency, vaginal bleeding and vaginal discharge.   Musculoskeletal: Positive for myalgias. Negative for arthralgias and joint swelling.   Skin: Negative for rash.   Neurological: Positive for headaches. Negative for dizziness, weakness and  "paresthesias.   Psychiatric/Behavioral: Negative for mood changes. The patient is not nervous/anxious.           OBJECTIVE:   LMP 04/06/2010 (Exact Date)   Physical Exam  GENERAL: healthy, alert and no distress  EYES: Eyes grossly normal to inspection, PERRL and conjunctivae and sclerae normal  HENT: ear canals and TM's normal, nose and mouth without ulcers or lesions  NECK: no adenopathy, no asymmetry, masses, or scars and thyroid normal to palpation  RESP: lungs clear to auscultation - no rales, rhonchi or wheezes  CV: regular rate and rhythm, normal S1 S2, no S3 or S4, no murmur, click or rub, no peripheral edema and peripheral pulses strong  ABDOMEN: soft, nontender, no hepatosplenomegaly, no masses and bowel sounds normal  MS: no gross musculoskeletal defects noted, no edema  SKIN: no suspicious lesions or rashes  NEURO: Normal strength and tone, mentation intact and speech normal  PSYCH: mentation appears normal, affect normal/bright  LYMPH: no cervical, supraclavicular, axillary, or inguinal adenopathy        ASSESSMENT/PLAN:   (Z00.00) Routine history and physical examination of adult  (primary encounter diagnosis)  Comment: in good health  Plan: walk daily    (F32.5) Depressive disorder in remission (H)  Comment: Well controlled   Plan: medications as dorected        COUNSELING:  Reviewed preventive health counseling, as reflected in patient instructions       Regular exercise       Healthy diet/nutrition       Vision screening       Hearing screening       Immunizations    Vaccinated for: Influenza  covid           Osteoporosis prevention/bone health       Colorectal Cancer Screening       (Zuleika)menopause management    Estimated body mass index is 21.14 kg/m  as calculated from the following:    Height as of 7/8/22: 1.702 m (5' 7\").    Weight as of 9/14/22: 61.2 kg (135 lb).        She reports that she has never smoked. She has never used smokeless tobacco.      Counseling Resources:  ATP IV " Guidelines  Pooled Cohorts Equation Calculator  Breast Cancer Risk Calculator  BRCA-Related Cancer Risk Assessment: FHS-7 Tool  FRAX Risk Assessment  ICSI Preventive Guidelines  Dietary Guidelines for Americans, 2010  USDA's MyPlate  ASA Prophylaxis  Lung CA Screening    Joaquin Rowe MD  [facility]

## 2022-09-30 NOTE — PROGRESS NOTES
Physical Therapy Initial Evaluation  Therapist Impression: Beronica is a 62 year old year old female referred to physical therapy by Adriano Cardenas PA-C for treatment of strain of right trapezius muscle. Subjective history and objective findings are consistent with shoulder vs. cervical etiology. Due to these impairments, patient has difficulty with use of R UE. Patient will benefit from skilled PT to address impairments/limitations in order to reach patient's goals, facilitate return to prior level of function, and maximize participation.    KEY FINDINGS:  1. Limited shoulder A/PROM  2. Pain/weakness resisted R shoulder ABD  3. Neck ROM reproduced some symptoms    Subjective:  The history is provided by the patient. No  was used.   Therapist Generated HPI Evaluation  Problem details: Pt presents with R shoulder pain that will be in her shoulder blade, down her arm and up into her neck. This has been going on for about 1 month. NKI - besides she ran into a door frame, doesn't really think this would have caused the pain. Does think her posture might play a role. Is currently doing PT at home for her back and hip. Also enjoys walking for exercise. RHD..         Type of problem:  Right shoulder.    This is a new condition.  Condition occurred with:  Unknown cause.  Where condition occurred: for unknown reasons.  Patient reports pain:  Posterior and scapular area.  Pain is described as aching and is intermittent.  Pain radiates to:  Upper arm and cervical. Pain timing: not time dependent.  Since onset symptoms are unchanged (but fluctuates).  Associated symptoms:  Loss of motion/stiffness (denies N/T. Will notice crackling.). Symptoms are exacerbated by using arm behind back (driving, opening a jar)  and relieved by rest (stretching).      Restrictions due to condition include:  Working in normal job without restrictions.  Barriers include:  None as reported by patient.    Patient Health History          Pain is reported as 4/10 on pain scale.  General health as reported by patient is good.  Pertinent medical history includes: anemia, concussions/dizziness, menopausal, migraines/headaches and osteoarthritis.   Red flags:  None as reported by patient.  Medical allergies: adhesives.       Current medications:  Anti-depressants and anti-inflammatory.    Current occupation is psychologist/crisis.   Primary job tasks include:  Computer work, prolonged sitting and prolonged standing.                                    Objective:  Standing Alignment:    Cervical/Thoracic:  Thoracic kyphosis increased  Shoulder/UE:  Rounded shoulders                                  Cervical/Thoracic Evaluation    AROM:  AROM Cervical:    Flexion:            WNL * pull R trap  Extension:       Min loss * pain R trap  Rotation:         Left: WNL     Right: mod loss *R  Side Bend:      Left: WNL     Right:  Min loss * tight on R                      Spinal Segmental Conclusions:  Pain with Spurlings R UT/scapular area               Shoulder Evaluation:  ROM:  AROM:  : * indicates pain.  Flexion:  Left:  145    Right:  130*    Abduction:  Left: 160   Right:  140    Internal Rotation:  Left:  T7    Right:  T12  External Rotation:  Left:  80    Right:  80                PROM:    Flexion:  Right: 140*      Abduction:  Right:  140*                          Strength:    Flexion: Left:5/5   Pain:    Right: 5/5     Pain:     Abduction:  Left: 4+/5   Pain:-    Right: 3+/5      Pain:+    Internal Rotation:  Left:5/5     Pain:    Right: 5/5     Pain:  External Rotation:   Left:5/5     Pain:   Right:5/5     Pain:        Elbow Flexion:  Left:5/5     Pain:    Right:5/5     Pain:  Elbow Extension:  Left:5/5     Pain:    Right:5/5     Pain:    Special Tests:      Right shoulder positive for the following special tests:Impingement (HK and Neers)  Right shoulder negative for the following special tests:Labral ( Neg Leblanc's); Rotator cuff tear and  Acrimioclavicular  Palpation:      Right shoulder tenderness present at: Biceps; Triceps; Supraspinatus; Infraspinatus; Teres Minor; Deltoid; Levator; Upper Trap and Bicipital Groove  Right shoulder tenderness not present at:Acrimioclavicular or Rhomboids                                     General     ROS    Assessment/Plan:    Patient is a 62 year old female with right side shoulder complaints.    Patient has the following significant findings with corresponding treatment plan.                Diagnosis 1:  R shoulder pain  Pain -  hot/cold therapy, manual therapy, splint/taping/bracing/orthotics, self management, education, directional preference exercise and home program  Decreased ROM/flexibility - manual therapy, therapeutic exercise, therapeutic activity and home program  Decreased joint mobility - manual therapy, therapeutic exercise, therapeutic activity and home program  Decreased strength - therapeutic exercise, therapeutic activities and home program  Decreased function - therapeutic activities and home program  Impaired posture - neuro re-education, therapeutic activities and home program    Therapy Evaluation Codes:     Cumulative Therapy Evaluation is: Low complexity.    Previous and current functional limitations:  (See Goal Flow Sheet for this information)    Short term and Long term goals: (See Goal Flow Sheet for this information)     Communication ability:  Patient appears to be able to clearly communicate and understand verbal and written communication and follow directions correctly.  Treatment Explanation - The following has been discussed with the patient:   RX ordered/plan of care  Anticipated outcomes  Possible risks and side effects  This patient would benefit from PT intervention to resume normal activities.   Rehab potential is excellent.    Frequency:  1 X week, once daily  Duration:  for 8 weeks  Discharge Plan:  Achieve all LTG.  Independent in home treatment program.  Reach maximal  therapeutic benefit.    Please refer to the daily flowsheet for treatment today, total treatment time and time spent performing 1:1 timed codes.

## 2022-10-05 ENCOUNTER — THERAPY VISIT (OUTPATIENT)
Dept: PHYSICAL THERAPY | Facility: CLINIC | Age: 62
End: 2022-10-05
Payer: COMMERCIAL

## 2022-10-05 DIAGNOSIS — S46.811A STRAIN OF RIGHT TRAPEZIUS MUSCLE, INITIAL ENCOUNTER: Primary | ICD-10-CM

## 2022-10-05 PROCEDURE — 97140 MANUAL THERAPY 1/> REGIONS: CPT | Mod: GP

## 2022-10-05 PROCEDURE — 97110 THERAPEUTIC EXERCISES: CPT | Mod: GP

## 2022-10-18 ENCOUNTER — THERAPY VISIT (OUTPATIENT)
Dept: PHYSICAL THERAPY | Facility: CLINIC | Age: 62
End: 2022-10-18
Payer: COMMERCIAL

## 2022-10-18 DIAGNOSIS — S46.811A STRAIN OF RIGHT TRAPEZIUS MUSCLE, INITIAL ENCOUNTER: Primary | ICD-10-CM

## 2022-10-18 PROCEDURE — 97112 NEUROMUSCULAR REEDUCATION: CPT | Mod: GP | Performed by: PHYSICAL THERAPIST

## 2022-10-18 PROCEDURE — 97140 MANUAL THERAPY 1/> REGIONS: CPT | Mod: GP | Performed by: PHYSICAL THERAPIST

## 2022-10-18 PROCEDURE — 97110 THERAPEUTIC EXERCISES: CPT | Mod: GP | Performed by: PHYSICAL THERAPIST

## 2022-10-31 ENCOUNTER — THERAPY VISIT (OUTPATIENT)
Dept: PHYSICAL THERAPY | Facility: CLINIC | Age: 62
End: 2022-10-31
Payer: COMMERCIAL

## 2022-10-31 DIAGNOSIS — S46.811A STRAIN OF RIGHT TRAPEZIUS MUSCLE, INITIAL ENCOUNTER: Primary | ICD-10-CM

## 2022-10-31 DIAGNOSIS — M54.2 NECK PAIN: ICD-10-CM

## 2022-10-31 PROCEDURE — 97140 MANUAL THERAPY 1/> REGIONS: CPT | Mod: GP | Performed by: PHYSICAL THERAPIST

## 2022-10-31 PROCEDURE — 97110 THERAPEUTIC EXERCISES: CPT | Mod: GP | Performed by: PHYSICAL THERAPIST

## 2022-11-08 ENCOUNTER — THERAPY VISIT (OUTPATIENT)
Dept: PHYSICAL THERAPY | Facility: CLINIC | Age: 62
End: 2022-11-08
Payer: COMMERCIAL

## 2022-11-08 DIAGNOSIS — S46.811A STRAIN OF RIGHT TRAPEZIUS MUSCLE, INITIAL ENCOUNTER: Primary | ICD-10-CM

## 2022-11-08 PROCEDURE — 97110 THERAPEUTIC EXERCISES: CPT | Mod: GP | Performed by: PHYSICAL THERAPIST

## 2022-11-08 PROCEDURE — 97530 THERAPEUTIC ACTIVITIES: CPT | Mod: GP | Performed by: PHYSICAL THERAPIST

## 2022-11-08 PROCEDURE — 97140 MANUAL THERAPY 1/> REGIONS: CPT | Mod: GP | Performed by: PHYSICAL THERAPIST

## 2022-11-16 ENCOUNTER — TELEPHONE (OUTPATIENT)
Dept: FAMILY MEDICINE | Facility: CLINIC | Age: 62
End: 2022-11-16

## 2022-11-16 NOTE — TELEPHONE ENCOUNTER
RNs,   Beronica called.   Needs pre-op exam completed before surgery on Tuesday 11/22.   No open slots before then.   Please advise.   Thanks!  Jojo HASTINGS

## 2022-11-21 ENCOUNTER — OFFICE VISIT (OUTPATIENT)
Dept: FAMILY MEDICINE | Facility: CLINIC | Age: 62
End: 2022-11-21
Payer: COMMERCIAL

## 2022-11-21 VITALS
OXYGEN SATURATION: 100 % | DIASTOLIC BLOOD PRESSURE: 69 MMHG | WEIGHT: 141 LBS | HEIGHT: 67 IN | TEMPERATURE: 99.3 F | BODY MASS INDEX: 22.13 KG/M2 | SYSTOLIC BLOOD PRESSURE: 110 MMHG | RESPIRATION RATE: 18 BRPM | HEART RATE: 95 BPM

## 2022-11-21 DIAGNOSIS — H25.013 CORTICAL AGE-RELATED CATARACT OF BOTH EYES: ICD-10-CM

## 2022-11-21 DIAGNOSIS — Z01.818 PREOP GENERAL PHYSICAL EXAM: Primary | ICD-10-CM

## 2022-11-21 PROCEDURE — 99214 OFFICE O/P EST MOD 30 MIN: CPT | Performed by: FAMILY MEDICINE

## 2022-11-21 ASSESSMENT — PAIN SCALES - GENERAL: PAINLEVEL: MODERATE PAIN (4)

## 2022-11-21 NOTE — PROGRESS NOTES
[address]  [address]  [facility] [address]  Phone: [phone]  Fax: [phone]  Primary Provider: Joaquin Rowe  Pre-op Performing Provider: JOAQUIN ROWE      PREOPERATIVE EVALUATION:  Today's date: 11/21/2022    Beronica Azevedo is a 62 year old female who presents for a preoperative evaluation.    Surgical Information:  Surgery/Procedure: left eye cataract surgery with occular implant.   Surgery Location:  [facility] in [place].  Surgeon: DR Marcelo Sanchez MD  Surgery Date: 11/22/2022  Time of Surgery: 11:45am  Where patient plans to recover: At home with family  Fax number for surgical facility: [phone]    Type of Anesthesia Anticipated: to be determined    Assessment & Plan     The proposed surgical procedure is considered LOW risk.    Preop general physical exam  Ok for procedure    Cortical age-related cataract of both eyes  Vision worsening             Risks and Recommendations:  The patient has the following additional risks and recommendations for perioperative complications:   - No identified additional risk factors other than previously addressed        RECOMMENDATION:  APPROVAL GIVEN to proceed with proposed procedure, without further diagnostic evaluation.    Review of external notes as documented above         5 minutes spent on the date of the encounter doing chart review         Subjective     HPI related to upcoming procedure:   Encounter Diagnoses   Name Primary?     Preop general physical exam Yes     Cortical age-related cataract of both eyes         Preop Questions 11/17/2022   1. Have you ever had a heart attack or stroke? No   2. Have you ever had surgery on your heart or blood vessels, such as a stent placement, a coronary artery bypass, or surgery on an artery in your head, neck, heart, or legs? No   3. Do you have chest pain with activity? No   4. Do you have a history of  heart failure? No   5.  Do you currently have a cold, bronchitis or symptoms of other infection? No   6. Do you have a cough, shortness of breath, or wheezing? No   7. Do you or anyone in your family have previous history of blood clots? No   8. Do you or does anyone in your family have a serious bleeding problem such as prolonged bleeding following surgeries or cuts? No   9. Have you ever had problems with anemia or been told to take iron pills? YES -    10. Have you had any abnormal blood loss such as black, tarry or bloody stools, or abnormal vaginal bleeding? YES -    11. Have you ever had a blood transfusion? No   12. Are you willing to have a blood transfusion if it is medically needed before, during, or after your surgery? Yes   13. Have you or any of your relatives ever had problems with anesthesia? No   14. Do you have sleep apnea, excessive snoring or daytime drowsiness? No   15. Do you have any artifical heart valves or other implanted medical devices like a pacemaker, defibrillator, or continuous glucose monitor? No   16. Do you have artificial joints? No   17. Are you allergic to latex? No       Health Care Directive:  Patient does not have a Health Care Directive or Living Will:     Preoperative Review of :   reviewed - no record of controlled substances prescribed.          Review of Systems  CONSTITUTIONAL: NEGATIVE for fever, chills, change in weight  ENT/MOUTH: NEGATIVE for ear, mouth and throat problems  RESP: NEGATIVE for significant cough or SOB  CV: NEGATIVE for chest pain, palpitations or peripheral edema    Patient Active Problem List    Diagnosis Date Noted     Strain of right trapezius muscle, initial encounter 09/30/2022     Priority: Medium     Depressive disorder in remission 09/30/2022     Priority: Medium     Trochanteric bursitis of right hip 04/22/2022     Priority: Medium     Mixed stress and urge urinary incontinence 12/06/2021     Priority: Medium     Urinary frequency 12/06/2021     Priority:  Medium     Urgency of urination 12/06/2021     Priority: Medium     Acute appendicitis with localized peritonitis, without perforation, abscess, or gangrene 02/03/2021     Priority: Medium     Migraine without status migrainosus, not intractable 06/25/2020     Priority: Medium     Endometrial cells on cervical Pap smear inconsistent with last menstrual period 06/13/2018     Priority: Medium     6/13/18 ASCUS/+ HR HPV (not 16 or 18).  Endometrial cells on pap.  Postmenopausal.  Plan:       Plantar fasciitis 03/28/2018     Priority: Medium     Neoplasm of uncertain behavior of skin 08/16/2017     Priority: Medium     Dermatitis, seborrheic 08/16/2017     Priority: Medium     FREDIS (generalized anxiety disorder) 08/10/2017     Priority: Medium     Acute pain of right knee 06/22/2017     Priority: Medium     Chronic pain of right knee 05/27/2016     Priority: Medium     Digital mucous cyst 12/15/2015     Priority: Medium     Seborrheic keratosis, inflamed 12/15/2015     Priority: Medium     Cervicalgia 05/01/2015     Priority: Medium     Pain in thoracic spine 05/01/2015     Priority: Medium     Myopia 06/25/2014     Priority: Medium     Regular astigmatism 06/25/2014     Priority: Medium     Presbyopia 06/25/2014     Priority: Medium     Posterior subcapsular polar senile cataract 06/25/2014     Priority: Medium     Senile nuclear sclerosis 06/25/2014     Priority: Medium     Macular puckering of retina 06/25/2014     Priority: Medium     IBS (irritable bowel syndrome)      Priority: Medium     Mild major depression (H) 08/16/2012     Priority: Medium     Osteopenia 07/31/2012     Priority: Medium     Post-menopausal 07/31/2012     Priority: Medium     Rosacea 04/03/2012     Priority: Medium     Onychodystrophy 04/03/2012     Priority: Medium     Dermatitis 04/03/2012     Priority: Medium     Iron deficiency anemia due to chronic blood loss 04/05/2011     Priority: Medium     Problem list name updated by automated  process. Provider to review       Endometrial polyp 01/29/2011     Priority: Medium     CARDIOVASCULAR SCREENING; LDL GOAL LESS THAN 160 10/31/2010     Priority: Medium     Moderate dysplasia of cervix (BEVERLY II) 02/18/2010     Priority: Medium     6/09: ASC H,   8/09: [place] - BEVERLY II  8/18/09: LEEP - BEVERLY II, clear margins  NIL paps: 2/10, 7/10, 9/12.  Plan pap in 1 yr.  3/4/16: Pap - NIL, + HR HPV.   3/18/16: [facility] - ECC - negative. Plan cotest in 1 year.  06/06/17: NIL pap, Neg HR HPV result. Plan cotest in 1 year.  Pt has had a LEEP, needs 2 consecutive NIL/Neg cotest's then a 3 year cotest.   6/13/18 ASCUS pap/+ HR HPV (not 16 or 18) and endometrial cells.  Plan: colposcopy and endometrial cell follow up by 9/13/18.  07/25/18:[facility] ECC Endocervical cells showed possible changes. Endometrial cells normal per provider Plan ECC in 3-6 months. Cotest in 1 year.   12/12/18: ECC Neg for dysplasia. Plan cotest due on 07/25/19.   09/12/19: NIL Pap, + HR HPV (not 16 or 18) result. Plan [facility].   10/10/19: [facility] Bx and ECC benign. Plan cotest in 1 year.  10/30/20 NIL pap, neg HPV. Patient will need three consecutive years of pap/HPV cotesting  (Hx LEEP: BEVERLY 2). Plan cotest in 1 year  11/23/21 NIL Pap, + HR HPV (not 16 or 18) Plan [facility] due bef 02/23/22.   01/10/22 [facility] Bx and ECC No BEVERLY. Plan cotest in 1 year due 01/10/23. Provider released the results and recommendations to the pt through [employer], pt viewed.          Headache      Priority: Medium     Problem list name updated by automated process. Provider to review       Insomnia      Priority: Medium      Past Medical History:   Diagnosis Date     Anxiety      Arthritis 2010     Cervical high risk HPV (human papillomavirus) test positive 03/04/2016 03/04/2016, 09/12/19, 11/23/21     BEVERLY II (cervical intraepithelial neoplasia II) 2009    LEEP     Depression      Depressive disorder Adolescence    Managed with medication and therapy     Dry eye syndrome      Frequent UTI 2009      Headache(784.0)      IBS (irritable bowel syndrome)      Insomnia      Lattice degeneration of retina      Lyme disease      Myopic astigmatism      Nonsenile cataract      Osteopenia 2001,2012    Fosamax     Pneumothorax on right     at 15 y/o     PVD (posterior vitreous detachment), both eyes 07/19/2010     Rosacea      Vasomotor rhinitis      Past Surgical History:   Procedure Laterality Date     BIOPSY  Moles skin tags     CHEST TUBE INSERTION       COLONOSCOPY  Normal     DILATION AND CURETTAGE, OPERATIVE HYSTEROSCOPY WITH MORCELLATOR, COMBINED  1/2011    endometrial polyp     LAPAROSCOPIC APPENDECTOMY N/A 2/4/2021    Procedure: APPENDECTOMY, LAPAROSCOPIC and laparoscopic liver biopsy;  Surgeon: Solis Zepeda MD;  Location: [facility] OR     LEEP TX, CERVICAL  8/2009    BEVERLY II, clear margins     THORACIC SURGERY  1976    Collapsed lung     Current Outpatient Medications   Medication Sig Dispense Refill     acetaminophen (TYLENOL) 325 MG tablet Take 2 tablets (650 mg) by mouth every 6 hours as needed for mild pain or fever 60 tablet 0     BENADRYL ALLERGY PO prn       biotin 2.5 mg/mL Take by mouth daily       Coenzyme Q10 (CO Q10) 100 MG CAPS Take 100 mg by mouth 2 times daily       eletriptan (RELPAX) 40 MG tablet Take 40 mg by mouth as needed for headaches  10     emollient (VANICREAM) external cream Apply topically daily 453 g 11     estradiol (ESTRACE) 0.1 MG/GM vaginal cream Place 1 g vaginally twice a week 42.5 g 3     hydrocortisone (CORTAID) 1 % external cream Apply topically 2 times daily 15 g 1     ketoconazole (NIZORAL) 2 % external shampoo Apply topically daily as needed for itching or irritation 120 mL 11     magnesium 100 MG CAPS Take 400 mg by mouth       naproxen (NAPROSYN) 500 MG tablet Take 1 tablet (500 mg) by mouth 2 times daily (with meals) 20 tablet 0     nortriptyline (PAMELOR) 50 MG capsule TAKE 1 CAPSULE(50 MG) BY MOUTH AT BEDTIME 90 capsule 1     Riboflavin (VITAMIN B-2 PO)  Take 400 mg by mouth       tiZANidine (ZANAFLEX) 2 MG tablet Take 1 tablet (2 mg) by mouth 3 times daily 21 tablet 0     topiramate (TOPAMAX) 25 MG tablet Take 25 mg by mouth 2 times daily       traZODone (DESYREL) 50 MG tablet 1/2 TABLET AT BEDTIME AS NEEDED FOR SLEEP 45 tablet 1     valACYclovir (VALTREX) 500 MG tablet Take 1 tablet (500 mg) by mouth 2 times daily Start 2 days before the laser procedure. 14 tablet 2       Allergies   Allergen Reactions     Perfume Other (See Comments)     Scents/smells     Tape [Adhesive Tape] Rash     Compazine Other (See Comments)     Hyperactivity       Paxil [Paroxetine] Other (See Comments)     Ropinirole Hcl Unknown     TABS     Depakote [Valproic Acid] Rash     Rash       Food Itching and Rash     mushrooms     Mushroom Itching and Rash     Ropinirole Anxiety and Unknown     TABS          Social History     Tobacco Use     Smoking status: Never     Smokeless tobacco: Never   Substance Use Topics     Alcohol use: Yes     Comment: 1 8oz mixed drink or beer, minimal 1-2 per week       History   Drug Use No         Objective     LMP 04/06/2010 (Exact Date)     Physical Exam  GENERAL APPEARANCE: healthy, alert and no distress  HENT: ear canals and TM's normal and nose and mouth without ulcers or lesions  RESP: lungs clear to auscultation - no rales, rhonchi or wheezes  CV: regular rate and rhythm, normal S1 S2, no S3 or S4 and no murmur, click or rub   ABDOMEN: soft, nontender, no HSM or masses and bowel sounds normal  NEURO: Normal strength and tone, sensory exam grossly normal, mentation intact and speech normal    Recent Labs   Lab Test 09/23/22  1000 06/18/21  1124 02/04/21  0703 02/03/21  1402   HGB  --   --  11.9 14.5   PLT  --   --  276 338    140 143 137   POTASSIUM 3.7 3.8 3.5 3.7   CR 0.77 0.96 0.88 1.07*        Diagnostics:  No labs were ordered during this visit.   No EKG required for low risk surgery (cataract, skin procedure, breast biopsy, etc).    Revised  Cardiac Risk Index (RCRI):  The patient has the following serious cardiovascular risks for perioperative complications:   - No serious cardiac risks = 0 points     RCRI Interpretation: 0 points: Class I (very low risk - 0.4% complication rate)           Signed Electronically by: Joaquin Rowe MD  Copy of this evaluation report is provided to requesting physician.

## 2022-12-27 ENCOUNTER — PATIENT OUTREACH (OUTPATIENT)
Dept: OBGYN | Facility: CLINIC | Age: 62
End: 2022-12-27

## 2022-12-27 DIAGNOSIS — N87.1 MODERATE DYSPLASIA OF CERVIX (CIN II): Primary | ICD-10-CM

## 2023-01-27 ENCOUNTER — TELEPHONE (OUTPATIENT)
Dept: FAMILY MEDICINE | Facility: CLINIC | Age: 63
End: 2023-01-27

## 2023-01-27 ENCOUNTER — VIRTUAL VISIT (OUTPATIENT)
Dept: FAMILY MEDICINE | Facility: CLINIC | Age: 63
End: 2023-01-27
Payer: COMMERCIAL

## 2023-01-27 DIAGNOSIS — J06.9 VIRAL URI WITH COUGH: Primary | ICD-10-CM

## 2023-01-27 PROCEDURE — 99212 OFFICE O/P EST SF 10 MIN: CPT | Mod: GT | Performed by: FAMILY MEDICINE

## 2023-01-27 NOTE — TELEPHONE ENCOUNTER
"Patient calling to report cold symptoms since Monday.  Had a temperature of 99 on Monday.  Tested negative for COVID - was exposed on 1/11/23 but didn't test at that point.  Having some sinus pain/pressure and ear pain. Reports some aches and pains. Isn't able to sleep because of the congestion and pain.  Has a mild cough - on Tuesday morning she coughed up yellow mucus and it has been happening periodically since them. Reports she is \"blowing her nose a lot\" - it is a clear to yellowish color. Also reports fatigue but isn't sure if that is related to her not sleeping well.  Does not endorse a sore throat.    Reports she has taken some Ibuprofen and took Benadryl for sleep last night. Wasn't sure if she could take Tylenol - RN advised that as long as she follows the instructions for dosing she should be OK.      Patient is also wondering if she can get a note excusing her from work - she will get back to the clinic after she talks to her job to find out if she needs one.     She is wondering if she should be seen by provider - was hoping for virtual but RN advised that she may need to be seen in person to evaluate her symptoms.     RN also counseled that if this illness is viral, she will have to treat symptomatically using OTC nasal decongestants, pain medicine, cough medicine, cough drops, hot water with lemon/honey.     Alexa Thompson RN  [facility]       "

## 2023-01-27 NOTE — LETTER
[facility]  [address]  [address]  [facility] [place] [address]  [phone]          January 27, 2023    RE:  Beronica Azevedo                                                                                                                                                       [address]  [facility] [address]            To whom it may concern:    Beronica Azevedo is under my professional care for Viral URI with cough   She  may return to work Monday if feeling better .    Sincerely,        Joaquin Rowe MD    
less than/equal to 3 secs

## 2023-01-27 NOTE — TELEPHONE ENCOUNTER
Pt called and was informed of her 3:40 virtual appointment.    Clementina Townsend RN  [facility]

## 2023-01-27 NOTE — PROGRESS NOTES
Beronica is a 62 year old who is being evaluated via a billable video visit.      How would you like to obtain your AVS? MyChart  If the video visit is dropped, the invitation should be resent by: Text to cell phone: [phone]  Will anyone else be joining your video visit? No          Assessment & Plan     Viral URI with cough  Better today   fluids/ rest   note for work               rest   Buddy DIABETES MELLITUS prn  See Patient Instructions    No follow-ups on file.    Joaquin Rowe MD  [facility]    Subjective   Beronica is a 62 year old, presenting for the following health issues:  No chief complaint on file.      HPI     Acute Illness  Acute illness concerns: URI  Onset/Duration: 5 dsay  Symptoms:  Fever: No  Chills/Sweats: No  Headache (location?): No  Sinus Pressure: No  Conjunctivitis:  No  Ear Pain: no  Rhinorrhea: YES  Congestion: YES  Sore Throat: YES  Cough: YES-productive of clear sputum, productive of yellow sputum  Wheeze: No  Decreased Appetite: YES  Nausea: No  Vomiting: No  Diarrhea: No  Dysuria/Freq.: No  Dysuria or Hematuria: No  Fatigue/Achiness: YES  Sick/Strep Exposure: No  Therapies tried and outcome: None       Review of Systems   Constitutional, HEENT, cardiovascular, pulmonary, gi and gu systems are negative, except as otherwise noted.      Objective           Vitals:  No vitals were obtained today due to virtual visit.    Physical Exam   GENERAL: alert and no distress  EYES: Eyes grossly normal to inspection.  No discharge or erythema, or obvious scleral/conjunctival abnormalities.  RESP: No audible wheeze, cough, or visible cyanosis.  No visible retractions or increased work of breathing.    SKIN: Visible skin clear. No significant rash, abnormal pigmentation or lesions.  NEURO: Cranial nerves grossly intact.  Mentation and speech appropriate for age.  PSYCH: Mentation appears normal, affect normal/bright, judgement and insight intact, normal speech and  appearance well-groomed.    Lab on 09/23/2022   Component Date Value Ref Range Status     TSH 09/23/2022 1.78  0.40 - 4.00 mU/L Final     Cholesterol 09/23/2022 180  <200 mg/dL Final     Triglycerides 09/23/2022 56  <150 mg/dL Final     Direct Measure HDL 09/23/2022 68  >=50 mg/dL Final     LDL Cholesterol Calculated 09/23/2022 101 (H)  <=100 mg/dL Final     Non HDL Cholesterol 09/23/2022 112  <130 mg/dL Final     Patient Fasting > 8hrs? 09/23/2022 Yes   Final     Sodium 09/23/2022 137  133 - 144 mmol/L Final     Potassium 09/23/2022 3.7  3.4 - 5.3 mmol/L Final     Chloride 09/23/2022 108  94 - 109 mmol/L Final     Carbon Dioxide (CO2) 09/23/2022 25  20 - 32 mmol/L Final     Anion Gap 09/23/2022 4  3 - 14 mmol/L Final     Urea Nitrogen 09/23/2022 9  7 - 30 mg/dL Final     Creatinine 09/23/2022 0.77  0.52 - 1.04 mg/dL Final     Calcium 09/23/2022 8.8  8.5 - 10.1 mg/dL Final     Glucose 09/23/2022 100 (H)  70 - 99 mg/dL Final     Alkaline Phosphatase 09/23/2022 74  40 - 150 U/L Final     AST 09/23/2022 18  0 - 45 U/L Final     ALT 09/23/2022 19  0 - 50 U/L Final     Protein Total 09/23/2022 7.1  6.8 - 8.8 g/dL Final     Albumin 09/23/2022 3.9  3.4 - 5.0 g/dL Final     Bilirubin Total 09/23/2022 0.4  0.2 - 1.3 mg/dL Final     GFR Estimate 09/23/2022 86  >60 mL/min/1.73m2 Final    Effective December 21, 2021 eGFRcr in adults is calculated using the 2021 CKD-EPI creatinine equation which includes age and gender (Lance david al., NEJM, DOI: 10.1056/GWPXbm9407522)               Video-Visit Details    Type of service:  Video Visit     Originating Location (pt. Location): Home    Distant Location (provider location):  On-site  Platform used for Video Visit: Tj

## 2023-02-24 ENCOUNTER — OFFICE VISIT (OUTPATIENT)
Dept: OBGYN | Facility: CLINIC | Age: 63
End: 2023-02-24
Payer: COMMERCIAL

## 2023-02-24 VITALS
WEIGHT: 141 LBS | HEART RATE: 105 BPM | SYSTOLIC BLOOD PRESSURE: 99 MMHG | HEIGHT: 67 IN | OXYGEN SATURATION: 98 % | DIASTOLIC BLOOD PRESSURE: 66 MMHG | BODY MASS INDEX: 22.13 KG/M2

## 2023-02-24 DIAGNOSIS — Z01.419 ENCOUNTER FOR GYNECOLOGICAL EXAMINATION WITHOUT ABNORMAL FINDING: Primary | ICD-10-CM

## 2023-02-24 DIAGNOSIS — R87.810 CERVICAL HIGH RISK HPV (HUMAN PAPILLOMAVIRUS) TEST POSITIVE: ICD-10-CM

## 2023-02-24 PROBLEM — M25.561 ACUTE PAIN OF RIGHT KNEE: Status: RESOLVED | Noted: 2017-06-22 | Resolved: 2023-02-24

## 2023-02-24 PROBLEM — K58.9 IBS (IRRITABLE BOWEL SYNDROME): Status: ACTIVE | Noted: 2021-12-08

## 2023-02-24 PROBLEM — M54.81 OCCIPITAL NEURALGIA: Status: ACTIVE | Noted: 2019-01-09

## 2023-02-24 PROBLEM — S46.811A STRAIN OF RIGHT TRAPEZIUS MUSCLE, INITIAL ENCOUNTER: Status: ACTIVE | Noted: 2021-02-03

## 2023-02-24 PROBLEM — R35.0 URINARY FREQUENCY: Status: RESOLVED | Noted: 2021-12-06 | Resolved: 2023-02-24

## 2023-02-24 PROBLEM — G43.719 CHRONIC MIGRAINE WITHOUT AURA, INTRACTABLE, WITHOUT STATUS MIGRAINOSUS: Status: ACTIVE | Noted: 2020-02-13

## 2023-02-24 PROBLEM — M26.609 UNSPECIFIED TEMPOROMANDIBULAR JOINT DISORDER, UNSPECIFIED SIDE: Status: ACTIVE | Noted: 2018-02-06

## 2023-02-24 PROBLEM — K63.5 POLYP OF COLON: Status: ACTIVE | Noted: 2020-10-23

## 2023-02-24 PROBLEM — R39.15 URGENCY OF URINATION: Status: RESOLVED | Noted: 2021-12-06 | Resolved: 2023-02-24

## 2023-02-24 PROBLEM — D12.2 BENIGN NEOPLASM OF ASCENDING COLON: Status: ACTIVE | Noted: 2020-10-27

## 2023-02-24 PROBLEM — G47.00 INSOMNIA: Status: ACTIVE | Noted: 2021-12-08

## 2023-02-24 PROCEDURE — 88175 CYTOPATH C/V AUTO FLUID REDO: CPT | Performed by: OBSTETRICS & GYNECOLOGY

## 2023-02-24 PROCEDURE — 87624 HPV HI-RISK TYP POOLED RSLT: CPT | Performed by: OBSTETRICS & GYNECOLOGY

## 2023-02-24 PROCEDURE — 99396 PREV VISIT EST AGE 40-64: CPT | Performed by: OBSTETRICS & GYNECOLOGY

## 2023-02-24 RX ORDER — GATIFLOXACIN 5 MG/ML
SOLUTION/ DROPS OPHTHALMIC
COMMUNITY
Start: 2022-11-23 | End: 2023-03-17

## 2023-02-24 RX ORDER — BUTALBITAL, ACETAMINOPHEN AND CAFFEINE 50; 325; 40 MG/1; MG/1; MG/1
1 TABLET ORAL EVERY 4 HOURS PRN
COMMUNITY
Start: 2022-08-28

## 2023-02-24 RX ORDER — PREDNISOLONE ACETATE 10 MG/ML
SUSPENSION/ DROPS OPHTHALMIC
COMMUNITY
Start: 2022-11-23 | End: 2023-03-17

## 2023-02-24 RX ORDER — NAPROXEN 500 MG/1
500 TABLET ORAL
COMMUNITY
Start: 2022-09-14 | End: 2023-03-17

## 2023-02-24 RX ORDER — KETOROLAC TROMETHAMINE 5 MG/ML
SOLUTION OPHTHALMIC
COMMUNITY
Start: 2022-11-23 | End: 2023-03-17

## 2023-02-24 NOTE — PROGRESS NOTES
Beronica is a 62 year old  who presents for annual exam.   Postmenopausal.  She is having vaginal dryness. No vaginal bleeding noted.     Besides routine health maintenance, she has no other health concerns today.  She has used estrogen cream previously and felt like it was helpful, but leaks out.  She also has a long h/o +HPV and nil daniel/colps.  Most recent colp , normal  : ASC H,   : [place] - BEVERLY II  09: LEEP - BEVERLY II, clear margins  NIL paps: 2/10, 7/10, .  Plan pap in 1 yr.  3/4/16: Pap - NIL, + HR HPV.   3/18/16: [place] - ECC - negative. Plan cotest in 1 year.  17: NIL pap, Neg HR HPV result. Plan cotest in 1 year.  Pt has had a LEEP, needs 2 consecutive NIL/Neg cotest's then a 3 year cotest.   18 ASCUS pap/+ HR HPV (not 16 or 18) and endometrial cells.  Plan: colposcopy and endometrial cell follow up by 18.  18:[place] ECC Endocervical cells showed possible changes. Endometrial cells normal per provider Plan ECC in 3-6 months. Cotest in 1 year.   18: ECC Neg for dysplasia. Plan cotest due on 19.   19: NIL Pap, + HR HPV (not 16 or 18) result. Plan [place].   10/10/19: [place] Bx and ECC benign. Plan cotest in 1 year.  10/30/20 NIL pap, neg HPV. Patient will need three consecutive years of pap/HPV cotesting  (Hx LEEP: BEVERLY 2). Plan cotest in 1 year  21 NIL Pap, + HR HPV (not 16 or 18) Plan [place] due bef 22.     GYNECOLOGIC HISTORY:  Menarche:   She isnt sexually active   History sexually transmitted infections:HPV  STI testing offered?  Declined  Estrogen replacement therapy: No  HOLLIE exposure: Unknown    History of abnormal Pap smear: YES - updated in Problem List and Health Maintenance accordingly  Family history of breast CA: Yes (Please explain): m-aunt  Family history of uterine/ovarian CA: Yes (Please explain): sis  Family history of colon CA: No    HEALTH MAINTENANCE:  Cholesterol: (No components found for: CHOL2 ) History of abnormal lipids:  Yes  Mammo: 21 . History of abnormal Mammo: No  Regular Self Breast Exams: occ  Colonoscopy: 10//23/20 History of abnormal Colonoscopy: No  Dexa:  History of abnormal Dexa: .  Calcium/Vitamin D intake: source:  dairy, vit d Adequate? Yes  TSH: (No components found for: TSH1 )  Pap; (  Lab Results   Component Value Date    PAP NIL 10/30/2020    PAP NIL 2019    PAP ASC-US 2018    )    HISTORY:  OB History    Para Term  AB Living   2 2 0 0 0 2   SAB IAB Ectopic Multiple Live Births   0 0 0 0 2      # Outcome Date GA Lbr Deuce/2nd Weight Sex Delivery Anes PTL Lv   2 Para 89        JAVY   1 Para 85        JAVY     Past Medical History:   Diagnosis Date     Anxiety      Arthritis      Cervical high risk HPV (human papillomavirus) test positive 2016, 19, 21     BEVERLY II (cervical intraepithelial neoplasia II)     LEEP     Depression      Depressive disorder Adolescence    Managed with medication and therapy     Dry eye syndrome      Frequent UTI      Headache(784.0)      IBS (irritable bowel syndrome)      Insomnia      Lattice degeneration of retina      Lyme disease      Myopic astigmatism      Nonsenile cataract      Osteopenia ,    Fosamax     Pneumothorax on right     at 17 y/o     PVD (posterior vitreous detachment), both eyes 2010     Rosacea      Vasomotor rhinitis      Past Surgical History:   Procedure Laterality Date     BIOPSY  Moles skin tags     CHEST TUBE INSERTION       COLONOSCOPY  Normal     DILATION AND CURETTAGE, OPERATIVE HYSTEROSCOPY WITH MORCELLATOR, COMBINED  2011    endometrial polyp     LAPAROSCOPIC APPENDECTOMY N/A 2021    Procedure: APPENDECTOMY, LAPAROSCOPIC and laparoscopic liver biopsy;  Surgeon: Solis Zepeda MD;  Location: [facility] OR     LEEP TX, CERVICAL  2009    BEVERLY II, clear margins     THORACIC SURGERY      Collapsed lung     Family History   Problem Relation Age  of Onset     Neurologic Disorder Mother      Allergies Mother      Arthritis Mother      Depression Mother      Eye Disorder Mother      Lipids Mother         high cholesterol     Osteoporosis Mother      Hypertension Mother      Hyperlipidemia Mother      Thyroid Disease Mother      Neurologic Disorder Father      Lipids Father         high cholesterol     Gastrointestinal Disease Father         stomach ulcers     Cancer Father         melanoma     Skin Cancer Father      Hypertension Father      Hyperlipidemia Father      Depression Sister      Uterine Cancer Sister      Tumor Sister         brain tumor     Neurologic Disorder Sister      Neurologic Disorder Sister         epilepsy     Alcohol/Drug Sister         alcohol and drug dependency     Lipids Sister         high cholesterol     Gastrointestinal Disease Sister         hepatitis ??     Osteoporosis Sister      Neurologic Disorder Brother      Alcohol/Drug Brother         recovery alcohol and drug addit     Asthma Daughter         excercise and allergy induced     Asthma Daughter      Asthma Son         excercise and allergy induced     Asthma Son      Breast Cancer Maternal Aunt      Melanoma No family hx of      Social History     Socioeconomic History     Marital status:      Spouse name: None     Number of children: None     Years of education: None     Highest education level: None   Occupational History     Occupation: Psychologist   Tobacco Use     Smoking status: Never     Smokeless tobacco: Never   Vaping Use     Vaping Use: Never used   Substance and Sexual Activity     Alcohol use: Yes     Comment: 1 8oz mixed drink or beer, minimal 1-2 per week     Drug use: No     Sexual activity: Not Currently     Partners: Male     Birth control/protection: Abstinence   Other Topics Concern     Parent/sibling w/ CABG, MI or angioplasty before 65F 55M? No       Current Outpatient Medications:      naproxen (NAPROSYN) 500 MG tablet, Take 500 mg by mouth,  Disp: , Rfl:      acetaminophen (TYLENOL) 325 MG tablet, Take 2 tablets (650 mg) by mouth every 6 hours as needed for mild pain or fever, Disp: 60 tablet, Rfl: 0     BENADRYL ALLERGY PO, prn, Disp: , Rfl:      biotin 2.5 mg/mL, Take by mouth daily, Disp: , Rfl:      butalbital-acetaminophen-caffeine (ESGIC) -40 MG tablet, Take 1 tablet by mouth every 4 hours as needed, Disp: , Rfl:      Coenzyme Q10 (CO Q10) 100 MG CAPS, Take 100 mg by mouth 2 times daily, Disp: , Rfl:      eletriptan (RELPAX) 40 MG tablet, Take 40 mg by mouth as needed for headaches, Disp: , Rfl: 10     emollient (VANICREAM) external cream, Apply topically daily, Disp: 453 g, Rfl: 11     estradiol (ESTRACE) 0.1 MG/GM vaginal cream, Place 1 g vaginally twice a week, Disp: 42.5 g, Rfl: 3     gatifloxacin (ZYMAXID) 0.5 % ophthalmic solution, INSTILL 1 DROP IN IN AFFECTED EYE(S) FOUR TIMES DAILY FOR 1 WEEK THEN 1 DROP BIS UNTIL GONE START 3 DAYS BEFORE SURGERY, Disp: , Rfl:      ketoconazole (NIZORAL) 2 % external shampoo, APPLY TOPICALLY DAILY AS NEEDED FOR ITCHING OR IRRITATION, Disp: 120 mL, Rfl: 4     ketorolac (ACULAR) 0.5 % ophthalmic solution, , Disp: , Rfl:      magnesium 100 MG CAPS, Take 400 mg by mouth, Disp: , Rfl:      nortriptyline (PAMELOR) 50 MG capsule, TAKE 1 CAPSULE(50 MG) BY MOUTH AT BEDTIME, Disp: 90 capsule, Rfl: 1     prednisoLONE acetate (PRED FORTE) 1 % ophthalmic suspension, INSTILL 1 DROP FOUR TIMES DAILY INTO OPERATIVE EYE FOR 1 WEEK THEN TWICE DAILY UNTIL ALL TAKEN. BEGIN 3 DAYS PRIOR TO SURGERY, Disp: , Rfl:      Riboflavin (VITAMIN B-2 PO), Take 400 mg by mouth, Disp: , Rfl:      topiramate (TOPAMAX) 25 MG tablet, Take 25 mg by mouth 2 times daily, Disp: , Rfl:      traZODone (DESYREL) 50 MG tablet, 1/2 TABLET AT BEDTIME AS NEEDED FOR SLEEP, Disp: 45 tablet, Rfl: 1     Allergies   Allergen Reactions     Perfume Other (See Comments)     Scents/smells     Adhesive Tape Rash     Compazine Other (See Comments)      "Hyperactivity       Paroxetine      Other reaction(s): parathesia     Paxil [Paroxetine] Other (See Comments)     Ropinirole Hcl Unknown     TABS     Tizanidine      Depakote [Valproic Acid] Rash     Rash       Food Itching and Rash     mushrooms     Mushroom Itching and Rash     mushrooms     Ropinirole Anxiety and Unknown     TABS         Past medical, surgical, social and family history were reviewed and updated in EPIC.    ROS:   C:       NEGATIVE for fever, chills, change in weight  I:         NEGATIVE for worrisome rashes, moles or lesions  E:       NEGATIVE for vision changes or irritation  E/M:   NEGATIVE for ear, mouth and throat problems  R:       NEGATIVE for significant cough or SOB  CV:     NEGATIVE for chest pain, palpitations or peripheral edema  GI:      NEGATIVE for nausea, abdominal pain, heartburn, or change in bowel habits  :    NEGATIVE for frequency, dysuria, hematuria, vaginal discharge, or bleeding  M:       NEGATIVE for significant arthralgias or myalgia  N:       NEGATIVE for weakness, dizziness or paresthesias  E:       NEGATIVE for temperature intolerance, skin/hair changes  P:       NEGATIVE for changes in mood or affect    EXAM:  BP 99/66   Pulse 105   Ht 1.7 m (5' 6.93\")   Wt 64 kg (141 lb)   LMP 04/06/2010 (Exact Date)   SpO2 98%   BMI 22.13 kg/m     BMI: Body mass index is 22.13 kg/m .  Constitutional: healthy, alert and no distress  Head: Normocephalic. No masses, lesions, tenderness or abnormalities  Neck: Neck supple. Trachea midline. No adenopathy. Thyroid symmetric, normal size.   Cardiovascular: RRR.   Respiratory: Negative.   Breast: No nodularity, asymmetry or nipple discharge bilaterally.  Gastrointestinal: Abdomen soft, non-tender, non-distended. No masses, organomegaly  :  Vulva:  No external lesions, normal female hair distribution, no inguinal adenopathy.    Urethra:  Midline, non-tender, well supported, no discharge  Vagina:  Atrophic, no abnormal discharge, " no lesions  Uterus:  Normal size, non-tender, freely mobile  Ovaries:  No masses appreciated, non-tender, mobile  Rectal Exam: deferred  Musculoskeletal: extremities normal  Skin: no suspicious lesions or rashes  Psychiatric: Affect appropriate, cooperative,mentation appears normal.     COUNSELING:   Reviewed preventive health counseling, as reflected in patient instructions  Special attention given to:        Regular exercise       Healthy diet/nutrition       Osteoporosis prevention/bone health       ([place])menopause management   reports that she has never smoked. She has never used smokeless tobacco.    Body mass index is 22.13 kg/m .      FRAX Risk Assessment  ASSESSMENT:  62 year old  with satisfactory annual exam, +HPV  Plan: dx cotesting done.  mammo due, colonoscopy UTD.  Routine health maintenance with PCP.    MELIA AQUINO MD

## 2023-03-02 LAB
BKR LAB AP GYN ADEQUACY: NORMAL
BKR LAB AP GYN INTERPRETATION: NORMAL
BKR LAB AP HPV REFLEX: NORMAL
BKR LAB AP PREVIOUS ABNL DX: NORMAL
BKR LAB AP PREVIOUS ABNORMAL: NORMAL
PATH REPORT.COMMENTS IMP SPEC: NORMAL
PATH REPORT.COMMENTS IMP SPEC: NORMAL
PATH REPORT.RELEVANT HX SPEC: NORMAL

## 2023-03-06 ENCOUNTER — PATIENT OUTREACH (OUTPATIENT)
Dept: OBGYN | Facility: CLINIC | Age: 63
End: 2023-03-06
Payer: COMMERCIAL

## 2023-03-17 ENCOUNTER — VIRTUAL VISIT (OUTPATIENT)
Dept: URGENT CARE | Facility: CLINIC | Age: 63
End: 2023-03-17
Payer: COMMERCIAL

## 2023-03-17 ENCOUNTER — TELEPHONE (OUTPATIENT)
Dept: FAMILY MEDICINE | Facility: CLINIC | Age: 63
End: 2023-03-17
Payer: COMMERCIAL

## 2023-03-17 DIAGNOSIS — U07.1 INFECTION DUE TO 2019 NOVEL CORONAVIRUS: Primary | ICD-10-CM

## 2023-03-17 PROCEDURE — 99213 OFFICE O/P EST LOW 20 MIN: CPT | Mod: CS

## 2023-03-17 NOTE — PATIENT INSTRUCTIONS
Symptoms began March 15  Stay home and away from others through March 20  You may be around others wearing a well fitting mask on March 21- March 25  Your isolation restrictions are over on March 26 as long as your symptoms are improving and you have been fever free for 24 hours, even if you still test positive for COVID.  However, if you test negative twice, at least 48 hours apart between days 6-10, you can stop wearing your mask earlier    Check O2 levels. If your number stays at 90 or below at rest, go to the emergency room.    Visit the CDC websites for more information and most up to date guidelines:  www.cdc.gov/coronavirus/2019-ncov/your-health/isolation.html  www.cdc.gov/coronavirus/2019-ncov/hcp/duration-isolation.html

## 2023-03-17 NOTE — TELEPHONE ENCOUNTER
Patient calling reporting positive covid test and wondering about paxlovid. Reports no SOB at rest and no difficutly breathing.    RN COVID TREATMENT VISIT  03/17/23      The patient has been triaged and does not require a higher level of care.    Beronica Azevedo  62 year old  Current weight? 64 kg    Has the patient been seen by a primary care provider at an [facility] or [facility] within the past two years? Yes.   Have you been in close proximity to/do you have a known exposure to a person with a confirmed case of influenza? No.     General treatment eligibility:  Date of positive COVID test (PCR or at home)?  3/16    Are you or have you been hospitalized for this COVID-19 infection? No.   Have you received monoclonal antibodies or antiviral treatment for COVID-19 since this positive test? No.   Do you have any of the following conditions that place you at risk of being very sick from COVID-19?   - Age 50 years or older  - Mental health disorders including mood disorders, depression, schizophrenia spectrum disorders   Yes, patient has at least one high risk condition as noted above.     Current COVID symptoms:   - cough  - fatigue  - muscle or body aches  - sore throat  - congestion or runny nose  Yes. Patient has at least one symptom as selected.     How many days since symptoms started? 5 days or less. Established patient, 12 years or older weighing at least 88.2 lbs, who has symptoms that started in the past 5 days, has not been hospitalized nor received treatment already, and is at risk for being very sick from COVID-19.     Treatment eligibility by RN:    Are you currently pregnant or nursing? No    Do you have a clinically significant hypersensitivity to nirmatrelvir or ritonavir, or toxic epidermal necrolysis (TEN) or Hyde-Cain Syndrome? No    Do you have a history of hepatitis, any hepatic impairment on the Problem List (such as Child-Brewer Class C, cirrhosis, fatty liver  disease, alcoholic liver disease), or was the last liver lab (hepatic panel, ALT, AST, ALK Phos, bilirubin) elevated in the past 6 months? No    Do you have any history of severe renal impairment (eGFR < 30mL/min)? No    Is patient eligible to continue?   Yes, patient meets all eligibility requirements for the RN COVID treatment (as denoted by all no responses above).     Current Outpatient Medications   Medication Sig Dispense Refill     acetaminophen (TYLENOL) 325 MG tablet Take 2 tablets (650 mg) by mouth every 6 hours as needed for mild pain or fever 60 tablet 0     BENADRYL ALLERGY PO prn       biotin 2.5 mg/mL Take by mouth daily       butalbital-acetaminophen-caffeine (ESGIC) -40 MG tablet Take 1 tablet by mouth every 4 hours as needed       Coenzyme Q10 (CO Q10) 100 MG CAPS Take 100 mg by mouth 2 times daily       eletriptan (RELPAX) 40 MG tablet Take 40 mg by mouth as needed for headaches  10     emollient (VANICREAM) external cream Apply topically daily 453 g 11     estradiol (ESTRACE) 0.1 MG/GM vaginal cream Place 1 g vaginally twice a week 42.5 g 3     gatifloxacin (ZYMAXID) 0.5 % ophthalmic solution INSTILL 1 DROP IN IN AFFECTED EYE(S) FOUR TIMES DAILY FOR 1 WEEK THEN 1 DROP BIS UNTIL GONE START 3 DAYS BEFORE SURGERY       ketoconazole (NIZORAL) 2 % external shampoo APPLY TOPICALLY DAILY AS NEEDED FOR ITCHING OR IRRITATION 120 mL 4     ketorolac (ACULAR) 0.5 % ophthalmic solution        magnesium 100 MG CAPS Take 400 mg by mouth       naproxen (NAPROSYN) 500 MG tablet Take 500 mg by mouth       nortriptyline (PAMELOR) 50 MG capsule TAKE 1 CAPSULE(50 MG) BY MOUTH AT BEDTIME 90 capsule 1     prednisoLONE acetate (PRED FORTE) 1 % ophthalmic suspension INSTILL 1 DROP FOUR TIMES DAILY INTO OPERATIVE EYE FOR 1 WEEK THEN TWICE DAILY UNTIL ALL TAKEN. BEGIN 3 DAYS PRIOR TO SURGERY       Riboflavin (VITAMIN B-2 PO) Take 400 mg by mouth       topiramate (TOPAMAX) 25 MG tablet Take 25 mg by mouth 2 times  daily       traZODone (DESYREL) 50 MG tablet 1/2 TABLET AT BEDTIME AS NEEDED FOR SLEEP 45 tablet 1       Medications from List 1 of the standing order (on medications that exclude the use of Paxlovid) that patient is taking: NONE. Is patient taking James's Wort? No  Is patient taking Elma's Wort or any meds from List 1? No.   Medications from List 2 of the standing order (on meds that provider needs to adjust) that patient is taking: NONE. Is patient on any of the meds from List 2? No.   Medications from List 3 of standing order (on meds that a RN needs to adjust) that patient is taking: eletriptan (Relpax): Was last dose of eletriptan within last 72 hours? Yes, last dose was within last 72 hours. Is end of 72 hour outside of 5 day window for starting Paxlovid? No, patient will still be within 5 day Paxlovid window. Instructed patient to stop taking eletriptan while taking Paxlovid and restart eletriptan 3 days after the completion of Paxlovid.  Will include a comment in the Note to Pharmacy on Paxlovid prescription to not dispense the med until 3/18 (72 hours after last eletriptan dose)  trazodone (Desyrel): Instructed patient to stop taking trazodone while taking Paxlovid and restart trazodone 3 days after the completion of Paxlovid.  Is patient on any meds from List 3? Yes. Patient is on meds from list 3. No meds require a provider visit and at least one med required RN to adjust.     Paxlovid has an approximate 90% reduction in hospitalization. Paxlovid can possibly cause altered sense of taste, diarrhea (loose, watery stools), high blood pressure, muscle aches.     Would patient like a Paxlovid prescription?   Yes.   Lab Results   Component Value Date    GFRESTIMATED 86 09/23/2022       Was last eGFR reduced? No, eGFR 60 or greater/ No Result on record. Patient can receive the normal renal function dose. Paxlovid Rx sent to [facility]'s pharmacy    Temporary change to home medications:  Advised to not take Trazodone during Paxlovid which patient is very hesitant about as she does not sleep without trazodone. Also states she took Relpax on 3/14 afternoon but unsure exactly what time.     All medication adjustments (holds, etc) were discussed with the patient and patient was asked to repeat back (teachback) their med adjustment.  Did patient understand med adjustment? Yes, patient repeated back and understood correctly.        Reviewed the following instructions with the patient:    Paxlovid (nimatrelvir and ritonavir)    How it works  Two medicines (nirmatrelvir and ritonavir) are taken together. They stop the virus from growing. Less amount of virus is easier for your body to fight.    How to take    Medicine comes in a daily container with both medicine tablets. Take by mouth twice daily (once in the morning, once at night) for 5 days.    The number of tablets to take varies by patient.    Don't chew or break capsules. Swallow whole.    When to take  Take as soon as possible after positive COVID-19 test result, and within 5 days of your first symptoms.    Possible side effects  Can cause altered sense of taste, diarrhea (loose, watery stools), high blood pressure, muscle aches.    Demetrius Matos RN     Pt requesting provider visit to discuss interaction of Trazodone and Paxlovid as she states she will not sleep otherwise and she is unsure of timing of the eletriptan on 3/14.    Transferred to  to make VV for paxlovid.    Demetrius Matos RN   [facility]

## 2023-03-17 NOTE — PROGRESS NOTES
"Assessment & Plan     Infection due to 2019 novel coronavirus  - molnupiravir (LAGEVRIO) 200 MG capsule; Take 4 capsules (800 mg) by mouth every 12 hours      Return in about 1 week (around 3/24/2023) for visit with primary care provider if not improving.   COVID-19 positive patient.  Encounter for consideration of medication intervention. Patient does qualify for a prescription. Full discussion with patient including medication options, risks and benefits. Potential drug interactions reviewed with patient.     Treatment Planned: Molnupiravir. Paxlovid interacts with Relpax and trazodone.  She is concerned about being able to sleep without taking or reducing the dose of her trazodone.  Temporary change to home medications: none    Estimated body mass index is 22.13 kg/m  as calculated from the following:    Height as of 2/24/23: 1.7 m (5' 6.93\").    Weight as of 2/24/23: 64 kg (141 lb).  GFR Estimate   Date Value Ref Range Status   09/23/2022 86 >60 mL/min/1.73m2 Final     Comment:     Effective December 21, 2021 eGFRcr in adults is calculated using the 2021 CKD-EPI creatinine equation which includes age and gender (Lance et al., NEJ, DOI: 10.1056/GFFOxf2967020)   06/18/2021 63 >60 mL/min/[1.73_m2] Final     Comment:     Non  GFR Calc  Starting 12/18/2018, serum creatinine based estimated GFR (eGFR) will be   calculated using the Chronic Kidney Disease Epidemiology Collaboration   (CKD-EPI) equation.       ALT   Date Value Ref Range Status   09/23/2022 19 0 - 50 U/L Final   06/18/2021 18 0 - 50 U/L Final     AST   Date Value Ref Range Status   09/23/2022 18 0 - 45 U/L Final   06/18/2021 14 0 - 45 U/L Final     Alkaline Phosphatase   Date Value Ref Range Status   09/23/2022 74 40 - 150 U/L Final   06/18/2021 88 40 - 150 U/L Final     Lab Results   Component Value Date    [id number] NOT DETECTED 01/22/2022       BERNIE Ross [facility]    Shonna BRANTLEY" Jolly is a 62 year old who presents for the following health issues    HPI    Beronica presents via video after testing positive for COVID-19.  Symptoms first began last Wednesday, 2 days ago.  She had a headache and pain in her neck and shoulders and down to her back.  She is also had a cough, sore throat, nasal congestion, runny nose and fatigue.    Review of Systems   ROS negative except as stated above.      Objective    Physical Exam   GENERAL: Healthy, alert and no distress  EYES: Eyes grossly normal to inspection.  No discharge or erythema, or obvious scleral/conjunctival abnormalities.  HENT: Normal cephalic/atraumatic.  External ears, nose and mouth without ulcers or lesions.  No nasal drainage visible.  RESP: No audible cough wheeze or visible cyanosis.  No visible retractions or increased work of breathing.    SKIN: Visible skin clear. No significant rash, abnormal pigmentation or lesions.  NEURO: Cranial nerves grossly intact.  Mentation and speech appropriate for age.  PSYCH: Mentation appears normal, affect normal/bright, judgement and insight intact, normal speech and appearance well-groomed.    Type of service:  Video Visit  Video Start Time: 4:35PM  Video End Time: 4:45PM  Originating Location (pt. Location): Home  Distant Location (provider location):  [facility]- offsite at home, [facility]  Platform used for Video Visit: [employer]oniWell

## 2023-03-30 ENCOUNTER — MYC MEDICAL ADVICE (OUTPATIENT)
Dept: FAMILY MEDICINE | Facility: CLINIC | Age: 63
End: 2023-03-30
Payer: COMMERCIAL

## 2023-03-31 ENCOUNTER — ANCILLARY PROCEDURE (OUTPATIENT)
Dept: MAMMOGRAPHY | Facility: CLINIC | Age: 63
End: 2023-03-31
Attending: FAMILY MEDICINE
Payer: COMMERCIAL

## 2023-03-31 DIAGNOSIS — Z12.31 VISIT FOR SCREENING MAMMOGRAM: ICD-10-CM

## 2023-03-31 PROCEDURE — 77067 SCR MAMMO BI INCL CAD: CPT | Mod: GC | Performed by: RADIOLOGY

## 2023-03-31 PROCEDURE — 77063 BREAST TOMOSYNTHESIS BI: CPT | Mod: GC | Performed by: RADIOLOGY

## 2023-04-03 ENCOUNTER — OFFICE VISIT (OUTPATIENT)
Dept: FAMILY MEDICINE | Facility: CLINIC | Age: 63
End: 2023-04-03
Payer: COMMERCIAL

## 2023-04-03 VITALS
WEIGHT: 136 LBS | RESPIRATION RATE: 12 BRPM | SYSTOLIC BLOOD PRESSURE: 112 MMHG | TEMPERATURE: 99.1 F | BODY MASS INDEX: 21.35 KG/M2 | OXYGEN SATURATION: 99 % | HEIGHT: 67 IN | HEART RATE: 85 BPM | DIASTOLIC BLOOD PRESSURE: 76 MMHG

## 2023-04-03 DIAGNOSIS — R05.8 POST-VIRAL COUGH SYNDROME: Primary | ICD-10-CM

## 2023-04-03 PROCEDURE — 99214 OFFICE O/P EST MOD 30 MIN: CPT | Performed by: FAMILY MEDICINE

## 2023-04-03 ASSESSMENT — PATIENT HEALTH QUESTIONNAIRE - PHQ9
SUM OF ALL RESPONSES TO PHQ QUESTIONS 1-9: 5
SUM OF ALL RESPONSES TO PHQ QUESTIONS 1-9: 5
10. IF YOU CHECKED OFF ANY PROBLEMS, HOW DIFFICULT HAVE THESE PROBLEMS MADE IT FOR YOU TO DO YOUR WORK, TAKE CARE OF THINGS AT HOME, OR GET ALONG WITH OTHER PEOPLE: NOT DIFFICULT AT ALL

## 2023-04-03 ASSESSMENT — PAIN SCALES - GENERAL: PAINLEVEL: NO PAIN (0)

## 2023-04-03 ASSESSMENT — ENCOUNTER SYMPTOMS: COUGH: 1

## 2023-04-03 NOTE — PROGRESS NOTES
"  Assessment & Plan     Post-viral cough syndrome  reassurance   Follow up only if unimproved.                Regular exercise  See Patient Instructions    Joaquin Rowe MD  [facility]    Shonna Loco is a 62 year old, presenting for the following health issues:    Cough (Had COVID 3/16-cough still lingering and wont go away, does get coughing spells that stuff does come up)        4/3/2023     4:01 PM   Additional Questions   Roomed by Caroline Franco    History of Present Illness       Reason for visit:  Cough which is persistent since March 16th  Symptom onset:  3-4 weeks ago  Symptoms include:  Cough and congestion  Symptom intensity:  Mild  Symptom progression:  Improving  Had these symptoms before:  Yes  Has tried/received treatment for these symptoms:  No  What makes it worse:  Not drinking enough water or rest or sleep  What makes it better:  Drinking water staying in humidified room rest    She eats 4 or more servings of fruits and vegetables daily.She consumes 0 sweetened beverage(s) daily.She exercises with enough effort to increase her heart rate 9 or less minutes per day.  She exercises with enough effort to increase her heart rate 3 or less days per week.   She is taking medications regularly.    Today's PHQ-9         PHQ-9 Total Score: 5    PHQ-9 Q9 Thoughts of better off dead/self-harm past 2 weeks :   Not at all    How difficult have these problems made it for you to do your work, take care of things at home, or get along with other people: Not difficult at all               Review of Systems   Respiratory: Positive for cough.       Constitutional, HEENT, cardiovascular, pulmonary, gi and gu systems are negative, except as otherwise noted.      Objective    /76 (BP Location: Left arm, Patient Position: Sitting, Cuff Size: Adult Regular)   Pulse 85   Temp 99.1  F (37.3  C) (Temporal)   Resp 12   Ht 1.702 m (5' 7\")   Wt 61.7 kg (136 lb)   LMP " 04/06/2010 (Exact Date)   SpO2 99%   BMI 21.30 kg/m    Body mass index is 21.3 kg/m .  Physical Exam   GENERAL: healthy, alert and no distress  EYES: Eyes grossly normal to inspection, PERRL and conjunctivae and sclerae normal  HENT: ear canals and TM's normal, nose and mouth without ulcers or lesions  NECK: no adenopathy, no asymmetry, masses, or scars and thyroid normal to palpation  RESP: no rales , no rhonchi and expiratory wheezes bibasilar  CV: regular rate and rhythm, normal S1 S2, no S3 or S4, no murmur, click or rub, no peripheral edema and peripheral pulses strong  ABDOMEN: soft, nontender, no hepatosplenomegaly, no masses and bowel sounds normal  MS: no gross musculoskeletal defects noted, no edema    Office Visit on 02/24/2023   Component Date Value Ref Range Status     Interpretation 02/24/2023 Negative for Intraepithelial Lesion or Malignancy (NILM)    Final     Comment 02/24/2023    Final                    Value:This result contains rich text formatting which cannot be displayed here.     Specimen Adequacy 02/24/2023 Satisfactory for evaluation, endocerv/transformation zone component absent, atrophy   Final     Clinical Information 02/24/2023    Final                    Value:This result contains rich text formatting which cannot be displayed here.     Reflex Testing 02/24/2023 Yes regardless of result   Final     Previous Abnormal? 02/24/2023    Final                    Value:This result contains rich text formatting which cannot be displayed here.     Previous Abnormal Diagnosis 02/24/2023    Final                    Value:This result contains rich text formatting which cannot be displayed here.     Performing Labs 02/24/2023    Final                    Value:This result contains rich text formatting which cannot be displayed here.     Other HR HPV 02/24/2023 Positive (A)  Negative Final     HPV16 DNA 02/24/2023 Negative  Negative Final     HPV18 DNA 02/24/2023 Negative  Negative Final     FINAL  DIAGNOSIS 02/24/2023    Final                    Value:This result contains rich text formatting which cannot be displayed here.

## 2023-05-05 ENCOUNTER — OFFICE VISIT (OUTPATIENT)
Dept: OBGYN | Facility: CLINIC | Age: 63
End: 2023-05-05
Payer: COMMERCIAL

## 2023-05-05 VITALS
HEART RATE: 89 BPM | OXYGEN SATURATION: 99 % | WEIGHT: 137.3 LBS | SYSTOLIC BLOOD PRESSURE: 107 MMHG | DIASTOLIC BLOOD PRESSURE: 69 MMHG | BODY MASS INDEX: 21.5 KG/M2

## 2023-05-05 DIAGNOSIS — R87.810 CERVICAL HIGH RISK HUMAN PAPILLOMAVIRUS (HPV) DNA TEST POSITIVE: Primary | ICD-10-CM

## 2023-05-05 PROCEDURE — 57454 BX/CURETT OF CERVIX W/SCOPE: CPT | Performed by: OBSTETRICS & GYNECOLOGY

## 2023-05-05 PROCEDURE — 88305 TISSUE EXAM BY PATHOLOGIST: CPT | Performed by: PATHOLOGY

## 2023-05-05 PROCEDURE — 88360 TUMOR IMMUNOHISTOCHEM/MANUAL: CPT | Performed by: PATHOLOGY

## 2023-05-05 PROCEDURE — 88342 IMHCHEM/IMCYTCHM 1ST ANTB: CPT | Mod: 59 | Performed by: PATHOLOGY

## 2023-05-05 NOTE — PROGRESS NOTES
[facility]    CC: colposcopy    S:Beronica Azevedo is a 63 year old  who presents today for colposcopy due to NILM pap HPV positive other HR types.      Pap Hx:  : ASC H,   : [place] - BEVERLY II  09: LEEP - BEVERLY II, clear margins  NIL paps: 2/10, 7/10, .  Plan pap in 1 yr.  3/4/16: Pap - NIL, + HR HPV.   3/18/16: [place] - ECC - negative. Plan cotest in 1 year.  17: NIL pap, Neg HR HPV result. Plan cotest in 1 year.  Pt has had a LEEP, needs 2 consecutive NIL/Neg cotest's then a 3 year cotest.   18 ASCUS pap/+ HR HPV (not 16 or 18) and endometrial cells.  Plan: colposcopy and endometrial cell follow up by 18.  18:[place] ECC Endocervical cells showed possible changes. Endometrial cells normal per provider Plan ECC in 3-6 months. Cotest in 1 year.   18: ECC Neg for dysplasia. Plan cotest due on 19.   19: NIL Pap, + HR HPV (not 16 or 18) result. Plan [place].   10/10/19: [place] Bx and ECC benign. Plan cotest in 1 year.  10/30/20 NIL pap, neg HPV. Patient will need three consecutive years of pap/HPV cotesting  (Hx LEEP: BEVERLY 2). Plan cotest in 1 year  21 NIL Pap, + HR HPV (not 16 or 18) Plan [place] due bef 22.   01/10/22 [place] Bx and ECC No BEVERLY. Plan cotest in 1 year due 01/10/23.   23 NIL pap, +HR HPV, not 16/18. Plan [place] bef 23       Patient's last menstrual period was 2010 (exact date).     O: Patient Vitals for the past 24 hrs:   BP Pulse SpO2 Weight   23 1342 107/69 89 99 % 62.3 kg (137 lb 4.8 oz)   ]    PROCEDURE:  Before the procedure, it was ensured that the patient was educated regarding the nature of her findings to date, the implications, and what was to be done. She has been made aware of the role of HPV, the natural history of infection, ways to minimize her future risk, the effect of HPV on the cervix, and treatment options available should they be indicated.  The details of the colposcopic procedure were  reviewed. Indications, alternatives, benefits, and risks including bleeding, infection, pain, and injury to surrounding organs were reviewed.  Questions and concerns were addressed.  Consent was signed.     On exam atrophic appearing vaginal mucosa, atrophic appearing cervix.  Cervix is flush with vaginal vault and stenotic os present.  Medium lee ann speculum placed in vagina and excellent visualization of cervix achieved, cervix swabbed x 3 with acetic acid solution.  Cervix noted to have patch of acetowhite change at 3 oclock.  Lugol's applied showing diffuse decreased uptake.  Cervical biopsy taken at 3 oclock.  Endocervical currettage collected, but cervix is stenotic .  Cervix made hemostatic with silver nitrate sticks.  Speculum removed. Patient tolerated procedure well.    FINDINGS:  Cervix: Cervix noted to have patch of acetowhite change at 3 oclock.  Lugol's applied showing diffuse decreased uptake.    Pap repeated?:  No  SCJ seen?:  no    ECC done?:  Yes   Lugol's solution used?:  Yes   Satisfactory examination?:  no      ASSESSMENT: HPV related changes.    PLAN: specimens labelled and sent to Pathology, will base further treatment on Pathology findings and post biopsy instructions given to patient    Misty Brown MD

## 2023-05-05 NOTE — PROGRESS NOTES
Beronica Azevedo is a 63 year old female who presents for repeat colposcopy, referred by ***. Pap smear {NUMBERS 1-16:10} months ago showed: {PAP SMEAR DIAGNOSES:512884}. The prior pap showed {PAP SMEAR DIAGNOSES:263678}.     Past Medical History:   Diagnosis Date     Anxiety      Arthritis 2010     Cervical high risk HPV (human papillomavirus) test positive 03/04/2016 03/04/2016, 09/12/19, 11/23/21     BEVERLY II (cervical intraepithelial neoplasia II) 2009    LEEP     Depression      Depressive disorder Adolescence    Managed with medication and therapy     Dry eye syndrome      Frequent UTI 2009     Headache(784.0)      IBS (irritable bowel syndrome)      Insomnia      Lattice degeneration of retina      Lyme disease      Myopic astigmatism      Nonsenile cataract      Osteopenia 2001,2012    Fosamax     Pneumothorax on right     at 15 y/o     PVD (posterior vitreous detachment), both eyes 07/19/2010     Rosacea      Vasomotor rhinitis      Family History   Problem Relation Age of Onset     Neurologic Disorder Mother      Allergies Mother      Arthritis Mother      Depression Mother      Eye Disorder Mother      Lipids Mother         high cholesterol     Osteoporosis Mother      Hypertension Mother      Hyperlipidemia Mother      Thyroid Disease Mother      Neurologic Disorder Father      Lipids Father         high cholesterol     Gastrointestinal Disease Father         stomach ulcers     Cancer Father         melanoma     Skin Cancer Father      Hypertension Father      Hyperlipidemia Father      Depression Sister      Uterine Cancer Sister      Tumor Sister         brain tumor     Neurologic Disorder Sister      Neurologic Disorder Sister         epilepsy     Alcohol/Drug Sister         alcohol and drug dependency     Lipids Sister         high cholesterol     Gastrointestinal Disease Sister         hepatitis ??     Osteoporosis Sister      Neurologic Disorder Brother      Alcohol/Drug Brother         recovery  "alcohol and drug addit     Asthma Daughter         excercise and allergy induced     Asthma Daughter      Asthma Son         excercise and allergy induced     Asthma Son      Breast Cancer Maternal Aunt      Melanoma No family hx of        Previous history of abnormal paps?: {Yes /No default.:174124::\"No\"}  History of cryotherapy (freezing)?: : {Yes /No default.:132794::\"No\"}  History of veneral diseases: : {Yes /No default.:720312::\"No\"}  Do you desire testing for any of these diseases? : {Yes /No default.:753145::\"No\"}  History of genital warts:  {Yes /No default.:939490::\"No\"}  Visible warts now?:  {Yes /No default.:376871::\"No\"}  Previously treated? If so, how?:  {Yes /No default.:925942::\"No\"}     Patient's last menstrual period was 04/06/2010 (exact date).  Type of contraception: {CONTRACEPTIVE METHOD:5051}  Age at first sexual intercourse: ***  Number of sexual partners (lifetime): ***  History   Smoking Status     Never   Smokeless Tobacco     Never     History of sexual abuse:  {Yes /No default.:253240::\"No\"}  Allergies as of 05/05/2023 - Reviewed 04/03/2023   Allergen Reaction Noted     Perfume Other (See Comments) 11/09/2011     Adhesive tape Rash 01/10/2011     Compazine Other (See Comments) 02/18/2010     Paroxetine  08/05/2010     Paxil [paroxetine] Other (See Comments) 02/18/2010     Ropinirole hcl Unknown 03/17/2011     Tizanidine  11/21/2022     Depakote [valproic acid] Rash 02/18/2010     Food Itching and Rash 07/30/2010     Mushroom Itching and Rash 07/30/2010     Ropinirole Anxiety and Unknown 03/17/2011        PROCEDURE:  Before the procedure, it was ensured that the patient was educated regarding the nature of her findings to date, the implications of them, and what was to be done. She has been made aware of the role of HPV, the natural history of infection, ways to minimize her future risk, the effect of HPV on the cervix, and treatment options available should they be indicated. The " "  pathophysiology of the cervix, including a discussion of squamous vs. endometrial cells, and squamous metaplasia have all been reviewed, using illustrations and sketches. The details of the colposcopic procedure were reviewed, as well as the risks of missed diagnoses, pain, infection and bleeding. All questions were answered before proceeding, and informed consent was therefore obtained.    Bimanual examination: {wasnt/was done:215260::\"was performed and was unremarkable.\"}  Unenhanced examination of the cervix was {NORMAL/A:779775}  Please refer to images section for details!  Pap repeated?:  {Yes /No default.:140087::\"No\"}  SCJ seen?:  {obesity hx yesno:656990::\"yes\"}  Endocervical speculum needed?:  {Yes /No default.:960587::\"No\"}  ECC done?:  {Yes /No default.:344363::\"No\"}  Lugol's solution used?:  {Yes /No default.:904641::\"No\"}  Satisfactory examination?:  {obesity hx yesno:121865::\"yes\"}    Vaginal vault: normal to cursory inspection ***  Urethra normal?:  {obesity hx yesno:695310::\"yes\"}  Labia normal?:  {obesity hx yesno:141269::\"yes\"}  Perineum normal?:  {obesity hx yesno:727494::\"yes\"}  Rectum normal?:  {obesity hx yesno:124063::\"yes\"}    FINDINGS:  Please see image   Cervix: {COLPOSCOPY CERVIX FINDINGS:591902}  Procedure: {COLPOSCOPY CERVIX PROCEDURE:731638}.    Procedure summary: {COLPOSCOPY SUMMARY:425144}     Assessment: {:733}  Reese index: {NUMBERS 1-16:10}    Plan: {COLPOSCOPY PLAN:734}  "

## 2023-05-11 LAB
PATH REPORT.COMMENTS IMP SPEC: NORMAL
PATH REPORT.COMMENTS IMP SPEC: NORMAL
PATH REPORT.FINAL DX SPEC: NORMAL
PATH REPORT.GROSS SPEC: NORMAL
PATH REPORT.MICROSCOPIC SPEC OTHER STN: NORMAL
PATH REPORT.MICROSCOPIC SPEC OTHER STN: NORMAL
PATH REPORT.RELEVANT HX SPEC: NORMAL
PHOTO IMAGE: NORMAL

## 2023-05-18 ENCOUNTER — PATIENT OUTREACH (OUTPATIENT)
Dept: OBGYN | Facility: CLINIC | Age: 63
End: 2023-05-18
Payer: COMMERCIAL

## 2023-05-18 DIAGNOSIS — N87.1 MODERATE DYSPLASIA OF CERVIX (CIN II): Primary | ICD-10-CM

## 2023-05-18 NOTE — TELEPHONE ENCOUNTER
5/5/23 [place]: Atrophic cervical cells. No endocervical cells on the sample due to cervical stenosis. Plan repeat pap and HPV testing in 12 months.

## 2023-05-18 NOTE — LETTER
May 18, 2023      Beronica Azevedo  [address] [place] [id number]      Hi Beronica,      Your cervical biopsy result is back and shows atrophic cervical cells.  There was not any endocervical cells on the sample.  This result is due to the cervical stenosis (scarring of the opening of the cervix) we discussed during your visit.  My recommendation based on these results is repeat pap and HPV testing in 12 months.  If you have any questions please call clinic at [phone].    Thanks,    Dr. Misty Brown MD

## 2023-11-19 ENCOUNTER — NURSE TRIAGE (OUTPATIENT)
Dept: NURSING | Facility: CLINIC | Age: 63
End: 2023-11-19
Payer: COMMERCIAL

## 2023-11-19 ENCOUNTER — E-VISIT (OUTPATIENT)
Dept: URGENT CARE | Facility: CLINIC | Age: 63
End: 2023-11-19
Payer: COMMERCIAL

## 2023-11-19 DIAGNOSIS — R30.0 DYSURIA: Primary | ICD-10-CM

## 2023-11-19 PROCEDURE — 99207 PR NON-BILLABLE SERV PER CHARTING: CPT | Performed by: PHYSICIAN ASSISTANT

## 2023-11-20 ENCOUNTER — VIRTUAL VISIT (OUTPATIENT)
Dept: FAMILY MEDICINE | Facility: CLINIC | Age: 63
End: 2023-11-20
Payer: COMMERCIAL

## 2023-11-20 ENCOUNTER — LAB (OUTPATIENT)
Dept: LAB | Facility: CLINIC | Age: 63
End: 2023-11-20
Payer: COMMERCIAL

## 2023-11-20 DIAGNOSIS — R30.0 DYSURIA: ICD-10-CM

## 2023-11-20 DIAGNOSIS — R30.0 DYSURIA: Primary | ICD-10-CM

## 2023-11-20 LAB
ALBUMIN UR-MCNC: NEGATIVE MG/DL
APPEARANCE UR: CLEAR
BILIRUB UR QL STRIP: NEGATIVE
COLOR UR AUTO: YELLOW
GLUCOSE UR STRIP-MCNC: NEGATIVE MG/DL
HGB UR QL STRIP: NEGATIVE
KETONES UR STRIP-MCNC: NEGATIVE MG/DL
LEUKOCYTE ESTERASE UR QL STRIP: NEGATIVE
NITRATE UR QL: NEGATIVE
PH UR STRIP: 7 [PH] (ref 5–7)
SP GR UR STRIP: <=1.005 (ref 1–1.03)
UROBILINOGEN UR STRIP-ACNC: 0.2 E.U./DL

## 2023-11-20 PROCEDURE — 81003 URINALYSIS AUTO W/O SCOPE: CPT

## 2023-11-20 PROCEDURE — 99442 PR PHYSICIAN TELEPHONE EVALUATION 11-20 MIN: CPT | Performed by: FAMILY MEDICINE

## 2023-11-20 RX ORDER — NITROFURANTOIN 25; 75 MG/1; MG/1
100 CAPSULE ORAL 2 TIMES DAILY
Qty: 14 CAPSULE | Refills: 0 | Status: SHIPPED | OUTPATIENT
Start: 2023-11-20 | End: 2023-11-27

## 2023-11-20 NOTE — PROGRESS NOTES
Beronica is a 63 year old who is being evaluated via a billable telephone visit.      What phone number would you like to be contacted at? cell  How would you like to obtain your AVS? [facility]    Distant Location (provider location):  On-site    Assessment & Plan     Dysuria  Was worse, now better   Reviewed UA    Push fluids   If worsen over weekend start  - nitrofurantoin macrocrystal-monohydrate (MACROBID) 100 MG capsule  Dispense: 14 capsule; Refill: 0      Review of the result(s) of each unique test - UA  4 minutes spent by me on the date of the encounter doing review of test results and interpretation of tests        Regular exercise  Fluids/ rest  See Patient Instructions    Joaquin Rowe MD  [facility]    Subjective   Beronica is a 63 year old, presenting for the following health issues:  No chief complaint on file.      HPI       Genitourinary - Female  Onset/Duration: days  Description:   Painful urination (Dysuria): YES           Frequency: YES  Blood in urine (Hematuria): No  Delay in urine (Hesitency): No  Intensity: mild  Progression of Symptoms:  waxing and waning  Accompanying Signs & Symptoms:  Fever/chills: No  Flank pain: No  Nausea and vomiting: No  Vaginal symptoms: none  Abdominal/Pelvic Pain: No  History:     History of kidney stones: No  Sexually Active: No    Precipitating or alleviating factors: None  Therapies tried and outcome: Cranberry juice prn (contraindicated in Coumadin patients) and  water       Review of Systems   Constitutional, HEENT, cardiovascular, pulmonary, gi and gu systems are negative, except as otherwise noted.      Objective           Vitals:  No vitals were obtained today due to virtual visit.    Physical Exam   healthy, alert, and no distress  PSYCH: Alert and oriented times 3; coherent speech, normal   rate and volume, able to articulate logical thoughts, able   to abstract reason, no tangential thoughts, no hallucinations   or delusions   Her affect is normal  RESP: No cough, no audible wheezing, able to talk in full sentences  Remainder of exam unable to be completed due to telephone visits    Lab on 11/20/2023   Component Date Value Ref Range Status    Color Urine 11/20/2023 Yellow  Colorless, Straw, Light Yellow, Yellow Final    Appearance Urine 11/20/2023 Clear  Clear Final    Glucose Urine 11/20/2023 Negative  Negative mg/dL Final    Bilirubin Urine 11/20/2023 Negative  Negative Final    Ketones Urine 11/20/2023 Negative  Negative mg/dL Final    Specific Gravity Urine 11/20/2023 <=1.005  1.003 - 1.035 Final    Blood Urine 11/20/2023 Negative  Negative Final    pH Urine 11/20/2023 7.0  5.0 - 7.0 Final    Protein Albumin Urine 11/20/2023 Negative  Negative mg/dL Final    Urobilinogen Urine 11/20/2023 0.2  0.2, 1.0 E.U./dL Final    Nitrite Urine 11/20/2023 Negative  Negative Final    Leukocyte Esterase Urine 11/20/2023 Negative  Negative Final               Phone call duration: 11 minutes

## 2023-11-20 NOTE — TELEPHONE ENCOUNTER
Pt is calling back to ask about symptom management with the UTI symptoms she has.     Writer advised pt she can try a baking soda sitz bath, 8 ounces of cranberry juice (or 1 oz if pure juice) every 12 hours, Tylenol 1,000 mg every eight hours. Call back if new or worsening symptoms such as fever or severe pain.     Pt verbalizes understanding and is agreeable to plan. She will complete an e visit now as well.

## 2023-11-20 NOTE — PATIENT INSTRUCTIONS
Dear Beronica Azevedo,     After reviewing your responses, I would like you to come in for a urine test to make sure we treat you correctly. This urine test is to evaluate you for a possible urinary tract infection, and should be scheduled for today or tomorrow. Schedule a Lab Only appointment here.     Lab appointments are not available at most locations on the weekends. If no Lab Only appointment is available, you should be seen in any of our convenient Walk-in or Urgent Care Centers, which can be found on our website here.     You will receive instructions with your results in [facility] once they are available.     If your symptoms worsen, you develop pain in your back or stomach, develop fevers, or are not improving in 5 days, please contact your primary care provider for an appointment or visit a Walk-in or Urgent Care Center to be seen.     Thanks again for choosing us as your health care partner,     Latesha Curran PA-C

## 2023-11-20 NOTE — TELEPHONE ENCOUNTER
Urgency, pain, frequency. No fever. Some flank pain    Nurse Triage SBAR    Is this a 2nd Level Triage? NO    Situation: UTI symptoms    Background: patient complains of urgency, frequency and some lower back pain. Patient denies fever.     Assessment: frequency, urgency of urination, lower back pain    Protocol Recommended Disposition:   See PCP Within 24 Hours    Recommendation: Patient verbalized understanding of care advice.       Raquel Anderson RN on 11/19/2023 at 8:14 PM      Does the patient meet one of the following criteria for ADS visit consideration? 16+ years old, with an [facility] PCP     TIP  Providers, please consider if this condition is appropriate for management at one of our Acute and Diagnostic Services sites.     If patient is a good candidate, please use dotphrase <dot>triageresponse and select Refer to ADS to document.      Reason for Disposition   Side (flank) or lower back pain present    Additional Information   Negative: Shock suspected (e.g., cold/pale/clammy skin, too weak to stand, low BP, rapid pulse)   Negative: Sounds like a life-threatening emergency to the triager   Negative: [1] Unable to urinate (or only a few drops) > 4 hours AND [2] bladder feels very full (e.g., palpable bladder or strong urge to urinate)   Negative: [1] Decreased urination and [2] drinking very little AND [2] dehydration suspected (e.g., dark urine, no urine > 12 hours, very dry mouth, very lightheaded)   Negative: Patient sounds very sick or weak to the triager   Negative: Fever > 100.4 F (38.0 C)    Protocols used: Urinary Symptoms-A-AH

## 2023-11-21 ENCOUNTER — OFFICE VISIT (OUTPATIENT)
Dept: DERMATOLOGY | Facility: CLINIC | Age: 63
End: 2023-11-21
Payer: COMMERCIAL

## 2023-11-21 DIAGNOSIS — L82.1 SEBORRHEIC KERATOSES: ICD-10-CM

## 2023-11-21 DIAGNOSIS — L72.9 CYST OF SKIN: Primary | ICD-10-CM

## 2023-11-21 DIAGNOSIS — L85.3 XEROSIS OF SKIN: ICD-10-CM

## 2023-11-21 DIAGNOSIS — L29.9 PRURITUS: ICD-10-CM

## 2023-11-21 DIAGNOSIS — F32.5 MAJOR DEPRESSIVE DISORDER, SINGLE EPISODE, IN FULL REMISSION (H): ICD-10-CM

## 2023-11-21 DIAGNOSIS — L72.0 EIC (EPIDERMAL INCLUSION CYST): ICD-10-CM

## 2023-11-21 DIAGNOSIS — K13.0 CHEILITIS: ICD-10-CM

## 2023-11-21 PROCEDURE — 99214 OFFICE O/P EST MOD 30 MIN: CPT | Mod: GC | Performed by: STUDENT IN AN ORGANIZED HEALTH CARE EDUCATION/TRAINING PROGRAM

## 2023-11-21 NOTE — LETTER
11/21/2023       RE: Beronica Azevedo  [address]  [place] [address]     Dear Colleague,    Thank you for referring your patient, Beronica Azevedo, to the [facility] [place] at [facility]. Please see a copy of my visit note below.    Dermatology Rooming Note    Beronica Azevedo's goals for this visit include:   Chief Complaint   Patient presents with    Derm Problem     Pt has spots of concern on her back, chin, and dry peeling lips.      Trevor Oneill, EMT-B      [facility] Dermatology Note  Encounter Date: Nov 21, 2023  Office Visit     Dermatology Problem List:  1. Seborrheic dermatitis, scalp  - Current tx: ketoconazole shampoo three times per week  2. Eczematous dermatitis  3. Erythematotelangiectatic rosacea  - Referred to cosmetic dermatology  4. Epidermal inclusion cyst, upper back  - s/p excisional bx of dilated pore of Nichole 8/1/2017  5. Scattered benign nevi  6. Seborrheic keratoses, non-irritated  7. 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 Lentigos  - Cosmetic dermatology referral to discuss laser     ____________________________________________    Assessment & Plan:     # Benign cyst, favor EIC  Etiology and benign nature reviewed. Discussed that definitive treatment would be removal via excision, though a small chance of recurrence remains if the cyst wall is unable to be completely removed. Discussed that intralesional injections and systemic antibiotics are sometimes recommended for inflamed lesions. Reviewed that excision can only performed on non-inflamed lesions.  - discussed risks and benefits of surgery   - risk factors include age  - pt wishes to proceed  - Will schedule for excision with Dr. Looney in [facility]    # Xerotic cheilitis  - Increase use of Vaseline to 2-3 times daily  - Counseled to optimize gentle skin  care & avoid trauma; stop scrubbing lips or rubbing aggressively     # Xerosis  # Pruritus, mostly on back  Patient initially complained of 2 itchy papules that were most consistent with benign intradermal nevi. On further questioning, it appears that the pruritus is rather diffuse rather than localized to just those 2 spots. Can consider component of notalgia paresthetica as well  - Start CeraVe cream or Vanicream BID    # Benign lesions (seborrheic keratoses, lentigines, clinically banal appearing melanocytic nevi)  - Reassurance provided and benign nature of condition discussed  - Signs and symptoms of NMSC discussed  - ABCDs of melanoma were discussed and self skin checks were advised  - Sun precaution was advised including the use of sun screens of SPF 30 or higher, sun protective clothing, and avoidance of tanning beds    Procedures Performed:  None    Follow-up: cyst excision at next available in [facility]    Staff and Resident:     Attending: Dr. Looney staffed the patient.    Resident: Jeet Ellison MD (PGY-4)    ____________________________________________    CC: Derm Problem (Pt has spots of concern on her back, chin, and dry peeling lips. )    HPI:  Ms. Beronica Azevedo is a(n) 63 year old female who presents today as a return patient for upper body skin check & several lesions of concern  - Patient reports dry irritated lips. Seems to be worse in the winter. Uses toothbrush to exofialate the lips since it becomes scaly. Has been using Vaseline nightly  - Cyst on upper back is annoying and patient would like this removed. Occasionally gets bigger  - Reports 2 spots on the b/l scapular that are itchy. On further questioning, it appears that the pruritus is rather diffuse rather than localized to just those 2 spots.     Labs Reviewed:  N/A    Physical Exam:  Vitals: LMP 04/06/2010 (Exact Date)   SKIN: Waist-up skin, which includes the head/face, neck, both arms, chest, back, abdomen, digits and/or  nails was examined.  - Upper and lower lips with xerosis and slight erosions centrally  - Central upper back with small mobile subcutaneous nodule; no visible punctum  - Multiple regular brown pigmented macules and papules are identified on the trunk and extremities.   - Scattered brown macules on sun exposed areas.  - There are waxy stuck on tan to brown papules on the trunk and extremities.   - No other lesions of concern on areas examined.     Medications:  Current Outpatient Medications   Medication    acetaminophen (TYLENOL) 325 MG tablet    BENADRYL ALLERGY PO    biotin 2.5 mg/mL    butalbital-acetaminophen-caffeine (ESGIC) -40 MG tablet    eletriptan (RELPAX) 40 MG tablet    emollient (VANICREAM) external cream    ketoconazole (NIZORAL) 2 % external shampoo    nitroFURantoin macrocrystal-monohydrate (MACROBID) 100 MG capsule    nortriptyline (PAMELOR) 50 MG capsule    topiramate (TOPAMAX) 25 MG tablet    traZODone (DESYREL) 50 MG tablet    Coenzyme Q10 (CO Q10) 100 MG CAPS    estradiol (ESTRACE) 0.1 MG/GM vaginal cream    magnesium 100 MG CAPS    Riboflavin (VITAMIN B-2 PO)     No current facility-administered medications for this visit.      Past Medical History:   Patient Active Problem List   Diagnosis    Headache    Insomnia    Moderate dysplasia of cervix (BEVERLY II)    CARDIOVASCULAR SCREENING; LDL GOAL LESS THAN 160    Rosacea    Onychodystrophy    Dermatitis    Osteopenia    Post-menopausal    Mild major depression (H)    IBS (irritable bowel syndrome)    Myopia    Regular astigmatism    Presbyopia    Posterior subcapsular polar senile cataract    Senile nuclear sclerosis    Macular puckering of retina    Cervicalgia    Pain in thoracic spine    Digital mucinous cyst    Seborrheic keratosis, inflamed    FREDIS (generalized anxiety disorder)    Neoplasm of uncertain behavior of skin    Dermatitis, seborrheic    Plantar fasciitis    Endometrial cells on cervical Pap smear inconsistent with last  menstrual period    Migraine without status migrainosus, not intractable    Acute appendicitis with localized peritonitis, without perforation, abscess, or gangrene    Mixed stress and urge urinary incontinence    Trochanteric bursitis of right hip    Strain of right trapezius muscle, initial encounter    Depressive disorder in remission    Benign neoplasm of ascending colon    Chronic migraine without aura, intractable, without status migrainosus    Occipital neuralgia    Polyp of colon    Unspecified temporomandibular joint disorder, unspecified side     Past Medical History:   Diagnosis Date    Anxiety     Arthritis 2010    Cervical high risk HPV (human papillomavirus) test positive 03/04/2016 03/04/2016, 09/12/19, 11/23/21    BEVERLY II (cervical intraepithelial neoplasia II) 2009    LEEP    Depression     Depressive disorder Adolescence    Managed with medication and therapy    Dry eye syndrome     Frequent UTI 2009    Headache(784.0)     IBS (irritable bowel syndrome)     Insomnia     Lattice degeneration of retina     Lyme disease     Myopic astigmatism     Nonsenile cataract     Osteopenia 2001,2012    Fosamax    Pneumothorax on right     at 17 y/o    PVD (posterior vitreous detachment), both eyes 07/19/2010    Rosacea     Vasomotor rhinitis        CC Referred Self, MD  No address on file on close of this encounter.      Attestation with edits by Biju Looney MD at 11/22/2023 11:32 AM:  I have personally examined this patient and agree with the resident doctor's documentation and plan of care. I have reviewed and amended the resident's note. The documentation accurately reflects my clinical observations, diagnoses, treatment and follow-up plans.     Biju Looney MD  Dermatology Staff

## 2023-11-21 NOTE — NURSING NOTE
Dermatology Rooming Note    Beronica Azevedo's goals for this visit include:   Chief Complaint   Patient presents with    Derm Problem     Pt has spots of concern on her back, chin, and dry peeling lips.      Trevor Oneill, EMT-B

## 2023-11-21 NOTE — PROGRESS NOTES
[facility] Dermatology Note  Encounter Date: Nov 21, 2023  Office Visit     Dermatology Problem List:  1. Seborrheic dermatitis, scalp  - Current tx: ketoconazole shampoo three times per week  2. Eczematous dermatitis  3. Erythematotelangiectatic rosacea  - Referred to cosmetic dermatology  4. Epidermal inclusion cyst, upper back  - s/p excisional bx of dilated pore of Nichole 8/1/2017  5. Scattered benign nevi  6. Seborrheic keratoses, non-irritated  7. 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 Lentigos  - Cosmetic dermatology referral to discuss laser     ____________________________________________    Assessment & Plan:     # Benign cyst, favor EIC  Etiology and benign nature reviewed. Discussed that definitive treatment would be removal via excision, though a small chance of recurrence remains if the cyst wall is unable to be completely removed. Discussed that intralesional injections and systemic antibiotics are sometimes recommended for inflamed lesions. Reviewed that excision can only performed on non-inflamed lesions.  - discussed risks and benefits of surgery   - risk factors include age  - pt wishes to proceed  - Will schedule for excision with Dr. Looney in [facility]    # Xerotic cheilitis  - Increase use of Vaseline to 2-3 times daily  - Counseled to optimize gentle skin care & avoid trauma; stop scrubbing lips or rubbing aggressively     # Xerosis  # Pruritus, mostly on back  Patient initially complained of 2 itchy papules that were most consistent with benign intradermal nevi. On further questioning, it appears that the pruritus is rather diffuse rather than localized to just those 2 spots. Can consider component of notalgia paresthetica as well  - Start CeraVe cream or Vanicream BID    # Benign lesions (seborrheic keratoses, lentigines, clinically banal appearing melanocytic nevi)  - Reassurance provided and  benign nature of condition discussed  - Signs and symptoms of NMSC discussed  - ABCDs of melanoma were discussed and self skin checks were advised  - Sun precaution was advised including the use of sun screens of SPF 30 or higher, sun protective clothing, and avoidance of tanning beds    Procedures Performed:  None    Follow-up: cyst excision at next available in [facility]    Staff and Resident:     Attending: Dr. Looney staffed the patient.    Resident: Jeet Ellison MD (PGY-4)    ____________________________________________    CC: Derm Problem (Pt has spots of concern on her back, chin, and dry peeling lips. )    HPI:  Ms. Beronica Azevedo is a(n) 63 year old female who presents today as a return patient for upper body skin check & several lesions of concern  - Patient reports dry irritated lips. Seems to be worse in the winter. Uses toothbrush to exofialate the lips since it becomes scaly. Has been using Vaseline nightly  - Cyst on upper back is annoying and patient would like this removed. Occasionally gets bigger  - Reports 2 spots on the b/l scapular that are itchy. On further questioning, it appears that the pruritus is rather diffuse rather than localized to just those 2 spots.     Labs Reviewed:  N/A    Physical Exam:  Vitals: LMP 04/06/2010 (Exact Date)   SKIN: Waist-up skin, which includes the head/face, neck, both arms, chest, back, abdomen, digits and/or nails was examined.  - Upper and lower lips with xerosis and slight erosions centrally  - Central upper back with small mobile subcutaneous nodule; no visible punctum  - Multiple regular brown pigmented macules and papules are identified on the trunk and extremities.   - Scattered brown macules on sun exposed areas.  - There are waxy stuck on tan to brown papules on the trunk and extremities.   - No other lesions of concern on areas examined.     Medications:  Current Outpatient Medications   Medication    acetaminophen (TYLENOL) 325 MG tablet     BENADRYL ALLERGY PO    biotin 2.5 mg/mL    butalbital-acetaminophen-caffeine (ESGIC) -40 MG tablet    eletriptan (RELPAX) 40 MG tablet    emollient (VANICREAM) external cream    ketoconazole (NIZORAL) 2 % external shampoo    nitroFURantoin macrocrystal-monohydrate (MACROBID) 100 MG capsule    nortriptyline (PAMELOR) 50 MG capsule    topiramate (TOPAMAX) 25 MG tablet    traZODone (DESYREL) 50 MG tablet    Coenzyme Q10 (CO Q10) 100 MG CAPS    estradiol (ESTRACE) 0.1 MG/GM vaginal cream    magnesium 100 MG CAPS    Riboflavin (VITAMIN B-2 PO)     No current facility-administered medications for this visit.      Past Medical History:   Patient Active Problem List   Diagnosis    Headache    Insomnia    Moderate dysplasia of cervix (BEVERLY II)    CARDIOVASCULAR SCREENING; LDL GOAL LESS THAN 160    Rosacea    Onychodystrophy    Dermatitis    Osteopenia    Post-menopausal    Mild major depression (H)    IBS (irritable bowel syndrome)    Myopia    Regular astigmatism    Presbyopia    Posterior subcapsular polar senile cataract    Senile nuclear sclerosis    Macular puckering of retina    Cervicalgia    Pain in thoracic spine    Digital mucinous cyst    Seborrheic keratosis, inflamed    FREDIS (generalized anxiety disorder)    Neoplasm of uncertain behavior of skin    Dermatitis, seborrheic    Plantar fasciitis    Endometrial cells on cervical Pap smear inconsistent with last menstrual period    Migraine without status migrainosus, not intractable    Acute appendicitis with localized peritonitis, without perforation, abscess, or gangrene    Mixed stress and urge urinary incontinence    Trochanteric bursitis of right hip    Strain of right trapezius muscle, initial encounter    Depressive disorder in remission    Benign neoplasm of ascending colon    Chronic migraine without aura, intractable, without status migrainosus    Occipital neuralgia    Polyp of colon    Unspecified temporomandibular joint disorder, unspecified side      Past Medical History:   Diagnosis Date    Anxiety     Arthritis 2010    Cervical high risk HPV (human papillomavirus) test positive 03/04/2016 03/04/2016, 09/12/19, 11/23/21    BEVERLY II (cervical intraepithelial neoplasia II) 2009    LEEP    Depression     Depressive disorder Adolescence    Managed with medication and therapy    Dry eye syndrome     Frequent UTI 2009    Headache(784.0)     IBS (irritable bowel syndrome)     Insomnia     Lattice degeneration of retina     Lyme disease     Myopic astigmatism     Nonsenile cataract     Osteopenia 2001,2012    Fosamax    Pneumothorax on right     at 15 y/o    PVD (posterior vitreous detachment), both eyes 07/19/2010    Rosacea     Vasomotor rhinitis        CC Referred Self, MD  No address on file on close of this encounter.

## 2023-12-15 ENCOUNTER — TELEPHONE (OUTPATIENT)
Dept: DERMATOLOGY | Facility: CLINIC | Age: 63
End: 2023-12-15
Payer: COMMERCIAL

## 2023-12-15 NOTE — TELEPHONE ENCOUNTER
[facility] Call Center    Phone Message    May a detailed message be left on voicemail: yes     Reason for Call: Patient needs info for her 01/29/2023 procedure - she needs to know the name of the procedure and the Dx code for insurance coverage - please call back [phone] Thank you    Action Taken: Other: OX DERM    Travel Screening: Not Applicable

## 2023-12-18 NOTE — TELEPHONE ENCOUNTER
Called and spoke with pt and gave her codes below. Pt voiced understanding.    Thank you,  Valarie YAN RN  Dermatology   [phone]

## 2024-01-25 ENCOUNTER — PATIENT OUTREACH (OUTPATIENT)
Dept: CARE COORDINATION | Facility: CLINIC | Age: 64
End: 2024-01-25
Payer: COMMERCIAL

## 2024-01-25 ENCOUNTER — E-VISIT (OUTPATIENT)
Dept: FAMILY MEDICINE | Facility: CLINIC | Age: 64
End: 2024-01-25
Payer: COMMERCIAL

## 2024-01-25 DIAGNOSIS — R30.0 DYSURIA: Primary | ICD-10-CM

## 2024-01-25 PROCEDURE — 99421 OL DIG E/M SVC 5-10 MIN: CPT | Performed by: PHYSICIAN ASSISTANT

## 2024-01-25 RX ORDER — NAPROXEN 500 MG/1
500 TABLET ORAL
COMMUNITY
Start: 2022-09-14

## 2024-01-26 ENCOUNTER — LAB (OUTPATIENT)
Dept: LAB | Facility: CLINIC | Age: 64
End: 2024-01-26
Payer: COMMERCIAL

## 2024-01-26 DIAGNOSIS — R30.0 DYSURIA: ICD-10-CM

## 2024-01-26 DIAGNOSIS — N39.0 ACUTE UTI: Primary | ICD-10-CM

## 2024-01-26 LAB
ALBUMIN UR-MCNC: 30 MG/DL
APPEARANCE UR: ABNORMAL
BILIRUB UR QL STRIP: NEGATIVE
COLOR UR AUTO: ABNORMAL
GLUCOSE UR STRIP-MCNC: NEGATIVE MG/DL
HGB UR QL STRIP: ABNORMAL
KETONES UR STRIP-MCNC: NEGATIVE MG/DL
LEUKOCYTE ESTERASE UR QL STRIP: ABNORMAL
NITRATE UR QL: NEGATIVE
PH UR STRIP: 5.5 [PH] (ref 5–7)
RBC #/AREA URNS AUTO: >100 /HPF
SP GR UR STRIP: 1.01 (ref 1–1.03)
TRANS CELLS #/AREA URNS HPF: ABNORMAL /HPF
UROBILINOGEN UR STRIP-ACNC: 0.2 E.U./DL
WBC #/AREA URNS AUTO: >100 /HPF

## 2024-01-26 PROCEDURE — 81001 URINALYSIS AUTO W/SCOPE: CPT

## 2024-01-26 PROCEDURE — 87086 URINE CULTURE/COLONY COUNT: CPT

## 2024-01-26 RX ORDER — NITROFURANTOIN 25; 75 MG/1; MG/1
100 CAPSULE ORAL 2 TIMES DAILY
Qty: 10 CAPSULE | Refills: 0 | Status: SHIPPED | OUTPATIENT
Start: 2024-01-26 | End: 2024-01-31

## 2024-01-26 NOTE — PATIENT INSTRUCTIONS
Dear Beronica Azevedo,     After reviewing your responses, I would like you to come in for a urine test to make sure we treat you correctly. This urine test is to evaluate you for a possible urinary tract infection, and should be scheduled for today or tomorrow. Schedule a Lab Only appointment here.     Lab appointments are not available at most locations on the weekends. If no Lab Only appointment is available, you should be seen in any of our convenient Walk-in or Urgent Care Centers, which can be found on our website here.     You will receive instructions with your results in [facility] once they are available.     If your symptoms worsen, you develop pain in your back or stomach, develop fevers, or are not improving in 5 days, please contact your primary care provider for an appointment or visit a Walk-in or Urgent Care Center to be seen.     Thanks again for choosing us as your health care partner,     Ruth Alexander PA-C

## 2024-01-27 LAB — BACTERIA UR CULT: NORMAL

## 2024-01-30 DIAGNOSIS — L21.9 DERMATITIS, SEBORRHEIC: ICD-10-CM

## 2024-01-30 RX ORDER — KETOCONAZOLE 20 MG/ML
SHAMPOO TOPICAL
Qty: 120 ML | Refills: 0 | Status: SHIPPED | OUTPATIENT
Start: 2024-01-30 | End: 2024-05-06

## 2024-01-31 ENCOUNTER — OFFICE VISIT (OUTPATIENT)
Dept: DERMATOLOGY | Facility: CLINIC | Age: 64
End: 2024-01-31
Payer: COMMERCIAL

## 2024-01-31 DIAGNOSIS — D48.9 NEOPLASM OF UNCERTAIN BEHAVIOR: ICD-10-CM

## 2024-01-31 PROCEDURE — 13101 CMPLX RPR TRUNK 2.6-7.5 CM: CPT | Performed by: STUDENT IN AN ORGANIZED HEALTH CARE EDUCATION/TRAINING PROGRAM

## 2024-01-31 PROCEDURE — 11402 EXC TR-EXT B9+MARG 1.1-2 CM: CPT | Performed by: STUDENT IN AN ORGANIZED HEALTH CARE EDUCATION/TRAINING PROGRAM

## 2024-01-31 PROCEDURE — 88304 TISSUE EXAM BY PATHOLOGIST: CPT | Performed by: DERMATOLOGY

## 2024-01-31 ASSESSMENT — PAIN SCALES - GENERAL: PAINLEVEL: NO PAIN (0)

## 2024-01-31 NOTE — LETTER
1/31/2024         RE: Beronica Azevedo  [address]  [place] [address]        Dear Colleague,    Thank you for referring your patient, Beronica Azevedo, to the [facility]. Please see a copy of my visit note below.    DERMATOLOGIC SURGERY REPORT    NAME OF PROCEDURE:  EXCISION AND CLOSURE    Surgeon:  Biju Looney MD    PREOPERATIVE DIAGNOSIS: NUB  POSTOPERATIVE DIAGNOSIS: Same  Lesion Size: 11 mm lesion with 0 mm margins  Final excision size: 11 mm  Repair type: Complex  FINAL REPAIR LENGTH:  mm  Location: back  Prior Biopsy Accession #: [id number]    INDICATIONS:Excision was indicated for treatment. We discussed the principles of treatment and most likely complications including bleeding, infection, wound dehiscence, pain, nerve damage, and scarring. Informed consent was obtained and the patient underwent the procedure as follows.    PROCEDURE:  The patient was taken to the operative suite. The treatment area was anesthetized with 1% lidocaine with [id number] epinephrine buffered with bicarbonate. The area was washed with hibiclens, rinsed with saline and draped with sterile towels. The lesion was delineated and excised down to subcutaneous fat. Hemostasis was obtained by electrocoagulation.     REPAIR:  In order to repair this defect while maintaining the normal anatomic relations and function, we elected to utilize a linear closure. Closure was oriented so that the wound was in the patient's natural skin tension lines. Two redundant cones were removed by triangulation. Due to tightness of the surrounding skin deeper layers of the subcutaneous tissue were undermined extensively to a distance  greater than width of the defect on both sides by dissection in the subcutaneous plane until adequate tissue mobility was obtained. Deep dermal and subcutaneous layer closure was performed using 4-0 monocryl deep, intradermal and subcutaneous sutures.  Final cutaneous approximation was  achieved with 4-0 monocryl simple running sutures.      A total of 2mL of anesthesia was administered for all surgical sites. Estimated blood loss was less than 5mL for all surgical sites. A sterile pressure dressing was applied and wound care instructions, with a written handout, were given. The patient was discharged alert and ambulatory.    Biju Looney M.D.      Department of Dermatology       Again, thank you for allowing me to participate in the care of your patient.        Sincerely,        Biju Looney MD

## 2024-01-31 NOTE — PATIENT INSTRUCTIONS
Excision Wound Care Instructions  I will experience scar, altered skin color, bleeding, swelling, pain, crusting and redness. I may experience altered sensation. Risks are excessive bleeding, infection, muscle weakness, thick (hypertrophic or keloidal) scar, and recurrence. A second procedure may be recommended to obtain the best cosmetic or functional result.  Possible complications of any surgical procedure are bleeding, infection, scarring, alteration in skin color and sensation, muscle weakness in the area, wound dehiscence or seperation, or recurrence of the lesion or disease. On occasion, after healing, a secondary procedure or revision may be recommended in order to obtain the best cosmetic or functional result.   After your surgery, a pressure bandage will be placed over the area that has sutures. This will help prevent bleeding. Please follow these instructions until you come back to clinic for suture removal on day 10, as they will help you to prevent complications as your wound heals.  For the First 24 hours After Surgery:  Leave the pressure bandage on and keep it dry. If it should come loose, you may retape it, but do not take it off.  Relax and take it easy. Do not do any vigorous exercise, heavy lifting, or bending forward. This could cause the wound to bleed.  Post-operative pain is usually mild. You may alternate between 1000 mg of Tylenol (acetaminophen) and 400 mg of Ibuprofen every 4 hours.  Do not take more than 4,000mg of acetaminophen in a 24 hour period or 3200 mg of Ibuprofen in a 24 hr period.  Avoid alcohol and vitamin E as these may increase your tendency to bleed.  You may put an ice pack around the bandaged area for 20 minutes every 2-3 hours. This may help reduce swelling, bruising, and pain. Make sure the ice pack is waterproof so that the pressure bandage does not get wet.   You may see a small amount of drainage or blood on your pressure bandage. This is normal. However, if drainage  or bleeding continues or saturates the bandage, you will need to apply firm pressure over the bandage with a washcloth for 15 minutes. If bleeding continues after applying pressure for 15 minutes then go to the nearest emergency room.  After the first 24 hours from Surgery  Carefully remove the bandage and start daily wound care and dressing changes. You may also now shower and get the wound wet.  Daily Wound Care:  Wash wound with a mild soap and water.  Use caution when washing the wound, be gentle and do not let the forceful shower stream hit the wound directly.  Pat dry.  Apply Vaseline (from a new container or tube) over the suture line with a Q-tip. It is very important to keep the wound continuously moist, as wounds heal best in a moist environment.  If you had stitches placed keep the site covered until sutures have either been removed or dissolve.  You can cover it with a Telfa (non-stick) dressing and tape or a band-aid.    If you are unable to keep wound covered, you must apply Vaseline every 2-3 hours (while awake) to ensure it is being kept moist for optimal healing. A dressing overnight is recommended to keep the area moist.  Call Us If:  You have pain that is not controlled with Tylenol/Ibuprofen  You have signs or symptoms of an infection, such as: fever over 100 degrees F, redness, warmth, or foul-smelling or yellow drainage from the wound.  Who should I call with questions?  [facility]: [phone]   [facility]: [phone]  [facility]: [phone]  For urgent needs outside of business hours call the [facility] at [phone] and ask to speak with the dermatology resident on call

## 2024-01-31 NOTE — LETTER
"February 7, 2024      Beronica Azevedo  [address]  [facility] [address]        Dear ,    We are writing to inform you of your test results.    The pathology came back as a cyst just like we talked about. Hope you are healing up well!    Resulted Orders   Dermatological Path Order and Indications   Result Value Ref Range    Case Report       Surgical Pathology Report                         Case: [id number]                                  Authorizing Provider:  Biju Looney MD          Collected:           01/31/2024 04:20 PM          Ordering Location:     [facility]   Received:            01/31/2024 04:54 PM                                 [facility]                                                           Pathologist:           John Antunez MD                                                         Specimen:    Skin, back                                                                                 Final Diagnosis       Back:  - Epidermal inclusion cyst with partial rupture - (see description)      Clinical Information       The patient is a 63 year old female.      Gross Description       A(1). Skin, back, Skin - Excision:  The specimen is received in formalin with proper patient identification labeled \" back skin\".  The specimen consists of an unoriented, ovoid excision of skin and subcutaneous tissue, measuring 2.6 x 0.9 cm and excised to a maximum depth of 0.9 cm.  No suture is present.  The cutaneous surface is tan, wrinkled, and remarkable for a 0.5 x 0.4 cm raised, white lesion that grossly abuts resection margin.  The resection margin is entirely inked black and sectioning shows a 0.7 x 0.5 x 0.3 cm cystic cavity that contains white, pasty material.  The remaining subcutaneous tissues are unremarkable.  Representative sections are submitted in cassette A1.        Microscopic Description       The specimen exhibits a dilated cystic cavity filled with " compact orthokeratosis and lined by keratinizing squamous epithelium with a granular layer. Adjacent to the cyst, the specimen exhibits a dense mixed cell infiltrate rich in histiocytes adjacent to fragments of squamous epithelium and/or keratin, consistent with partial cyst wall rupture.      Performing Labs       The technical component of this testing was completed at [facility]         If you have any questions or concerns, please call the clinic at the number listed above.       Sincerely,      Biju Looney MD

## 2024-01-31 NOTE — PROGRESS NOTES
DERMATOLOGIC SURGERY REPORT    NAME OF PROCEDURE:  EXCISION AND CLOSURE    Surgeon:  Biju Looney MD    PREOPERATIVE DIAGNOSIS: NUB  POSTOPERATIVE DIAGNOSIS: Same  Lesion Size: 11 mm lesion with 0 mm margins  Final excision size: 11 mm  Repair type: Complex  FINAL REPAIR LENGTH:  28 mm  Location: back  Prior Biopsy Accession #: [id number]    INDICATIONS:Excision was indicated for treatment. We discussed the principles of treatment and most likely complications including bleeding, infection, wound dehiscence, pain, nerve damage, and scarring. Informed consent was obtained and the patient underwent the procedure as follows.    PROCEDURE:  The patient was taken to the operative suite. The treatment area was anesthetized with 1% lidocaine with [id number] epinephrine buffered with bicarbonate. The area was washed with hibiclens, rinsed with saline and draped with sterile towels. The lesion was delineated and excised down to subcutaneous fat. Hemostasis was obtained by electrocoagulation.     REPAIR:  In order to repair this defect while maintaining the normal anatomic relations and function, we elected to utilize a linear closure. Closure was oriented so that the wound was in the patient's natural skin tension lines. Two redundant cones were removed by triangulation. Due to tightness of the surrounding skin deeper layers of the subcutaneous tissue were undermined extensively to a distance  greater than width of the defect on both sides by dissection in the subcutaneous plane until adequate tissue mobility was obtained. Deep dermal and subcutaneous layer closure was performed using 4-0 monocryl deep, intradermal and subcutaneous sutures.  Final cutaneous approximation was achieved with 4-0 monocryl simple running sutures.      A total of 2mL of anesthesia was administered for all surgical sites. Estimated blood loss was less than 5mL for all surgical sites. A sterile pressure dressing was applied and wound care instructions, with  a written handout, were given. The patient was discharged alert and ambulatory.    Biju Looney M.D.      Department of Dermatology

## 2024-02-03 LAB
PATH REPORT.COMMENTS IMP SPEC: NORMAL
PATH REPORT.COMMENTS IMP SPEC: NORMAL
PATH REPORT.FINAL DX SPEC: NORMAL
PATH REPORT.GROSS SPEC: NORMAL
PATH REPORT.MICROSCOPIC SPEC OTHER STN: NORMAL
PATH REPORT.RELEVANT HX SPEC: NORMAL

## 2024-02-08 ENCOUNTER — PATIENT OUTREACH (OUTPATIENT)
Dept: CARE COORDINATION | Facility: CLINIC | Age: 64
End: 2024-02-08
Payer: COMMERCIAL

## 2024-03-26 ENCOUNTER — OFFICE VISIT (OUTPATIENT)
Dept: FAMILY MEDICINE | Facility: CLINIC | Age: 64
End: 2024-03-26
Payer: COMMERCIAL

## 2024-03-26 VITALS
HEART RATE: 83 BPM | RESPIRATION RATE: 15 BRPM | WEIGHT: 138.5 LBS | HEIGHT: 67 IN | DIASTOLIC BLOOD PRESSURE: 76 MMHG | OXYGEN SATURATION: 99 % | TEMPERATURE: 97.4 F | SYSTOLIC BLOOD PRESSURE: 117 MMHG | BODY MASS INDEX: 21.74 KG/M2

## 2024-03-26 DIAGNOSIS — Z00.00 ROUTINE HISTORY AND PHYSICAL EXAMINATION OF ADULT: Primary | ICD-10-CM

## 2024-03-26 DIAGNOSIS — Z13.6 CARDIOVASCULAR SCREENING; LDL GOAL LESS THAN 160: ICD-10-CM

## 2024-03-26 DIAGNOSIS — G47.00 PERSISTENT INSOMNIA: ICD-10-CM

## 2024-03-26 DIAGNOSIS — R25.2 LEG CRAMPING: ICD-10-CM

## 2024-03-26 LAB
ERYTHROCYTE [DISTWIDTH] IN BLOOD BY AUTOMATED COUNT: 11.5 % (ref 10–15)
HCT VFR BLD AUTO: 39.9 % (ref 35–47)
HGB BLD-MCNC: 13.2 G/DL (ref 11.7–15.7)
MCH RBC QN AUTO: 30.3 PG (ref 26.5–33)
MCHC RBC AUTO-ENTMCNC: 33.1 G/DL (ref 31.5–36.5)
MCV RBC AUTO: 92 FL (ref 78–100)
PLATELET # BLD AUTO: 286 10E3/UL (ref 150–450)
RBC # BLD AUTO: 4.35 10E6/UL (ref 3.8–5.2)
WBC # BLD AUTO: 5.9 10E3/UL (ref 4–11)

## 2024-03-26 PROCEDURE — 80053 COMPREHEN METABOLIC PANEL: CPT | Performed by: FAMILY MEDICINE

## 2024-03-26 PROCEDURE — 80061 LIPID PANEL: CPT | Performed by: FAMILY MEDICINE

## 2024-03-26 PROCEDURE — 82728 ASSAY OF FERRITIN: CPT | Performed by: FAMILY MEDICINE

## 2024-03-26 PROCEDURE — 36415 COLL VENOUS BLD VENIPUNCTURE: CPT | Performed by: FAMILY MEDICINE

## 2024-03-26 PROCEDURE — 85027 COMPLETE CBC AUTOMATED: CPT | Performed by: FAMILY MEDICINE

## 2024-03-26 PROCEDURE — 99396 PREV VISIT EST AGE 40-64: CPT | Performed by: FAMILY MEDICINE

## 2024-03-26 PROCEDURE — 84443 ASSAY THYROID STIM HORMONE: CPT | Performed by: FAMILY MEDICINE

## 2024-03-26 SDOH — HEALTH STABILITY: PHYSICAL HEALTH: ON AVERAGE, HOW MANY DAYS PER WEEK DO YOU ENGAGE IN MODERATE TO STRENUOUS EXERCISE (LIKE A BRISK WALK)?: 2 DAYS

## 2024-03-26 ASSESSMENT — SOCIAL DETERMINANTS OF HEALTH (SDOH): HOW OFTEN DO YOU GET TOGETHER WITH FRIENDS OR RELATIVES?: THREE TIMES A WEEK

## 2024-03-26 ASSESSMENT — PATIENT HEALTH QUESTIONNAIRE - PHQ9
SUM OF ALL RESPONSES TO PHQ QUESTIONS 1-9: 5
10. IF YOU CHECKED OFF ANY PROBLEMS, HOW DIFFICULT HAVE THESE PROBLEMS MADE IT FOR YOU TO DO YOUR WORK, TAKE CARE OF THINGS AT HOME, OR GET ALONG WITH OTHER PEOPLE: SOMEWHAT DIFFICULT
SUM OF ALL RESPONSES TO PHQ QUESTIONS 1-9: 5

## 2024-03-26 ASSESSMENT — PAIN SCALES - GENERAL: PAINLEVEL: MILD PAIN (2)

## 2024-03-26 NOTE — PROGRESS NOTES
Preventive Care Visit  [facility]  Joaquin Rowe MD, Family Medicine  Mar 26, 2024      Assessment & Plan     Routine history and physical examination of adult  In good health  - DX Bone Density; Future  - *MA Screening Digital Bilateral; Future  - CBC with platelets; Future  - TSH with free T4 reflex; Future  - Comprehensive metabolic panel (BMP + Alb, Alk Phos, ALT, AST, Total. Bili, TP); Future  - CBC with platelets  - TSH with free T4 reflex  - Comprehensive metabolic panel (BMP + Alb, Alk Phos, ALT, AST, Total. Bili, TP)    CARDIOVASCULAR SCREENING; LDL GOAL LESS THAN 160  recheck  - Lipid panel reflex to direct LDL Fasting; Future  - Lipid panel reflex to direct LDL Fasting    Leg cramping  Check labs  - Ferritin; Future    Persistent insomnia  Well controlled on trazodone      Follow up with consultant as planned.  ( GYN for pap/ breast exam)        Counseling  Appropriate preventive services were discussed with this patient, including applicable screening as appropriate for fall prevention, nutrition, physical activity, Tobacco-use cessation, weight loss and cognition.  Checklist reviewing preventive services available has been given to the patient.  Reviewed patient's diet, addressing concerns and/or questions.   She is at risk for lack of exercise and has been provided with information to increase physical activity for the benefit of her well-being.   The patient was instructed to see the dentist every 6 months.   The patient's PHQ-9 score is consistent with mild depression. She was provided with information regarding depression.       Regular exercise  See Patient Instructions    Shonna Loco is a 63 year old, presenting for the following:  Physical        3/26/2024     3:37 PM   Additional Questions   Roomed by loula   Accompanied by self        Health Care Directive  Patient does not have a Health Care Directive or Living Will: Discussed advance  care planning with patient; information given to patient to review.    HPI    Having persistent leg cramping agt bedtime        3/26/2024   General Health   How would you rate your overall physical health? Good   Feel stress (tense, anxious, or unable to sleep) To some extent   (!) STRESS CONCERN      3/26/2024   Nutrition   Three or more servings of calcium each day? Yes   Diet: Regular (no restrictions)   How many servings of fruit and vegetables per day? (!) 2-3   How many sweetened beverages each day? 0-1         3/26/2024   Exercise   Days per week of moderate/strenuous exercise 2 days   (!) EXERCISE CONCERN      3/26/2024   Social Factors   Frequency of gathering with friends or relatives Three times a week   Worry food won't last until get money to buy more No   Food not last or not have enough money for food? No   Do you have housing?  Yes   Are you worried about losing your housing? No   Lack of transportation? No   Unable to get utilities (heat,electricity)? No          No data to display                   3/26/2024   Dental   Dentist two times every year? (!) NO         3/26/2024   TB Screening   Were you born outside of the [place]? No       Today's PHQ-9 Score:       3/26/2024     3:14 PM   PHQ-9 SCORE   PHQ-9 Total Score MyChart 5 (Mild depression)   PHQ-9 Total Score 5         3/26/2024   Substance Use   Alcohol more than 3/day or more than 7/wk No   Do you use any other substances recreationally? No     Social History     Tobacco Use    Smoking status: Never    Smokeless tobacco: Never   Vaping Use    Vaping Use: Never used   Substance Use Topics    Alcohol use: Yes     Comment: 1 8oz mixed drink or beer, minimal 1-2 per week    Drug use: No           3/31/2023   LAST FHS-7 RESULTS   1st degree relative breast or ovarian cancer No   Any relative bilateral breast cancer Unknown   Any male have breast cancer No   Any ONE woman have BOTH breast AND ovarian cancer No   Any woman with breast cancer before  50yrs No   2 or more relatives with breast AND/OR ovarian cancer No   2 or more relatives with breast AND/OR bowel cancer No        Mammogram Screening - Mammogram every 1-2 years updated in Health Maintenance based on mutual decision making          3/26/2024   One time HIV Screening   Previous HIV test? No         3/26/2024   STI Screening   New sexual partner(s) since last STI/HIV test? No     History of abnormal Pap smear: YES - updated in Problem List and Health Maintenance accordingly        Latest Ref Rng & Units 2023    10:32 AM 2021     5:30 PM 10/30/2020    12:17 PM   PAP / HPV   PAP  Negative for Intraepithelial Lesion or Malignancy (NILM)  Negative for Intraepithelial Lesion or Malignancy (NILM)     PAP (Historical)    NIL    HPV 16 DNA Negative Negative  Negative     HPV 18 DNA Negative Negative  Negative     Other HR HPV Negative Positive  Positive       ASCVD Risk   The 10-year ASCVD risk score (Martina HEATON, et al., 2019) is: 3.6%    Values used to calculate the score:      Age: 63 years      Sex: Female      Is Non- : No      Diabetic: No      Tobacco smoker: No      Systolic Blood Pressure: 117 mmHg      Is BP treated: No      HDL Cholesterol: 68 mg/dL      Total Cholesterol: 180 mg/dL    Fracture Risk Assessment Tool  Link to Frax Calculator  Use the information below to complete the Frax calculator  : 1960  Sex: female  Weight (kg): 62.8 kg (actual weight)  Height (cm): 169.5 cm  Previous Fragility Fracture:  No  History of parent with fractured hip:  no  Current Smoking:  No  Patient has been on glucocorticoids for more than 3 months (5mg/day or more): No  Rheumatoid Arthritis on Problem List:  No  Secondary Osteoporosis on Problem List:  No  Consumes 3 or more units of alcohol per day: No  Femoral Neck BMD (g/cm2)           Reviewed and updated as needed this visit by Provider                    Past Medical History:   Diagnosis Date    Anxiety   "   Arthritis 2010    Cervical high risk HPV (human papillomavirus) test positive 03/04/2016 03/04/2016, 09/12/19, 11/23/21    BEVERLY II (cervical intraepithelial neoplasia II) 2009    LEEP    Depression     Depressive disorder Adolescence    Managed with medication and therapy    Dry eye syndrome     Frequent UTI 2009    Headache(784.0)     IBS (irritable bowel syndrome)     Insomnia     Lattice degeneration of retina     Lyme disease     Myopic astigmatism     Nonsenile cataract     Osteopenia 2001,2012    Fosamax    Pneumothorax on right     at 15 y/o    PVD (posterior vitreous detachment), both eyes 07/19/2010    Rosacea     Vasomotor rhinitis          Review of Systems  Constitutional, HEENT, cardiovascular, pulmonary, gi and gu systems are negative, except as otherwise noted.     Objective    Exam  /76   Pulse 83   Temp 97.4  F (36.3  C) (Temporal)   Resp 15   Ht 1.695 m (5' 6.73\")   Wt 62.8 kg (138 lb 8 oz)   LMP 04/06/2010 (Exact Date)   SpO2 99%   BMI 21.87 kg/m     Estimated body mass index is 21.87 kg/m  as calculated from the following:    Height as of this encounter: 1.695 m (5' 6.73\").    Weight as of this encounter: 62.8 kg (138 lb 8 oz).    Physical Exam  GENERAL: alert and no distress  EYES: Eyes grossly normal to inspection, PERRL and conjunctivae and sclerae normal  HENT: ear canals and TM's normal, nose and mouth without ulcers or lesions  NECK: no adenopathy, no asymmetry, masses, or scars  RESP: lungs clear to auscultation - no rales, rhonchi or wheezes  CV: regular rate and rhythm, normal S1 S2, no S3 or S4, no murmur, click or rub, no peripheral edema  ABDOMEN: soft, nontender, no hepatosplenomegaly, no masses and bowel sounds normal  MS: no gross musculoskeletal defects noted, no edema  SKIN: no suspicious lesions or rashes  NEURO: Normal strength and tone, mentation intact and speech normal  PSYCH: mentation appears normal, affect normal/bright  LYMPH: no cervical, " supraclavicular, axillary, or inguinal adenopathy  Diabetic foot exam: normal DP and PT pulses, no trophic changes or ulcerative lesions, and normal sensory exam        Signed Electronically by: Joaquin Rowe MD    Answers submitted by the patient for this visit:  Patient Health Questionnaire (Submitted on 3/26/2024)  If you checked off any problems, how difficult have these problems made it for you to do your work, take care of things at home, or get along with other people?: Somewhat difficult  PHQ9 TOTAL SCORE: 5

## 2024-03-27 LAB
ALBUMIN SERPL BCG-MCNC: 4.3 G/DL (ref 3.5–5.2)
ALP SERPL-CCNC: 85 U/L (ref 40–150)
ALT SERPL W P-5'-P-CCNC: 12 U/L (ref 0–50)
ANION GAP SERPL CALCULATED.3IONS-SCNC: 8 MMOL/L (ref 7–15)
AST SERPL W P-5'-P-CCNC: 19 U/L (ref 0–45)
BILIRUB SERPL-MCNC: 0.2 MG/DL
BUN SERPL-MCNC: 12.6 MG/DL (ref 8–23)
CALCIUM SERPL-MCNC: 9.2 MG/DL (ref 8.8–10.2)
CHLORIDE SERPL-SCNC: 107 MMOL/L (ref 98–107)
CHOLEST SERPL-MCNC: 195 MG/DL
CREAT SERPL-MCNC: 0.85 MG/DL (ref 0.51–0.95)
DEPRECATED HCO3 PLAS-SCNC: 24 MMOL/L (ref 22–29)
EGFRCR SERPLBLD CKD-EPI 2021: 76 ML/MIN/1.73M2
FASTING STATUS PATIENT QL REPORTED: NO
FERRITIN SERPL-MCNC: 52 NG/ML (ref 11–328)
GLUCOSE SERPL-MCNC: 87 MG/DL (ref 70–99)
HDLC SERPL-MCNC: 63 MG/DL
LDLC SERPL CALC-MCNC: 121 MG/DL
NONHDLC SERPL-MCNC: 132 MG/DL
POTASSIUM SERPL-SCNC: 4.2 MMOL/L (ref 3.4–5.3)
PROT SERPL-MCNC: 6.7 G/DL (ref 6.4–8.3)
SODIUM SERPL-SCNC: 139 MMOL/L (ref 135–145)
TRIGL SERPL-MCNC: 53 MG/DL
TSH SERPL DL<=0.005 MIU/L-ACNC: 1.26 UIU/ML (ref 0.3–4.2)

## 2024-04-23 ENCOUNTER — PATIENT OUTREACH (OUTPATIENT)
Dept: FAMILY MEDICINE | Facility: CLINIC | Age: 64
End: 2024-04-23
Payer: COMMERCIAL

## 2024-05-06 DIAGNOSIS — L21.9 DERMATITIS, SEBORRHEIC: ICD-10-CM

## 2024-05-06 RX ORDER — KETOCONAZOLE 20 MG/ML
SHAMPOO TOPICAL
Qty: 240 ML | Refills: 0 | Status: SHIPPED | OUTPATIENT
Start: 2024-05-06

## 2024-05-06 NOTE — TELEPHONE ENCOUNTER
Patient is asking for 2 bottles in this fill? Please send if appropriate    Den SHORT CPhT @  [facility]  [address] [place] [address]  Phone: [phone]  Fax: [phone]

## 2024-05-14 ENCOUNTER — ANCILLARY PROCEDURE (OUTPATIENT)
Dept: BONE DENSITY | Facility: CLINIC | Age: 64
End: 2024-05-14
Attending: FAMILY MEDICINE
Payer: COMMERCIAL

## 2024-05-14 ENCOUNTER — ANCILLARY PROCEDURE (OUTPATIENT)
Dept: MAMMOGRAPHY | Facility: CLINIC | Age: 64
End: 2024-05-14
Attending: FAMILY MEDICINE
Payer: COMMERCIAL

## 2024-05-14 DIAGNOSIS — Z00.00 ROUTINE HISTORY AND PHYSICAL EXAMINATION OF ADULT: ICD-10-CM

## 2024-05-14 PROCEDURE — 77063 BREAST TOMOSYNTHESIS BI: CPT | Mod: GC | Performed by: STUDENT IN AN ORGANIZED HEALTH CARE EDUCATION/TRAINING PROGRAM

## 2024-05-14 PROCEDURE — 77080 DXA BONE DENSITY AXIAL: CPT | Performed by: INTERNAL MEDICINE

## 2024-05-14 PROCEDURE — 77067 SCR MAMMO BI INCL CAD: CPT | Mod: GC | Performed by: STUDENT IN AN ORGANIZED HEALTH CARE EDUCATION/TRAINING PROGRAM

## 2024-05-21 ENCOUNTER — TRANSFERRED RECORDS (OUTPATIENT)
Dept: HEALTH INFORMATION MANAGEMENT | Facility: CLINIC | Age: 64
End: 2024-05-21
Payer: COMMERCIAL

## 2024-06-13 ENCOUNTER — OFFICE VISIT (OUTPATIENT)
Dept: FAMILY MEDICINE | Facility: CLINIC | Age: 64
End: 2024-06-13
Payer: COMMERCIAL

## 2024-06-13 VITALS
HEART RATE: 76 BPM | WEIGHT: 138 LBS | SYSTOLIC BLOOD PRESSURE: 96 MMHG | OXYGEN SATURATION: 98 % | HEIGHT: 67 IN | DIASTOLIC BLOOD PRESSURE: 60 MMHG | BODY MASS INDEX: 21.66 KG/M2 | RESPIRATION RATE: 14 BRPM

## 2024-06-13 DIAGNOSIS — S60.212A CONTUSION OF LEFT WRIST, INITIAL ENCOUNTER: ICD-10-CM

## 2024-06-13 DIAGNOSIS — M81.0 OSTEOPOROSIS WITHOUT CURRENT PATHOLOGICAL FRACTURE, UNSPECIFIED OSTEOPOROSIS TYPE: Primary | ICD-10-CM

## 2024-06-13 PROCEDURE — G2211 COMPLEX E/M VISIT ADD ON: HCPCS | Performed by: FAMILY MEDICINE

## 2024-06-13 PROCEDURE — 99214 OFFICE O/P EST MOD 30 MIN: CPT | Performed by: FAMILY MEDICINE

## 2024-06-13 RX ORDER — MULTIVITAMIN WITH IRON
1 TABLET ORAL DAILY
COMMUNITY

## 2024-06-13 RX ORDER — CALCIUM CARBONATE 500(1250)
1 TABLET ORAL 2 TIMES DAILY
Qty: 180 TABLET | Refills: 1 | Status: SHIPPED | OUTPATIENT
Start: 2024-06-13

## 2024-06-13 RX ORDER — CHOLECALCIFEROL (VITAMIN D3) 50 MCG
1 TABLET ORAL DAILY
Qty: 90 TABLET | Refills: 2 | Status: SHIPPED | OUTPATIENT
Start: 2024-06-13

## 2024-06-13 RX ORDER — ALENDRONATE SODIUM 70 MG/1
70 TABLET ORAL
Qty: 12 TABLET | Refills: 3 | Status: SHIPPED | OUTPATIENT
Start: 2024-06-13 | End: 2024-08-29

## 2024-06-13 ASSESSMENT — PAIN SCALES - GENERAL: PAINLEVEL: NO PAIN (1)

## 2024-06-13 NOTE — PROGRESS NOTES
Assessment & Plan     Osteoporosis without current pathological fracture, unspecified osteoporosis type  Instructions on Fosamax use and side effects - particularly esophageal adverse events - are carefully reviewed with her. This drug must be taken upon arising for the day on an empty stomach, with a large 6-8 ounce glass of water; she must remain NPO in the upright position for at least 30 minutes afterwards and until after the first food of the day. If esophageal irritation is noted, she will stop the drug and call my office.   - calcium carbonate (OS-KATHIA) 500 MG tablet; Take 1 tablet (500 mg) by mouth 2 times daily  - vitamin D3 (CHOLECALCIFEROL) 50 mcg (2000 units) tablet; Take 1 tablet (50 mcg) by mouth daily  - alendronate (FOSAMAX) 70 MG tablet; Take 1 tablet (70 mg) by mouth every 7 days for 12 days    Contusion of left wrist, initial encounter  No swelling , rest/ ice as needed  Follow up only if unimproved.             Regular exercise  Calcium/ vit D   See Patient Instructions    Subjective   Beronica is a 64 year old, presenting for the following health issues:  Osteoporosis (Fall about a week ago on L wrist./Fell onto dirt while trying to get on bike/Pain only when putting pressure on the wrist. ) and Follow Up        6/13/2024     2:41 PM   Additional Questions   Roomed by Gabino GARCIA     History of Present Illness       Reason for visit:  Osteoporosis    She eats 2-3 servings of fruits and vegetables daily.She consumes 0 sweetened beverage(s) daily. She exercises with enough effort to increase her heart rate 3 or less days per week.   She is taking medications regularly.         SUBJECTIVE:  Beronica Azevedo is a 64 year old female who sustained a left wrist injury 1 weeks ago. Mechanism of injury: fall on dirt. Immediate symptoms: delayed pain, was able to bear weight directly after injury. Symptoms have been improving since that time. Prior history of related problems: no prior problems with this area in  "the past.    The longitudinal plan of care for the diagnosis(es)/condition(s) as documented were addressed during this visit. Due to the added complexity in care, I will continue to support Beronica in the subsequent management and with ongoing continuity of care.       Review of Systems  Constitutional, HEENT, cardiovascular, pulmonary, gi and gu systems are negative, except as otherwise noted.      Objective    BP 96/60 (BP Location: Left arm, Patient Position: Sitting, Cuff Size: Adult Regular)   Pulse 76   Resp 14   Ht 1.694 m (5' 6.7\")   Wt 62.6 kg (138 lb)   LMP 04/06/2010 (Exact Date)   SpO2 98%   BMI 21.81 kg/m    Body mass index is 21.81 kg/m .  Physical Exam   GENERAL: alert and no distress  RESP: lungs clear to auscultation - no rales, rhonchi or wheezes  MS: extremities normal- no gross deformities notedOBJECTIVE:    Wrist exam: normal exam, no swelling, tenderness, instability; ligaments intact, FROM all hand, wrist, finger joints.  X-ray: not indicated.  SKIN: no suspicious lesions or rashes  NEURO: Normal strength and tone, mentation intact and speech normal  PSYCH: mentation appears normal, affect normal/bright    Office Visit on 03/26/2024   Component Date Value Ref Range Status    WBC Count 03/26/2024 5.9  4.0 - 11.0 10e3/uL Final    RBC Count 03/26/2024 4.35  3.80 - 5.20 10e6/uL Final    Hemoglobin 03/26/2024 13.2  11.7 - 15.7 g/dL Final    Hematocrit 03/26/2024 39.9  35.0 - 47.0 % Final    MCV 03/26/2024 92  78 - 100 fL Final    MCH 03/26/2024 30.3  26.5 - 33.0 pg Final    MCHC 03/26/2024 33.1  31.5 - 36.5 g/dL Final    RDW 03/26/2024 11.5  10.0 - 15.0 % Final    Platelet Count 03/26/2024 286  150 - 450 10e3/uL Final    TSH 03/26/2024 1.26  0.30 - 4.20 uIU/mL Final    Sodium 03/26/2024 139  135 - 145 mmol/L Final    Reference intervals for this test were updated on 09/26/2023 to more accurately reflect our healthy population. There may be differences in the flagging of prior results with " similar values performed with this method. Interpretation of those prior results can be made in the context of the updated reference intervals.     Potassium 03/26/2024 4.2  3.4 - 5.3 mmol/L Final    Carbon Dioxide (CO2) 03/26/2024 24  22 - 29 mmol/L Final    Anion Gap 03/26/2024 8  7 - 15 mmol/L Final    Urea Nitrogen 03/26/2024 12.6  8.0 - 23.0 mg/dL Final    Creatinine 03/26/2024 0.85  0.51 - 0.95 mg/dL Final    GFR Estimate 03/26/2024 76  >60 mL/min/1.73m2 Final    Calcium 03/26/2024 9.2  8.8 - 10.2 mg/dL Final    Chloride 03/26/2024 107  98 - 107 mmol/L Final    Glucose 03/26/2024 87  70 - 99 mg/dL Final    Alkaline Phosphatase 03/26/2024 85  40 - 150 U/L Final    Reference intervals for this test were updated on 11/14/2023 to more accurately reflect our healthy population. There may be differences in the flagging of prior results with similar values performed with this method. Interpretation of those prior results can be made in the context of the updated reference intervals.    AST 03/26/2024 19  0 - 45 U/L Final    Reference intervals for this test were updated on 6/12/2023 to more accurately reflect our healthy population. There may be differences in the flagging of prior results with similar values performed with this method. Interpretation of those prior results can be made in the context of the updated reference intervals.    ALT 03/26/2024 12  0 - 50 U/L Final    Reference intervals for this test were updated on 6/12/2023 to more accurately reflect our healthy population. There may be differences in the flagging of prior results with similar values performed with this method. Interpretation of those prior results can be made in the context of the updated reference intervals.      Protein Total 03/26/2024 6.7  6.4 - 8.3 g/dL Final    Albumin 03/26/2024 4.3  3.5 - 5.2 g/dL Final    Bilirubin Total 03/26/2024 0.2  <=1.2 mg/dL Final    Cholesterol 03/26/2024 195  <200 mg/dL Final    Triglycerides 03/26/2024  53  <150 mg/dL Final    Direct Measure HDL 03/26/2024 63  >=50 mg/dL Final    LDL Cholesterol Calculated 03/26/2024 121 (H)  <=100 mg/dL Final    Non HDL Cholesterol 03/26/2024 132 (H)  <130 mg/dL Final    Patient Fasting > 8hrs? 03/26/2024 No   Final    Ferritin 03/26/2024 52  11 - 328 ng/mL Final           Signed Electronically by: Joaquin Rowe MD

## 2024-06-27 ENCOUNTER — TELEPHONE (OUTPATIENT)
Dept: FAMILY MEDICINE | Facility: CLINIC | Age: 64
End: 2024-06-27

## 2024-06-27 ENCOUNTER — LAB (OUTPATIENT)
Dept: LAB | Facility: CLINIC | Age: 64
End: 2024-06-27
Payer: COMMERCIAL

## 2024-06-27 ENCOUNTER — VIRTUAL VISIT (OUTPATIENT)
Dept: FAMILY MEDICINE | Facility: CLINIC | Age: 64
End: 2024-06-27
Payer: COMMERCIAL

## 2024-06-27 DIAGNOSIS — R30.0 DYSURIA: ICD-10-CM

## 2024-06-27 DIAGNOSIS — N30.00 ACUTE CYSTITIS WITHOUT HEMATURIA: Primary | ICD-10-CM

## 2024-06-27 LAB
ALBUMIN UR-MCNC: NEGATIVE MG/DL
APPEARANCE UR: CLEAR
BACTERIA #/AREA URNS HPF: ABNORMAL /HPF
BILIRUB UR QL STRIP: NEGATIVE
COLOR UR AUTO: YELLOW
GLUCOSE UR STRIP-MCNC: NEGATIVE MG/DL
HGB UR QL STRIP: ABNORMAL
KETONES UR STRIP-MCNC: NEGATIVE MG/DL
LEUKOCYTE ESTERASE UR QL STRIP: ABNORMAL
NITRATE UR QL: NEGATIVE
PH UR STRIP: 7 [PH] (ref 5–7)
RBC #/AREA URNS AUTO: ABNORMAL /HPF
SP GR UR STRIP: <=1.005 (ref 1–1.03)
SQUAMOUS #/AREA URNS AUTO: ABNORMAL /LPF
UROBILINOGEN UR STRIP-ACNC: 0.2 E.U./DL
WBC #/AREA URNS AUTO: ABNORMAL /HPF

## 2024-06-27 PROCEDURE — 87086 URINE CULTURE/COLONY COUNT: CPT

## 2024-06-27 PROCEDURE — 99442 PR PHYSICIAN TELEPHONE EVALUATION 11-20 MIN: CPT | Performed by: FAMILY MEDICINE

## 2024-06-27 PROCEDURE — 81001 URINALYSIS AUTO W/SCOPE: CPT

## 2024-06-27 PROCEDURE — 87186 SC STD MICRODIL/AGAR DIL: CPT

## 2024-06-27 RX ORDER — NITROFURANTOIN 25; 75 MG/1; MG/1
100 CAPSULE ORAL 2 TIMES DAILY
Qty: 14 CAPSULE | Refills: 0 | Status: SHIPPED | OUTPATIENT
Start: 2024-06-27 | End: 2024-07-04

## 2024-06-27 NOTE — PROGRESS NOTES
Beronica is a 64 year old who is being evaluated via a billable telephone visit.    What phone number would you like to be contacted at? cell  How would you like to obtain your AVS? MyChart  Originating Location (pt. Location): Home    Distant Location (provider location):  On-site    Assessment & Plan     Acute cystitis without hematuria  start  - nitroFURantoin macrocrystal-monohydrate (MACROBID) 100 MG capsule; Take 1 capsule (100 mg) by mouth 2 times daily for 7 days    Dysuria  Fluids/ rest  - UA with Microscopic reflex to Culture - lab collect; Future  - nitroFURantoin macrocrystal-monohydrate (MACROBID) 100 MG capsule; Take 1 capsule (100 mg) by mouth 2 times daily for 7 days            See Patient Instructions    Subjective   Beronica is a 64 year old, presenting for the following health issues:  No chief complaint on file.    HPI       Genitourinary - Female  Onset/Duration: dysuria  Description:   Painful urination (Dysuria): YES           Frequency: YES  Blood in urine (Hematuria): No  Delay in urine (Hesitency): No  Intensity: mild  Progression of Symptoms:  same  Accompanying Signs & Symptoms:  Fever/chills: No  Flank pain: No  Nausea and vomiting: No  Vaginal symptoms: none  Abdominal/Pelvic Pain: No  History:   History of frequent UTI s: No  History of kidney stones: No  Sexually Active: No        Review of Systems  Constitutional, HEENT, cardiovascular, pulmonary, gi and gu systems are negative, except as otherwise noted.      Objective           Vitals:  No vitals were obtained today due to virtual visit.    Physical Exam   General: Alert and no distress //Respiratory: No audible wheeze, cough, or shortness of breath // Psychiatric:  Appropriate affect, tone, and pace of words      Lab on 06/27/2024   Component Date Value Ref Range Status    Color Urine 06/27/2024 Yellow  Colorless, Straw, Light Yellow, Yellow Final    Appearance Urine 06/27/2024 Clear  Clear Final    Glucose Urine 06/27/2024 Negative   Negative mg/dL Final    Bilirubin Urine 06/27/2024 Negative  Negative Final    Ketones Urine 06/27/2024 Negative  Negative mg/dL Final    Specific Gravity Urine 06/27/2024 <=1.005  1.003 - 1.035 Final    Blood Urine 06/27/2024 Small (A)  Negative Final    pH Urine 06/27/2024 7.0  5.0 - 7.0 Final    Protein Albumin Urine 06/27/2024 Negative  Negative mg/dL Final    Urobilinogen Urine 06/27/2024 0.2  0.2, 1.0 E.U./dL Final    Nitrite Urine 06/27/2024 Negative  Negative Final    Leukocyte Esterase Urine 06/27/2024 Large (A)  Negative Final    Bacteria Urine 06/27/2024 Few (A)  None Seen /HPF Final    RBC Urine 06/27/2024 0-2  0-2 /HPF /HPF Final    WBC Urine 06/27/2024  (A)  0-5 /HPF /HPF Final    Squamous Epithelials Urine 06/27/2024 Few (A)  None Seen /LPF Final     Results for orders placed or performed in visit on 06/27/24   UA with Microscopic reflex to Culture - lab collect     Status: Abnormal    Specimen: Urine, Midstream   Result Value Ref Range    Color Urine Yellow Colorless, Straw, Light Yellow, Yellow    Appearance Urine Clear Clear    Glucose Urine Negative Negative mg/dL    Bilirubin Urine Negative Negative    Ketones Urine Negative Negative mg/dL    Specific Gravity Urine <=1.005 1.003 - 1.035    Blood Urine Small (A) Negative    pH Urine 7.0 5.0 - 7.0    Protein Albumin Urine Negative Negative mg/dL    Urobilinogen Urine 0.2 0.2, 1.0 E.U./dL    Nitrite Urine Negative Negative    Leukocyte Esterase Urine Large (A) Negative   UA Microscopic with Reflex to Culture     Status: Abnormal   Result Value Ref Range    Bacteria Urine Few (A) None Seen /HPF    RBC Urine 0-2 0-2 /HPF /HPF    WBC Urine  (A) 0-5 /HPF /HPF    Squamous Epithelials Urine Few (A) None Seen /LPF         Phone call duration: 12 minutes  Signed Electronically by: Joaquin Rowe MD

## 2024-06-27 NOTE — TELEPHONE ENCOUNTER
Patient is calling stating she did an E-visit however it is not showing, she is wondering if she can get a lab test for possible UTI.    Would you need to see her or can a test be ordered?

## 2024-06-27 NOTE — TELEPHONE ENCOUNTER
FYI - Status Update    Who is Calling: patient    Update: Pt has a UTI telephone visit today @ 5:20. Pt wanted to let team know that she will not be available until after 2:15 Pm today (in case  calls her sooner) due to other appts that she currently has scheduled    Does caller want a call/response back: No

## 2024-06-29 LAB — BACTERIA UR CULT: ABNORMAL

## 2024-07-22 ENCOUNTER — OFFICE VISIT (OUTPATIENT)
Dept: OBGYN | Facility: CLINIC | Age: 64
End: 2024-07-22
Payer: COMMERCIAL

## 2024-07-22 VITALS
HEART RATE: 89 BPM | DIASTOLIC BLOOD PRESSURE: 71 MMHG | WEIGHT: 138.7 LBS | OXYGEN SATURATION: 100 % | TEMPERATURE: 97.9 F | SYSTOLIC BLOOD PRESSURE: 114 MMHG | HEIGHT: 67 IN | BODY MASS INDEX: 21.77 KG/M2

## 2024-07-22 DIAGNOSIS — Z12.4 PAP SMEAR FOR CERVICAL CANCER SCREENING: Primary | ICD-10-CM

## 2024-07-22 DIAGNOSIS — N39.498 OTHER URINARY INCONTINENCE: ICD-10-CM

## 2024-07-22 PROCEDURE — 87624 HPV HI-RISK TYP POOLED RSLT: CPT | Performed by: OBSTETRICS & GYNECOLOGY

## 2024-07-22 PROCEDURE — G0145 SCR C/V CYTO,THINLAYER,RESCR: HCPCS | Performed by: OBSTETRICS & GYNECOLOGY

## 2024-07-22 PROCEDURE — 99213 OFFICE O/P EST LOW 20 MIN: CPT | Performed by: OBSTETRICS & GYNECOLOGY

## 2024-07-22 NOTE — PROGRESS NOTES
"S; Beronica Azevedo is a 64 year old   female here for pap smear.  She has a long history of abnormal paps.  LEEP for BEVERLY 2 in , since then normal paps and colps, but persistent HPV.  History as follows:    : [facility] H,   : [place] - BEVERLY II  09: LEEP - BEVER[place] II, clear margins  NIL paps: 2/10, 7/10, .  Plan pap in 1 yr.  3/4/16: Pap - NIL, + HR HPV.   3/18/16: [place] - ECC - negative. Plan cotest in 1 year.  17: NIL pap, Neg HR HPV result. Plan cotest in 1 year.  Pt has had a LEEP, needs 2 consecutive NIL/Neg cotest's then a 3 year cotest.   18 ASCUS pap/+ HR HPV (not 16 or 18) and endometrial cells.  Plan: colposcopy and endometrial cell follow up by 18.  18:[place] ECC Endocervical cells showed possible changes. Endometrial cells normal per provider Plan ECC in 3-6 months. Cotest in 1 year.   18: ECC Neg for dysplasia. Plan cotest due on 19.   19: NIL Pap, + HR HPV (not 16 or 18) result. Plan [place].   10/10/19: [place] Bx and ECC benign. Plan cotest in 1 year.  10/30/20 NIL pap, neg HPV. Patient will need three consecutive years of pap/HPV cotesting  (Hx LEEP: BEVERLY 2). Plan cotest in 1 year  21 NIL Pap, + HR HPV (not 16 or 18) Plan [place] due bef 22.   01/10/22 [place] Bx and ECC No BEVERLY. Plan cotest in 1 year due 01/10/23.   23 NIL pap, +HR HPV, not 16/18. Plan [place] bef 23 [place]: Atrophic cervical cells.    She also has a h/o incontinence, previously used estrogen cream and that seemed to help, but was too messy, wondering about exercises, etc that she could try vs more estrogen?    O: /71   Pulse 89   Temp 97.9  F (36.6  C)   Ht 1.694 m (5' 6.7\")   Wt 62.9 kg (138 lb 11.2 oz)   LMP 2010 (Exact Date)   SpO2 100%   BMI 21.92 kg/m      Psych: normal affect, appropriate eye contact  Resp: no increased work of breathing  CV: no peripheral edema  Abd; SNT, no palpable masses  Lymph: no enlarged inquinal nodes  Pelvic: atrophic " vulva and vagina, Small flush cervix with pinpoint os.  No cmt  Skin: no visible rashes or lesions.    A/P:  1) HPV   Cotresting done, f/u based on results.   2) urinary incontinence   We discussed trial of estring if insurance covers vs pelvic PT vs both.  She would like to start with PT and add estrogen if needed.    MELIA AQUINO MD

## 2024-07-23 LAB
HPV HR 12 DNA CVX QL NAA+PROBE: NEGATIVE
HPV16 DNA CVX QL NAA+PROBE: NEGATIVE
HPV18 DNA CVX QL NAA+PROBE: NEGATIVE
HUMAN PAPILLOMA VIRUS FINAL DIAGNOSIS: NORMAL

## 2024-07-26 ENCOUNTER — PATIENT OUTREACH (OUTPATIENT)
Dept: OBGYN | Facility: CLINIC | Age: 64
End: 2024-07-26
Payer: COMMERCIAL

## 2024-07-26 LAB
BKR LAB AP GYN ADEQUACY: NORMAL
BKR LAB AP GYN INTERPRETATION: NORMAL
BKR LAB AP PREVIOUS ABNORMAL: NORMAL
PATH REPORT.COMMENTS IMP SPEC: NORMAL
PATH REPORT.COMMENTS IMP SPEC: NORMAL
PATH REPORT.RELEVANT HX SPEC: NORMAL

## 2024-08-13 ENCOUNTER — TRANSFERRED RECORDS (OUTPATIENT)
Dept: HEALTH INFORMATION MANAGEMENT | Facility: CLINIC | Age: 64
End: 2024-08-13
Payer: COMMERCIAL

## 2024-08-21 ENCOUNTER — TELEPHONE (OUTPATIENT)
Dept: FAMILY MEDICINE | Facility: CLINIC | Age: 64
End: 2024-08-21
Payer: COMMERCIAL

## 2024-08-21 NOTE — TELEPHONE ENCOUNTER
Reason for Call (Form)    Goal: Our goal is to have forms completed within 72 hours. However, some forms may require a visit or additional information.    Type of Letter, Form, or Note:    Form Origin: [facility]     Physician lab form    Received From: patient via [employer]    Placed At: [facility]    Provider: Soledad    Additional Comments: DUE BY AUG 31ST     From patient:     Before August 31st, if possible, Request from provider, Dr. Collins to please complete and fax a  [facility] physician lab form  for a health incentive to my health insurance at [phone] or send back to me on my bluepulsehart please. This is for a preventative health appointment on March 26th, 2024 with Dr. Collins. Claim # [id number]. If able to complete the Biometric screening on the form, that would be an extra special request! The biometric screening includes height, weight, blood pressure on the form. I filled in waist measurement. Thank you!     Please mychart pt to let them know that it is completed.

## 2024-08-29 ENCOUNTER — THERAPY VISIT (OUTPATIENT)
Dept: PHYSICAL THERAPY | Facility: CLINIC | Age: 64
End: 2024-08-29
Attending: OBSTETRICS & GYNECOLOGY
Payer: COMMERCIAL

## 2024-08-29 ENCOUNTER — OFFICE VISIT (OUTPATIENT)
Dept: FAMILY MEDICINE | Facility: CLINIC | Age: 64
End: 2024-08-29
Payer: COMMERCIAL

## 2024-08-29 VITALS
TEMPERATURE: 97.7 F | DIASTOLIC BLOOD PRESSURE: 74 MMHG | WEIGHT: 139 LBS | HEIGHT: 67 IN | HEART RATE: 87 BPM | OXYGEN SATURATION: 98 % | RESPIRATION RATE: 15 BRPM | SYSTOLIC BLOOD PRESSURE: 116 MMHG | BODY MASS INDEX: 21.82 KG/M2

## 2024-08-29 DIAGNOSIS — M77.8 TOE TENDONITIS: Primary | ICD-10-CM

## 2024-08-29 DIAGNOSIS — N39.498 OTHER URINARY INCONTINENCE: ICD-10-CM

## 2024-08-29 PROCEDURE — 97530 THERAPEUTIC ACTIVITIES: CPT | Mod: GP | Performed by: PHYSICAL THERAPIST

## 2024-08-29 PROCEDURE — 97161 PT EVAL LOW COMPLEX 20 MIN: CPT | Mod: GP | Performed by: PHYSICAL THERAPIST

## 2024-08-29 PROCEDURE — 97110 THERAPEUTIC EXERCISES: CPT | Mod: GP | Performed by: PHYSICAL THERAPIST

## 2024-08-29 PROCEDURE — 99213 OFFICE O/P EST LOW 20 MIN: CPT | Performed by: FAMILY MEDICINE

## 2024-08-29 ASSESSMENT — PAIN SCALES - GENERAL: PAINLEVEL: MILD PAIN (2)

## 2024-08-29 ASSESSMENT — PATIENT HEALTH QUESTIONNAIRE - PHQ9: SUM OF ALL RESPONSES TO PHQ QUESTIONS 1-9: 2

## 2024-08-29 NOTE — PROGRESS NOTES
"  Assessment & Plan     Toe tendonitis  Has old shoes   Go to [employer], get new supportive shoes   Toe stretching  Follow up only if unimproved.             See Patient Instructions    Subjective   Beronica is a 64 year old, presenting for the following health issues:  Toe Pain (Appears to be resolved, but wants to make sure everything is okay)      8/29/2024     4:10 PM   Additional Questions   Roomed by Chelsea GARCIA     History of Present Illness       Reason for visit:  Toe pain  Symptom onset:  1-2 weeks ago  Symptoms include:  Pain numbness  Symptom intensity:  Moderate  Symptom progression:  Improving  Had these symptoms before:  No  What makes it worse:  No  What makes it better:  Massage   She is taking medications regularly.                 Review of Systems  Constitutional, HEENT, cardiovascular, pulmonary, gi and gu systems are negative, except as otherwise noted.      Objective    /74 (BP Location: Left arm, Patient Position: Sitting, Cuff Size: Adult Regular)   Pulse 87   Temp 97.7  F (36.5  C) (Temporal)   Resp 15   Ht 1.702 m (5' 7\")   Wt 63 kg (139 lb)   LMP 04/06/2010 (Exact Date)   SpO2 98%   BMI 21.77 kg/m    Body mass index is 21.77 kg/m .  Physical Exam   GENERAL: alert and no distress  MS: tenderness to palpation left 4th  toe laterally, no redness or swelling  SKIN: no suspicious lesions or rashes  Diabetic foot exam: normal DP and PT pulses, no trophic changes or ulcerative lesions, and normal sensory exam            Signed Electronically by: Joaquin Rowe MD    "

## 2024-08-29 NOTE — PROGRESS NOTES
PHYSICAL THERAPY EVALUATION  Type of Visit: Evaluation       Fall Risk Screen:  Fall screen completed by: PT  Have you fallen 2 or more times in the past year?: No  Have you fallen and had an injury in the past year?: No  Is patient a fall risk?: No    Subjective       Presenting condition or subjective complaint: incontinence  Date of onset:      Relevant medical history: Anemia; Depression; Dizziness; Incontinence; Menopause; Migraines or headaches; Osteoarthritis; Osteoporosis   Dates & types of surgery: collapsed lung in 1976 catarac and eye implants in 2022 emergency appendectomy in 2021    Prior diagnostic imaging/testing results:       Prior therapy history for the same diagnosis, illness or injury: No      Prior Level of Function  Transfers:   Ambulation:   ADL:   IADL:     Living Environment  Social support: Alone   Type of home: House; 2-story; Basement   Stairs to enter the home: Yes 4 Is there a railing: Yes     Ramp: No   Stairs inside the home: Yes 25 Is there a railing: Yes     Help at home: None  Equipment owned:       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  Hobbies/Interests: creating and viewing art liatening to music watching movies walking gardening concerts reading cooking and baking    Patient goals for therapy: agata my bladder    Pain assessment:      Objective      PELVIC EVALUATION  ADDITIONAL HISTORY:  Sex assigned at birth: Female  Gender identity: Female    Pronouns: She/Her Hers      Bladder History:  Feels bladder filling: Yes  Triggers for feeling of inability to wait to go to the bathroom: Yes when my bladde is full movement time lapsing sneezinglauging  How long can you wait to urinate: three hours or so  Gets up at night to urinate: Yes once  Can stop the flow of urine when urinating: Sometimes  Volume of urine usually released: Large   Other issues: Bladder infections  Number of bladder infections in last 12 months: 2  Fluid intake per day: 14 or  more glasses less than one cup a day    Medications taken for bladder: No     Activities causing urine leak: Cough; Sneeze; Jump; Run; Hurrying to the bathroom due to a strong urge to urinate (pee)    Amount of urine typically leaked: small  Pads used to help with leaking: No        Bowel History:  Frequency of bowel movement: once to twice a day  Consistency of stool: Soft-formed    Ignores the urge to defecate: No  Other bowel issues: Loss of gas  Length of time spent trying to have a bowel movement:      Has some trouble now with change in medication with taking more calcium.      Sexual Function History:  Sexual orientation: Straight    Sexually active: Yes  Lubrication used: Yes Yes  Pelvic pain: Initial penetration (rectal or vaginal); Deep penetration (rectal or vaginal); Pelvic exams  Has used estrogen cream in the past.    Pain or difficulty with orgasms/erection/ejaculation: No    State of menopause: Post-menopause (I am done with menopause)  Hormone medications: No      Are you currently pregnant: No  Number of previous pregnancies: 2  Number of deliveries: 2  If you have delivered before, did you have any of these issues during delivery: Episiotomy  Have you been diagnosed with pelvic prolapse or abdominal separation: No  Have you tried pelvic floor strengthening exercises for 4 weeks: No  Do you have any history of trauma that is relevant to your care that you d like to share: Yes, I d like to discuss it with my provider in person.      Doing planks and press ups, bikes and walks.     Working full time.    Had 2- 9 plus pounds baby.  Pushed for a long time with the first.   Discussed reason for referral regarding pelvic health needs and external/internal pelvic floor muscle examination with patient/guardian.  Opportunity provided to ask questions and verbal consent for assessment and intervention was given.    PAIN: Pain is Exacerbated By: feels dryness with sexual activity  POSTURE: Sitting Posture:  Thoracic kyphosis increased  LUMBAR SCREEN:   HIP SCREEN: decreased PROM with flexion, ER and IR on the left greater than right  Strength:    Functional Strength Testing:     PELVIC/SI SCREEN:     PAIN PROVOCATION TEST:   PELVIS/SI SPECIAL TESTS:   BREATHING SYMMETRY:     PELVIC EXAM, not competed today due to addressing other concerns and needing to leave for an appointment  External Visual Inspection:      Integumentary:       External Digital Palpation per Perineum:       Scar:   Location/Type:   Mobility:     Internal Digital Palpation:  Per Vagina:      Per Rectum:        Pelvic Organ Prolapse:       ABDOMINAL ASSESSMENT  Diastasis Rectus Abdominis (ALONSO):      Abdominal Activation/Strength:     Scar:   Location/Type:   Mobility:     Fascial Tension/Restriction:     BIOFEEDBACK:  Position:   Surface Electrodes:     Abdominals:     Perianals:       DERMATOMES:   DTR S:     Assessment & Plan   CLINICAL IMPRESSIONS  Medical Diagnosis: Other urinary incontinence    Treatment Diagnosis: Other urinary incontinence   Impression/Assessment:     Clinical Decision Making (Complexity):  Clinical Presentation: Stable/Uncomplicated  Clinical Presentation Rationale: based on medical and personal factors listed in PT evaluation  Clinical Decision Making (Complexity): Low complexity    PLAN OF CARE  Treatment Interventions:  Modalities: Biofeedback  Interventions: Neuromuscular Re-education, Therapeutic Activity, Therapeutic Exercise    Long Term Goals     PT Goal 1  Goal Identifier: urinary incontinence  Goal Description: no c/o urinary incontinence with coughing and sneezing or with urge coming trace  Rationale: to maximize safety and independence with performance of ADLs and functional tasks  Goal Progress: currently has intermittent leaking with all 3 activities  Target Date: 11/26/24      Frequency of Treatment: 2 times a month  Duration of Treatment: 3 months    Recommended Referrals to Other Professionals:   Education  Assessment:   Learner/Method: Patient;Listening;Reading;Demonstration;Pictures/Video;No Barriers to Learning    Risks and benefits of evaluation/treatment have been explained.   Patient/Family/caregiver agrees with Plan of Care.     Evaluation Time:     PT Eval, Low Complexity Minutes (52010): 25       Signing Clinician: Tahira Arzate PT

## 2024-09-05 ENCOUNTER — THERAPY VISIT (OUTPATIENT)
Dept: PHYSICAL THERAPY | Facility: CLINIC | Age: 64
End: 2024-09-05
Attending: OBSTETRICS & GYNECOLOGY
Payer: COMMERCIAL

## 2024-09-05 DIAGNOSIS — N39.498 OTHER URINARY INCONTINENCE: Primary | ICD-10-CM

## 2024-09-05 PROCEDURE — 97110 THERAPEUTIC EXERCISES: CPT | Mod: GP | Performed by: PHYSICAL THERAPIST

## 2024-09-05 PROCEDURE — 97530 THERAPEUTIC ACTIVITIES: CPT | Mod: GP | Performed by: PHYSICAL THERAPIST

## 2024-09-12 ENCOUNTER — THERAPY VISIT (OUTPATIENT)
Dept: PHYSICAL THERAPY | Facility: CLINIC | Age: 64
End: 2024-09-12
Attending: OBSTETRICS & GYNECOLOGY
Payer: COMMERCIAL

## 2024-09-12 ENCOUNTER — IMMUNIZATION (OUTPATIENT)
Dept: FAMILY MEDICINE | Facility: CLINIC | Age: 64
End: 2024-09-12
Payer: COMMERCIAL

## 2024-09-12 DIAGNOSIS — Z23 NEED FOR PROPHYLACTIC VACCINATION AND INOCULATION AGAINST INFLUENZA: Primary | ICD-10-CM

## 2024-09-12 DIAGNOSIS — N39.498 OTHER URINARY INCONTINENCE: Primary | ICD-10-CM

## 2024-09-12 PROCEDURE — 99207 PR NO CHARGE NURSE ONLY: CPT

## 2024-09-12 PROCEDURE — 90471 IMMUNIZATION ADMIN: CPT

## 2024-09-12 PROCEDURE — 90662 IIV NO PRSV INCREASED AG IM: CPT

## 2024-09-12 PROCEDURE — 97110 THERAPEUTIC EXERCISES: CPT | Mod: GP | Performed by: PHYSICAL THERAPIST

## 2024-09-12 PROCEDURE — 97530 THERAPEUTIC ACTIVITIES: CPT | Mod: GP | Performed by: PHYSICAL THERAPIST

## 2024-10-10 ENCOUNTER — THERAPY VISIT (OUTPATIENT)
Dept: PHYSICAL THERAPY | Facility: CLINIC | Age: 64
End: 2024-10-10
Payer: COMMERCIAL

## 2024-10-10 DIAGNOSIS — N39.498 OTHER URINARY INCONTINENCE: Primary | ICD-10-CM

## 2024-10-10 PROCEDURE — 97110 THERAPEUTIC EXERCISES: CPT | Mod: GP | Performed by: PHYSICAL THERAPIST

## 2024-10-10 PROCEDURE — 97530 THERAPEUTIC ACTIVITIES: CPT | Mod: GP | Performed by: PHYSICAL THERAPIST

## 2024-10-10 NOTE — PROGRESS NOTES
PLAN  Continue therapy per current plan of care.   10/10/24 0500   Appointment Info   Signing clinician's name / credentials Tahira James,ATC, SCS   Total/Authorized Visits E+T(6)   Visits Used 4   Medical Diagnosis Other urinary incontinence   PT Tx Diagnosis Other urinary incontinence   Precautions/Limitations osteoporosis   Progress Note/Certification   Therapy Frequency 2 times a month   Predicted Duration 3 months   Progress Note Due Date 11/26/24   Progress Note Completed Date 08/29/24   PT Goal 1   Goal Identifier urinary incontinence   Goal Description no c/o urinary incontinence with coughing and sneezing or with urge coming home   Rationale to maximize safety and independence with performance of ADLs and functional tasks   Goal Progress has had fewer episodes of urge incontinence   Target Date 11/26/24   Subjective Report   Subjective Report Has only a few incidents of urge incontinence.  One time in the morning.  Has made some progress.   Objective Measures   Objective Measures Objective Measure 1;Objective Measure 2   Objective Measure 1   Objective Measure internal exam   Details weak contraction, slow to relax, fatigues after 3 seconds   Objective Measure 2   Objective Measure posture   Details encourage sitting and standing with good posture as patient has forward head and rounded shoulders and to be able to relax to not hold pelvic muscles   Treatment Interventions (PT)   Interventions Therapeutic Procedure/Exercise;Therapeutic Activity;Neuromuscular Re-education   Therapeutic Procedure/Exercise   Therapeutic Procedures: strength, endurance, ROM, flexibility minutes (11616) 15   PTRx Ther Proc 1 Prone Press Ups   PTRx Ther Proc 1 - Details 10 reps   PTRx Ther Proc 2 Side-lying Positional Traction   PTRx Ther Proc 2 - Details some back pain with this   PTRx Ther Proc 3 Towel Roll Stretch   PTRx Ther Proc 3 - Details verbal review   PTRx Ther Proc 4 Pelvic Floor Muscle Strengthening Basic    PTRx Ther Proc 4 - Details practicing this daily   PTRx Ther Proc 5 Prone Plank   PTRx Ther Proc 5 - Details verbal review   PTRx Ther Proc 6 Shoulder Theraband Rows   PTRx Ther Proc 6 - Details 10 reps   PTRx Ther Proc 7 Hip Extension With Theraband At 45 Degrees Angle   PTRx Ther Proc 7 - Details HEP   PTRx Ther Proc 8 Roll Ins   PTRx Ther Proc 8 - Details sitting with towel roll   Skilled Intervention verbal and tactile cues   Patient Response/Progress understanding expressed, questions answered   Ther Proc 1 prone arm raise #2   Ther Proc 1 - Details 5 reps   Therapeutic Activity   Therapeutic Activities: dynamic activities to improve functional performance minutes (84643) 20   PTRx Ther Act 1 Pelvic Myofascial Release   PTRx Ther Act 1 - Details verbal review to keep working on this in the bathtub or in the shower   PTRx Ther Act 6 Posture Correction with Lumbar Roll   PTRx Ther Act 6 - Details verbal review   PTRx Ther Act 7 Education Sheet General   PTRx Ther Act 7 - Details The strengthening and balance exercises can help with your osteoporosis as well.For other education sheets click on the download education sheets at the top of the pelvic health exercises.     Patient Response/Progress understanding expressed, questions answered   Neuromuscular Re-education   Neuromuscular re-ed of mvmt, balance, coord, kinesthetic sense, posture, proprioception minutes (94918) 1   PTRx Neuro Re-ed 1 Squat Pelvic Floor   PTRx Neuro Re-ed 1 - Details 3 reps   Intervention (Other)   PTRx Other  2 Urge Incontinence Suppression Techniques   PTRx Other 2 - Details reviewed   Education   Learner/Method Patient;Listening;Reading;Demonstration;Pictures/Video;No Barriers to Learning   Plan   Home program see ptrx on phone   Updates to plan of care focus on strength and pelvic muscle release of contraction   Plan for next session reassess strength   Comments   Pelvic Health Informed Consent Statement Discussed with  patient/guardian reason for referral regarding pelvic health needs and external/internal pelvic floor muscle examination.  Opportunity provided to ask questions and verbal consent for assessment and intervention was given.   Total Session Time   Timed Code Treatment Minutes 36   Total Treatment Time (sum of timed and untimed services) 36       Beginning/End Dates of Progress Note Reporting Period:  08/29/24 to 10/10/2024    Referring Provider:  Shwetha Ibrahim

## 2024-12-17 ENCOUNTER — E-VISIT (OUTPATIENT)
Dept: URGENT CARE | Facility: CLINIC | Age: 64
End: 2024-12-17
Payer: COMMERCIAL

## 2024-12-17 DIAGNOSIS — N39.0 ACUTE UTI (URINARY TRACT INFECTION): Primary | ICD-10-CM

## 2024-12-17 RX ORDER — NITROFURANTOIN 25; 75 MG/1; MG/1
100 CAPSULE ORAL 2 TIMES DAILY
Qty: 10 CAPSULE | Refills: 0 | Status: SHIPPED | OUTPATIENT
Start: 2024-12-17 | End: 2024-12-22

## 2024-12-17 NOTE — PATIENT INSTRUCTIONS
Dear Beronica Azevedo    After reviewing your responses, I've been able to diagnose you with a urinary tract infection, which is a common infection of the bladder with bacteria.  This is not a sexually transmitted infection, though urinating immediately after intercourse can help prevent infections.  Drinking lots of fluids is also helpful to clear your current infection and prevent the next one.      I have sent a prescription for antibiotics to your pharmacy to treat this infection.    It is important that you take all of your prescribed medication even if your symptoms are improving after a few doses.  Taking all of your medicine helps prevent the symptoms from returning.     If your symptoms worsen, you develop pain in your back or stomach, develop fevers, or are not improving in 5 days, please contact your primary care provider for an appointment or visit any of our convenient Walk-in or Urgent Care Centers to be seen, which can be found on our website here.    Thanks again for choosing us as your health care partner,    Jackson Westbrook MD  Urinary Tract Infection (UTI) in Women: Care Instructions  Overview     A urinary tract infection (UTI) is an infection caused by bacteria. It can happen anywhere in the urinary tract. A UTI can happen in the:  Kidneys.  Ureters, the tubes that connect the kidneys to the bladder.  Bladder.  Urethra, where the urine comes out.  Most UTIs are bladder infections. They often cause pain or burning when you urinate.  Most UTIs can be cured with antibiotics. If you are prescribed antibiotics, be sure to complete your treatment so that the infection does not get worse.  Follow-up care is a key part of your treatment and safety. Be sure to make and go to all appointments, and call your doctor if you are having problems. It's also a good idea to know your test results and keep a list of the medicines you take.  How can you care for yourself at home?  Take your antibiotics as directed.  "Do not stop taking them just because you feel better. You need to take the full course of antibiotics.  Drink extra water and other fluids for the next day or two. This will help make the urine less concentrated and help wash out the bacteria that are causing the infection. (If you have kidney, heart, or liver disease and have to limit fluids, talk with your doctor before you increase the amount of fluids you drink.)  Avoid drinks that are carbonated or have caffeine. They can irritate the bladder.  Urinate often. Try to empty your bladder each time.  To relieve pain, take a hot bath or lay a heating pad set on low over your lower belly or genital area. Never go to sleep with a heating pad in place.  To prevent UTIs  Drink plenty of water each day. This helps you urinate often, which clears bacteria from your system. (If you have kidney, heart, or liver disease and have to limit fluids, talk with your doctor before you increase the amount of fluids you drink.)  Urinate when you need to.  If you are sexually active, urinate right after you have sex.  Change sanitary pads often.  Avoid douches, bubble baths, feminine hygiene sprays, and other feminine hygiene products that have deodorants.  After going to the bathroom, wipe from front to back.  When should you call for help?   Call your doctor now or seek immediate medical care if:    You have new or worse fever, chills, nausea, or vomiting.     You have new pain in your back just below your rib cage. This is called flank pain.     There is new blood or pus in your urine.     You have any problems with your antibiotic medicine.   Watch closely for changes in your health, and be sure to contact your doctor if:    You are not getting better after taking an antibiotic for 2 days.     Your symptoms go away but then come back.   Where can you learn more?  Go to https://www.healthwise.net/patiented  Enter K848 in the search box to learn more about \"Urinary Tract Infection " "(UTI) in Women: Care Instructions.\"  Current as of: November 15, 2023  Content Version: 14.2 2024 [facility].   Care instructions adapted under license by your healthcare professional. If you have questions about a medical condition or this instruction, always ask your healthcare professional. [employer] disclaims any warranty or liability for your use of this information.    "

## 2025-01-15 ENCOUNTER — E-VISIT (OUTPATIENT)
Dept: URGENT CARE | Facility: CLINIC | Age: 65
End: 2025-01-15
Payer: COMMERCIAL

## 2025-01-15 DIAGNOSIS — R30.0 DYSURIA: Primary | ICD-10-CM

## 2025-01-16 NOTE — PATIENT INSTRUCTIONS
Dear Beronica Azevedo,     After reviewing your responses, I would like you to come in for a urine test to make sure we treat you correctly. This urine test is to evaluate you for a possible urinary tract infection, and should be scheduled for today or tomorrow. Schedule a Lab Only appointment here.     Lab appointments are not available at most locations on the weekends. If no Lab Only appointment is available, you should be seen in any of our convenient Walk-in or Urgent Care Centers, which can be found on our website here.     You will receive instructions with your results in [facility] once they are available.     If your symptoms worsen, you develop pain in your back or stomach, develop fevers, or are not improving in 5 days, please contact your primary care provider for an appointment or visit a Walk-in or Urgent Care Center to be seen.     Thanks again for choosing us as your health care partner,     Antonia Shah, CNP

## 2025-01-23 ENCOUNTER — OFFICE VISIT (OUTPATIENT)
Dept: DERMATOLOGY | Facility: CLINIC | Age: 65
End: 2025-01-23
Payer: COMMERCIAL

## 2025-01-23 DIAGNOSIS — L29.9 PRURITUS: ICD-10-CM

## 2025-01-23 DIAGNOSIS — L85.3 XEROSIS OF SKIN: ICD-10-CM

## 2025-01-23 DIAGNOSIS — L71.9 ROSACEA: Primary | ICD-10-CM

## 2025-01-23 DIAGNOSIS — L82.1 SEBORRHEIC KERATOSIS: ICD-10-CM

## 2025-01-23 PROCEDURE — 99213 OFFICE O/P EST LOW 20 MIN: CPT | Mod: GC | Performed by: DERMATOLOGY

## 2025-01-23 RX ORDER — ALENDRONATE SODIUM 35 MG/1
40 TABLET ORAL
COMMUNITY

## 2025-01-23 RX ORDER — AZELAIC ACID 0.15 G/G
GEL TOPICAL
Qty: 50 G | Refills: 3 | Status: SHIPPED | OUTPATIENT
Start: 2025-01-23

## 2025-01-23 ASSESSMENT — PAIN SCALES - GENERAL: PAINLEVEL_OUTOF10: NO PAIN (0)

## 2025-01-23 NOTE — NURSING NOTE
Dermatology Rooming Note    Beronica Azevedo's goals for this visit include:   Chief Complaint   Patient presents with    Derm Problem     PT reports that they have been 'so itchy' it interrupts normal daily functions like sleeping and working. PT reports they take tepid baths and apply lotions constantly.     Nilda Olvera - EMT

## 2025-01-23 NOTE — PROGRESS NOTES
[facility] Dermatology Note  Encounter Date: Jan 23, 2025  Office Visit     Dermatology Problem List:  # Seborrheic dermatitis, scalp  - Current tx: ketoconazole shampoo three times per week   # Eczematous dermatitis w/ xerosis   - gentle skin care regimen and generous emollient daily   # Erythematotelangiectatic rosacea  - Referred to cosmetic dermatology in past, recommended azelaic acid (prescribed today 01/23/25)   # Epidermal inclusion cyst, upper back  - s/p excisional bx of dilated pore of Nichole 8/1/2017, well healed   # Benign findings:   - Scattered benign nevi, seborrheic keratoses, non-irritated, lentigos   # Onychodystrophy, longitudinal ridging on all nails  - Current tx: biotin supplement, urea 20% cream BID  ____________________________________________    Assessment & Plan:     # Xerosis  # Pruritus, mostly on back  Patient initially complained of 2 itchy papules that were most consistent with benign intradermal nevi. On further questioning, it appears that the pruritus is rather diffuse rather than localized to just those 2 spots. Can consider component of notalgia paresthetica as well  - gentle skin care regimen and generous emollient daily     # Erythematotelangiectatic rosacea  - Referred to cosmetic dermatology in past, recommended azelaic acid (prescribed today 01/23/25)     # Benign findings:  - Scattered benign nevi, seborrheic keratoses, non-irritated, lentigos     Procedures Performed:  None    Follow-up: cyst excision at next available in [facility]    Staff and Resident:   Dr. Quiroz and Brigid Ramires DO     Patient was seen and examined with the dermatology resident. I agree with the history, review of systems, physical examination, assessments and plan.  Lluvia Quiroz MD  Professor   Department of Dermatology  [facility]     ____________________________________________    CC: Derm Problem (PT reports that they have been 'so itchy' it  interrupts normal daily functions like sleeping and working. PT reports they take tepid baths and apply lotions constantly.)    HPI:  Ms. Beronica Azevedo is a(n) 64 year old female who presents today as a return patient for numerous concerns   Worried about skin dryness and itching, has been making a solution of coconut oil with almond oil and shea butter that she has been using on this skin   She also has had some tightness and dryness after she gets out of the shower   No new rashes or lesions   Has some pink spots on her cheeks that she is wondering about laser   No other concerns or complaints today     Labs Reviewed:  N/A    Physical Exam:  Vitals: LMP 04/06/2010 (Exact Date)   SKIN: Waist-up skin, which includes the head/face, neck, both arms, chest, back, abdomen, digits and/or nails was examined.   - Multiple regular brown pigmented macules and papules are identified on the trunk and extremities.   - Scattered brown macules on sun exposed areas.  - There are waxy stuck on tan to brown papules on the trunk and extremities.     - back and abdomen   - Pink telangiectasias on the cheeks   - No other lesions of concern on areas examined.     Medications:  Current Outpatient Medications   Medication Sig Dispense Refill    acetaminophen (TYLENOL) 325 MG tablet Take 2 tablets (650 mg) by mouth every 6 hours as needed for mild pain or fever 60 tablet 0    alendronate (FOSAMAX) 35 MG tablet Take 40 mg by mouth every 7 days.      BENADRYL ALLERGY PO prn      biotin 2.5 mg/mL Take by mouth daily      butalbital-acetaminophen-caffeine (ESGIC) -40 MG tablet Take 1 tablet by mouth every 4 hours as needed      calcium carbonate (OS-KATHIA) 500 MG tablet Take 1 tablet (500 mg) by mouth 2 times daily 180 tablet 1    eletriptan (RELPAX) 40 MG tablet Take 40 mg by mouth as needed for headaches  10    ketoconazole (NIZORAL) 2 % external shampoo APPLY A SMALL AMOUNT TOPICALLY ONCE DAILY AS NEEDED FOR ITCHING 240 mL 0     magnesium 250 MG tablet Take 1 tablet by mouth daily      nortriptyline (PAMELOR) 50 MG capsule TAKE 1 CAPSULE(50 MG) BY MOUTH AT BEDTIME 90 capsule 1    topiramate (TOPAMAX) 25 MG tablet Take 25 mg by mouth 2 times daily      traZODone (DESYREL) 50 MG tablet 1/2 TABLET AT BEDTIME AS NEEDED FOR SLEEP 45 tablet 1    vitamin D3 (CHOLECALCIFEROL) 50 mcg (2000 units) tablet Take 1 tablet (50 mcg) by mouth daily 90 tablet 2    alendronate (FOSAMAX) 70 MG tablet Take 1 tablet (70 mg) by mouth every 7 days for 12 days 12 tablet 3    naproxen (NAPROSYN) 500 MG tablet Take 500 mg by mouth (Patient not taking: Reported on 1/23/2025)       No current facility-administered medications for this visit.      Past Medical History:   Patient Active Problem List   Diagnosis    Headache    Insomnia    Moderate dysplasia of cervix (BEVERLY II)    CARDIOVASCULAR SCREENING; LDL GOAL LESS THAN 160    Rosacea    Onychodystrophy    Dermatitis    Osteopenia    Post-menopausal    Mild major depression (H)    IBS (irritable bowel syndrome)    Myopia    Regular astigmatism    Presbyopia    Posterior subcapsular polar senile cataract    Senile nuclear sclerosis    Macular puckering of retina    Cervicalgia    Pain in thoracic spine    Digital mucinous cyst    Seborrheic keratosis, inflamed    FREDIS (generalized anxiety disorder)    Neoplasm of uncertain behavior of skin    Dermatitis, seborrheic    Plantar fasciitis    Endometrial cells on cervical Pap smear inconsistent with last menstrual period    Migraine without status migrainosus, not intractable    Acute appendicitis with localized peritonitis, without perforation, abscess, or gangrene    Mixed stress and urge urinary incontinence    Trochanteric bursitis of right hip    Strain of right trapezius muscle, initial encounter    Depressive disorder in remission    Benign neoplasm of ascending colon    Chronic migraine without aura, intractable, without status migrainosus    Occipital neuralgia     Polyp of colon    Unspecified temporomandibular joint disorder, unspecified side    Major depressive disorder, single episode, in full remission    Other urinary incontinence     Past Medical History:   Diagnosis Date    Anxiety     Arthritis 2010    Cervical high risk HPV (human papillomavirus) test positive 03/04/2016 03/04/2016, 09/12/19, 11/23/21    BEVERLY II (cervical intraepithelial neoplasia II) 2009    LEEP    Depression     Depressive disorder Adolescence    Managed with medication and therapy    Dry eye syndrome     Frequent UTI 2009    Headache(784.0)     IBS (irritable bowel syndrome)     Insomnia     Lattice degeneration of retina     Lyme disease     Myopic astigmatism     Nonsenile cataract     Osteopenia 2001,2012    Fosamax    Pneumothorax on right     at 17 y/o    PVD (posterior vitreous detachment), both eyes 07/19/2010    Rosacea     Vasomotor rhinitis      CC Referred Self, MD  No address on file on close of this encounter.

## 2025-01-23 NOTE — LETTER
1/23/2025       RE: Beronica Azevedo  [address]  [facility] [address]     Dear Colleague,    Thank you for referring your patient, Beronica Azevedo, to the [facility] [place] at [facility]. Please see a copy of my visit note below.    [facility] Dermatology Note  Encounter Date: Jan 23, 2025  Office Visit     Dermatology Problem List:  # Seborrheic dermatitis, scalp  - Current tx: ketoconazole shampoo three times per week   # Eczematous dermatitis w/ xerosis   - gentle skin care regimen and generous emollient daily   # Erythematotelangiectatic rosacea  - Referred to cosmetic dermatology in past, recommended azelaic acid (prescribed today 01/23/25)   # Epidermal inclusion cyst, upper back  - s/p excisional bx of dilated pore of Nichole 8/1/2017, well healed   # Benign findings:   - Scattered benign nevi, seborrheic keratoses, non-irritated, lentigos   # Onychodystrophy, longitudinal ridging on all nails  - Current tx: biotin supplement, urea 20% cream BID  ____________________________________________    Assessment & Plan:     # Xerosis  # Pruritus, mostly on back  Patient initially complained of 2 itchy papules that were most consistent with benign intradermal nevi. On further questioning, it appears that the pruritus is rather diffuse rather than localized to just those 2 spots. Can consider component of notalgia paresthetica as well  - gentle skin care regimen and generous emollient daily     # Erythematotelangiectatic rosacea  - Referred to cosmetic dermatology in past, recommended azelaic acid (prescribed today 01/23/25)     # Benign findings:  - Scattered benign nevi, seborrheic keratoses, non-irritated, lentigos     Procedures Performed:  None    Follow-up: cyst excision at next available in [facility]    Staff and Resident:   Dr. Quiroz and Brigid Ramires, DO      ____________________________________________    CC: Derm Problem (PT reports that they have been 'so itchy' it interrupts normal daily functions like sleeping and working. PT reports they take tepid baths and apply lotions constantly.)    HPI:  Ms. Beronica Azevedo is a(n) 64 year old female who presents today as a return patient for numerous concerns   Worried about skin dryness and itching, has been making a solution of coconut oil with almond oil and shea butter that she has been using on this skin   She also has had some tightness and dryness after she gets out of the shower   No new rashes or lesions   Has some pink spots on her cheeks that she is wondering about laser   No other concerns or complaints today     Labs Reviewed:  N/A    Physical Exam:  Vitals: LMP 04/06/2010 (Exact Date)   SKIN: Waist-up skin, which includes the head/face, neck, both arms, chest, back, abdomen, digits and/or nails was examined.   - Multiple regular brown pigmented macules and papules are identified on the trunk and extremities.   - Scattered brown macules on sun exposed areas.  - There are waxy stuck on tan to brown papules on the trunk and extremities.     - back and abdomen   - Pink telangiectasias on the cheeks   - No other lesions of concern on areas examined.     Medications:  Current Outpatient Medications   Medication Sig Dispense Refill     acetaminophen (TYLENOL) 325 MG tablet Take 2 tablets (650 mg) by mouth every 6 hours as needed for mild pain or fever 60 tablet 0     alendronate (FOSAMAX) 35 MG tablet Take 40 mg by mouth every 7 days.       BENADRYL ALLERGY PO prn       biotin 2.5 mg/mL Take by mouth daily       butalbital-acetaminophen-caffeine (ESGIC) -40 MG tablet Take 1 tablet by mouth every 4 hours as needed       calcium carbonate (OS-KATHIA) 500 MG tablet Take 1 tablet (500 mg) by mouth 2 times daily 180 tablet 1     eletriptan (RELPAX) 40 MG tablet Take 40 mg by mouth as needed for headaches  10      ketoconazole (NIZORAL) 2 % external shampoo APPLY A SMALL AMOUNT TOPICALLY ONCE DAILY AS NEEDED FOR ITCHING 240 mL 0     magnesium 250 MG tablet Take 1 tablet by mouth daily       nortriptyline (PAMELOR) 50 MG capsule TAKE 1 CAPSULE(50 MG) BY MOUTH AT BEDTIME 90 capsule 1     topiramate (TOPAMAX) 25 MG tablet Take 25 mg by mouth 2 times daily       traZODone (DESYREL) 50 MG tablet 1/2 TABLET AT BEDTIME AS NEEDED FOR SLEEP 45 tablet 1     vitamin D3 (CHOLECALCIFEROL) 50 mcg (2000 units) tablet Take 1 tablet (50 mcg) by mouth daily 90 tablet 2     alendronate (FOSAMAX) 70 MG tablet Take 1 tablet (70 mg) by mouth every 7 days for 12 days 12 tablet 3     naproxen (NAPROSYN) 500 MG tablet Take 500 mg by mouth (Patient not taking: Reported on 1/23/2025)       No current facility-administered medications for this visit.      Past Medical History:   Patient Active Problem List   Diagnosis     Headache     Insomnia     Moderate dysplasia of cervix (BEVERLY II)     CARDIOVASCULAR SCREENING; LDL GOAL LESS THAN 160     Rosacea     Onychodystrophy     Dermatitis     Osteopenia     Post-menopausal     Mild major depression (H)     IBS (irritable bowel syndrome)     Myopia     Regular astigmatism     Presbyopia     Posterior subcapsular polar senile cataract     Senile nuclear sclerosis     Macular puckering of retina     Cervicalgia     Pain in thoracic spine     Digital mucinous cyst     Seborrheic keratosis, inflamed     FREDIS (generalized anxiety disorder)     Neoplasm of uncertain behavior of skin     Dermatitis, seborrheic     Plantar fasciitis     Endometrial cells on cervical Pap smear inconsistent with last menstrual period     Migraine without status migrainosus, not intractable     Acute appendicitis with localized peritonitis, without perforation, abscess, or gangrene     Mixed stress and urge urinary incontinence     Trochanteric bursitis of right hip     Strain of right trapezius muscle, initial encounter     Depressive  disorder in remission     Benign neoplasm of ascending colon     Chronic migraine without aura, intractable, without status migrainosus     Occipital neuralgia     Polyp of colon     Unspecified temporomandibular joint disorder, unspecified side     Major depressive disorder, single episode, in full remission     Other urinary incontinence     Past Medical History:   Diagnosis Date     Anxiety      Arthritis 2010     Cervical high risk HPV (human papillomavirus) test positive 03/04/2016 03/04/2016, 09/12/19, 11/23/21     BEVERLY II (cervical intraepithelial neoplasia II) 2009    LEEP     Depression      Depressive disorder Adolescence    Managed with medication and therapy     Dry eye syndrome      Frequent UTI 2009     Headache(784.0)      IBS (irritable bowel syndrome)      Insomnia      Lattice degeneration of retina      Lyme disease      Myopic astigmatism      Nonsenile cataract      Osteopenia 2001,2012    Fosamax     Pneumothorax on right     at 15 y/o     PVD (posterior vitreous detachment), both eyes 07/19/2010     Rosacea      Vasomotor rhinitis      CC Referred Self, MD  No address on file on close of this encounter.      Again, thank you for allowing me to participate in the care of your patient.      Sincerely,    Brigid Ramires DO

## 2025-01-23 NOTE — PATIENT INSTRUCTIONS
Rosacea- Apply azelaic acid to face daily at night with moisturizer   If not covered by insurance, try this one instead -       Vanicream - body wash or dove unscented to wash your body   Apply Vaseline after you get of the shower       Gentle Skin Care  Below is a list of products our providers recommend for gentle skin care.  Daily bathing is recommended. Make sure you are applying a good moisturizer after bathing every time.  Use Moisturizing creams at least twice daily to the whole body. Your provider may recommend a lighter or heavier moisturizer based on your skins severity and that time of year it is.  Lighter, more pleasing to the feel moisturizers include products such as; Cetaphil, Cerave, Aveeno and Vanicream light.   Thicker agents include; Aquaphor ointment, Vaseline, Eucerin and Vanicream.  Creams are more moisturizing than lotions  Products should be fragrance free- soaps, creams, detergents.  Mild Bar Soaps include;   Fragrance Free Dove, Basis and Purpose  Mild Liquid Cleansers;  Vanicream, Cetaphil, Aquanil, Cerave and Aquaphor  Laundry Products include;  All Free and Clear, Dreft, and Cheer Free  Care Plan:  Keep bathing and showering short, less than 15 mins  Always use lukewarm warm when possible. AVOID very HOT or COLD water  DO NOT use bubble bath  Limit the use of soaps. Focus on  dirty  areas of the body; face, armpits, groin and feet  Do NOT vigorously scrub when you cleanse your skin  After bathing, PAT your skin lightly with a towel. DO NOT rub or scrub when drying  ALWAYS apply a moisturizer immediately after bathing. This helps to  lock in  the moisture. * IF YOU WERE PRESCRIBED A TOPICAL MEDICATION, APPLY YOUR MEDICATION FIRST THEN COVER WITH YOUR DAILY MOISTURIZER  Reapply moisturizing agents at least twice daily to your whole body  Do not use products such as powders, perfumes, or colognes on your skin  Avoid saunas and steam baths. This temperature is too HOT  Use unscented  hypo-allergenic laundry products. AVOID fabric softeners  and dryer sheets  Avoid tight or  scratchy  clothing such as wool  Always wash new clothing before wearing them for the first time  Sometimes a humidifier or vaporizer, used at night can help the dry skin. Remember to keep it clean to avoid mold growth.

## 2025-02-06 DIAGNOSIS — L21.9 DERMATITIS, SEBORRHEIC: ICD-10-CM

## 2025-02-06 RX ORDER — KETOCONAZOLE 20 MG/ML
SHAMPOO, SUSPENSION TOPICAL
Qty: 120 ML | Refills: 0 | Status: SHIPPED | OUTPATIENT
Start: 2025-02-06

## 2025-03-27 NOTE — TELEPHONE ENCOUNTER
"Requested Prescriptions   Pending Prescriptions Disp Refills     azelastine (ASTELIN) 0.1 % nasal spray  Last Written Prescription Date:  10/30/18  Last Fill Quantity: 30,  # refills: 1   Last office visit: 1/18/2019 with prescribing provider:  1/18/19   Future Office Visit:     30 mL 1    Antihistamines Protocol Passed - 3/26/2019 12:45 PM       Passed - Patient is 3-64 years of age    Apply weight-based dosing for peds patients age 3 - 12 years of age.    Forward request to provider for patients under the age of 3 or over the age of 64.         Passed - Recent (12 mo) or future (30 days) visit within the authorizing provider's specialty    Patient had office visit in the last 12 months or has a visit in the next 30 days with authorizing provider or within the authorizing provider's specialty.  See \"Patient Info\" tab in inbasket, or \"Choose Columns\" in Meds & Orders section of the refill encounter.             Passed - Medication is active on med list          " [Patient] : patient

## 2025-04-24 ENCOUNTER — TRANSFERRED RECORDS (OUTPATIENT)
Dept: HEALTH INFORMATION MANAGEMENT | Facility: CLINIC | Age: 65
End: 2025-04-24
Payer: COMMERCIAL

## 2025-05-03 ENCOUNTER — HEALTH MAINTENANCE LETTER (OUTPATIENT)
Age: 65
End: 2025-05-03

## 2025-05-07 DIAGNOSIS — M81.0 OSTEOPOROSIS WITHOUT CURRENT PATHOLOGICAL FRACTURE, UNSPECIFIED OSTEOPOROSIS TYPE: ICD-10-CM

## 2025-05-07 RX ORDER — ALENDRONATE SODIUM 70 MG/1
TABLET ORAL
Qty: 12 TABLET | Refills: 3 | Status: SHIPPED | OUTPATIENT
Start: 2025-05-07

## 2025-05-27 ENCOUNTER — ANCILLARY PROCEDURE (OUTPATIENT)
Dept: MAMMOGRAPHY | Facility: CLINIC | Age: 65
End: 2025-05-27
Payer: COMMERCIAL

## 2025-05-27 DIAGNOSIS — Z12.31 VISIT FOR SCREENING MAMMOGRAM: ICD-10-CM

## 2025-05-27 PROCEDURE — 77063 BREAST TOMOSYNTHESIS BI: CPT | Performed by: RADIOLOGY

## 2025-05-27 PROCEDURE — 77067 SCR MAMMO BI INCL CAD: CPT | Performed by: RADIOLOGY

## 2025-05-30 ENCOUNTER — ANCILLARY PROCEDURE (OUTPATIENT)
Dept: MAMMOGRAPHY | Facility: CLINIC | Age: 65
End: 2025-05-30
Attending: OBSTETRICS & GYNECOLOGY
Payer: COMMERCIAL

## 2025-05-30 DIAGNOSIS — R92.8 ABNORMAL MAMMOGRAM OF RIGHT BREAST: ICD-10-CM

## 2025-05-30 PROCEDURE — G0279 TOMOSYNTHESIS, MAMMO: HCPCS

## 2025-05-30 PROCEDURE — 77065 DX MAMMO INCL CAD UNI: CPT | Mod: RT

## 2025-06-17 ENCOUNTER — OFFICE VISIT (OUTPATIENT)
Dept: FAMILY MEDICINE | Facility: CLINIC | Age: 65
End: 2025-06-17
Payer: COMMERCIAL

## 2025-06-17 ENCOUNTER — RESULTS FOLLOW-UP (OUTPATIENT)
Dept: OBGYN | Facility: CLINIC | Age: 65
End: 2025-06-17

## 2025-06-17 VITALS
RESPIRATION RATE: 16 BRPM | TEMPERATURE: 97.5 F | BODY MASS INDEX: 21.35 KG/M2 | HEART RATE: 95 BPM | DIASTOLIC BLOOD PRESSURE: 59 MMHG | OXYGEN SATURATION: 99 % | SYSTOLIC BLOOD PRESSURE: 91 MMHG | HEIGHT: 67 IN | WEIGHT: 136 LBS

## 2025-06-17 DIAGNOSIS — Z12.4 CERVICAL CANCER SCREENING: ICD-10-CM

## 2025-06-17 DIAGNOSIS — G89.29 CHRONIC UPPER BACK PAIN: ICD-10-CM

## 2025-06-17 DIAGNOSIS — G89.29 CHRONIC CHEST WALL PAIN: ICD-10-CM

## 2025-06-17 DIAGNOSIS — Z13.220 LIPID SCREENING: ICD-10-CM

## 2025-06-17 DIAGNOSIS — Z13.1 SCREENING FOR DIABETES MELLITUS: ICD-10-CM

## 2025-06-17 DIAGNOSIS — Z00.00 ROUTINE GENERAL MEDICAL EXAMINATION AT A HEALTH CARE FACILITY: Primary | ICD-10-CM

## 2025-06-17 DIAGNOSIS — R07.89 CHRONIC CHEST WALL PAIN: ICD-10-CM

## 2025-06-17 DIAGNOSIS — Z23 ENCOUNTER FOR VACCINATION: ICD-10-CM

## 2025-06-17 DIAGNOSIS — M54.9 CHRONIC UPPER BACK PAIN: ICD-10-CM

## 2025-06-17 LAB
CHOLEST SERPL-MCNC: 204 MG/DL
EST. AVERAGE GLUCOSE BLD GHB EST-MCNC: 108 MG/DL
FASTING STATUS PATIENT QL REPORTED: NO
HBA1C MFR BLD: 5.4 % (ref 0–5.6)
HDLC SERPL-MCNC: 60 MG/DL
LDLC SERPL CALC-MCNC: 127 MG/DL
NONHDLC SERPL-MCNC: 144 MG/DL
TRIGL SERPL-MCNC: 85 MG/DL

## 2025-06-17 PROCEDURE — 3078F DIAST BP <80 MM HG: CPT | Performed by: FAMILY MEDICINE

## 2025-06-17 PROCEDURE — 1126F AMNT PAIN NOTED NONE PRSNT: CPT | Performed by: FAMILY MEDICINE

## 2025-06-17 PROCEDURE — 87624 HPV HI-RISK TYP POOLED RSLT: CPT | Performed by: FAMILY MEDICINE

## 2025-06-17 PROCEDURE — 36415 COLL VENOUS BLD VENIPUNCTURE: CPT | Performed by: FAMILY MEDICINE

## 2025-06-17 PROCEDURE — 90480 ADMN SARSCOV2 VAC 1/ONLY CMP: CPT | Performed by: FAMILY MEDICINE

## 2025-06-17 PROCEDURE — 91320 SARSCV2 VAC 30MCG TRS-SUC IM: CPT | Performed by: FAMILY MEDICINE

## 2025-06-17 PROCEDURE — 99397 PER PM REEVAL EST PAT 65+ YR: CPT | Mod: 25 | Performed by: FAMILY MEDICINE

## 2025-06-17 PROCEDURE — 83036 HEMOGLOBIN GLYCOSYLATED A1C: CPT | Performed by: FAMILY MEDICINE

## 2025-06-17 PROCEDURE — 3074F SYST BP LT 130 MM HG: CPT | Performed by: FAMILY MEDICINE

## 2025-06-17 PROCEDURE — 90471 IMMUNIZATION ADMIN: CPT | Performed by: FAMILY MEDICINE

## 2025-06-17 PROCEDURE — 80061 LIPID PANEL: CPT | Performed by: FAMILY MEDICINE

## 2025-06-17 PROCEDURE — 90677 PCV20 VACCINE IM: CPT | Performed by: FAMILY MEDICINE

## 2025-06-17 PROCEDURE — 99213 OFFICE O/P EST LOW 20 MIN: CPT | Mod: 25 | Performed by: FAMILY MEDICINE

## 2025-06-17 SDOH — HEALTH STABILITY: PHYSICAL HEALTH: ON AVERAGE, HOW MANY DAYS PER WEEK DO YOU ENGAGE IN MODERATE TO STRENUOUS EXERCISE (LIKE A BRISK WALK)?: 3 DAYS

## 2025-06-17 ASSESSMENT — PATIENT HEALTH QUESTIONNAIRE - PHQ9
SUM OF ALL RESPONSES TO PHQ QUESTIONS 1-9: 1
SUM OF ALL RESPONSES TO PHQ QUESTIONS 1-9: 1
10. IF YOU CHECKED OFF ANY PROBLEMS, HOW DIFFICULT HAVE THESE PROBLEMS MADE IT FOR YOU TO DO YOUR WORK, TAKE CARE OF THINGS AT HOME, OR GET ALONG WITH OTHER PEOPLE: NOT DIFFICULT AT ALL

## 2025-06-17 ASSESSMENT — SOCIAL DETERMINANTS OF HEALTH (SDOH): HOW OFTEN DO YOU GET TOGETHER WITH FRIENDS OR RELATIVES?: ONCE A WEEK

## 2025-06-17 ASSESSMENT — PAIN SCALES - GENERAL: PAINLEVEL_OUTOF10: NO PAIN (0)

## 2025-06-17 NOTE — PROGRESS NOTES
Preventive Care Visit  [facility]  Nilay Bartholomew , Family Medicine  Jun 17, 2025      Assessment & Plan     Routine general medical examination at a health care facility  -Pt will obtain tetanus booster at pharmacy for insurance coverage  -Has biometric screening form, will complete when labs return    Cervical cancer screening  - HPV and Gynecologic Cytology Panel - Recommended Age 30-65 Years    Encounter for vaccination  - COVID-19 12+ (PFIZER)  - Pneumococcal 20 Valent Conjugate (PCV20)    Screening for diabetes mellitus  - Hemoglobin A1c    Lipid screening  - Lipid panel reflex to direct LDL Non-fasting      Chronic upper back pain  Chronic chest wall pain  -Symptoms since 2022. Recent mammogram normal.  -Discussed PT for MSK pain. Recommended using backpack rather than sling bag to carry work equipment.  - Physical Therapy  Referral              Counseling  Appropriate preventive services were addressed with this patient via screening, questionnaire, or discussion as appropriate for fall prevention, nutrition, physical activity, Tobacco-use cessation, social engagement, weight loss and cognition.  Checklist reviewing preventive services available has been given to the patient.  Reviewed patient's diet, addressing concerns and/or questions.   She is at risk for lack of exercise and has been provided with information to increase physical activity for the benefit of her well-being.   Discussed possible causes of fatigue. The patient was provided with written information regarding signs of hearing loss.   Information on urinary incontinence and treatment options given to patient.           Shonna Loco is a 65 year old, presenting for the following:  Physical (Physical )        6/17/2025     8:34 AM   Additional Questions   Roomed by Teresa Gillis          HPI    Other concerns:    Twinges in upper chest. Over summer lifted heavy suit case. Brief twinges underneath breast,  since 2002.  Tight upper back and shoulders.  Lifts laptop and ipad for work. Uses sling bad.             Advance Care Planning    Discussed advance care planning with patient; however, patient declined at this time.        6/17/2025   General Health   How would you rate your overall physical health? Good   Feel stress (tense, anxious, or unable to sleep) Only a little   (!) STRESS CONCERN      6/17/2025   Nutrition   Diet: Other   If other, please elaborate: allergy         6/17/2025   Exercise   Days per week of moderate/strenous exercise 3 days         6/17/2025   Social Factors   Frequency of gathering with friends or relatives Once a week   Worry food won't last until get money to buy more No   Food not last or not have enough money for food? No   Do you have housing? (Housing is defined as stable permanent housing and does not include staying outside in a car, in a tent, in an abandoned building, in an overnight shelter, or couch-surfing.) Yes   Are you worried about losing your housing? No   Lack of transportation? No   Unable to get utilities (heat,electricity)? No         6/17/2025   Fall Risk   Fallen 2 or more times in the past year? No   Trouble with walking or balance? No          6/17/2025   Activities of Daily Living- Home Safety   Needs help with the following daily activites None of the above   Safety concerns in the home None of the above         6/17/2025   Dental   Dentist two times every year? Yes         6/17/2025   Hearing Screening   Hearing concerns? (!) I NEED TO ASK PEOPLE TO SPEAK UP OR REPEAT THEMSELVES.    (!) IT'S HARD TO FOLLOW A CONVERSATION IN A NOISY RESTAURANT OR CROWDED ROOM.    (!) TROUBLE UNDERSTANDING SOFT OR WHISPERED SPEECH.       Multiple values from one day are sorted in reverse-chronological order         6/17/2025   Driving Risk Screening   Patient/family members have concerns about driving No         6/17/2025   General Alertness/Fatigue Screening   Have you been more  tired than usual lately? (!) YES         6/17/2025   Urinary Incontinence Screening   Bothered by leaking urine in past 6 months Yes       Today's PHQ-9 Score:       6/17/2025     8:27 AM   PHQ-9 SCORE   PHQ-9 Total Score MyChart 1 (Minimal depression)   PHQ-9 Total Score 1        Patient-reported         6/17/2025   Substance Use   Alcohol more than 3/day or more than 7/wk No   Do you have a current opioid prescription? No   How severe/bad is pain from 1 to 10? 1/10   Do you use any other substances recreationally? No     Social History     Tobacco Use    Smoking status: Never    Smokeless tobacco: Never   Vaping Use    Vaping status: Never Used   Substance Use Topics    Alcohol use: Yes     Comment: 1 8oz mixed drink or beer, minimal 1-2 per week    Drug use: No           5/27/2025   LAST FHS-7 RESULTS   1st degree relative breast or ovarian cancer No   Any relative bilateral breast cancer No   Any male have breast cancer No   Any ONE woman have BOTH breast AND ovarian cancer No   Any woman with breast cancer before 50yrs No   2 or more relatives with breast AND/OR ovarian cancer No   2 or more relatives with breast AND/OR bowel cancer No              History of abnormal Pap smear: No - age 30- 64 PAP with HPV every 5 years recommended        Latest Ref Rng & Units 7/22/2024     4:17 PM 2/24/2023    10:32 AM 11/23/2021     5:30 PM   PAP / HPV   PAP  Negative for Intraepithelial Lesion or Malignancy (NILM)  Negative for Intraepithelial Lesion or Malignancy (NILM)  Negative for Intraepithelial Lesion or Malignancy (NILM)    HPV 16 DNA Negative Negative  Negative  Negative    HPV 18 DNA Negative Negative  Negative  Negative    Other HR HPV Negative Negative  Positive  Positive      ASCVD Risk   The 10-year ASCVD risk score (Martina HEATON, et al., 2019) is: 3%    Values used to calculate the score:      Age: 65 years      Sex: Female      Is Non- : No      Diabetic: No      Tobacco smoker:  "No      Systolic Blood Pressure: 91 mmHg      Is BP treated: No      HDL Cholesterol: 63 mg/dL      Total Cholesterol: 195 mg/dL            Reviewed and updated as needed this visit by Provider                      Current providers sharing in care for this patient include:  Patient Care Team:  Joaquin Rowe MD as PCP - General (Family Practice)  Macey Hughes MD as MD (Dermatology)  John Antunez MD as MD (Dermapathology)  Rhett Perez MD as MD (Family Practice)  Joaquin Rowe MD as Assigned PCP  Kellee Hawkins MD as MD (OB/Gyn)  Biju Looney MD as MD (Dermatology)  Shwetha Ibrahim MD as Assigned OBGYN Provider  Chloé Castro MD as Resident (Dermatology)  Biju Looney MD as Assigned Dermatology Provider    The following health maintenance items are reviewed in Epic and correct as of today:  Health Maintenance   Topic Date Due    URINE DRUG SCREEN  Never done    ANNUAL REVIEW OF HM ORDERS  Never done    PNEUMOCOCCAL VACCINE 50+ YEARS (1 of 1 - PCV) Never done    COVID-19 VACCINE (5 - 2024-25 season) 09/01/2024    DTAP/TDAP/TD VACCINE (2 - Td or Tdap) 10/23/2024    MEDICARE ANNUAL WELLNESS VISIT  03/26/2025    PAP FOLLOW-UP  07/22/2025    HPV FOLLOW-UP  07/22/2025    PHQ-9  12/17/2025    FALL RISK ASSESSMENT  06/17/2026    DIABETES SCREENING  03/26/2027    MAMMO SCREENING  05/30/2027    LIPID  03/26/2029    ADVANCE CARE PLANNING  03/26/2029    COLORECTAL CANCER SCREENING  10/23/2030    RSV VACCINE (1 - 1-dose 75+ series) 04/07/2034    DEXA  05/14/2039    HEPATITIS C SCREENING  Completed    DEPRESSION ACTION PLAN  Completed    INFLUENZA VACCINE  Completed    ZOSTER VACCINE  Completed    HPV VACCINE  Aged Out    MENINGITIS VACCINE  Aged Out    PAP  Discontinued            Objective    Exam  BP 91/59 (BP Location: Right arm, Patient Position: Sitting, Cuff Size: Adult Regular)   Pulse 95   Temp 97.5  F (36.4  C) (Temporal)   Resp 16   Ht 1.702 m (5' 7\")   Wt 61.7 " "kg (136 lb)   LMP 04/06/2010 (Exact Date)   SpO2 99%   BMI 21.30 kg/m     Estimated body mass index is 21.3 kg/m  as calculated from the following:    Height as of this encounter: 1.702 m (5' 7\").    Weight as of this encounter: 61.7 kg (136 lb).    Physical Exam  GENERAL: alert and no distress  EYES: Eyes grossly normal to inspection, PERRL and conjunctivae and sclerae normal  HENT: nose and mouth without ulcers or lesions  NECK: no adenopathy, no asymmetry, masses, or scars  RESP: lungs clear to auscultation - no rales, rhonchi or wheezes  CV: regular rate and rhythm, normal S1 S2, no S3 or S4, no murmur, click or rub, no peripheral edema  ABDOMEN: soft, nontender, no hepatosplenomegaly, no masses and bowel sounds normal   (female) : normal female external genitalia, normal urethral meatus, atrophy of vaginal mucosa, and normal cervix  MS: no gross musculoskeletal defects noted, no edema  SKIN: no suspicious lesions or rashes  NEURO: Normal strength and tone, mentation intact and speech normal  PSYCH: mentation appears normal, affect normal/bright        6/17/2025   Mini Cog   Clock Draw Score 2 Normal   3 Item Recall 3 objects recalled   Mini Cog Total Score 5             Signed Electronically by: Nilay Bartholomew DO    "

## 2025-06-17 NOTE — NURSING NOTE
Prior to immunization administration, verified patients identity using patient s name and date of birth. Please see Immunization Activity for additional information.     Screening Questionnaire for Adult Immunization    Are you sick today?   No   Do you have allergies to medications, food, a vaccine component or latex?   Yes   Have you ever had a serious reaction after receiving a vaccination?   No   Do you have a long-term health problem with heart, lung, kidney, or metabolic disease (e.g., diabetes), asthma, a blood disorder, no spleen, complement component deficiency, a cochlear implant, or a spinal fluid leak?  Are you on long-term aspirin therapy?   No   Do you have cancer, leukemia, HIV/AIDS, or any other immune system problem?   No   Do you have a parent, brother, or sister with an immune system problem?   No   In the past 3 months, have you taken medications that affect  your immune system, such as prednisone, other steroids, or anticancer drugs; drugs for the treatment of rheumatoid arthritis, Crohn s disease, or psoriasis; or have you had radiation treatments?   No   Have you had a seizure, or a brain or other nervous system problem?   No   During the past year, have you received a transfusion of blood or blood    products, or been given immune (gamma) globulin or antiviral drug?   No   For women: Are you pregnant or is there a chance you could become       pregnant during the next month?   No   Have you received any vaccinations in the past 4 weeks?   No     Immunization questionnaire was positive for at least one answer.  Sherrill kruger.      Patient instructed to remain in clinic for 15 minutes afterwards, and to report any adverse reactions.     Screening performed by Carissa Miranda MA on 6/17/2025 at 9:26 AM.

## 2025-06-17 NOTE — PATIENT INSTRUCTIONS
Patient Education   Preventive Care Advice   This is general advice given by our system to help you stay healthy. However, your care team may have specific advice just for you. Please talk to your care team about your preventive care needs.  Nutrition  Eat 5 or more servings of fruits and vegetables each day.  Try wheat bread, brown rice and whole grain pasta (instead of white bread, rice, and pasta).  Get enough calcium and vitamin D. Check the label on foods and aim for 100% of the RDA (recommended daily allowance).  Lifestyle  Exercise at least 150 minutes each week  (30 minutes a day, 5 days a week).  Do muscle strengthening activities 2 days a week. These help control your weight and prevent disease.  No smoking.  Wear sunscreen to prevent skin cancer.  Have a dental exam and cleaning every 6 months.  Yearly exams  See your health care team every year to talk about:  Any changes in your health.  Any medicines your care team has prescribed.  Preventive care, family planning, and ways to prevent chronic diseases.  Shots (vaccines)   HPV shots (up to age 26), if you've never had them before.  Hepatitis B shots (up to age 59), if you've never had them before.  COVID-19 shot: Get this shot when it's due.  Flu shot: Get a flu shot every year.  Tetanus shot: Get a tetanus shot every 10 years.  Pneumococcal, hepatitis A, and RSV shots: Ask your care team if you need these based on your risk.  Shingles shot (for age 50 and up)  General health tests  Diabetes screening:  Starting at age 35, Get screened for diabetes at least every 3 years.  If you are younger than age 35, ask your care team if you should be screened for diabetes.  Cholesterol test: At age 39, start having a cholesterol test every 5 years, or more often if advised.  Bone density scan (DEXA): At age 50, ask your care team if you should have this scan for osteoporosis (brittle bones).  Hepatitis C: Get tested at least once in your life.  STIs (sexually  transmitted infections)  Before age 24: Ask your care team if you should be screened for STIs.  After age 24: Get screened for STIs if you're at risk. You are at risk for STIs (including HIV) if:  You are sexually active with more than one person.  You don't use condoms every time.  You or a partner was diagnosed with a sexually transmitted infection.  If you are at risk for HIV, ask about PrEP medicine to prevent HIV.  Get tested for HIV at least once in your life, whether you are at risk for HIV or not.  Cancer screening tests  Cervical cancer screening: If you have a cervix, begin getting regular cervical cancer screening tests starting at age 21.  Breast cancer scan (mammogram): If you've ever had breasts, begin having regular mammograms starting at age 40. This is a scan to check for breast cancer.  Colon cancer screening: It is important to start screening for colon cancer at age 45.  Have a colonoscopy test every 10 years (or more often if you're at risk) Or, ask your provider about stool tests like a FIT test every year or Cologuard test every 3 years.  To learn more about your testing options, visit:   .  For help making a decision, visit:   https://bit.ly/kf75807.  Prostate cancer screening test: If you have a prostate, ask your care team if a prostate cancer screening test (PSA) at age 55 is right for you.  Lung cancer screening: If you are a current or former smoker ages 50 to 80, ask your care team if ongoing lung cancer screenings are right for you.  For informational purposes only. Not to replace the advice of your health care provider. Copyright   2023 [facility] [employer]. All rights reserved. Clinically reviewed by the [facility] Transitions Program. Trigence [id number] - REV 01/24.  Hearing Loss: Care Instructions  Overview     Hearing loss is a sudden or slow decrease in how well you hear. It can range from slight to profound. Permanent hearing loss can occur with aging. It also can  happen when you are exposed long-term to loud noise. Examples include listening to loud music, riding motorcycles, or being around other loud machines.  Hearing loss can affect your work and home life. It can make you feel lonely or depressed. You may feel that you have lost your independence. But hearing aids and other devices can help you hear better and feel connected to others.  Follow-up care is a key part of your treatment and safety. Be sure to make and go to all appointments, and call your doctor if you are having problems. It's also a good idea to know your test results and keep a list of the medicines you take.  How can you care for yourself at home?  Avoid loud noises whenever possible. This helps keep your hearing from getting worse.  Always wear hearing protection around loud noises.  Wear a hearing aid as directed.  A professional can help you pick a hearing aid that will work best for you.  You can also get hearing aids over the counter for mild to moderate hearing loss.  Have hearing tests as your doctor suggests. They can show whether your hearing has changed. Your hearing aid may need to be adjusted.  Use other devices as needed. These may include:  Telephone amplifiers and hearing aids that can connect to a television, stereo, radio, or microphone.  Devices that use lights or vibrations. These alert you to the doorbell, a ringing telephone, or a baby monitor.  Television closed-captioning. This shows the words at the bottom of the screen. Most new TVs can do this.  TTY (text telephone). This lets you type messages back and forth on the telephone instead of talking or listening. These devices are also called TDD. When messages are typed on the keyboard, they are sent over the phone line to a receiving TTY. The message is shown on a monitor.  Use text messaging, social media, and email if it is hard for you to communicate by telephone.  Try to learn a listening technique called speechreading. It is  "not lipreading. You pay attention to people's gestures, expressions, posture, and tone of voice. These clues can help you understand what a person is saying. Face the person you are talking to, and have them face you. Make sure the lighting is good. You need to see the other person's face clearly.  Think about counseling if you need help to adjust to your hearing loss.  When should you call for help?  Watch closely for changes in your health, and be sure to contact your doctor if:    You think your hearing is getting worse.     You have new symptoms, such as dizziness or nausea.   Where can you learn more?  Go to https://www.[employer].net/patiented  Enter R798 in the search box to learn more about \"Hearing Loss: Care Instructions.\"  Current as of: October 27, 2024  Content Version: 14.5    2409-0923 [employer].   Care instructions adapted under license by your healthcare professional. If you have questions about a medical condition or this instruction, always ask your healthcare professional. [employer] disclaims any warranty or liability for your use of this information.    Learning About Sleeping Well  What does sleeping well mean?     Sleeping well means getting enough sleep to feel good and stay healthy. How much sleep is enough varies among people.  The number of hours you sleep and how you feel when you wake up are both important. If you do not feel refreshed, you probably need more sleep. Another sign of not getting enough sleep is feeling tired during the day.  Experts recommend that adults get at least 7 or more hours of sleep per day. Children and older adults need more sleep.  Why is getting enough sleep important?  Getting enough quality sleep is a basic part of good health. When your sleep suffers, your physical health, mood, and your thoughts can suffer too. You may find yourself feeling more grumpy or stressed. Not getting enough sleep also can lead to serious problems, " "including injury, accidents, anxiety, and depression.  What might cause poor sleeping?  Many things can cause sleep problems, including:  Changes to your sleep schedule.  Stress. Stress can be caused by fear about a single event, such as giving a speech. Or you may have ongoing stress, such as worry about work or school.  Depression, anxiety, and other mental or emotional conditions.  Changes in your sleep habits or surroundings. This includes changes that happen where you sleep, such as noise, light, or sleeping in a different bed. It also includes changes in your sleep pattern, such as having jet lag or working a late shift.  Health problems, such as pain, breathing problems, and restless legs syndrome.  Lack of regular exercise.  Using alcohol, nicotine, or caffeine before bed.  How can you help yourself?  Here are some tips that may help you sleep more soundly and wake up feeling more refreshed.  Your sleeping area   Use your bedroom only for sleeping and sex. A bit of light reading may help you fall asleep. But if it doesn't, do your reading elsewhere in the house. Try not to use your TV, computer, smartphone, or tablet while you are in bed.  Be sure your bed is big enough to stretch out comfortably, especially if you have a sleep partner.  Keep your bedroom quiet, dark, and cool. Use curtains, blinds, or a sleep mask to block out light. To block out noise, use earplugs, soothing music, or a \"white noise\" machine.  Your evening and bedtime routine   Create a relaxing bedtime routine. You might want to take a warm shower or bath, or listen to soothing music.  Go to bed at the same time every night. And get up at the same time every morning, even if you feel tired.  What to avoid   Limit caffeine (coffee, tea, caffeinated sodas) during the day, and don't have any for at least 6 hours before bedtime.  Avoid drinking alcohol before bedtime. Alcohol can cause you to wake up more often during the night.  Try not to " "smoke or use tobacco, especially in the evening. Nicotine can keep you awake.  Limit naps during the day, especially close to bedtime.  Avoid lying in bed awake for too long. If you can't fall asleep or if you wake up in the middle of the night and can't get back to sleep within about 20 minutes, get out of bed and go to another room until you feel sleepy.  Avoid taking medicine right before bed that may keep you awake or make you feel hyper or energized. Your doctor can tell you if your medicine may do this and if you can take it earlier in the day.  If you can't sleep   Imagine yourself in a peaceful, pleasant scene. Focus on the details and feelings of being in a place that is relaxing.  Get up and do a quiet or boring activity until you feel sleepy.  Avoid drinking any liquids before going to bed to help prevent waking up often to use the bathroom.  Where can you learn more?  Go to https://www.[employer].net/patiented  Enter J942 in the search box to learn more about \"Learning About Sleeping Well.\"  Current as of: July 31, 2024  Content Version: 14.5    1890-0534 [employer].   Care instructions adapted under license by your healthcare professional. If you have questions about a medical condition or this instruction, always ask your healthcare professional. [employer] disclaims any warranty or liability for your use of this information.    Bladder Training: Care Instructions  Your Care Instructions     Bladder training is used to treat urge incontinence and stress incontinence. Urge incontinence means that the need to urinate comes on so fast that you can't get to a toilet in time. Stress incontinence means that you leak urine because of pressure on your bladder. For example, it may happen when you laugh, cough, or lift something heavy.  Bladder training can increase how long you can wait before you have to urinate. It can also help your bladder hold more urine. And it can give you better " control over the urge to urinate.  It is important to remember that bladder training takes a few weeks to a few months to make a difference. You may not see results right away, but don't give up.  Follow-up care is a key part of your treatment and safety. Be sure to make and go to all appointments, and call your doctor if you are having problems. It's also a good idea to know your test results and keep a list of the medicines you take.  How can you care for yourself at home?  Work with your doctor to come up with a bladder training program that is right for you. You may use one or more of the following methods.  Delayed urination  In the beginning, try to keep from urinating for 5 minutes after you first feel the need to go.  While you wait, take deep, slow breaths to relax. Kegel exercises can also help you delay the need to go to the bathroom.  After some practice, when you can easily wait 5 minutes to urinate, try to wait 10 minutes before you urinate.  Slowly increase the waiting period until you are able to control when you have to urinate.  Scheduled urination  Empty your bladder when you first wake up in the morning.  Schedule times throughout the day when you will urinate.  Start by going to the bathroom every hour, even if you don't need to go.  Slowly increase the time between trips to the bathroom.  When you have found a schedule that works well for you, keep doing it.  If you wake up during the night and have to urinate, do it. Apply your schedule to waking hours only.  Kegel exercises  These tighten and strengthen pelvic muscles, which can help you control the flow of urine. (If doing these exercises causes pain, stop doing them and talk with your doctor.) To do Kegel exercises:  Squeeze your muscles as if you were trying not to pass gas. Or squeeze your muscles as if you were stopping the flow of urine. Your belly, legs, and buttocks shouldn't move.  Hold the squeeze for 3 seconds, then relax for 5 to  "10 seconds.  Start with 3 seconds, then add 1 second each week until you are able to squeeze for 10 seconds.  Repeat the exercise 10 times a session. Do 3 to 8 sessions a day.  When should you call for help?  Watch closely for changes in your health, and be sure to contact your doctor if:    Your incontinence is getting worse.     You do not get better as expected.   Where can you learn more?  Go to https://www.[employer]les.net/patiented  Enter V684 in the search box to learn more about \"Bladder Training: Care Instructions.\"  Current as of: April 30, 2024  Content Version: 14.5    3829-1195 [employer].   Care instructions adapted under license by your healthcare professional. If you have questions about a medical condition or this instruction, always ask your healthcare professional. [employer] disclaims any warranty or liability for your use of this information.       "

## 2025-06-18 ENCOUNTER — RESULTS FOLLOW-UP (OUTPATIENT)
Dept: FAMILY MEDICINE | Facility: CLINIC | Age: 65
End: 2025-06-18

## 2025-06-24 ENCOUNTER — PATIENT OUTREACH (OUTPATIENT)
Dept: FAMILY MEDICINE | Facility: CLINIC | Age: 65
End: 2025-06-24
Payer: COMMERCIAL

## 2025-06-24 DIAGNOSIS — N87.1 MODERATE DYSPLASIA OF CERVIX (CIN II): Primary | ICD-10-CM

## 2025-06-30 ENCOUNTER — THERAPY VISIT (OUTPATIENT)
Dept: PHYSICAL THERAPY | Facility: CLINIC | Age: 65
End: 2025-06-30
Attending: FAMILY MEDICINE
Payer: COMMERCIAL

## 2025-06-30 DIAGNOSIS — M54.9 CHRONIC UPPER BACK PAIN: ICD-10-CM

## 2025-06-30 DIAGNOSIS — G89.29 CHRONIC BILATERAL THORACIC BACK PAIN: Primary | ICD-10-CM

## 2025-06-30 DIAGNOSIS — R07.89 CHRONIC CHEST WALL PAIN: ICD-10-CM

## 2025-06-30 DIAGNOSIS — G89.29 CHRONIC UPPER BACK PAIN: ICD-10-CM

## 2025-06-30 DIAGNOSIS — G89.29 CHRONIC CHEST WALL PAIN: ICD-10-CM

## 2025-06-30 DIAGNOSIS — M54.6 CHRONIC BILATERAL THORACIC BACK PAIN: Primary | ICD-10-CM

## 2025-06-30 PROCEDURE — 97161 PT EVAL LOW COMPLEX 20 MIN: CPT | Mod: GP | Performed by: PHYSICAL THERAPIST

## 2025-06-30 PROCEDURE — 97530 THERAPEUTIC ACTIVITIES: CPT | Mod: GP | Performed by: PHYSICAL THERAPIST

## 2025-06-30 PROCEDURE — 97110 THERAPEUTIC EXERCISES: CPT | Mod: GP | Performed by: PHYSICAL THERAPIST

## 2025-07-03 PROBLEM — M54.6 CHRONIC BILATERAL THORACIC BACK PAIN: Status: ACTIVE | Noted: 2025-07-03

## 2025-07-03 PROBLEM — G89.29 CHRONIC BILATERAL THORACIC BACK PAIN: Status: ACTIVE | Noted: 2025-07-03

## 2025-07-03 NOTE — PROGRESS NOTES
PHYSICAL THERAPY EVALUATION  Type of Visit: Evaluation       Fall Risk Screen:  Have you fallen 2 or more times in the past year?: No  Have you fallen and had an injury in the past year?: No  Timed Up and Go score (seconds): 8    Subjective         Presenting condition or subjective complaint: under arm and chest wall pain. Patient reports very intermittent sporadic pains that shoot into her armpit area/chest wall a few x week without warning or trigger. They only last a minute or so then go away. This has been going on for 20 years but she has never pursued it since it is so infrequent and does not limit her function. She also c/o tightness in her pectorals.   Date of onset: 06/17/25 (referral date)    Relevant medical history: Arthritis; Migraines or headaches; Osteoarthritis; Osteoporosis; Severe headaches   Past Medical History:   Diagnosis Date    Anxiety     Arthritis 2010    Cervical high risk HPV (human papillomavirus) test positive 03/04/2016 03/04/2016, 09/12/19, 11/23/21    Chronic headache     BEVERLY II (cervical intraepithelial neoplasia II) 2009    LEEP    Depression     Depressive disorder Adolescence    Managed with medication and therapy    Dry eye syndrome     Frequent UTI 2009    Headache(784.0)     IBS (irritable bowel syndrome)     Insomnia     Lattice degeneration of retina     Lyme disease     Myopic astigmatism     Nonsenile cataract     Osteopenia 2001,2012    Fosamax    Pneumothorax on right     at 15 y/o    PVD (posterior vitreous detachment), both eyes 07/19/2010    Rosacea     Spider veins 2017    Vasomotor rhinitis        Dates & types of surgery: spontaneous pneumothorax appendix   Past Surgical History:   Procedure Laterality Date    BIOPSY  Moles skin tags    CHEST TUBE INSERTION      COLONOSCOPY  Normal    DILATION AND CURETTAGE, OPERATIVE HYSTEROSCOPY WITH MORCELLATOR, COMBINED  1/2011    endometrial polyp    LAPAROSCOPIC APPENDECTOMY N/A 2/4/2021    Procedure: APPENDECTOMY,  LAPAROSCOPIC and laparoscopic liver biopsy;  Surgeon: Solis Zepeda MD;  Location: [facility] OR    LEEP TX, CERVICAL  2009    BEVERLY II, clear margins    THORACIC SURGERY  1976    Collapsed lung         Prior diagnostic imaging/testing results:       Prior therapy history for the same diagnosis, illness or injury: No        Living Environment  Social support: Alone   Type of home: House; 2-story; Basement   Stairs to enter the home: Yes 5 Is there a railing: Yes     Ramp: No   Stairs inside the home: Yes 15 Is there a railing: Yes     Help at home: Home and Yard maintenance tasks  Equipment owned:       Employment: Yes mental health crisis responder psychologist  Hobbies/Interests: walking art film cooking baking trips    Patient goals for therapy: more flexibility and less pain    Pain assessment: Location: B pectoral/armpit area/Ratin/10 when present     Objective   SHOULDER EVALUATION  PAIN: Pain Level with Use: 5/10  Pain is Exacerbated By: nothing triggers the pains. They come on sporadically without warning.  POSTURE: Sitting Posture: Rounded shoulders  ROM: AROM R sh flex 130; abd 115. L sh flex 140; abd 135. Cervical movements WNL  STRENGTH: B shoulder 5/5  FLEXIBILITY: tightness in B pectorals  PALPATION: tender in B pectorals  JOINT MOBILITY: pain with rib spring B 6th-8th ribs   CERVICAL SCREEN: WNL    Assessment & Plan   CLINICAL IMPRESSIONS  Medical Diagnosis: thoracic pain    Treatment Diagnosis: thoracic pain   Impression/Assessment: Patient is a 65 year old female with B shoulder tightness complaints.  The following significant findings have been identified: Pain, Decreased ROM/flexibility, Impaired muscle performance, and Decreased activity tolerance. These impairments interfere with their ability to perform self care tasks, recreational activities, and household chores as compared to previous level of function.     Clinical Decision Making (Complexity):  Clinical Presentation:  Stable/Uncomplicated  Clinical Presentation Rationale: based on medical and personal factors listed in PT evaluation  Clinical Decision Making (Complexity): Low complexity    PLAN OF CARE  Treatment Interventions:  Interventions: Neuromuscular Re-education, Therapeutic Activity, Therapeutic Exercise, Self-Care/Home Management    Long Term Goals     PT Goal 1  Goal Identifier: reaching  Goal Description: patient will be able to reach fully overhead without pain  Rationale: to maximize safety and independence with performance of ADLs and functional tasks  Target Date: 08/28/25      Frequency of Treatment: 1 x month  Duration of Treatment: 2 months    Education Assessment:   Learner/Method: Patient;Listening;Demonstration    Risks and benefits of evaluation/treatment have been explained.   Patient/Family/caregiver agrees with Plan of Care.     Evaluation Time:     PT Eval, Low Complexity Minutes (22472): 15       Signing Clinician: Mary Ann Walton PT

## 2025-07-29 ENCOUNTER — TRANSFERRED RECORDS (OUTPATIENT)
Dept: HEALTH INFORMATION MANAGEMENT | Facility: CLINIC | Age: 65
End: 2025-07-29
Payer: COMMERCIAL

## (undated) DEVICE — ESU LIGASURE MARYLAND VESSEL LAP 44CM XLONG LF1944

## (undated) DEVICE — STPL RELOAD REG TISSUE ECHELON 45 X 3.6MM BLUE GST45B

## (undated) DEVICE — GLOVE PROTEXIS BLUE W/NEU-THERA 8.0  2D73EB80

## (undated) DEVICE — SUCTION MANIFOLD NEPTUNE 2 SYS 4 PORT 0702-020-000

## (undated) DEVICE — NDL INSUFFLATION 13GA 120MM C2201

## (undated) DEVICE — DRSG PRIMAPORE 02X3" 7133

## (undated) DEVICE — LINEN TOWEL PACK X6 WHITE 5487

## (undated) DEVICE — ESU GROUND PAD ADULT W/CORD E7507

## (undated) DEVICE — LINEN TOWEL PACK X5 5464

## (undated) DEVICE — CATH TRAY FOLEY SURESTEP 16FR W/URNE MTR STLK LATEX A303316A

## (undated) DEVICE — ESU ENDO SCISSORS 5MM CVD 5DCS

## (undated) DEVICE — WIPES FOLEY CARE SURESTEP PROVON DFC100

## (undated) DEVICE — Device

## (undated) DEVICE — ENDO TROCAR FIRST ENTRY KII FIOS Z-THRD 12X100MM CTF73

## (undated) DEVICE — SU VICRYL 4-0 PS-2 18" UND J496H

## (undated) DEVICE — DRSG STERI STRIP 1/2X4" R1547

## (undated) DEVICE — COVER LIGHT HANDLE  HILL-ROM C LT-C02

## (undated) DEVICE — ENDO POUCH UNIV RETRIEVAL SYSTEM INZII 10MM CD001

## (undated) DEVICE — ANTIFOG SOLUTION W/FOAM PAD 31142527

## (undated) DEVICE — ENDO TROCAR BLUNT TIP KII BALLOON 12X100MM C0R47

## (undated) DEVICE — SOL ADH LIQUID BENZOIN SWAB 0.6ML C1544

## (undated) DEVICE — SU VICRYL 0 UR-6 27" J603H

## (undated) DEVICE — GLOVE PROTEXIS POWDER FREE SMT 7.5  2D72PT75X

## (undated) DEVICE — PREP CHLORAPREP 26ML TINTED ORANGE  260815

## (undated) DEVICE — STPL ENDO ARTICULATING 45MM EC45A

## (undated) DEVICE — LIGHT HANDLE X1 31140133

## (undated) DEVICE — ENDO TROCAR SLEEVE KII Z-THREADED 05X100MM CTS02

## (undated) DEVICE — ENDO TROCAR FIRST ENTRY KII FIOS Z-THRD 05X100MM CTF03

## (undated) DEVICE — ESU LIGASURE MARYLAND LAPAROSCOPIC SLR/DVDR 5MMX37CM LF1937

## (undated) DEVICE — SOL WATER IRRIG 1000ML BOTTLE 2F7114

## (undated) RX ORDER — CEFAZOLIN SODIUM 2 G/100ML
INJECTION, SOLUTION INTRAVENOUS
Status: DISPENSED
Start: 2021-02-04

## (undated) RX ORDER — ACETAMINOPHEN 325 MG/1
TABLET ORAL
Status: DISPENSED
Start: 2021-02-04

## (undated) RX ORDER — LIDOCAINE HYDROCHLORIDE 10 MG/ML
INJECTION, SOLUTION EPIDURAL; INFILTRATION; INTRACAUDAL; PERINEURAL
Status: DISPENSED
Start: 2020-12-16

## (undated) RX ORDER — HYDROMORPHONE HYDROCHLORIDE 1 MG/ML
INJECTION, SOLUTION INTRAMUSCULAR; INTRAVENOUS; SUBCUTANEOUS
Status: DISPENSED
Start: 2021-02-04

## (undated) RX ORDER — OXYCODONE HYDROCHLORIDE 5 MG/1
TABLET ORAL
Status: DISPENSED
Start: 2021-02-04

## (undated) RX ORDER — BUPIVACAINE HYDROCHLORIDE 5 MG/ML
INJECTION, SOLUTION EPIDURAL; INTRACAUDAL
Status: DISPENSED
Start: 2020-09-21

## (undated) RX ORDER — METHYLPREDNISOLONE ACETATE 80 MG/ML
INJECTION, SUSPENSION INTRA-ARTICULAR; INTRALESIONAL; INTRAMUSCULAR; SOFT TISSUE
Status: DISPENSED
Start: 2020-12-16

## (undated) RX ORDER — FENTANYL CITRATE 50 UG/ML
INJECTION, SOLUTION INTRAMUSCULAR; INTRAVENOUS
Status: DISPENSED
Start: 2021-02-04

## (undated) RX ORDER — LIDOCAINE HYDROCHLORIDE 10 MG/ML
INJECTION, SOLUTION EPIDURAL; INFILTRATION; INTRACAUDAL; PERINEURAL
Status: DISPENSED
Start: 2020-09-21

## (undated) RX ORDER — BUPIVACAINE HYDROCHLORIDE 5 MG/ML
INJECTION, SOLUTION EPIDURAL; INTRACAUDAL
Status: DISPENSED
Start: 2020-12-16

## (undated) RX ORDER — BUPIVACAINE HYDROCHLORIDE 5 MG/ML
INJECTION, SOLUTION EPIDURAL; INTRACAUDAL
Status: DISPENSED
Start: 2021-02-04

## (undated) RX ORDER — SODIUM CHLORIDE, SODIUM LACTATE, POTASSIUM CHLORIDE, CALCIUM CHLORIDE 600; 310; 30; 20 MG/100ML; MG/100ML; MG/100ML; MG/100ML
INJECTION, SOLUTION INTRAVENOUS
Status: DISPENSED
Start: 2021-02-04

## (undated) RX ORDER — METHYLPREDNISOLONE ACETATE 80 MG/ML
INJECTION, SUSPENSION INTRA-ARTICULAR; INTRALESIONAL; INTRAMUSCULAR; SOFT TISSUE
Status: DISPENSED
Start: 2020-09-21

## (undated) RX ORDER — LIDOCAINE HYDROCHLORIDE AND EPINEPHRINE 10; 10 MG/ML; UG/ML
INJECTION, SOLUTION INFILTRATION; PERINEURAL
Status: DISPENSED
Start: 2021-02-04